# Patient Record
Sex: MALE | Race: WHITE | NOT HISPANIC OR LATINO | Employment: OTHER | URBAN - NONMETROPOLITAN AREA
[De-identification: names, ages, dates, MRNs, and addresses within clinical notes are randomized per-mention and may not be internally consistent; named-entity substitution may affect disease eponyms.]

---

## 2023-12-14 DIAGNOSIS — I10 PRIMARY HYPERTENSION: Primary | ICD-10-CM

## 2023-12-14 RX ORDER — ASPIRIN 325 MG
325 TABLET ORAL DAILY
COMMUNITY

## 2023-12-14 RX ORDER — RANOLAZINE 500 MG/1
500 TABLET, EXTENDED RELEASE ORAL 2 TIMES DAILY
COMMUNITY
Start: 2022-11-14

## 2023-12-14 RX ORDER — PENICILLIN V POTASSIUM 500 MG/1
500 TABLET, FILM COATED ORAL 4 TIMES DAILY
COMMUNITY

## 2023-12-14 RX ORDER — AMLODIPINE BESYLATE 10 MG/1
10 TABLET ORAL DAILY
COMMUNITY

## 2023-12-14 RX ORDER — HYDROCHLOROTHIAZIDE 25 MG/1
25 TABLET ORAL DAILY
COMMUNITY
End: 2024-04-03 | Stop reason: SDUPTHER

## 2023-12-14 RX ORDER — TRAZODONE HYDROCHLORIDE 100 MG/1
100 TABLET ORAL NIGHTLY
COMMUNITY
Start: 2020-01-27

## 2023-12-14 RX ORDER — DOXYCYCLINE 100 MG/1
100 TABLET ORAL 2 TIMES DAILY
COMMUNITY
Start: 2020-01-27

## 2023-12-14 RX ORDER — CLOTRIMAZOLE 1 %
1 CREAM (GRAM) TOPICAL 2 TIMES DAILY
COMMUNITY
Start: 2023-07-02

## 2023-12-14 RX ORDER — AMOXICILLIN AND CLAVULANATE POTASSIUM 875; 125 MG/1; MG/1
1 TABLET, FILM COATED ORAL 2 TIMES DAILY
COMMUNITY
Start: 2023-07-02 | End: 2023-07-09

## 2023-12-14 RX ORDER — ASPIRIN 81 MG/1
81 TABLET ORAL DAILY
COMMUNITY

## 2023-12-14 RX ORDER — METOPROLOL SUCCINATE 25 MG/1
25 TABLET, EXTENDED RELEASE ORAL DAILY
COMMUNITY
Start: 2020-01-27

## 2023-12-14 RX ORDER — METOPROLOL SUCCINATE 50 MG/1
50 TABLET, EXTENDED RELEASE ORAL DAILY
COMMUNITY
End: 2024-02-02 | Stop reason: SDUPTHER

## 2023-12-14 RX ORDER — PREDNISONE 20 MG/1
20 TABLET ORAL DAILY
COMMUNITY
Start: 2020-01-27

## 2023-12-14 RX ORDER — CLOPIDOGREL BISULFATE 75 MG/1
75 TABLET ORAL DAILY
COMMUNITY
End: 2023-12-14 | Stop reason: SDUPTHER

## 2023-12-14 RX ORDER — ATORVASTATIN CALCIUM 20 MG/1
20 TABLET, FILM COATED ORAL DAILY
COMMUNITY

## 2023-12-19 RX ORDER — CLOPIDOGREL BISULFATE 75 MG/1
75 TABLET ORAL DAILY
Qty: 90 TABLET | Refills: 3 | Status: SHIPPED | OUTPATIENT
Start: 2023-12-19 | End: 2024-01-29 | Stop reason: SDUPTHER

## 2024-01-29 DIAGNOSIS — I10 PRIMARY HYPERTENSION: ICD-10-CM

## 2024-01-29 NOTE — TELEPHONE ENCOUNTER
Pt called in to request a refill       Requested Prescriptions     Pending Prescriptions Disp Refills    clopidogrel (Plavix) 75 mg tablet 90 tablet 3     Sig: Take 1 tablet (75 mg) by mouth once daily.

## 2024-01-30 RX ORDER — CLOPIDOGREL BISULFATE 75 MG/1
75 TABLET ORAL DAILY
Qty: 90 TABLET | Refills: 3 | Status: SHIPPED | OUTPATIENT
Start: 2024-01-30 | End: 2024-04-29 | Stop reason: SDUPTHER

## 2024-02-02 DIAGNOSIS — I10 PRIMARY HYPERTENSION: Primary | ICD-10-CM

## 2024-02-02 RX ORDER — METOPROLOL SUCCINATE 50 MG/1
50 TABLET, EXTENDED RELEASE ORAL DAILY
Qty: 90 TABLET | Refills: 3 | Status: SHIPPED | OUTPATIENT
Start: 2024-02-02 | End: 2025-01-27

## 2024-02-02 NOTE — TELEPHONE ENCOUNTER
Pt is requesting a refill     Requested Prescriptions     Pending Prescriptions Disp Refills    metoprolol succinate XL (Toprol-XL) 50 mg 24 hr tablet 90 tablet 3     Sig: Take 1 tablet (50 mg) by mouth once daily.

## 2024-04-03 DIAGNOSIS — I10 PRIMARY HYPERTENSION: Primary | ICD-10-CM

## 2024-04-03 RX ORDER — HYDROCHLOROTHIAZIDE 25 MG/1
25 TABLET ORAL DAILY
Qty: 90 TABLET | Refills: 3 | Status: SHIPPED | OUTPATIENT
Start: 2024-04-03 | End: 2025-03-29

## 2024-04-03 NOTE — TELEPHONE ENCOUNTER
Pt is requesting a refill, due to running low       Requested Prescriptions     Pending Prescriptions Disp Refills    hydroCHLOROthiazide (HYDRODiuril) 25 mg tablet 90 tablet 3     Sig: Take 1 tablet (25 mg) by mouth once daily.

## 2024-04-29 DIAGNOSIS — I25.10 CORONARY ARTERY DISEASE INVOLVING NATIVE HEART, UNSPECIFIED VESSEL OR LESION TYPE, UNSPECIFIED WHETHER ANGINA PRESENT: ICD-10-CM

## 2024-04-29 DIAGNOSIS — I10 PRIMARY HYPERTENSION: ICD-10-CM

## 2024-04-29 NOTE — TELEPHONE ENCOUNTER
Christophe Ambriz  has called in to request a refill on his:    Clopidogrel 75mg     Medication sent to pharmacy and Christophe Ambriz  will be notified of when available for / has been sent out for delivery         Requested Prescriptions     Pending Prescriptions Disp Refills    clopidogrel (Plavix) 75 mg tablet 90 tablet 3     Sig: Take 1 tablet (75 mg) by mouth once daily.

## 2024-04-30 RX ORDER — CLOPIDOGREL BISULFATE 75 MG/1
75 TABLET ORAL DAILY
Qty: 90 TABLET | Refills: 3 | Status: SHIPPED | OUTPATIENT
Start: 2024-04-30 | End: 2025-04-25

## 2024-05-11 ENCOUNTER — HOSPITAL ENCOUNTER (EMERGENCY)
Facility: HOSPITAL | Age: 74
Discharge: HOME | End: 2024-05-11
Attending: EMERGENCY MEDICINE
Payer: MEDICARE

## 2024-05-11 VITALS
HEIGHT: 73 IN | WEIGHT: 210 LBS | BODY MASS INDEX: 27.83 KG/M2 | TEMPERATURE: 98.4 F | RESPIRATION RATE: 16 BRPM | DIASTOLIC BLOOD PRESSURE: 77 MMHG | HEART RATE: 72 BPM | OXYGEN SATURATION: 96 % | SYSTOLIC BLOOD PRESSURE: 138 MMHG

## 2024-05-11 DIAGNOSIS — K08.89 PAIN, DENTAL: Primary | ICD-10-CM

## 2024-05-11 PROCEDURE — 2500000001 HC RX 250 WO HCPCS SELF ADMINISTERED DRUGS (ALT 637 FOR MEDICARE OP): Mod: SE | Performed by: EMERGENCY MEDICINE

## 2024-05-11 PROCEDURE — 99283 EMERGENCY DEPT VISIT LOW MDM: CPT

## 2024-05-11 RX ORDER — AMOXICILLIN 500 MG/1
500 CAPSULE ORAL 3 TIMES DAILY
Qty: 30 CAPSULE | Refills: 0 | Status: SHIPPED | OUTPATIENT
Start: 2024-05-11 | End: 2024-05-21

## 2024-05-11 RX ORDER — AMOXICILLIN 500 MG/1
500 CAPSULE ORAL EVERY 8 HOURS SCHEDULED
Status: DISCONTINUED | OUTPATIENT
Start: 2024-05-11 | End: 2024-05-12 | Stop reason: HOSPADM

## 2024-05-11 RX ADMIN — AMOXICILLIN 500 MG: 500 CAPSULE ORAL at 22:30

## 2024-05-11 ASSESSMENT — PAIN DESCRIPTION - LOCATION: LOCATION: MOUTH

## 2024-05-11 ASSESSMENT — PAIN SCALES - GENERAL: PAINLEVEL_OUTOF10: 7

## 2024-05-11 ASSESSMENT — PAIN DESCRIPTION - PAIN TYPE: TYPE: ACUTE PAIN

## 2024-05-11 ASSESSMENT — PAIN - FUNCTIONAL ASSESSMENT: PAIN_FUNCTIONAL_ASSESSMENT: 0-10

## 2024-05-11 ASSESSMENT — PAIN DESCRIPTION - DESCRIPTORS: DESCRIPTORS: ACHING

## 2024-05-11 ASSESSMENT — PAIN DESCRIPTION - FREQUENCY: FREQUENCY: CONSTANT/CONTINUOUS

## 2024-05-12 NOTE — DISCHARGE INSTRUCTIONS
Follow-up with your dentist of choice.  There is a dental office in the [facility].  Recommend following up with your dentist of choice to soon as possible

## 2024-05-12 NOTE — ED PROVIDER NOTES
HPI   Chief Complaint   Patient presents with    Dental Pain     Dental pain       74-year-old male presents to the emergency department complaining of dental pain.  States that is been present this time for the past 3 days.  History of poor dentition.  States that it happens on and off and he needs antibiotics and Vicodin.  Denies any fevers or chills.  No other complaints.  Has been taking Tylenol and ibuprofen for the pain                          No data recorded                   Patient History   No past medical history on file.  No past surgical history on file.  No family history on file.  Social History     Tobacco Use    Smoking status: Not on file    Smokeless tobacco: Not on file   Substance Use Topics    Alcohol use: Not on file    Drug use: Not on file       Physical Exam   ED Triage Vitals [05/11/24 2119]   Temperature Heart Rate Respirations BP   36.9 °C (98.4 °F) 77 14 134/79      Pulse Ox Temp Source Heart Rate Source Patient Position   95 % Tympanic -- Sitting      BP Location FiO2 (%)     Left arm --       Physical Exam  HENT:      Head: Normocephalic and atraumatic.      Mouth/Throat:      Mouth: Mucous membranes are moist.      Comments: Patient with poor dentition.  Eroded teeth in multiple sites with gingival erythema without evidence of abscess formation.  Eyes:      Extraocular Movements: Extraocular movements intact.      Pupils: Pupils are equal, round, and reactive to light.   Cardiovascular:      Rate and Rhythm: Normal rate and regular rhythm.   Pulmonary:      Effort: Pulmonary effort is normal.      Breath sounds: Normal breath sounds.   Abdominal:      Palpations: Abdomen is soft.      Tenderness: There is no abdominal tenderness.   Neurological:      Mental Status: He is alert.         ED Course & MDM   Diagnoses as of 05/11/24 2222   Pain, dental       Medical Decision Making  Patient with poor dentition who presents with dental pain.  Patient will be started on amoxicillin for  infection.  He is to continue taking Tylenol and ibuprofen.  He needs to see a dentist in follow-up.  There is no abscess.  No concerns for Zackery's angina.  No posterior pharyngeal erythema.        Procedure  Procedures     Franki Saldivar MD  05/11/24 2221       Franki Saldivar MD  05/11/24 2222

## 2024-07-04 ENCOUNTER — HOSPITAL ENCOUNTER (EMERGENCY)
Facility: HOSPITAL | Age: 74
Discharge: OTHER INSTITUTION | End: 2024-07-06
Attending: EMERGENCY MEDICINE
Payer: MEDICARE

## 2024-07-04 DIAGNOSIS — N28.89 RENAL MASS: ICD-10-CM

## 2024-07-04 DIAGNOSIS — C79.9 METASTATIC MALIGNANT NEOPLASM, UNSPECIFIED SITE (MULTI): Primary | ICD-10-CM

## 2024-07-04 DIAGNOSIS — M89.9 LYTIC BONE LESIONS ON XRAY: ICD-10-CM

## 2024-07-04 PROCEDURE — 96375 TX/PRO/DX INJ NEW DRUG ADDON: CPT

## 2024-07-04 PROCEDURE — 99285 EMERGENCY DEPT VISIT HI MDM: CPT | Mod: 25

## 2024-07-04 PROCEDURE — 96374 THER/PROPH/DIAG INJ IV PUSH: CPT

## 2024-07-04 PROCEDURE — 96376 TX/PRO/DX INJ SAME DRUG ADON: CPT

## 2024-07-04 ASSESSMENT — COLUMBIA-SUICIDE SEVERITY RATING SCALE - C-SSRS
2. HAVE YOU ACTUALLY HAD ANY THOUGHTS OF KILLING YOURSELF?: NO
6. HAVE YOU EVER DONE ANYTHING, STARTED TO DO ANYTHING, OR PREPARED TO DO ANYTHING TO END YOUR LIFE?: NO
1. IN THE PAST MONTH, HAVE YOU WISHED YOU WERE DEAD OR WISHED YOU COULD GO TO SLEEP AND NOT WAKE UP?: NO

## 2024-07-04 ASSESSMENT — PAIN DESCRIPTION - PAIN TYPE: TYPE: ACUTE PAIN

## 2024-07-04 ASSESSMENT — PAIN - FUNCTIONAL ASSESSMENT: PAIN_FUNCTIONAL_ASSESSMENT: 0-10

## 2024-07-04 ASSESSMENT — PAIN SCALES - GENERAL: PAINLEVEL_OUTOF10: 7

## 2024-07-04 ASSESSMENT — PAIN DESCRIPTION - LOCATION: LOCATION: BACK

## 2024-07-05 ENCOUNTER — APPOINTMENT (OUTPATIENT)
Dept: RADIOLOGY | Facility: HOSPITAL | Age: 74
End: 2024-07-05
Payer: MEDICARE

## 2024-07-05 LAB
ALBUMIN SERPL BCP-MCNC: 3.8 G/DL (ref 3.4–5)
ALP SERPL-CCNC: 103 U/L (ref 33–136)
ALT SERPL W P-5'-P-CCNC: 15 U/L (ref 10–52)
ANION GAP SERPL CALC-SCNC: 16 MMOL/L (ref 10–20)
APPEARANCE UR: CLEAR
AST SERPL W P-5'-P-CCNC: 18 U/L (ref 9–39)
BILIRUB SERPL-MCNC: 0.5 MG/DL (ref 0–1.2)
BILIRUB UR STRIP.AUTO-MCNC: NEGATIVE MG/DL
BUN SERPL-MCNC: 11 MG/DL (ref 6–23)
CALCIUM SERPL-MCNC: 9.7 MG/DL (ref 8.6–10.3)
CHLORIDE SERPL-SCNC: 106 MMOL/L (ref 98–107)
CO2 SERPL-SCNC: 22 MMOL/L (ref 21–32)
COLOR UR: ABNORMAL
CREAT SERPL-MCNC: 0.75 MG/DL (ref 0.5–1.3)
CRP SERPL-MCNC: 10.89 MG/DL
EGFRCR SERPLBLD CKD-EPI 2021: >90 ML/MIN/1.73M*2
ERYTHROCYTE [DISTWIDTH] IN BLOOD BY AUTOMATED COUNT: 13.5 % (ref 11.5–14.5)
GLUCOSE SERPL-MCNC: 105 MG/DL (ref 74–99)
GLUCOSE UR STRIP.AUTO-MCNC: NORMAL MG/DL
HCT VFR BLD AUTO: 38.7 % (ref 41–52)
HGB BLD-MCNC: 12.5 G/DL (ref 13.5–17.5)
KETONES UR STRIP.AUTO-MCNC: ABNORMAL MG/DL
LEUKOCYTE ESTERASE UR QL STRIP.AUTO: NEGATIVE
MCH RBC QN AUTO: 27.2 PG (ref 26–34)
MCHC RBC AUTO-ENTMCNC: 32.3 G/DL (ref 32–36)
MCV RBC AUTO: 84 FL (ref 80–100)
MIXED CELL CASTS #/AREA UR COMP ASSIST: ABNORMAL /LPF
MUCOUS THREADS #/AREA URNS AUTO: ABNORMAL /LPF
NITRITE UR QL STRIP.AUTO: NEGATIVE
NRBC BLD-RTO: 0 /100 WBCS (ref 0–0)
PH UR STRIP.AUTO: 5.5 [PH]
PLATELET # BLD AUTO: 494 X10*3/UL (ref 150–450)
POTASSIUM SERPL-SCNC: 3.7 MMOL/L (ref 3.5–5.3)
PROT SERPL-MCNC: 7.8 G/DL (ref 6.4–8.2)
PROT UR STRIP.AUTO-MCNC: ABNORMAL MG/DL
PSA SERPL-MCNC: 3.69 NG/ML
RBC # BLD AUTO: 4.59 X10*6/UL (ref 4.5–5.9)
RBC # UR STRIP.AUTO: ABNORMAL /UL
RBC #/AREA URNS AUTO: ABNORMAL /HPF
SODIUM SERPL-SCNC: 140 MMOL/L (ref 136–145)
SP GR UR STRIP.AUTO: 1.03
UROBILINOGEN UR STRIP.AUTO-MCNC: NORMAL MG/DL
WBC # BLD AUTO: 12.7 X10*3/UL (ref 4.4–11.3)
WBC #/AREA URNS AUTO: ABNORMAL /HPF

## 2024-07-05 PROCEDURE — 80053 COMPREHEN METABOLIC PANEL: CPT | Performed by: EMERGENCY MEDICINE

## 2024-07-05 PROCEDURE — 36415 COLL VENOUS BLD VENIPUNCTURE: CPT | Performed by: EMERGENCY MEDICINE

## 2024-07-05 PROCEDURE — 2500000004 HC RX 250 GENERAL PHARMACY W/ HCPCS (ALT 636 FOR OP/ED): Mod: SE | Performed by: EMERGENCY MEDICINE

## 2024-07-05 PROCEDURE — 74177 CT ABD & PELVIS W/CONTRAST: CPT

## 2024-07-05 PROCEDURE — 72128 CT CHEST SPINE W/O DYE: CPT | Performed by: STUDENT IN AN ORGANIZED HEALTH CARE EDUCATION/TRAINING PROGRAM

## 2024-07-05 PROCEDURE — 72131 CT LUMBAR SPINE W/O DYE: CPT

## 2024-07-05 PROCEDURE — 2500000001 HC RX 250 WO HCPCS SELF ADMINISTERED DRUGS (ALT 637 FOR MEDICARE OP): Mod: SE | Performed by: EMERGENCY MEDICINE

## 2024-07-05 PROCEDURE — 2550000001 HC RX 255 CONTRASTS: Mod: SE

## 2024-07-05 PROCEDURE — 72128 CT CHEST SPINE W/O DYE: CPT

## 2024-07-05 PROCEDURE — 71260 CT THORAX DX C+: CPT | Performed by: RADIOLOGY

## 2024-07-05 PROCEDURE — 81001 URINALYSIS AUTO W/SCOPE: CPT | Performed by: EMERGENCY MEDICINE

## 2024-07-05 PROCEDURE — 84153 ASSAY OF PSA TOTAL: CPT | Mod: GENLAB | Performed by: EMERGENCY MEDICINE

## 2024-07-05 PROCEDURE — 96376 TX/PRO/DX INJ SAME DRUG ADON: CPT

## 2024-07-05 PROCEDURE — 74177 CT ABD & PELVIS W/CONTRAST: CPT | Performed by: RADIOLOGY

## 2024-07-05 PROCEDURE — 2550000001 HC RX 255 CONTRASTS: Mod: SE | Performed by: EMERGENCY MEDICINE

## 2024-07-05 PROCEDURE — 86140 C-REACTIVE PROTEIN: CPT | Performed by: EMERGENCY MEDICINE

## 2024-07-05 PROCEDURE — 85027 COMPLETE CBC AUTOMATED: CPT | Performed by: EMERGENCY MEDICINE

## 2024-07-05 PROCEDURE — 72158 MRI LUMBAR SPINE W/O & W/DYE: CPT | Performed by: RADIOLOGY

## 2024-07-05 PROCEDURE — 72131 CT LUMBAR SPINE W/O DYE: CPT | Performed by: STUDENT IN AN ORGANIZED HEALTH CARE EDUCATION/TRAINING PROGRAM

## 2024-07-05 PROCEDURE — 72158 MRI LUMBAR SPINE W/O & W/DYE: CPT

## 2024-07-05 PROCEDURE — A9575 INJ GADOTERATE MEGLUMI 0.1ML: HCPCS | Mod: SE

## 2024-07-05 RX ORDER — GADOTERATE MEGLUMINE 376.9 MG/ML
19 INJECTION INTRAVENOUS
Status: COMPLETED | OUTPATIENT
Start: 2024-07-05 | End: 2024-07-05

## 2024-07-05 RX ORDER — ONDANSETRON HYDROCHLORIDE 2 MG/ML
4 INJECTION, SOLUTION INTRAVENOUS ONCE
Status: COMPLETED | OUTPATIENT
Start: 2024-07-05 | End: 2024-07-05

## 2024-07-05 RX ORDER — HYDROMORPHONE HYDROCHLORIDE 1 MG/ML
1 INJECTION, SOLUTION INTRAMUSCULAR; INTRAVENOUS; SUBCUTANEOUS ONCE
Status: COMPLETED | OUTPATIENT
Start: 2024-07-05 | End: 2024-07-05

## 2024-07-05 RX ORDER — OXYCODONE HYDROCHLORIDE 5 MG/1
10 TABLET ORAL ONCE
Status: COMPLETED | OUTPATIENT
Start: 2024-07-05 | End: 2024-07-05

## 2024-07-05 RX ORDER — OXYCODONE HYDROCHLORIDE 5 MG/1
10 TABLET ORAL EVERY 6 HOURS PRN
Status: DISCONTINUED | OUTPATIENT
Start: 2024-07-05 | End: 2024-07-05

## 2024-07-05 RX ADMIN — HYDROMORPHONE HYDROCHLORIDE 0.5 MG: 1 INJECTION, SOLUTION INTRAMUSCULAR; INTRAVENOUS; SUBCUTANEOUS at 14:21

## 2024-07-05 RX ADMIN — HYDROMORPHONE HYDROCHLORIDE 1 MG: 1 INJECTION, SOLUTION INTRAMUSCULAR; INTRAVENOUS; SUBCUTANEOUS at 04:21

## 2024-07-05 RX ADMIN — IOHEXOL 75 ML: 350 INJECTION, SOLUTION INTRAVENOUS at 05:44

## 2024-07-05 RX ADMIN — GADOTERATE MEGLUMINE 19 ML: 376.9 INJECTION INTRAVENOUS at 10:11

## 2024-07-05 RX ADMIN — ONDANSETRON 4 MG: 2 INJECTION INTRAMUSCULAR; INTRAVENOUS at 04:21

## 2024-07-05 RX ADMIN — HYDROMORPHONE HYDROCHLORIDE 0.5 MG: 1 INJECTION, SOLUTION INTRAMUSCULAR; INTRAVENOUS; SUBCUTANEOUS at 20:25

## 2024-07-05 RX ADMIN — OXYCODONE HYDROCHLORIDE 10 MG: 5 TABLET ORAL at 00:34

## 2024-07-05 ASSESSMENT — PAIN SCALES - GENERAL
PAINLEVEL_OUTOF10: 7
PAINLEVEL_OUTOF10: 3
PAINLEVEL_OUTOF10: 8
PAINLEVEL_OUTOF10: 8

## 2024-07-05 ASSESSMENT — PAIN DESCRIPTION - LOCATION: LOCATION: BACK

## 2024-07-05 NOTE — ED PROVIDER NOTES
HPI   Chief Complaint   Patient presents with    Back Pain     Lower back pain for 36 days. Patient state he hasn't seen a dr       The patient has severe low back pain that has precluded his ambulation for the last 37 days.  He says that he is unaware of any injury strain or lifting accident that could have caused the pain.  He said it was more gradual but suddenly he could barely even move much less try to stand up.  He moves all extremities well and has no incontinence.  Sensorimotor function is intact in lower extremities.  He is fairly tender in the midline of the lumbar level.                        No data recorded                     Patient History   History reviewed. No pertinent past medical history.  History reviewed. No pertinent surgical history.  No family history on file.  Social History     Tobacco Use    Smoking status: Unknown    Smokeless tobacco: Not on file   Substance Use Topics    Alcohol use: Not on file    Drug use: Not on file       Physical Exam   ED Triage Vitals [07/04/24 2124]   Temperature Heart Rate Respirations BP   36.3 °C (97.3 °F) 72 16 148/84      Pulse Ox Temp Source Heart Rate Source Patient Position   98 % Temporal Monitor Sitting      BP Location FiO2 (%)     Right arm --       Physical Exam  Vitals and nursing note reviewed.   HENT:      Head: Normocephalic and atraumatic.      Right Ear: Tympanic membrane and ear canal normal.      Left Ear: Tympanic membrane and ear canal normal.      Nose: Nose normal.      Mouth/Throat:      Mouth: Mucous membranes are moist.   Cardiovascular:      Rate and Rhythm: Normal rate.   Pulmonary:      Effort: Pulmonary effort is normal.      Breath sounds: Normal breath sounds.   Abdominal:      General: Abdomen is flat.      Palpations: Abdomen is soft.   Musculoskeletal:         General: Tenderness present. Normal range of motion.      Cervical back: Normal range of motion.      Comments: There is midline tenderness in the lumbar spine    Skin:     General: Skin is warm and dry.   Neurological:      General: No focal deficit present.      Mental Status: He is alert.         ED Course & MDM   Diagnoses as of 07/06/24 2204   Metastatic malignant neoplasm, unspecified site (Multi)   Renal mass   Lytic bone lesions on xray       Medical Decision Making  Was not clear to me why the patient had been given narcotics without any defined cause or source of his low back pain.  He says the pain is severe and goes down the legs.  He had no incontinence.  Before sending him home with new medication I did scan his low back and thoracic spine and they have found a lytic lesion in the vertebral body of L3 which I think is probably metastatic.  There is also about a 20% compression fracture.  Since the patient cannot get out and go to the doctor and do much of anything since he is so much pain, I have opted to go ahead and scan when I can today to check for other related tumor and if possible, could likely obtain an MRI of the spine with contrast as recommended this morning.  After that he will need to be transferred in order to receive care for this lytic lesion.        Procedure  Procedures     Jose Soler MD  07/05/24 0348       Jose Soler MD  07/05/24 0432       Jose Soler MD  07/06/24 2204

## 2024-07-05 NOTE — PROGRESS NOTES
Emergency Medicine Transition of Care Note.    I received Christophe Ambriz in signout from Dr. Soler.  Please see the previous ED provider note for all HPI, PE and MDM up to the time of signout at 0700. This is in addition to the primary record.    In brief Christophe Ambriz is an 74 y.o. male presenting for back pain  Chief Complaint   Patient presents with    Back Pain     Lower back pain for 36 days. Patient state he hasn't seen a dr     At the time of signout we were awaiting: MRI results and transfer    Diagnoses as of 07/05/24 1053   Metastatic malignant neoplasm, unspecified site (Multi)   Renal mass   Lytic bone lesions on xray       Medical Decision Making  Patient is a 74-year-old male who initially presented to the ED for about 1 month of back pain, to the point where he has been unable to ambulate or get out of bed.    Workup in the ED was initiated by the previous physician, concerning for metastatic malignancy.  At time of signout, we are awaiting MRI results as well as transfer to a facility with oncology available, as patient is now unable to care for himself or get out of bed secondary to the pain.    Patient was informed of their lab and imaging results, and all questions and concerns were answered. Transfer planning for further management was discussed at this time, to which the patient was agreeable.  I discussed the case with Dr. Matias, oncologist at [facility], who accepts patient for transfer.      Final diagnoses:   [C79.9] Metastatic malignant neoplasm, unspecified site (Multi)   [N28.89] Renal mass   [M89.9] Lytic bone lesions on xray     Procedure  Procedures    Lexis Santizo MD

## 2024-07-06 ENCOUNTER — APPOINTMENT (OUTPATIENT)
Dept: RADIOLOGY | Facility: HOSPITAL | Age: 74
DRG: 456 | End: 2024-07-06
Payer: MEDICARE

## 2024-07-06 ENCOUNTER — OFFICE VISIT (OUTPATIENT)
Dept: HEMATOLOGY/ONCOLOGY | Facility: HOSPITAL | Age: 74
DRG: 456 | End: 2024-07-06
Payer: MEDICARE

## 2024-07-06 ENCOUNTER — HOSPITAL ENCOUNTER (INPATIENT)
Facility: HOSPITAL | Age: 74
DRG: 456 | End: 2024-07-06
Attending: INTERNAL MEDICINE | Admitting: INTERNAL MEDICINE
Payer: MEDICARE

## 2024-07-06 VITALS
DIASTOLIC BLOOD PRESSURE: 81 MMHG | TEMPERATURE: 97.3 F | SYSTOLIC BLOOD PRESSURE: 143 MMHG | RESPIRATION RATE: 18 BRPM | HEIGHT: 73 IN | HEART RATE: 71 BPM | WEIGHT: 210 LBS | OXYGEN SATURATION: 94 % | BODY MASS INDEX: 27.83 KG/M2

## 2024-07-06 DIAGNOSIS — G47.00 INSOMNIA, UNSPECIFIED TYPE: ICD-10-CM

## 2024-07-06 DIAGNOSIS — C64.9 RENAL CELL CARCINOMA, UNSPECIFIED LATERALITY (MULTI): ICD-10-CM

## 2024-07-06 DIAGNOSIS — Z95.5 STENTED CORONARY ARTERY: ICD-10-CM

## 2024-07-06 DIAGNOSIS — K59.03 DRUG-INDUCED CONSTIPATION: ICD-10-CM

## 2024-07-06 DIAGNOSIS — I10 PRIMARY HYPERTENSION: ICD-10-CM

## 2024-07-06 DIAGNOSIS — R52 PAIN: ICD-10-CM

## 2024-07-06 DIAGNOSIS — E78.5 HYPERLIPIDEMIA, UNSPECIFIED HYPERLIPIDEMIA TYPE: ICD-10-CM

## 2024-07-06 DIAGNOSIS — M84.48XA PATHOLOGIC LUMBAR VERTEBRAL FRACTURE, INITIAL ENCOUNTER: Primary | ICD-10-CM

## 2024-07-06 LAB
ABO GROUP (TYPE) IN BLOOD: NORMAL
ALBUMIN SERPL BCP-MCNC: 3.5 G/DL (ref 3.4–5)
ALP SERPL-CCNC: 110 U/L (ref 33–136)
ALT SERPL W P-5'-P-CCNC: 14 U/L (ref 10–52)
ANION GAP SERPL CALC-SCNC: 18 MMOL/L (ref 10–20)
ANTIBODY SCREEN: NORMAL
APPEARANCE UR: CLEAR
APPEARANCE UR: CLEAR
APTT PPP: 35 SECONDS (ref 27–38)
AST SERPL W P-5'-P-CCNC: 15 U/L (ref 9–39)
BASOPHILS # BLD AUTO: 0.05 X10*3/UL (ref 0–0.1)
BASOPHILS NFR BLD AUTO: 0.4 %
BILIRUB DIRECT SERPL-MCNC: 0.1 MG/DL (ref 0–0.3)
BILIRUB SERPL-MCNC: 0.4 MG/DL (ref 0–1.2)
BILIRUB UR STRIP.AUTO-MCNC: NEGATIVE MG/DL
BILIRUB UR STRIP.AUTO-MCNC: NEGATIVE MG/DL
BUN SERPL-MCNC: 10 MG/DL (ref 6–23)
CALCIUM SERPL-MCNC: 9 MG/DL (ref 8.6–10.6)
CHLORIDE SERPL-SCNC: 102 MMOL/L (ref 98–107)
CO2 SERPL-SCNC: 23 MMOL/L (ref 21–32)
COLOR UR: ABNORMAL
COLOR UR: ABNORMAL
CREAT SERPL-MCNC: 0.64 MG/DL (ref 0.5–1.3)
EGFRCR SERPLBLD CKD-EPI 2021: >90 ML/MIN/1.73M*2
EOSINOPHIL # BLD AUTO: 0.06 X10*3/UL (ref 0–0.4)
EOSINOPHIL NFR BLD AUTO: 0.4 %
ERYTHROCYTE [DISTWIDTH] IN BLOOD BY AUTOMATED COUNT: 13.6 % (ref 11.5–14.5)
GLUCOSE SERPL-MCNC: 153 MG/DL (ref 74–99)
GLUCOSE UR STRIP.AUTO-MCNC: NORMAL MG/DL
GLUCOSE UR STRIP.AUTO-MCNC: NORMAL MG/DL
HCT VFR BLD AUTO: 36.2 % (ref 41–52)
HGB BLD-MCNC: 11.5 G/DL (ref 13.5–17.5)
HOLD SPECIMEN: NORMAL
IMM GRANULOCYTES # BLD AUTO: 0.08 X10*3/UL (ref 0–0.5)
IMM GRANULOCYTES NFR BLD AUTO: 0.6 % (ref 0–0.9)
INR PPP: 1.2 (ref 0.9–1.1)
KETONES UR STRIP.AUTO-MCNC: ABNORMAL MG/DL
KETONES UR STRIP.AUTO-MCNC: NEGATIVE MG/DL
LDH SERPL L TO P-CCNC: 167 U/L (ref 84–246)
LEUKOCYTE ESTERASE UR QL STRIP.AUTO: NEGATIVE
LEUKOCYTE ESTERASE UR QL STRIP.AUTO: NEGATIVE
LYMPHOCYTES # BLD AUTO: 2.31 X10*3/UL (ref 0.8–3)
LYMPHOCYTES NFR BLD AUTO: 16.8 %
MAGNESIUM SERPL-MCNC: 1.95 MG/DL (ref 1.6–2.4)
MCH RBC QN AUTO: 27.1 PG (ref 26–34)
MCHC RBC AUTO-ENTMCNC: 31.8 G/DL (ref 32–36)
MCV RBC AUTO: 85 FL (ref 80–100)
MONOCYTES # BLD AUTO: 0.95 X10*3/UL (ref 0.05–0.8)
MONOCYTES NFR BLD AUTO: 6.9 %
MUCOUS THREADS #/AREA URNS AUTO: ABNORMAL /LPF
MUCOUS THREADS #/AREA URNS AUTO: NORMAL /LPF
NEUTROPHILS # BLD AUTO: 10.27 X10*3/UL (ref 1.6–5.5)
NEUTROPHILS NFR BLD AUTO: 74.9 %
NITRITE UR QL STRIP.AUTO: NEGATIVE
NITRITE UR QL STRIP.AUTO: NEGATIVE
NRBC BLD-RTO: 0 /100 WBCS (ref 0–0)
PH UR STRIP.AUTO: 6 [PH]
PH UR STRIP.AUTO: 6 [PH]
PHOSPHATE SERPL-MCNC: 2.4 MG/DL (ref 2.5–4.9)
PLATELET # BLD AUTO: 483 X10*3/UL (ref 150–450)
POTASSIUM SERPL-SCNC: 3.9 MMOL/L (ref 3.5–5.3)
PROT SERPL-MCNC: 6.8 G/DL (ref 6.4–8.2)
PROT UR STRIP.AUTO-MCNC: ABNORMAL MG/DL
PROT UR STRIP.AUTO-MCNC: ABNORMAL MG/DL
PROTHROMBIN TIME: 13.9 SECONDS (ref 9.8–12.8)
RBC # BLD AUTO: 4.24 X10*6/UL (ref 4.5–5.9)
RBC # UR STRIP.AUTO: ABNORMAL /UL
RBC # UR STRIP.AUTO: ABNORMAL /UL
RBC #/AREA URNS AUTO: ABNORMAL /HPF
RBC #/AREA URNS AUTO: NORMAL /HPF
RH FACTOR (ANTIGEN D): NORMAL
SODIUM SERPL-SCNC: 139 MMOL/L (ref 136–145)
SP GR UR STRIP.AUTO: 1.01
SP GR UR STRIP.AUTO: 1.01
UROBILINOGEN UR STRIP.AUTO-MCNC: NORMAL MG/DL
UROBILINOGEN UR STRIP.AUTO-MCNC: NORMAL MG/DL
WBC # BLD AUTO: 13.7 X10*3/UL (ref 4.4–11.3)
WBC #/AREA URNS AUTO: ABNORMAL /HPF
WBC #/AREA URNS AUTO: NORMAL /HPF

## 2024-07-06 PROCEDURE — 82248 BILIRUBIN DIRECT: CPT

## 2024-07-06 PROCEDURE — 71045 X-RAY EXAM CHEST 1 VIEW: CPT

## 2024-07-06 PROCEDURE — 2500000001 HC RX 250 WO HCPCS SELF ADMINISTERED DRUGS (ALT 637 FOR MEDICARE OP): Performed by: STUDENT IN AN ORGANIZED HEALTH CARE EDUCATION/TRAINING PROGRAM

## 2024-07-06 PROCEDURE — 93005 ELECTROCARDIOGRAM TRACING: CPT

## 2024-07-06 PROCEDURE — 99222 1ST HOSP IP/OBS MODERATE 55: CPT | Performed by: STUDENT IN AN ORGANIZED HEALTH CARE EDUCATION/TRAINING PROGRAM

## 2024-07-06 PROCEDURE — 72100 X-RAY EXAM L-S SPINE 2/3 VWS: CPT

## 2024-07-06 PROCEDURE — 71045 X-RAY EXAM CHEST 1 VIEW: CPT | Performed by: RADIOLOGY

## 2024-07-06 PROCEDURE — 84100 ASSAY OF PHOSPHORUS: CPT

## 2024-07-06 PROCEDURE — 99223 1ST HOSP IP/OBS HIGH 75: CPT

## 2024-07-06 PROCEDURE — 86901 BLOOD TYPING SEROLOGIC RH(D): CPT

## 2024-07-06 PROCEDURE — 36415 COLL VENOUS BLD VENIPUNCTURE: CPT

## 2024-07-06 PROCEDURE — 83615 LACTATE (LD) (LDH) ENZYME: CPT | Performed by: STUDENT IN AN ORGANIZED HEALTH CARE EDUCATION/TRAINING PROGRAM

## 2024-07-06 PROCEDURE — 2500000004 HC RX 250 GENERAL PHARMACY W/ HCPCS (ALT 636 FOR OP/ED)

## 2024-07-06 PROCEDURE — 1170000001 HC PRIVATE ONCOLOGY ROOM DAILY

## 2024-07-06 PROCEDURE — 72100 X-RAY EXAM L-S SPINE 2/3 VWS: CPT | Performed by: RADIOLOGY

## 2024-07-06 PROCEDURE — 73552 X-RAY EXAM OF FEMUR 2/>: CPT | Mod: 50

## 2024-07-06 PROCEDURE — 2500000004 HC RX 250 GENERAL PHARMACY W/ HCPCS (ALT 636 FOR OP/ED): Mod: SE | Performed by: EMERGENCY MEDICINE

## 2024-07-06 PROCEDURE — 81001 URINALYSIS AUTO W/SCOPE: CPT

## 2024-07-06 PROCEDURE — 73552 X-RAY EXAM OF FEMUR 2/>: CPT | Mod: BILATERAL PROCEDURE | Performed by: RADIOLOGY

## 2024-07-06 PROCEDURE — 85730 THROMBOPLASTIN TIME PARTIAL: CPT

## 2024-07-06 PROCEDURE — 83735 ASSAY OF MAGNESIUM: CPT

## 2024-07-06 PROCEDURE — 85025 COMPLETE CBC W/AUTO DIFF WBC: CPT

## 2024-07-06 PROCEDURE — 81001 URINALYSIS AUTO W/SCOPE: CPT | Performed by: STUDENT IN AN ORGANIZED HEALTH CARE EDUCATION/TRAINING PROGRAM

## 2024-07-06 PROCEDURE — 86923 COMPATIBILITY TEST ELECTRIC: CPT

## 2024-07-06 PROCEDURE — 2500000001 HC RX 250 WO HCPCS SELF ADMINISTERED DRUGS (ALT 637 FOR MEDICARE OP)

## 2024-07-06 RX ORDER — ATORVASTATIN CALCIUM 20 MG/1
20 TABLET, FILM COATED ORAL NIGHTLY
Status: DISCONTINUED | OUTPATIENT
Start: 2024-07-06 | End: 2024-07-12 | Stop reason: HOSPADM

## 2024-07-06 RX ORDER — OXYCODONE HYDROCHLORIDE 5 MG/1
5 TABLET ORAL EVERY 6 HOURS PRN
Status: DISCONTINUED | OUTPATIENT
Start: 2024-07-06 | End: 2024-07-06

## 2024-07-06 RX ORDER — METOPROLOL SUCCINATE 50 MG/1
50 TABLET, EXTENDED RELEASE ORAL DAILY
Status: DISCONTINUED | OUTPATIENT
Start: 2024-07-06 | End: 2024-07-12 | Stop reason: HOSPADM

## 2024-07-06 RX ORDER — HYDROMORPHONE HYDROCHLORIDE 1 MG/ML
0.5 INJECTION, SOLUTION INTRAMUSCULAR; INTRAVENOUS; SUBCUTANEOUS EVERY 6 HOURS PRN
Status: DISCONTINUED | OUTPATIENT
Start: 2024-07-06 | End: 2024-07-12 | Stop reason: HOSPADM

## 2024-07-06 RX ORDER — TRAZODONE HYDROCHLORIDE 50 MG/1
50 TABLET ORAL NIGHTLY
Status: DISCONTINUED | OUTPATIENT
Start: 2024-07-06 | End: 2024-07-12 | Stop reason: HOSPADM

## 2024-07-06 RX ORDER — OXYCODONE HYDROCHLORIDE 5 MG/1
7.5 TABLET ORAL
Status: DISCONTINUED | OUTPATIENT
Start: 2024-07-06 | End: 2024-07-07

## 2024-07-06 RX ORDER — ACETAMINOPHEN 500 MG
1000 TABLET ORAL EVERY 6 HOURS PRN
COMMUNITY

## 2024-07-06 RX ORDER — ENOXAPARIN SODIUM 100 MG/ML
40 INJECTION SUBCUTANEOUS DAILY
Status: DISCONTINUED | OUTPATIENT
Start: 2024-07-06 | End: 2024-07-12 | Stop reason: HOSPADM

## 2024-07-06 RX ORDER — POLYETHYLENE GLYCOL 3350 17 G/17G
17 POWDER, FOR SOLUTION ORAL DAILY
Status: DISCONTINUED | OUTPATIENT
Start: 2024-07-06 | End: 2024-07-12 | Stop reason: HOSPADM

## 2024-07-06 RX ORDER — HYDROCHLOROTHIAZIDE 25 MG/1
25 TABLET ORAL DAILY
Status: DISCONTINUED | OUTPATIENT
Start: 2024-07-06 | End: 2024-07-12 | Stop reason: HOSPADM

## 2024-07-06 RX ORDER — SENNOSIDES 8.6 MG/1
2 TABLET ORAL 2 TIMES DAILY
Status: DISCONTINUED | OUTPATIENT
Start: 2024-07-06 | End: 2024-07-12 | Stop reason: HOSPADM

## 2024-07-06 RX ORDER — CLOPIDOGREL BISULFATE 75 MG/1
75 TABLET ORAL DAILY
Status: DISCONTINUED | OUTPATIENT
Start: 2024-07-06 | End: 2024-07-12 | Stop reason: HOSPADM

## 2024-07-06 RX ORDER — IBUPROFEN 600 MG/1
600 TABLET ORAL EVERY 8 HOURS
Status: DISCONTINUED | OUTPATIENT
Start: 2024-07-06 | End: 2024-07-06

## 2024-07-06 RX ORDER — LIDOCAINE 560 MG/1
1 PATCH PERCUTANEOUS; TOPICAL; TRANSDERMAL DAILY
Status: DISCONTINUED | OUTPATIENT
Start: 2024-07-06 | End: 2024-07-12 | Stop reason: HOSPADM

## 2024-07-06 RX ORDER — ACETAMINOPHEN 325 MG/1
975 TABLET ORAL EVERY 8 HOURS
Status: DISCONTINUED | OUTPATIENT
Start: 2024-07-06 | End: 2024-07-12 | Stop reason: HOSPADM

## 2024-07-06 RX ORDER — OXYCODONE HYDROCHLORIDE 10 MG/1
10 TABLET ORAL
Status: CANCELLED | OUTPATIENT
Start: 2024-07-06

## 2024-07-06 RX ORDER — ACETAMINOPHEN 325 MG/1
975 TABLET ORAL EVERY 8 HOURS PRN
Status: DISCONTINUED | OUTPATIENT
Start: 2024-07-06 | End: 2024-07-06

## 2024-07-06 RX ORDER — GABAPENTIN 100 MG/1
100 CAPSULE ORAL NIGHTLY
Status: DISCONTINUED | OUTPATIENT
Start: 2024-07-06 | End: 2024-07-12 | Stop reason: HOSPADM

## 2024-07-06 RX ORDER — AMLODIPINE BESYLATE 10 MG/1
10 TABLET ORAL DAILY
Status: DISCONTINUED | OUTPATIENT
Start: 2024-07-06 | End: 2024-07-12 | Stop reason: HOSPADM

## 2024-07-06 RX ADMIN — SENNOSIDES 17.2 MG: 8.6 TABLET, FILM COATED ORAL at 20:28

## 2024-07-06 RX ADMIN — HYDROMORPHONE HYDROCHLORIDE 0.5 MG: 1 INJECTION, SOLUTION INTRAMUSCULAR; INTRAVENOUS; SUBCUTANEOUS at 06:28

## 2024-07-06 RX ADMIN — OXYCODONE HYDROCHLORIDE 5 MG: 5 TABLET ORAL at 04:29

## 2024-07-06 RX ADMIN — HYDROMORPHONE HYDROCHLORIDE 0.5 MG: 1 INJECTION, SOLUTION INTRAMUSCULAR; INTRAVENOUS; SUBCUTANEOUS at 01:49

## 2024-07-06 RX ADMIN — METOPROLOL SUCCINATE 50 MG: 50 TABLET, EXTENDED RELEASE ORAL at 08:57

## 2024-07-06 RX ADMIN — AMLODIPINE BESYLATE 10 MG: 10 TABLET ORAL at 08:57

## 2024-07-06 RX ADMIN — PSYLLIUM HUSK 1 PACKET: 3.4 POWDER ORAL at 10:11

## 2024-07-06 RX ADMIN — PSYLLIUM HUSK 1 PACKET: 3.4 POWDER ORAL at 20:28

## 2024-07-06 RX ADMIN — ATORVASTATIN CALCIUM 20 MG: 20 TABLET, FILM COATED ORAL at 20:28

## 2024-07-06 RX ADMIN — HYDROCHLOROTHIAZIDE 25 MG: 25 TABLET ORAL at 08:58

## 2024-07-06 RX ADMIN — TRAZODONE HYDROCHLORIDE 50 MG: 50 TABLET ORAL at 20:28

## 2024-07-06 RX ADMIN — ENOXAPARIN SODIUM 40 MG: 100 INJECTION SUBCUTANEOUS at 04:30

## 2024-07-06 RX ADMIN — OXYCODONE HYDROCHLORIDE 7.5 MG: 5 TABLET ORAL at 20:28

## 2024-07-06 RX ADMIN — ACETAMINOPHEN 975 MG: 325 TABLET ORAL at 17:19

## 2024-07-06 RX ADMIN — ACETAMINOPHEN 975 MG: 325 TABLET ORAL at 10:11

## 2024-07-06 RX ADMIN — PSYLLIUM HUSK 1 PACKET: 3.4 POWDER ORAL at 14:51

## 2024-07-06 RX ADMIN — GABAPENTIN 100 MG: 100 CAPSULE ORAL at 20:28

## 2024-07-06 RX ADMIN — OXYCODONE HYDROCHLORIDE 5 MG: 5 TABLET ORAL at 14:51

## 2024-07-06 SDOH — SOCIAL STABILITY: SOCIAL INSECURITY: HAVE YOU HAD THOUGHTS OF HARMING ANYONE ELSE?: NO

## 2024-07-06 SDOH — SOCIAL STABILITY: SOCIAL INSECURITY: HAVE YOU HAD ANY THOUGHTS OF HARMING ANYONE ELSE?: NO

## 2024-07-06 SDOH — ECONOMIC STABILITY: HOUSING INSECURITY: IN THE LAST 12 MONTHS, HOW MANY PLACES HAVE YOU LIVED?: 1

## 2024-07-06 SDOH — ECONOMIC STABILITY: TRANSPORTATION INSECURITY
IN THE PAST 12 MONTHS, HAS LACK OF TRANSPORTATION KEPT YOU FROM MEETINGS, WORK, OR FROM GETTING THINGS NEEDED FOR DAILY LIVING?: NO

## 2024-07-06 SDOH — ECONOMIC STABILITY: INCOME INSECURITY: IN THE LAST 12 MONTHS, WAS THERE A TIME WHEN YOU WERE NOT ABLE TO PAY THE MORTGAGE OR RENT ON TIME?: NO

## 2024-07-06 SDOH — SOCIAL STABILITY: SOCIAL INSECURITY: ARE THERE ANY APPARENT SIGNS OF INJURIES/BEHAVIORS THAT COULD BE RELATED TO ABUSE/NEGLECT?: NO

## 2024-07-06 SDOH — SOCIAL STABILITY: SOCIAL INSECURITY: DO YOU FEEL UNSAFE GOING BACK TO THE PLACE WHERE YOU ARE LIVING?: NO

## 2024-07-06 SDOH — ECONOMIC STABILITY: HOUSING INSECURITY
IN THE LAST 12 MONTHS, WAS THERE A TIME WHEN YOU DID NOT HAVE A STEADY PLACE TO SLEEP OR SLEPT IN A SHELTER (INCLUDING NOW)?: NO

## 2024-07-06 SDOH — SOCIAL STABILITY: SOCIAL INSECURITY: WERE YOU ABLE TO COMPLETE ALL THE BEHAVIORAL HEALTH SCREENINGS?: YES

## 2024-07-06 SDOH — ECONOMIC STABILITY: TRANSPORTATION INSECURITY
IN THE PAST 12 MONTHS, HAS THE LACK OF TRANSPORTATION KEPT YOU FROM MEDICAL APPOINTMENTS OR FROM GETTING MEDICATIONS?: NO

## 2024-07-06 SDOH — SOCIAL STABILITY: SOCIAL INSECURITY: DO YOU FEEL ANYONE HAS EXPLOITED OR TAKEN ADVANTAGE OF YOU FINANCIALLY OR OF YOUR PERSONAL PROPERTY?: NO

## 2024-07-06 SDOH — ECONOMIC STABILITY: INCOME INSECURITY: HOW HARD IS IT FOR YOU TO PAY FOR THE VERY BASICS LIKE FOOD, HOUSING, MEDICAL CARE, AND HEATING?: NOT HARD AT ALL

## 2024-07-06 SDOH — SOCIAL STABILITY: SOCIAL INSECURITY: DOES ANYONE TRY TO KEEP YOU FROM HAVING/CONTACTING OTHER FRIENDS OR DOING THINGS OUTSIDE YOUR HOME?: NO

## 2024-07-06 SDOH — SOCIAL STABILITY: SOCIAL INSECURITY: HAS ANYONE EVER THREATENED TO HURT YOUR FAMILY OR YOUR PETS?: NO

## 2024-07-06 SDOH — SOCIAL STABILITY: SOCIAL INSECURITY: ABUSE: ADULT

## 2024-07-06 SDOH — SOCIAL STABILITY: SOCIAL INSECURITY: ARE YOU OR HAVE YOU BEEN THREATENED OR ABUSED PHYSICALLY, EMOTIONALLY, OR SEXUALLY BY ANYONE?: NO

## 2024-07-06 ASSESSMENT — COGNITIVE AND FUNCTIONAL STATUS - GENERAL
TURNING FROM BACK TO SIDE WHILE IN FLAT BAD: A LOT
CLIMB 3 TO 5 STEPS WITH RAILING: TOTAL
MOVING TO AND FROM BED TO CHAIR: A LOT
MOVING TO AND FROM BED TO CHAIR: A LOT
MOVING FROM LYING ON BACK TO SITTING ON SIDE OF FLAT BED WITH BEDRAILS: A LOT
MOBILITY SCORE: 11
DRESSING REGULAR UPPER BODY CLOTHING: A LITTLE
HELP NEEDED FOR BATHING: A LITTLE
PATIENT BASELINE BEDBOUND: NO
STANDING UP FROM CHAIR USING ARMS: A LOT
WALKING IN HOSPITAL ROOM: A LOT
DRESSING REGULAR LOWER BODY CLOTHING: A LOT
TOILETING: A LITTLE
DRESSING REGULAR UPPER BODY CLOTHING: A LITTLE
DAILY ACTIVITIY SCORE: 19
DRESSING REGULAR LOWER BODY CLOTHING: A LOT
HELP NEEDED FOR BATHING: A LITTLE
TOILETING: A LITTLE
MOVING FROM LYING ON BACK TO SITTING ON SIDE OF FLAT BED WITH BEDRAILS: A LOT
CLIMB 3 TO 5 STEPS WITH RAILING: TOTAL
STANDING UP FROM CHAIR USING ARMS: A LOT
TURNING FROM BACK TO SIDE WHILE IN FLAT BAD: A LOT
DAILY ACTIVITIY SCORE: 19
WALKING IN HOSPITAL ROOM: A LOT
MOBILITY SCORE: 11

## 2024-07-06 ASSESSMENT — PAIN SCALES - GENERAL
PAINLEVEL_OUTOF10: 6
PAINLEVEL_OUTOF10: 6
PAINLEVEL_OUTOF10: 7
PAINLEVEL_OUTOF10: 7
PAINLEVEL_OUTOF10: 10 - WORST POSSIBLE PAIN
PAINLEVEL_OUTOF10: 9
PAINLEVEL_OUTOF10: 8
PAINLEVEL_OUTOF10: 6
PAINLEVEL_OUTOF10: 10 - WORST POSSIBLE PAIN
PAINLEVEL_OUTOF10: 7

## 2024-07-06 ASSESSMENT — ACTIVITIES OF DAILY LIVING (ADL)
GROOMING: INDEPENDENT
ADEQUATE_TO_COMPLETE_ADL: YES
HEARING - LEFT EAR: FUNCTIONAL
TOILETING: NEEDS ASSISTANCE
JUDGMENT_ADEQUATE_SAFELY_COMPLETE_DAILY_ACTIVITIES: YES
DRESSING YOURSELF: INDEPENDENT
FEEDING YOURSELF: INDEPENDENT
BATHING: NEEDS ASSISTANCE
HEARING - RIGHT EAR: FUNCTIONAL
WALKS IN HOME: NEEDS ASSISTANCE
PATIENT'S MEMORY ADEQUATE TO SAFELY COMPLETE DAILY ACTIVITIES?: YES

## 2024-07-06 ASSESSMENT — LIFESTYLE VARIABLES
AUDIT-C TOTAL SCORE: 2
SKIP TO QUESTIONS 9-10: 0
AUDIT-C TOTAL SCORE: 2
HOW OFTEN DO YOU HAVE 6 OR MORE DRINKS ON ONE OCCASION: LESS THAN MONTHLY
HOW OFTEN DO YOU HAVE A DRINK CONTAINING ALCOHOL: MONTHLY OR LESS
HOW MANY STANDARD DRINKS CONTAINING ALCOHOL DO YOU HAVE ON A TYPICAL DAY: 1 OR 2

## 2024-07-06 ASSESSMENT — PAIN DESCRIPTION - DESCRIPTORS: DESCRIPTORS: ACHING

## 2024-07-06 ASSESSMENT — PAIN - FUNCTIONAL ASSESSMENT
PAIN_FUNCTIONAL_ASSESSMENT: 0-10

## 2024-07-06 ASSESSMENT — COLUMBIA-SUICIDE SEVERITY RATING SCALE - C-SSRS
1. IN THE PAST MONTH, HAVE YOU WISHED YOU WERE DEAD OR WISHED YOU COULD GO TO SLEEP AND NOT WAKE UP?: NO
6. HAVE YOU EVER DONE ANYTHING, STARTED TO DO ANYTHING, OR PREPARED TO DO ANYTHING TO END YOUR LIFE?: NO
2. HAVE YOU ACTUALLY HAD ANY THOUGHTS OF KILLING YOURSELF?: NO

## 2024-07-06 ASSESSMENT — PAIN DESCRIPTION - LOCATION
LOCATION: HIP
LOCATION: HIP

## 2024-07-06 ASSESSMENT — PATIENT HEALTH QUESTIONNAIRE - PHQ9
1. LITTLE INTEREST OR PLEASURE IN DOING THINGS: NOT AT ALL
SUM OF ALL RESPONSES TO PHQ9 QUESTIONS 1 & 2: 0
2. FEELING DOWN, DEPRESSED OR HOPELESS: NOT AT ALL

## 2024-07-06 ASSESSMENT — PAIN DESCRIPTION - ORIENTATION: ORIENTATION: RIGHT;LEFT

## 2024-07-06 NOTE — SIGNIFICANT EVENT
"Updates to Assessment & Plan Following Rounds    Subjective/Interval History:  Patient seen and evaluated at bedside in AM. HPI as reported in H&P confirmed with the patient. Pt states he slept \"for an hour or two\" after arriving as transfer from [place] and getting to [facility] \"around 2 am\". Patient unclear on what was identified in his scans, and states that he thinks he was transferred to \"figure out something more than sciatica\". When asked about whether he was told there was a mass found on his left kidney, he states that he was told something about that.     Updates on 7/6  Pain management regimen:   Change tylenol 975 mg q8h PRN to scheduled 975 mg PO q8h  Consult to Interventional Radiology placed 7/6, appreciate recs  Preference for biopsy of L3 mass prior to renal mass (given soft tissue present at L3)  Add psyllium husk 3.4g packet TID     Consult neurosurgery place 7/6, appreciate recs  If NSG wants to do something surgical, can get biopsy tissue at that time      Assessment & Plan  Christophe Ambriz is a 74-year-old male with past medical history significant for significant tobacco use, coronary artery disease, hypertension, hyperlipidemia who is admitted for left renal mass and pathologic L3 fracture concerning for metastatic disease.  Currently stable, on minimal medications.  MRI spine completed, no concern for cord compression.     #Left renal mass  #L3 lumbar fracture concern for spinal mets  :: Patient with 1 month of severe back pain with point tenderness, recent weight loss, pain awakening at night concerning for malignant process with metastasis  :: CT and MRI imaging as above showing left renal mass concerning for RCC with enhancing metastatic lesion infiltrating L3 vertebral body and adjacent epidural invasion involving the ventral aspect of L3  Plan:  No concern at this time for cord compression, will not start dexamethasone but could consider for symptom relief  Tylenol 975 every 8 hours, " oxycodone 5 mg every 6 hours as needed, Dilaudid 0.5 mg every 6 hours for breakthrough  Consult neurosurgery place 7/6, appreciate recs  If NSG wants to do something surgical, can get biopsy tissue at that time  Consult to Interventional Radiology placed 7/6, appreciate recs  Preference for biopsy of L3 mass prior to renal mass (given soft tissue present at L3)     #CAD s/p PCI  #HTN  #HLD  hold home plavix in lieu of possible biopsy   continue home atorvastatin  continue home amlodipine 10mg daily  continue home hydrochlorothiazide 25mg daily   continue home metoprolol succinate 50mg daily   confirm home medications w/ pharmacy med rec in AM     #Leukocytosis  :: No current infectious symptoms, likely reactive in setting of likely malignancy and fracture     F: PRN  E: PRN  N: regular  A: PIV  Abx: none  O2: RA  GI ppx: n/a  DVT ppx: lovenox  Bowel ppx: senokot 17.2 mg BID, psyllium husk 3.4g TID, Miralax 17g PO daily    NOK: Iram Aguirre (sig other) [phone]  Code status: Full code (confirmed on admission)    Felix Edwards M4  CWRU YANETH

## 2024-07-06 NOTE — PROGRESS NOTES
Pharmacy Medication History Review    Christophe Ambriz is a 74 y.o. male admitted for Pathologic lumbar vertebral fracture, initial encounter. Pharmacy reviewed the patient's [id number] medications and allergies for accuracy.    The list below reflects the updated PTA list.   Comments regarding how patient may be taking medications differently can be found in the Admit Orders Activity  Prior to Admission Medications   Prescriptions Last Dose Informant Patient Reported?   acetaminophen (Tylenol) 500 mg tablet  Self Yes   Sig: Take 2 tablets (1,000 mg) by mouth every 6 hours if needed for mild pain (1 - 3).   amLODIPine (Norvasc) 10 mg tablet  Self Yes   Sig: Take 1 tablet (10 mg) by mouth once daily.   aspirin 81 mg EC tablet Not Taking Self Yes   Sig: Take 1 tablet (81 mg) by mouth once daily.   atorvastatin (Lipitor) 20 mg tablet  Self Yes   Sig: Take 1 tablet (20 mg) by mouth once daily.   clopidogrel (Plavix) 75 mg tablet  Self No   Sig: Take 1 tablet (75 mg) by mouth once daily.   hydroCHLOROthiazide (HYDRODiuril) 25 mg tablet  Self No   Sig: Take 1 tablet (25 mg) by mouth once daily.   metoprolol succinate XL (Toprol-XL) 50 mg 24 hr tablet  Self No   Sig: Take 1 tablet (50 mg) by mouth once daily.      Facility-Administered Medications: None        The list below reflects the updated allergy list. Please review each documented allergy for additional clarification and justification.  Allergies  Reviewed by London Chenug [facility] on 7/6/2024   No Known Allergies         Patient accepts M2B at discharge.   Local pharmacy: [facility] in [place], [place]     Sources:   Pt interview - familiar with medications, moderate historian.   Attempted to call friend (Iram) [phone] - pt reports she knows more about his medications than he does. LVM asking for return call.   Dispense hx  OARRS - no hx     Additional Comments:  Acetaminophen - pt reports taking acetaminophen 500 mg tablets, used a whole bottle of ~250  "tablets in 2 weeks (average 1974-0641 mg per day). Hepatic function panel from today is WNL. I discussed extensively the risk of taking more than eight 500 mg tablets per day. Risks including acetaminophen overdose, liver damage, ICU admission, and death.   Hydrochlorothiazide - reports running out of this medication. Pt needs to call the pharmacy to refill hydrochlorothiazide. He has 3 refills remaining.       London Cheung, Mary Anne  Transitions of Care Pharmacist  07/06/24     Secure Chat preferred   If no response call [phone] or Vocera \"Med Rec\"   "

## 2024-07-06 NOTE — SIGNIFICANT EVENT
Interventional Radiology Clinical Event Note:    IR contacted RE patient Christophe Ambriz for consideration of left renal mass biopsy.     Recent CT CAP on 7/5/24 showed a heterogenous solid mass arising from the left upper renal pole concerning for malignancy. Additionally, patient found to have osseous lesions including the right 9th rib and L3 vertebral body, presumably metastatic.     IR advised that US guided biopsy would be more appropriate in an outpatient setting given the patient's stability otherwise. Discussed with team and agreed.     Please contact if any significant changes in the patient's status requiring more urgent intervention.    Ofelia Kelley MD PGY-3  Interventional Radiology  Pager [phone] or Epic Secure Chat

## 2024-07-06 NOTE — CONSULTS
Inpatient consult to Neurosurgery  Consult performed by: Linda Dunaway MD  Consult ordered by: Nate Matias MD        Reason For Consult  L3 lesion, pathological fracture    History Of Present Illness  Christophe Ambriz is a 74 y.o. male with h/o HTN, HLD, CAD s/p PCI (2017) (on ASA/PLX), p/w LBP of 37d duration, CT T/L spine L3 lytic lesion, CT CAP 6x8 cm L kidney mass, 2.1 cm R thyroid nodule, L 9th rib lytic lesion, MRL LS lytic mets to L3 with L3-4 neuro foraminal stenosis (severe on the L), L3-4 disc involvement with tumor    Patient stated that he has been experiencing severe low back pain for 37 days and BLE weakness for about 30 days.  Denies bowel/bladder incontinence, change in sensation, headache, nausea, vomiting, chills, or any other symptoms. The pain begins in the back and radiates into the thigh and leg. The pain is 10/10 and he is barely able to walk.     Patient is on Plavix and ASA (last dose, about a week ago).     Imaging is personally reviewed and CT T/L spine L3 lytic lesion, CT CAP 6x8 cm L kidney mass, 2.1 cm R thyroid nodule, L 9th rib lytic lesion, MRL LS osteolytic mets to L3 with L3-4 neuro foraminal stenosis (severe on the L), L3-4 disc involvement with tumor    Past Medical History  He has no past medical history on file.    Surgical History  He has no past surgical history on file.     Social History  He has no history on file for tobacco use, alcohol use, and drug use.    Family History  No family history on file.     Allergies  Patient has no known allergies.    Review of Systems   Review of systems was reviewed and otherwise negative other than what was listed in the HPI.    Physical Exam  GENERAL APPEARANCE:  No distress, alert, interactive and cooperative.   RESP: breathing comfortably  CARDIOVASCULAR: Regular rate and rhythm. Radial pulses +2 and equal. No swelling, varicosities, edema, or tenderness to palpation.   NEURO:     NAD, A&Ox3     Cranial Nerves II-XII: PERRL, EOMI,  "Face symmetric, Facial SILT, Palate/Tongue midline and symmetric, shoulder shrugs symmetric, hearing intact to finger rubs bilaterally       MOTOR:        Muscle bulk and tone were normal in both upper and lower extremities.          RUE D5 / B5 / T5 / HG 5/ IO 5       LUE D5 / B5 / T5 / HG 5/ IO 5       Negative quinn         RLE HF5 / KE 5/ DF 5/ PF 5       LLE HF4 / KE 5/ DF 5/ PF 5       B/l clonus      SENSORY:  In both upper and lower extremities, sensation was intact to light touch   L3 lumbar radiculopathy and pain in the L3/L4 distribution worse in the left leg than right     PSYCH: appropriate  SKIN: no obvious lesions     Last Recorded Vitals  Blood pressure 153/86, pulse 74, temperature 36.4 °C (97.5 °F), temperature source Temporal, resp. rate 16, height 1.854 m (6' 1\"), weight 91 kg (200 lb 9.9 oz), SpO2 94%.    Relevant Results  Results for orders placed or performed during the hospital encounter of 07/06/24 (from the past 24 hour(s))   Urinalysis with Reflex Culture and Microscopic   Result Value Ref Range    Color, Urine Light-Yellow Light-Yellow, Yellow, Dark-Yellow    Appearance, Urine Clear Clear    Specific Gravity, Urine 1.014 1.005 - 1.035    pH, Urine 6.0 5.0, 5.5, 6.0, 6.5, 7.0, 7.5, 8.0    Protein, Urine 10 (TRACE) NEGATIVE, 10 (TRACE), 20 (TRACE) mg/dL    Glucose, Urine Normal Normal mg/dL    Blood, Urine 0.03 (TRACE) (A) NEGATIVE    Ketones, Urine NEGATIVE NEGATIVE mg/dL    Bilirubin, Urine NEGATIVE NEGATIVE    Urobilinogen, Urine Normal Normal mg/dL    Nitrite, Urine NEGATIVE NEGATIVE    Leukocyte Esterase, Urine NEGATIVE NEGATIVE   Extra Urine Gray Tube   Result Value Ref Range    Extra Tube Hold for add-ons.    Urinalysis Microscopic   Result Value Ref Range    WBC, Urine 1-5 1-5, NONE /HPF    RBC, Urine 6-10 (A) NONE, 1-2, 3-5 /HPF    Mucus, Urine FEW Reference range not established. /LPF          Assessment/Plan     Christophe Ambriz is a 74 y.o. male with h/o HTN, HLD, CAD s/p PCI " (2017) (on ASA/PLX), p/w LBP of 37d duration, CT T/L spine L3 lytic lesion, CT CAP 8 cm L kidney mass, 2.1 cm R thyroid nodule, L 9th rib lytic lesion, MRL LS lytic mets to L3 with L3-4 neuro foraminal stenosis (severe on the L), L3-4 disc involvement with tumor.    Patient has left hip flexion weakness and bilateral lower extremity radiculopathy.  Otherwise, spine exam is unremarkable.  Imaging is concerning for metastatic lesion. No cord compression or severe canal stenosis on imaging. Patients symptoms  are likely from the neuroforaminal stenosis at L3-4.     Recs  Please obtain upright lumbar spine x-ray  Recommend hard Thoracic-Lumbar-Sacral Orthosis (TLSO)   Document RCRI/prognosis  CBC/RFP/coag/T&S/UA/EKG/CXR    Patient is discussed with chief resident, who agrees with above assessment and plan. Note is not final until signed by attending physician.     Note authored by resident on neurosurgery team, with all questions or to contact team please page at [phone]    Linda Dunaway MD  Neurosurgery, PGY-2      Pathological fracture with >50% loss of height on standing xrays.  Severe intractable pain in the L3 distribution of the left leg.  He is unable to walk.  He does not have a known diagnosis of cancer but appears to have metastatic disease.  The presumptive diagnosis is renal cell carcinoma.  I discussed with the oncology team and if this is the case he may have a reasonable prognosis.  Given the instability and the lumbar radiculopathy being so debilitating I would recommend surgical stabilization decompression and fusion.  The intent would be to get a diagnosis at the time of surgery.    I offered the option of surgery that would consist of a L1-5 open decompression stabilization and resection of L3 tumor.    I have explained the surgical procedure in detail with expected duration and extent of recovery along risks of surgery that include, but is not limited to bleeding, infection, blood vessel injury or  damage, loss of sensation, loss of bladder, bowel or sexual function, nerve injury/damage resulting in weakness/paralysis, malunion, nonunion, CSF leak, brachial plexus injury, peripheral vision blindness, failure of implants/fusion, failure to relieve symptoms, recurrent disease, adjacent segment disease, need to reoperate for any reason and general anesthesia reaction such as stroke, coma, heart attack, delirium, confusion, death as well as worsening of preexisted medical conditions.    I clearly emphasized that while the goal of surgery is to decompress the spinal cord so as to ARREST the progression of neurological deficits - preexisting deficits may or may not improve after surgery. We discussed that many patients do clinically improve in functional and neurological outcomes following decompression of the spinal elements in patients with lumbar degenerative disease or radiculopathy the extent of which is variable and depends on the severity of pain, numbness, tingling, or weakness. With improvement seen of those symptoms in that order. We did discuss the goal of surgery to alleviate pain first and foremost and hope for recovery of all neurologic function with time.    All questions were answered and the patient left satisfied with the surgical plan moving forward.     I saw and evaluated the patient.  I personally obtained the key and critical portions of the history and physical exam or was physically present for key and critical portions performed by the Resident/Fellow. I reviewed the documentation and discussed the patient with the Resident/Fellow.  I agree with the Resident/Fellow’s medical decision making as documented in the note.     I have reviewed all prior documentation and reviewed the electronic medical record since admission. I have personally have reviewed all advanced imaging not just the reports and used my interpretation as documented as the relevant findings. I have reviewed the risks and  benefits of all treatment recommendations listed in this note with the patient and family. I spent a total of 60 minutes in service to this patient's care during this date of service.      Zac Garcia MD, [facility]  Spine , [facility]  Yohan Lima and Norma Lima Chair in Spinal Neurosurgery  Neurosurgery , [facility]  Complex Spine Surgery Fellowship Director   of Neurological Surgery  [employer]  Office: [phone]  Fax: [phone]

## 2024-07-06 NOTE — CONSULTS
SUPPORTIVE AND PALLIATIVE ONCOLOGY CONSULT    Inpatient consult to [facility] Adult Supportive Oncology  Consult performed by: Galina Durant, APRN-CNP  Consult ordered by: Nate Matias MD        SERVICE DATE: 7/6/2024      PALLIATIVE MEDICINE OUTPATIENT PROVIDER:  None  CURRENT ATTENDING PROVIDER: Nate Matias MD     Medical Oncologist: No care team member to display   Radiation Oncologist: No care team member to display  Primary Physician: Dejuan Dhaliwal  [phone]    REASON FOR CONSULT/CHIEF CONSULT COMPLAINT: pain management    Subjective   HISTORY OF PRESENT ILLNESS: Christophe Ambriz is a 74 y.o. male diagnosed with left renal mass and pathologic fracture of L3; c/f metastatic renal cell carcinoma. PMH significant for CAD s/p stenting (pt unsure of what year), HTN, HLD, tobacco use disorder. Admitted 7/6/2024 for further evaluation and management of pathologic spine fracture from bony metastatic disease from [facility] where pt originally presented on 7/4/24 with c/o low back pain that frequently awakens him at night. Course complicated by sub-optimal pain control. Supportive and Palliative Oncology is consulted for pain management.       CT of lumbar spine completed on 7/5/24. Results as follows:  1.  Large lytic lesion is present within the L3 vertebral body,  likely representing metastatic, myelogenous or lymphoproliferative  disease, with a suspected underlying pathologic fracture with less  than 10 to 20% height loss and no significant retropulsion  posteriorly into the spinal canal. Dedicated contrast enhanced MRI of  the lumbar spine is recommended to better characterize mass and  assess for any epidural soft tissue components.  2. No additional lytic lesions are identified in the thoracic or  lumbar spine.  3. Degenerative changes are present in the lumbar spine, although the  exam is not optimized to assess the spinal canal. At least  mild-to-moderate stenosis is present at L3-L4 and L4-L5  "due to disc  osteophyte complexes.    CT of thoracic spine completed on 7/5/24. Results as follows:   1.  Large lytic lesion is present within the L3 vertebral body,  likely representing metastatic, myelogenous or lymphoproliferative  disease, with a suspected underlying pathologic fracture with less  than 10 to 20% height loss and no significant retropulsion  posteriorly into the spinal canal. Dedicated contrast enhanced MRI of  the lumbar spine is recommended to better characterize mass and  assess for any epidural soft tissue components.  2. No additional lytic lesions are identified in the thoracic or  lumbar spine.  3. Degenerative changes are present in the lumbar spine, although the  exam is not optimized to assess the spinal canal. At least  mild-to-moderate stenosis is present at L3-L4 and L4-L5 due to disc  osteophyte complexes.    MRI of lumbar spine performed 7/5/24. Results as follows:  1) Findings concerning for left renal cell carcinoma with enhancing  metastatic lesion infiltrating the L3 vertebral body with minimal  height loss/pathologic fracture component and adjacent epidural  invasion involving the ventral aspect of L3. There is lateral recess  narrowing with no significant spinal canal stenosis. There is a  subcentimeter focus of enhancement involving the inferior endplate of  L2 along the rightward aspect that could represent additional  neoplastic involvement or degenerative change. There is otherwise no  additional osseous metastatic disease identified within the lumbar  spine.  2) Lumbar degenerative change as described in detail above. There is  severe neural foraminal narrowing at the L5-S1 level,  left-greater-than-right.    IR consulted and recommend biopsy of L3 mass prior to renal mass (given soft tissue present at L3  NSGY consulted and evaluated pt today (7/6/24); recs as follows:         \"Please obtain upright lumbar spine x-ray         Document RCRI/prognosis         " "CBC/RFP/coag/T&S/UA/EKG/CXR         Further recs pending above imaging\"    Pain Assessment:  Onset: 1-2 Months  Location: Bilateral lower back pain  Duration: Constant  Characteristics:   Ratin   Descriptors: aching, throbbing, shooting, and stabbing   Aggravating: movement, walking, and bending    Relieving: Analgesics and Modifying activity   Intolerances:Christophe Ambriz has No Known Allergies.   Personal Pain Goal: 3    Interference with Function: Very Much   Coping Strategies: relaxation   Emotional Response: None   Barriers to Pain Management: None    Opioid Use  Past 24 h opioid use:   Hydromorphone 0.5 mg IV x 4 doses = 2 mg = 25 OME  Oxycodone IR 5 mg PO x 1 doses = 5 mg = 6.2 OME  Total 24h OME use:  31.2 OME    Note: OME calculations based on equianalgesic table below. Please note this table is based on best available evidence but conversions are still approximate. These are NOT opioid DOSES for individual patient use; this is equivalency information.  Drug Parenteral Enteral   Morphine 10 25   Oxycodone N/A 20   Hydromorphone 2 5   Fentanyl 0.15 N/A   Tramadol N/A 120   Citation: Rafaela MARX. Demystifying opioid conversion calculations: A guide for effective dosing, Second edition. MD Reyna: American Society of Health-System Pharmacists, 2018.    OARRS/PDMP reviewed; Unintentional Overdose Risk Pojla=450; no aberrant behavior noted.    Symptom Assessment:  Pain:very much   Headache: none  Dizziness:none  Lack of energy: none  Difficulty sleeping: somewhat  Worrying: none  Anxiety: none  Depression: none  Pain in mouth/swallowing: none  Dry mouth: none  Taste changes: none  Shortness of breath: none  Lack of appetite: a little (associates with pain)  Nausea: none  Vomiting: none  Constipation: somewhat (states he has chronic constipation for \"years\")  Diarrhea: none  Sore muscles: none  Numbness or tingling in hands/feet/other: none  Weight loss: a little  Other: somewhat (bilateral lower " extremity weakness)  Wt Readings from Last 10 Encounters:   07/06/24 91 kg (200 lb 9.9 oz)   07/04/24 95.3 kg (210 lb)   05/11/24 95.3 kg (210 lb)   11/14/22 102 kg (225 lb)   05/16/22 112 kg (248 lb)   12/14/21 109 kg (240 lb)   06/15/21 110 kg (242 lb)   12/01/20 110 kg (243 lb)   10/14/20 110 kg (243 lb)   01/27/20 102 kg (224 lb)           Information obtained from: chart review, interview of patient, discussion with RN, and discussion with primary team  ______________________________________________________________________     Oncology History    No history exists.       No past medical history on file.  No past surgical history on file.  No family history on file.     SOCIAL HISTORY:  Marital Status single; in relationship with significant other Iram Armstrong-states ok to discuss anything regarding his care with her; no children   Social History:  Rare ETOH use  Smoked 3 packs/day for at least 50 years for 150-pack-year history  Denies illicit drug use    Oriental orthodox and Importance of Oriental orthodox:  None    REVIEW OF SYSTEMS:  Review of systems negative unless noted in HPI.       Objective       Lab Results   Component Value Date    WBC 13.7 (H) 07/06/2024    HGB 11.5 (L) 07/06/2024    HCT 36.2 (L) 07/06/2024    MCV 85 07/06/2024     (H) 07/06/2024      Lab Results   Component Value Date    GLUCOSE 153 (H) 07/06/2024    CALCIUM 9.0 07/06/2024     07/06/2024    K 3.9 07/06/2024    CO2 23 07/06/2024     07/06/2024    BUN 10 07/06/2024    CREATININE 0.64 07/06/2024     Lab Results   Component Value Date    ALT 14 07/06/2024    AST 15 07/06/2024    ALKPHOS 110 07/06/2024    BILITOT 0.4 07/06/2024     Estimated Creatinine Clearance: 112.7 mL/min (by C-G formula based on SCr of 0.64 mg/dL).     No results found for this or any previous visit (from the past 4464 hour(s)).  Wt Readings from Last 5 Encounters:   07/06/24 91 kg (200 lb 9.9 oz)   07/04/24 95.3 kg (210 lb)   05/11/24 95.3 kg (210 lb)    11/14/22 102 kg (225 lb)   05/16/22 112 kg (248 lb)       Current Outpatient Medications   Medication Instructions    acetaminophen (TYLENOL) 1,000 mg, oral, Every 6 hours PRN    amLODIPine (NORVASC) 10 mg, oral, Daily    aspirin 81 mg, oral, Daily    atorvastatin (LIPITOR) 20 mg, oral, Daily    clopidogrel (PLAVIX) 75 mg, oral, Daily    hydroCHLOROthiazide (HYDRODIURIL) 25 mg, oral, Daily    metoprolol succinate XL (TOPROL-XL) 50 mg, oral, Daily     Scheduled medications   acetaminophen, 975 mg, oral, q8h  amLODIPine, 10 mg, oral, Daily  atorvastatin, 20 mg, oral, Nightly  [Held by provider] clopidogrel, 75 mg, oral, Daily  enoxaparin, 40 mg, subcutaneous, Daily  hydroCHLOROthiazide, 25 mg, oral, Daily  lidocaine, 1 patch, transdermal, Daily  metoprolol succinate XL, 50 mg, oral, Daily  polyethylene glycol, 17 g, oral, Daily  psyllium, 1 packet, oral, TID  sennosides, 2 tablet, oral, BID      Continuous medications     PRN medications  HYDROmorphone, 0.5 mg, q6h PRN  oxyCODONE, 5 mg, q6h PRN         Allergies: No Known Allergies             PHYSICAL EXAMINATION:  Vital Signs:   Vital signs reviewed  Vitals:    07/06/24 1313   BP: 155/80   Pulse: 73   Resp: 18   Temp: 36.3 °C (97.3 °F)   SpO2: 94%     Pain Score: 7     Physical Exam  Vitals reviewed.   Constitutional:       General: He is not in acute distress.     Appearance: He is ill-appearing.   HENT:      Head: Normocephalic and atraumatic.   Cardiovascular:      Pulses: Normal pulses.   Pulmonary:      Effort: Pulmonary effort is normal. No respiratory distress.      Comments: On room air; no increased work of breathing or use of accessory muscles  Abdominal:      General: Abdomen is flat. There is no distension.      Palpations: Abdomen is soft.   Musculoskeletal:         General: Tenderness present.      Right lower leg: No edema.      Left lower leg: No edema.      Comments: Bilateral lower extremity weakness; full sensation intact  Tenderness upon  palpation noted to lumbar spine   Skin:     General: Skin is warm and dry.      Capillary Refill: Capillary refill takes less than 2 seconds.      Coloration: Skin is pale.   Neurological:      General: No focal deficit present.      Mental Status: He is alert and oriented to person, place, and time.   Psychiatric:         Mood and Affect: Mood normal.         Thought Content: Thought content normal.         ASSESSMENT/PLAN:  Christophe Ambriz is a 74 y.o. male diagnosed with left renal mass and pathologic fracture of L3; c/f metastatic renal cell carcinoma. PMH significant for CAD s/p stenting (pt unsure of what year), HTN, HLD, tobacco use disorder. Admitted 7/6/2024 for further evaluation and management of pathologic spine fracture from bony metastatic disease from [facility] where pt originally presented on 7/4/24 with c/o low back pain that frequently awakens him at night. Course complicated by sub-optimal pain control. Supportive and Palliative Oncology is consulted for pain management    Pain:  Lower back pain related to likely metastatic disease  Pain is: pain related to large lytic lesion to L3; likely metastatic disease (suspect RCC upon imaging)  Type: somatic and neuropathic  Pain control: sub-optimally controlled  Home regimen:  Acetaminophen  Intolerances/previously tried: none  Personalized pain goal: 3  Total OME usage for the past 24 hours:  31.2 OME  Recommend discontinuing Oxycodone 5 mg po q 6 hrs prnb-pt states minimally reduces pain and does not last more than 3-4 hours  Recommend 7.5 mg Oxycodone po q 3 hrs prn for MODERATE pain (opioid naive, elderly)  Recommend 10 mg Oxycodone po q 3 hrs prn for SEVERE pain (opioid naive, elderly)  Continue Hydromorphone 0.5 mg IVP but change frequency to q 3 hrs prn for BREAKTHROUGH PAIN   Recommend Gabapentin 100 mg po q hs (for neuropathic pain coverage)  Continue to monitor pain scores and administer PRN medications as appropriate  Continue/initiate  "nonpharmacologic pain management strategies including ice/heat therapy, distraction techniques, deep breathing/relaxation techniques, calming music, and repositioning  Continue to monitor for signs of opioid efficacy (pain scores, improved functionality) and toxicity (pinpoint pupils, excess sedation/drowsiness/confusion, respiratory depression, etc.)    Nausea:  At risk for nausea without vomiting related to opioids and constipation   Home regimen:  none  Well-controlled  Consider adding Ondansetron 4 mg PO/IVP q 6 hrs prn     Constipation  At risk for constipation related to opioids, decreased po intake and decreased mobility secondary to pain; currently constipated  States he has chronic constipation for \"years\"  Usual bowel pattern:  \"once a week\"-discussed importance of adequate BM's and need for bowel regimen as he is now on opioids  Home regimen: none  LBM 7/2/24  Monitor BM frequency, adjust regimen as needed  Goal to have BM without straining q48-72h  Miralax 17 g po daily  Senna 2 tabs po bid     Sleeping Difficulty:  Impaired sleep related to pain and hospital environment  Home regimen:   states 2-3 years ago was taking 300 mg Trazodone  po q hs for chronic insomnia  Will likely improve with adequate pain management  Recommend Trazodone 50 mg po q hs    Decreased appetite:  Appetite loss related to disease process and pain  Nutrition consult  Pt notes that he has had difficulty preparing meals due to pain and bilateral lower extremity weakness  Weight loss pt unsure of how much; chart review shows 10 lb weight loss since May  Home regimen:  none  Will likely improve as pain is better controlled  Can consider appetite stimulant after pain is well controlled and if appetite remains decreased    Medical Decision Making/Goals of Care/Advance Care Planning:  Patient's current clinical condition, including diagnosis, prognosis, and management plan, and goals of care were discussed.   Life limiting disease:  " "unknown at this time; imaging c/f renal cell carcinoma with metastatic disease (spine-l3)  Family: Supportive significant other Iram Armstrong  Performance status: Moderate limitations due to pain and disease process  Joys/meaning/strength: [place]  Understanding of health: Demonstrates good understanding of disease process, understands of probably diagnosis of renal cell carcinoma with mets to spine; fully understands need for biopsy to confirm diagnosis and develop plan after results are obtained  Information:Wants full disclosure  Goals: symptom control and cancer directed therapy  Worries and fears now and future: ongoing symptoms, inability to receive cancer treatment, and continued and progressive weakness    Minimum acceptable outcome/QOL:  \"to get my pain better controlled and to figure out what is going on and get moving on addressing it\"  Code status discussion:  FULL    Advance Directives  Existence of Advance Directives:No - not interested  Decision maker: Surrogate decision maker is friend Iram Armstrong [phone]-states ok to discuss any of his care with her  Code Status: Full code    Introduction to Supportive and Palliative Oncology:  Spoke with patient at bedside  Introduced the role and philosophy of Supportive and Palliative oncology in the evaluation and management of symptoms during cancer treatment  Palliative care was introduced as a service for patients with serious illness to help with symptoms, assist with goals of care conversations, navigate complex decision making, improve quality of life for patients, and provide support both patients and families.  Patient seemed to appreciate the extra layer of support.     Supportive Interventions: Interventions: Music Therapy: declined, Art Therapy: declined, SPO Spiritual Care: declined    Disposition:  Please  start the process of having prior authorization with meds to beds deliver medications to patient prior to discharge via [facility] " pharmacy. Prescriptions will need to be sent 48-72 hours prior to discharge so that a prior authorization can be completed.     Discharge date: unknown pending acute issues and pain control  Will assess if patient needs an appointment with Outpatient Supportive Oncology as appropriate      Signature and billing:  Thank you for allowing us to participate in the care of this patient. Recommendations will be communicated back to the consulting service by way of shared electronic medical record or face-to-face.    Medical complexity was high level due to due to complexity of problems, extensive data review, and high risk of management/treatment.    I spent 75 minutes in the care of this patient which included chart review, interviewing patient/family, discussion with primary team, coordination of care, and documentation.      DATA   Diagnostic tests and information reviewed for today's visit:  Conversation with primary team, Most recent labs and imaging results, Most recent EKG, Medications       Some elements copied from H&P note on 7/6/24, the elements have been updated and all reflect current decision making from today, 7/6/2024.    Plan of Care discussed with: Provider, RN, Patient    Thank you for asking Supportive and Palliative Oncology to assist with care of this patient.  We will continue to follow.  Please contact us for additional questions or concerns.      SIGNATURE: VINNY Riddle-CNP  PAGER/CONTACT:  Contact information:  Supportive and Palliative Oncology  Monday-Friday 8 AM-5 PM  Epic Secure chat or pager [phone].  After hours and weekends:  pager [phone]

## 2024-07-06 NOTE — H&P
"History Of Present Illness  Christophe Ambriz is a 74 y.o. male presenting with past medical history of coronary artery disease status post stenting (patient unsure the year), hypertension, hyperlipidemia, tobacco use disorder who was transferred from outside hospital emergency department due to concern for pathologic spine fracture from bony metastatic disease.  Patient presented to the emergency department 7/4 with complaints of low back pain that frequently awakens him from sleep and will reach 10 out of 10 since the second last day of May.  Patient says he originally thought this was sciatica and that it would go away on his own as he has had chronic back pain since he was 14 years old.  The pain occasionally shoots down his left leg and is frequently debilitating to him.  He denies any tobi weakness, bladder loss, inability to have bowel movements.  Does have point tenderness over his lower back.  He denies any blood thinner use, fevers or chills, IV drug use.  He has been taking \"a lot\" of Tylenol at home but this did not help his pain.  He said if it was not better by 4 July he was going to go to the emergency department so that is why he went in on the day that he did.    He denies hematuria, hematochezia, chest pain, shortness of breath, abdominal pain, fever/chills, headaches, change in vision, diplopia.  Reports shooting pain down his left leg as above.  Currently says that his pain is 6 out of 10, but frequently reaches \"13 or 14\" /10.  Pain frequently awakens him at night and was sudden onset at the end of May.  Denies any history of trauma or falls to the area.  Does endorse recent weight loss of at least 10 to 15 pounds, says he lost 3 to 4 inches off his waistline and his pants no longer fit.    Patient says that he does not go to the doctor that much, but that he does take several medications at home but he cannot remember all the names.  He says he knows that he takes a statin as well as Plavix.   "   Past Medical History  CAD s/p stenting unsure year  Hypertension  Hyperlipidemia    Surgical History  No surgeries aside from PCI for CAD     Social History  Rare ethanol use  Smoked 3 packs/day for at least 50 years for 150-pack-year history  Denies illicit drug use    Family History  Denies family history of cancer  Mother and father both passed away from ischemic heart disease     Allergies  Patient has no known allergies.    Review of Systems  12 point ROS negative otherwise stated in HPI    Physical Exam  Constitutional:       General: He is not in acute distress.     Appearance: He is not ill-appearing or toxic-appearing.      Comments: Chronically ill appearing   HENT:      Head: Normocephalic and atraumatic.      Mouth/Throat:      Mouth: Mucous membranes are moist.      Pharynx: Oropharynx is clear.      Comments: edentulous  Eyes:      General: No scleral icterus.     Extraocular Movements: Extraocular movements intact.      Conjunctiva/sclera: Conjunctivae normal.      Pupils: Pupils are equal, round, and reactive to light.   Cardiovascular:      Rate and Rhythm: Normal rate and regular rhythm.      Pulses: Normal pulses.      Heart sounds: Normal heart sounds. No murmur heard.     No friction rub. No gallop.   Pulmonary:      Effort: Pulmonary effort is normal. No respiratory distress.      Breath sounds: No wheezing or rhonchi.      Comments: Diminished breath sounds throughout  Abdominal:      General: Abdomen is flat. Bowel sounds are normal. There is no distension.      Palpations: Abdomen is soft.      Tenderness: There is abdominal tenderness (LLQ). There is no guarding.   Musculoskeletal:         General: Tenderness (midline, lumbar spine) present. Normal range of motion.      Cervical back: Normal range of motion and neck supple.      Right lower leg: No edema.      Left lower leg: No edema.   Skin:     General: Skin is warm and dry.      Capillary Refill: Capillary refill takes less than 2  "seconds.      Comments: Multiple tattoos over bilateral forearms   Neurological:      General: No focal deficit present.      Mental Status: He is alert and oriented to person, place, and time.      Comments: 5/5 bilateral upper extremity strength  5/5 RLE strength  LLE: 5/5 foot plantar and dorsiflexion, 5/5 knee extension/flexion; hip extension and flexion limited exam 2/2 pain    Normal sensation to light touch in all extremities    CN 2-12 intact   Psychiatric:         Mood and Affect: Mood normal.          Last Recorded Vitals  Blood pressure 154/52, pulse 70, temperature 36.4 °C (97.5 °F), temperature source Temporal, resp. rate 18, height 1.854 m (6' 1\"), weight 91 kg (200 lb 9.9 oz), SpO2 93%.    Relevant Results  Scheduled medications  amLODIPine, 10 mg, oral, Daily  atorvastatin, 20 mg, oral, Nightly  [Held by provider] clopidogrel, 75 mg, oral, Daily  enoxaparin, 40 mg, subcutaneous, Daily  hydroCHLOROthiazide, 25 mg, oral, Daily  lidocaine, 1 patch, transdermal, Daily  metoprolol succinate XL, 50 mg, oral, Daily  polyethylene glycol, 17 g, oral, Daily  sennosides, 2 tablet, oral, BID      Continuous medications     PRN medications  PRN medications: acetaminophen, HYDROmorphone, oxyCODONE  Results for orders placed or performed during the hospital encounter of 07/04/24 (from the past 24 hour(s))   Urinalysis with Reflex Microscopic   Result Value Ref Range    Color, Urine Light-Yellow Light-Yellow, Yellow, Dark-Yellow    Appearance, Urine Clear Clear    Specific Gravity, Urine 1.030 1.005 - 1.035    pH, Urine 5.5 5.0, 5.5, 6.0, 6.5, 7.0, 7.5, 8.0    Protein, Urine 10 (TRACE) NEGATIVE, 10 (TRACE), 20 (TRACE) mg/dL    Glucose, Urine Normal Normal mg/dL    Blood, Urine 0.1 (1+) (A) NEGATIVE    Ketones, Urine 40 (2+) (A) NEGATIVE mg/dL    Bilirubin, Urine NEGATIVE NEGATIVE    Urobilinogen, Urine Normal Normal mg/dL    Nitrite, Urine NEGATIVE NEGATIVE    Leukocyte Esterase, Urine NEGATIVE NEGATIVE "   Microscopic Only, Urine   Result Value Ref Range    WBC, Urine 1-5 1-5, NONE /HPF    RBC, Urine 6-10 (A) NONE, 1-2, 3-5 /HPF    Mucus, Urine FEW Reference range not established. /LPF    Mixed Cell Casts, Urine OCCASIONAL (A) NONE /LPF     CT chest abdomen pelvis w IV contrast    Result Date: 7/5/2024  Interpreted By:  Finkelstein, Evan, STUDY: CT CHEST ABDOMEN PELVIS W IV CONTRAST;  7/5/2024 5:47 am   INDICATION: Signs/Symptoms:metastatic disease with lesion to L3.   COMPARISON: None.   ACCESSION NUMBER(S): [id number]   ORDERING CLINICIAN: JAILENE FENG   TECHNIQUE: Axial CT images of the chest, abdomen and pelvis with coronal and sagittal reconstructed images obtained after intravenous administration of 75 mL Omnipaque 350.   FINDINGS: CHEST:   CHEST WALL AND LOWER NECK: Enlarged right thyroid lobe with a hypodense nodule measuring up to 2.1cm. No acute osseous abnormality. VESSELS: Aorta is normal caliber. Atherosclerotic changes in the aorta and coronary arteries. HEART: Normal size. No pericardial effusion. MEDIASTINUM AND CELE: No pathologically enlarged thoracic lymph nodes. LUNG, PLEURA, LARGE AIRWAYS: Mild bibasilar atelectasis.   ABDOMEN/PELVIS:   LIVER: Normal attenuation and contour. Subcentimeter hypodensity in the left hepatic lobe is too small to characterize. BILE DUCTS: Normal caliber. GALLBLADDER: No calcified gallstones. No wall thickening. PANCREAS: Unremarkable. SPLEEN: Unremarkable. ADRENALS: Unremarkable. KIDNEYS, URETERS and BLADDER: Symmetric renal enhancement. Subcentimeter hypodensities in the right kidney are too small to characterize. No right-sided hydroureteronephrosis. The bladder is unremarkable. Heterogeneous 5.8 x 8.1 cm mass arising from the superior aspect of the left kidney. There is mild adjacent stranding. 5.5 cm hypodensity extending from the inferior pole of the left kidney measures simple fluid attenuation most compatible with a simple cyst. REPRODUCTIVE ORGANS: The  prostate is enlarged and measures 4.5 x 5.7 cm.   ABDOMINAL WALL: Fat containing inguinal hernias bilaterally. PERITONEUM: No ascites or free air, no fluid collection.   BOWEL: Normal caliber. Scattered areas of wall thickening versus underdistention throughout the colon. Normal appendix.   VESSELS: Moderate aortoiliac calcifications. RETROPERITONEUM: Within normal limits.   BONES: Destructive changes of the right 9th rib with an associated soft tissue mass measuring 2.6 x 4 cm. Destructive changes of the L3 vertebral body as described on prior CT lumbar spine. For findings in the thoracic and lumbar spine, please refer to separately dictated earlier CT reports.       Mild bibasilar atelectasis.   Enlarged right thyroid lobe with a hypodense nodule measuring up to 2.1 cm. Recommend nonemergent targeted ultrasound to further characterize.   Heterogeneous 5.8 x 8.1 cm mass arising from the superior aspect of the left kidney concerning for malignancy.   Scattered areas of wall thickening versus underdistention throughout the colon. Correlate for symptoms of colitis.   Destructive changes of the right 9th rib with an associated soft tissue mass measuring 2.6 x 4 cm. There is also a destructive lytic lesion within the L3 vertebral body as described on prior CT lumbar spine. Findings are concerning for metastatic disease.   MACRO: Critical Finding:  See findings. Notification was initiated on 7/5/2024 at 6:49 am by  Evan Finkelstein.  (**-YCF-**) Instructions:   Signed by: Evan Finkelstein 7/5/2024 6:50 AM Dictation workstation:   [id number]    MR lumbar spine w and wo IV contrast    Result Date: 7/5/2024  Interpreted By:  Laureano Galindo, STUDY: MR LUMBAR SPINE W AND WO IV CONTRAST;  7/5/2024 10:28 am   INDICATION: Signs/Symptoms:lesion in L3 that appears lytic.   COMPARISON: CT lumbar spine dated 07/25/2024.   ACCESSION NUMBER(S): [id number]   ORDERING CLINICIAN: JAILENE FENG   TECHNIQUE: Multiplanar multisequence  MR imaging of the lumbar spine performed prior to and following administration of 19 mL Dotarem intravenous contrast. Sagittal T1, T2, STIR, axial T1 and T2 weighted images of the lumbar spine were acquired. Postcontrast sagittal and axial T1 imaging obtained.   FINDINGS: Segmentation: Normal.   Conus: The lower thoracic cord appears unremarkable. The conus terminates at the level of the inferior endplate of L1. Cauda equina are unremarkable. There is no definite abnormal enhancement of the cauda equina.   Epidural fluid: None. There is epidural extension of tumor noted at the L3 vertebral body with abnormal enhancing masslike components seen predominantly involving the ventral epidural region.   Alignment: Vertebral alignment is grossly maintained.   Marrow signal/Vertebral bodies: There is an expansile enhancing mass infiltrating the L3 vertebral body with invasion/extension to the ventral epidural regions. There is minimal associated height loss/pathologic fracture component. There is relatively diffuse involvement of the vertebral body and extension into the base of the pedicles. There is an ill-defined 6 mm focus of enhancement involving the inferior endplate of L2 (series 9, image 8). No additional abnormal enhancing osseous metastatic lesions are otherwise evident within the lumbar spine. Remaining lumbar vertebral body heights are maintained.   Intervertebral discs: Mild multilevel degenerative disc height loss and disc desiccation.     Degenerative change:   T12-L1: Minimal/mild facet arthropathy. No spinal canal stenosis or neural foraminal narrowing.   L1-2: Minimal ligamentum flavum hypertrophy and disc bulge. No spinal canal stenosis or neural foraminal narrowing.   L2-3: Mild ligamentum flavum hypertrophy. Mild-to-moderate disc bulge with ventral epidural extension of L3 tumor components. There is lateral recess narrowing with no additional spinal canal stenosis at the disc level. Mild bilateral neural  foraminal narrowing.   L3-4: Enhancing epidural tumor components at the L3 vertebral body level resulting in ventral thecal sac narrowing with no significant spinal canal stenosis. There is mild ligamentum flavum hypertrophy, minimal facet arthropathy, and mild disc bulge at the disc level. There is resultant mild lateral recess narrowing. There is moderate to severe left and mild-to-moderate right neural foraminal narrowing from degenerative change and neoplastic involvement.   L4-5: Mild facet arthropathy and ligamentum flavum hypertrophy. Disc bulge and endplate spurring with superimposed left subarticular disc osteophyte protrusion component. There is left lateral recess stenosis/effacement with compression of the descending left L5 nerve root. No additional spinal canal stenosis. Mild right and moderate left neural foraminal narrowing.   L5-S1: Mild facet arthropathy. Disc bulge and hypertrophic endplate spurring with broad-based central and left subarticular disc extrusion component with caudal migration. There is encroachment on the descending left S1 nerve root without compression. There is no additional spinal canal stenosis. Severe left and moderate to severe right neural foraminal narrowing.   Soft tissues: There is a partially visualized large irregular enhancing mass associated with the left kidney, better characterized on same day CT chest, abdomen, and pelvis, concerning for a renal cell carcinoma. Fatty atrophy of the inferior posterior paraspinal musculature.       1. Findings concerning for left renal cell carcinoma with enhancing metastatic lesion infiltrating the L3 vertebral body with minimal height loss/pathologic fracture component and adjacent epidural invasion involving the ventral aspect of L3. There is lateral recess narrowing with no significant spinal canal stenosis. There is a subcentimeter focus of enhancement involving the inferior endplate of L2 along the rightward aspect that could  represent additional neoplastic involvement or degenerative change. There is otherwise no additional osseous metastatic disease identified within the lumbar spine. 2. Lumbar degenerative change as described in detail above. There is severe neural foraminal narrowing at the L5-S1 level, left-greater-than-right.   MACRO: None   Signed by: Laureano Galindo 7/5/2024 11:25 AM Dictation workstation:   [id number]    CT lumbar spine wo IV contrast    Result Date: 7/5/2024  Interpreted By:  Hanh Webster, STUDY: CT THORACIC SPINE WO IV CONTRAST; CT LUMBAR SPINE WO IV CONTRAST; 7/5/2024 1:53 am   INDICATION: Signs/Symptoms:pain; Signs/Symptoms:pain; sciatica.   COMPARISON: Radiographs of the lumbar spine dated 12/17/2019..   ACCESSION NUMBER(S): [id number]; [id number]   ORDERING CLINICIAN: JAILENE FENG   TECHNIQUE: Axial CT images of the thoracic and lumbar spine are obtained. Axial, coronal and sagittal reconstructions are submitted for review.   FINDINGS: THORACIC SPINE:   Thoracic vertebral alignment is maintained, without evidence of significant spondylolisthesis.   Thoracic vertebral body heights are preserved without evidence of compression fractures.   Posterior elements of the thoracic spine do not demonstrate any evidence of acute trauma.   Mild intervertebral disc height loss is present in the midthoracic spine at T7-T8 and T8-T9.   No high-grade stenosis is present. Small disc osteophyte complexes are present at the levels of T6-T7, T7-T8 and T8-T9 with some effacement of the anterior subarachnoid space, without significant spinal canal stenosis.   Paraspinal musculature does not demonstrate any acute abnormality.   Atelectatic changes are partially visualized in the lower lobes bilaterally, right-greater-than-left.   LUMBAR SPINE:   There are 5 lumbar type non rib-bearing vertebral bodies, with lowest well-formed intervertebral disc space labeled L5-S1.   Lumbar vertebral alignment is maintained,  without evidence of significant spondylolisthesis.   There is a large lytic lesion present within the L3 vertebral body with some cortical discontinuity present along the superior and inferior margins likely representing an underlying pathologic fracture with less than 20% height loss.   No additional lytic lesions are identified in the lumbar spine. No additional compression fractures are identified.   Multilevel intervertebral disc height loss is present moderate at L5-S1 and mild at other levels.   Facet joints are preserved without evidence of widening or subluxation although multilevel degenerate facet osteoarthropathy is present, worst at L5-S1.   Evaluation of the lumbar spinal canal is somewhat limited. Mild-to-moderate spinal canal narrowing is suspected at the levels of L3-L4 and L4-L5 bilaterally due to disc osteophyte complex and ligamentum flavum thickening.   Multilevel neural foraminal stenosis is present, with likely moderate narrowing present at the level of L3-L4 on the left with mild narrowing present at L4-L5 and L5-S1 bilaterally.   Paraspinal musculature does not demonstrate any acute abnormality.       1.  Large lytic lesion is present within the L3 vertebral body, likely representing metastatic, myelogenous or lymphoproliferative disease, with a suspected underlying pathologic fracture with less than 10 to 20% height loss and no significant retropulsion posteriorly into the spinal canal. Dedicated contrast enhanced MRI of the lumbar spine is recommended to better characterize mass and assess for any epidural soft tissue components. 2. No additional lytic lesions are identified in the thoracic or lumbar spine. 3. Degenerative changes are present in the lumbar spine, although the exam is not optimized to assess the spinal canal. At least mild-to-moderate stenosis is present at L3-L4 and L4-L5 due to disc osteophyte complexes.   MACRO: None   Signed by: Hanh Webster 7/5/2024 2:22 AM  Dictation workstation:   [id number]    CT thoracic spine wo IV contrast    Result Date: 7/5/2024  Interpreted By:  Hanh Webster, STUDY: CT THORACIC SPINE WO IV CONTRAST; CT LUMBAR SPINE WO IV CONTRAST; 7/5/2024 1:53 am   INDICATION: Signs/Symptoms:pain; Signs/Symptoms:pain; sciatica.   COMPARISON: Radiographs of the lumbar spine dated 12/17/2019..   ACCESSION NUMBER(S): [id number]; [id number]   ORDERING CLINICIAN: JAILENE FENG   TECHNIQUE: Axial CT images of the thoracic and lumbar spine are obtained. Axial, coronal and sagittal reconstructions are submitted for review.   FINDINGS: THORACIC SPINE:   Thoracic vertebral alignment is maintained, without evidence of significant spondylolisthesis.   Thoracic vertebral body heights are preserved without evidence of compression fractures.   Posterior elements of the thoracic spine do not demonstrate any evidence of acute trauma.   Mild intervertebral disc height loss is present in the midthoracic spine at T7-T8 and T8-T9.   No high-grade stenosis is present. Small disc osteophyte complexes are present at the levels of T6-T7, T7-T8 and T8-T9 with some effacement of the anterior subarachnoid space, without significant spinal canal stenosis.   Paraspinal musculature does not demonstrate any acute abnormality.   Atelectatic changes are partially visualized in the lower lobes bilaterally, right-greater-than-left.   LUMBAR SPINE:   There are 5 lumbar type non rib-bearing vertebral bodies, with lowest well-formed intervertebral disc space labeled L5-S1.   Lumbar vertebral alignment is maintained, without evidence of significant spondylolisthesis.   There is a large lytic lesion present within the L3 vertebral body with some cortical discontinuity present along the superior and inferior margins likely representing an underlying pathologic fracture with less than 20% height loss.   No additional lytic lesions are identified in the lumbar spine. No additional  compression fractures are identified.   Multilevel intervertebral disc height loss is present moderate at L5-S1 and mild at other levels.   Facet joints are preserved without evidence of widening or subluxation although multilevel degenerate facet osteoarthropathy is present, worst at L5-S1.   Evaluation of the lumbar spinal canal is somewhat limited. Mild-to-moderate spinal canal narrowing is suspected at the levels of L3-L4 and L4-L5 bilaterally due to disc osteophyte complex and ligamentum flavum thickening.   Multilevel neural foraminal stenosis is present, with likely moderate narrowing present at the level of L3-L4 on the left with mild narrowing present at L4-L5 and L5-S1 bilaterally.   Paraspinal musculature does not demonstrate any acute abnormality.       1.  Large lytic lesion is present within the L3 vertebral body, likely representing metastatic, myelogenous or lymphoproliferative disease, with a suspected underlying pathologic fracture with less than 10 to 20% height loss and no significant retropulsion posteriorly into the spinal canal. Dedicated contrast enhanced MRI of the lumbar spine is recommended to better characterize mass and assess for any epidural soft tissue components. 2. No additional lytic lesions are identified in the thoracic or lumbar spine. 3. Degenerative changes are present in the lumbar spine, although the exam is not optimized to assess the spinal canal. At least mild-to-moderate stenosis is present at L3-L4 and L4-L5 due to disc osteophyte complexes.   MACRO: None   Signed by: Hanh Webster 7/5/2024 2:22 AM Dictation workstation:   [id number]      Assessment/Plan   Principal Problem:    Pathologic lumbar vertebral fracture, initial encounter  Christophe Ambriz is a 74-year-old male with past medical history significant for significant tobacco use, coronary artery disease, hypertension, hyperlipidemia who is admitted for left renal mass and pathologic L3 fracture concerning  for metastatic disease.  Currently stable, on minimal medications.  MRI spine completed, no concern for cord compression.    #Left renal mass  #L3 lumbar fracture concern for spinal mets  :: Patient with 1 month of severe back pain with point tenderness, recent weight loss, pain awakening at night concerning for malignant process with metastasis  :: CT and MRI imaging as above showing left renal mass concerning for RCC with enhancing metastatic lesion infiltrating L3 vertebral body and adjacent epidural invasion involving the ventral aspect of L3  Plan:  -Likely consult IR on 7/8 for CT-guided biopsy of left renal mass  -No concern at this time for cord compression, will not start dexamethasone but could consider for symptom relief  -Consider orthopedics/neurosurgery consult for possible surgical intervention of pathologic fracture  -Tylenol 975 every 8 hours as needed, oxycodone 5 mg every 6 hours as needed, Dilaudid 0.5 mg every 6 hours for breakthrough    #CAD s/p PCI  #HTN  #HLD  - hold home plavix in lieu of possible biopsy   - continue home atorvastatin  - continue home amlodipine 10mg daily  - continue home hydrochlorothiazide 25mg daily   - continue home metoprolol succinate 50mg daily   - confirm home medications w/ pharmacy med rec in AM    #Leukocytosis  :: No current infectious symptoms, likely reactive in setting of likely malignancy and fracture    F: PRN  E: PRN  N: regular  A: PIV  Abx: none  O2: RA  GI ppx: n/a  DVT ppx: lovenox    NOK: Iram Aguirre (sig other) [phone]  Code status: Full code (confirmed on admission)    Patient to be seen and staffed with attending physician Dr. Matias in the AM.        Romeo Mg MD

## 2024-07-07 VITALS
TEMPERATURE: 98.2 F | HEART RATE: 75 BPM | RESPIRATION RATE: 16 BRPM | HEIGHT: 73 IN | SYSTOLIC BLOOD PRESSURE: 121 MMHG | DIASTOLIC BLOOD PRESSURE: 75 MMHG | BODY MASS INDEX: 26.59 KG/M2 | OXYGEN SATURATION: 94 % | WEIGHT: 200.62 LBS

## 2024-07-07 LAB — HOLD SPECIMEN: NORMAL

## 2024-07-07 PROCEDURE — 2500000001 HC RX 250 WO HCPCS SELF ADMINISTERED DRUGS (ALT 637 FOR MEDICARE OP): Performed by: STUDENT IN AN ORGANIZED HEALTH CARE EDUCATION/TRAINING PROGRAM

## 2024-07-07 PROCEDURE — 2500000004 HC RX 250 GENERAL PHARMACY W/ HCPCS (ALT 636 FOR OP/ED)

## 2024-07-07 PROCEDURE — 1170000001 HC PRIVATE ONCOLOGY ROOM DAILY

## 2024-07-07 PROCEDURE — 2500000001 HC RX 250 WO HCPCS SELF ADMINISTERED DRUGS (ALT 637 FOR MEDICARE OP)

## 2024-07-07 PROCEDURE — 99232 SBSQ HOSP IP/OBS MODERATE 35: CPT

## 2024-07-07 RX ORDER — OXYCODONE HYDROCHLORIDE 5 MG/1
7.5 TABLET ORAL
Status: DISCONTINUED | OUTPATIENT
Start: 2024-07-07 | End: 2024-07-12 | Stop reason: HOSPADM

## 2024-07-07 RX ORDER — OXYCODONE HYDROCHLORIDE 5 MG/1
10 TABLET ORAL EVERY 6 HOURS PRN
Status: DISCONTINUED | OUTPATIENT
Start: 2024-07-07 | End: 2024-07-12 | Stop reason: HOSPADM

## 2024-07-07 RX ADMIN — GABAPENTIN 100 MG: 100 CAPSULE ORAL at 20:17

## 2024-07-07 RX ADMIN — OXYCODONE HYDROCHLORIDE 7.5 MG: 5 TABLET ORAL at 10:06

## 2024-07-07 RX ADMIN — TRAZODONE HYDROCHLORIDE 50 MG: 50 TABLET ORAL at 20:17

## 2024-07-07 RX ADMIN — PSYLLIUM HUSK 1 PACKET: 3.4 POWDER ORAL at 21:00

## 2024-07-07 RX ADMIN — HYDROMORPHONE HYDROCHLORIDE 0.5 MG: 1 INJECTION, SOLUTION INTRAMUSCULAR; INTRAVENOUS; SUBCUTANEOUS at 23:37

## 2024-07-07 RX ADMIN — PSYLLIUM HUSK 1 PACKET: 3.4 POWDER ORAL at 15:46

## 2024-07-07 RX ADMIN — SENNOSIDES 17.2 MG: 8.6 TABLET, FILM COATED ORAL at 20:16

## 2024-07-07 RX ADMIN — ACETAMINOPHEN 975 MG: 325 TABLET ORAL at 01:30

## 2024-07-07 RX ADMIN — METOPROLOL SUCCINATE 50 MG: 50 TABLET, EXTENDED RELEASE ORAL at 10:08

## 2024-07-07 RX ADMIN — HYDROCHLOROTHIAZIDE 25 MG: 25 TABLET ORAL at 10:05

## 2024-07-07 RX ADMIN — HYDROMORPHONE HYDROCHLORIDE 0.5 MG: 1 INJECTION, SOLUTION INTRAMUSCULAR; INTRAVENOUS; SUBCUTANEOUS at 11:51

## 2024-07-07 RX ADMIN — ACETAMINOPHEN 975 MG: 325 TABLET ORAL at 10:06

## 2024-07-07 RX ADMIN — AMLODIPINE BESYLATE 10 MG: 10 TABLET ORAL at 10:05

## 2024-07-07 RX ADMIN — ENOXAPARIN SODIUM 40 MG: 100 INJECTION SUBCUTANEOUS at 10:08

## 2024-07-07 RX ADMIN — OXYCODONE HYDROCHLORIDE 7.5 MG: 5 TABLET ORAL at 15:45

## 2024-07-07 RX ADMIN — ATORVASTATIN CALCIUM 20 MG: 20 TABLET, FILM COATED ORAL at 20:17

## 2024-07-07 RX ADMIN — OXYCODONE HYDROCHLORIDE 7.5 MG: 5 TABLET ORAL at 05:25

## 2024-07-07 RX ADMIN — OXYCODONE HYDROCHLORIDE 7.5 MG: 5 TABLET ORAL at 20:16

## 2024-07-07 RX ADMIN — SENNOSIDES 17.2 MG: 8.6 TABLET, FILM COATED ORAL at 10:05

## 2024-07-07 ASSESSMENT — PAIN DESCRIPTION - LOCATION
LOCATION: HIP
LOCATION: HIP

## 2024-07-07 ASSESSMENT — PAIN SCALES - GENERAL
PAINLEVEL_OUTOF10: 8
PAINLEVEL_OUTOF10: 6
PAINLEVEL_OUTOF10: 6
PAINLEVEL_OUTOF10: 5 - MODERATE PAIN
PAINLEVEL_OUTOF10: 4
PAINLEVEL_OUTOF10: 8
PAINLEVEL_OUTOF10: 9

## 2024-07-07 ASSESSMENT — COGNITIVE AND FUNCTIONAL STATUS - GENERAL
MOVING FROM LYING ON BACK TO SITTING ON SIDE OF FLAT BED WITH BEDRAILS: A LOT
WALKING IN HOSPITAL ROOM: A LOT
MOBILITY SCORE: 11
TOILETING: A LITTLE
DAILY ACTIVITIY SCORE: 19
HELP NEEDED FOR BATHING: A LITTLE
TURNING FROM BACK TO SIDE WHILE IN FLAT BAD: A LOT
STANDING UP FROM CHAIR USING ARMS: A LOT
CLIMB 3 TO 5 STEPS WITH RAILING: TOTAL
DRESSING REGULAR UPPER BODY CLOTHING: A LITTLE
MOVING TO AND FROM BED TO CHAIR: A LOT
DRESSING REGULAR LOWER BODY CLOTHING: A LOT

## 2024-07-07 ASSESSMENT — PAIN DESCRIPTION - ORIENTATION
ORIENTATION: RIGHT;LEFT
ORIENTATION: RIGHT;LEFT

## 2024-07-07 ASSESSMENT — PAIN - FUNCTIONAL ASSESSMENT
PAIN_FUNCTIONAL_ASSESSMENT: 0-10

## 2024-07-07 NOTE — PROGRESS NOTES
Discharge planning note:      Christophe Ambriz is a 74 y.o. male on day 1 of admission presenting with Pathologic lumbar vertebral fracture, initial encounter.    Called and spoke with the patient confirmed all information on the demographics page. Lives with his life time  in an apartment one level. No Pet or DME prior to admission. Unable to confirm MD as Dejuan Dhaliwal says he usually just goes to the ED for treatment as needed. Preference [facility] if needed for home.              Ro Beard RN

## 2024-07-07 NOTE — PROGRESS NOTES
Christophe Ambriz is a 74 y.o. male on day 1 of admission presenting with Pathologic lumbar vertebral fracture, initial encounter.    Subjective   Christophe Ambriz is a 74-year-old male with past medical history significant for significant tobacco use, coronary artery disease, hypertension, hyperlipidemia who is admitted for left renal mass and pathologic L3 fracture concerning for metastatic disease.  Currently stable, on minimal medications.     This morning he is having low back pain that awakens him from sleep and will radiate to left leg. He denied any weakness, incontinence, changes in bowel habits. He states the pain is 5/10.     He denies headaches, change in vision, chest pain, shortness of breath, abdominal pain, nausea, vomiting, or diarrhea. He endorsed constipation however this is his normal bowel habit.        Objective     Physical Exam  Physical Exam  Constitutional:       General: He is not in acute distress.     Appearance: He is not ill-appearing or toxic-appearing.   HENT:      Head: Normocephalic and atraumatic.      Mouth/Throat:      Mouth: Mucous membranes are moist.      Pharynx: Oropharynx is clear.   Eyes:      General: No scleral icterus.     Extraocular Movements: Extraocular movements intact.      Conjunctiva/sclera: Conjunctivae normal.      Pupils: Pupils are equal, round, and reactive to light.   Cardiovascular:      Rate and Rhythm: Normal rate and regular rhythm.      Pulses: Normal pulses.      Heart sounds: Normal heart sounds. No murmur heard.     No friction rub. No gallop.   Pulmonary:      Effort: Pulmonary effort is normal. No respiratory distress.      Breath sounds: No wheezing or rhonchi.   Abdominal:      General: Abdomen is flat. Bowel sounds are normal. There is no distension.      Palpations: Abdomen is soft.      Tenderness: There is abdominal tenderness (LLQ). There is no guarding.   Musculoskeletal:         General: Tenderness (midline, lumbar spine) present. Normal  "range of motion.      Cervical back: Normal range of motion and neck supple.      Right lower leg: No edema.      Left lower leg: No edema.   Skin:     General: Skin is warm and dry.      Capillary Refill: Capillary refill takes less than 2 seconds.   Neurological:      General: No focal deficit present.      Mental Status: He is alert and oriented to person, place, and time.     Last Recorded Vitals  Blood pressure 113/63, pulse 72, temperature 36.6 °C (97.9 °F), temperature source Temporal, resp. rate 16, height 1.854 m (6' 1\"), weight 91 kg (200 lb 9.9 oz), SpO2 95%.  Intake/Output last 3 Shifts:  I/O last 3 completed shifts:  In: 480 (5.3 mL/kg) [P.O.:480]  Out: 875 (9.6 mL/kg) [Urine:875 (0.3 mL/kg/hr)]  Weight: 91 kg     Relevant Results              Results for orders placed or performed during the hospital encounter of 07/06/24 (from the past 24 hour(s))   Urinalysis with Reflex Culture and Microscopic   Result Value Ref Range    Color, Urine Light-Yellow Light-Yellow, Yellow, Dark-Yellow    Appearance, Urine Clear Clear    Specific Gravity, Urine 1.014 1.005 - 1.035    pH, Urine 6.0 5.0, 5.5, 6.0, 6.5, 7.0, 7.5, 8.0    Protein, Urine 10 (TRACE) NEGATIVE, 10 (TRACE), 20 (TRACE) mg/dL    Glucose, Urine Normal Normal mg/dL    Blood, Urine 0.03 (TRACE) (A) NEGATIVE    Ketones, Urine NEGATIVE NEGATIVE mg/dL    Bilirubin, Urine NEGATIVE NEGATIVE    Urobilinogen, Urine Normal Normal mg/dL    Nitrite, Urine NEGATIVE NEGATIVE    Leukocyte Esterase, Urine NEGATIVE NEGATIVE   Extra Urine Gray Tube   Result Value Ref Range    Extra Tube Hold for add-ons.    Urinalysis Microscopic   Result Value Ref Range    WBC, Urine 1-5 1-5, NONE /HPF    RBC, Urine 6-10 (A) NONE, 1-2, 3-5 /HPF    Mucus, Urine FEW Reference range not established. /LPF      Imaging  XR chest 1 view    Result Date: 7/7/2024  Interpreted By:  Laureano Moore, STUDY: XR CHEST 1 VIEW; 7/6/2024 4:51 pm   INDICATION: Signs/Symptoms:neurosurgery recs for pt " with high suspicion new cancer.   COMPARISON: 04/05/2020   ACCESSION NUMBER(S): [id number]   ORDERING CLINICIAN: MINE EMANUEL   FINDINGS:     CARDIOMEDIASTINAL SILHOUETTE: Interval superior mediastinal prominence with tracheal narrowing.   LUNGS: There is minimal basilar atelectasis. No pneumothorax or significant effusions.   ABDOMEN: No remarkable upper abdominal findings.   BONES: No acute osseous changes.       1.  Interval mediastinal widening and correlate with thyroid enlargement/mass. 2. Minimal left retrocardiac atelectasis.     Signed by: Laureano Moore 7/7/2024 1:25 AM Dictation workstation:   [id number]    XR lumbar spine 2-3 views    Result Date: 7/6/2024  Interpreted By:  Pierce Vera, STUDY: XR LUMBAR SPINE 2-3 VIEWS; ;  7/6/2024 2:35 pm   INDICATION: Signs/Symptoms:presumed bony mets from presumed RCC.   COMPARISON: Correlation with MRI from 07/05/2024   ACCESSION NUMBER(S): [id number]   ORDERING CLINICIAN: MINE EMANUEL   FINDINGS: Lumbar spine, three views   There is loss of height of the L3 vertebral body corresponding to known pathologic fracture. No other fracture seen. There is moderate multilevel spondylotic changes worse at L4-5 of S1. There is no spondylolisthesis       Pathologic fracture at L3 as seen on recent MRI. No new abnormality seen     MACRO: None   Signed by: Pierce Vera 7/6/2024 3:12 PM Dictation workstation:   [id number]    XR femur 2 VW bilateral    Result Date: 7/6/2024  Interpreted By:  Pierce Vera, STUDY: XR FEMUR 2 VW BILATERAL; ;  7/6/2024 12:13 pm   INDICATION: Signs/Symptoms:Concern for Mets to femur.   COMPARISON: None.   ACCESSION NUMBER(S): [id number]   ORDERING CLINICIAN: MINE EMANUEL   FINDINGS: Bilateral femurs, two views of each   No lytic or sclerotic lesion seen in the visualized osseous structures radiographically. There is no fracture or dislocation.       No lytic or sclerotic lesion seen radiographically. If there is specific concern  bone scan or MRI can be performed further evaluation     MACRO: None   Signed by: Pierce Vera 7/6/2024 3:11 PM Dictation workstation:   [id number]    MR lumbar spine w and wo IV contrast    Result Date: 7/5/2024  Interpreted By:  Laureano Galindo, STUDY: MR LUMBAR SPINE W AND WO IV CONTRAST;  7/5/2024 10:28 am   INDICATION: Signs/Symptoms:lesion in L3 that appears lytic.   COMPARISON: CT lumbar spine dated 07/25/2024.   ACCESSION NUMBER(S): [id number]   ORDERING CLINICIAN: JAILENE FENG   TECHNIQUE: Multiplanar multisequence MR imaging of the lumbar spine performed prior to and following administration of 19 mL Dotarem intravenous contrast. Sagittal T1, T2, STIR, axial T1 and T2 weighted images of the lumbar spine were acquired. Postcontrast sagittal and axial T1 imaging obtained.   FINDINGS: Segmentation: Normal.   Conus: The lower thoracic cord appears unremarkable. The conus terminates at the level of the inferior endplate of L1. Cauda equina are unremarkable. There is no definite abnormal enhancement of the cauda equina.   Epidural fluid: None. There is epidural extension of tumor noted at the L3 vertebral body with abnormal enhancing masslike components seen predominantly involving the ventral epidural region.   Alignment: Vertebral alignment is grossly maintained.   Marrow signal/Vertebral bodies: There is an expansile enhancing mass infiltrating the L3 vertebral body with invasion/extension to the ventral epidural regions. There is minimal associated height loss/pathologic fracture component. There is relatively diffuse involvement of the vertebral body and extension into the base of the pedicles. There is an ill-defined 6 mm focus of enhancement involving the inferior endplate of L2 (series 9, image 8). No additional abnormal enhancing osseous metastatic lesions are otherwise evident within the lumbar spine. Remaining lumbar vertebral body heights are maintained.   Intervertebral discs: Mild  multilevel degenerative disc height loss and disc desiccation.     Degenerative change:   T12-L1: Minimal/mild facet arthropathy. No spinal canal stenosis or neural foraminal narrowing.   L1-2: Minimal ligamentum flavum hypertrophy and disc bulge. No spinal canal stenosis or neural foraminal narrowing.   L2-3: Mild ligamentum flavum hypertrophy. Mild-to-moderate disc bulge with ventral epidural extension of L3 tumor components. There is lateral recess narrowing with no additional spinal canal stenosis at the disc level. Mild bilateral neural foraminal narrowing.   L3-4: Enhancing epidural tumor components at the L3 vertebral body level resulting in ventral thecal sac narrowing with no significant spinal canal stenosis. There is mild ligamentum flavum hypertrophy, minimal facet arthropathy, and mild disc bulge at the disc level. There is resultant mild lateral recess narrowing. There is moderate to severe left and mild-to-moderate right neural foraminal narrowing from degenerative change and neoplastic involvement.   L4-5: Mild facet arthropathy and ligamentum flavum hypertrophy. Disc bulge and endplate spurring with superimposed left subarticular disc osteophyte protrusion component. There is left lateral recess stenosis/effacement with compression of the descending left L5 nerve root. No additional spinal canal stenosis. Mild right and moderate left neural foraminal narrowing.   L5-S1: Mild facet arthropathy. Disc bulge and hypertrophic endplate spurring with broad-based central and left subarticular disc extrusion component with caudal migration. There is encroachment on the descending left S1 nerve root without compression. There is no additional spinal canal stenosis. Severe left and moderate to severe right neural foraminal narrowing.   Soft tissues: There is a partially visualized large irregular enhancing mass associated with the left kidney, better characterized on same day CT chest, abdomen, and pelvis,  concerning for a renal cell carcinoma. Fatty atrophy of the inferior posterior paraspinal musculature.       1. Findings concerning for left renal cell carcinoma with enhancing metastatic lesion infiltrating the L3 vertebral body with minimal height loss/pathologic fracture component and adjacent epidural invasion involving the ventral aspect of L3. There is lateral recess narrowing with no significant spinal canal stenosis. There is a subcentimeter focus of enhancement involving the inferior endplate of L2 along the rightward aspect that could represent additional neoplastic involvement or degenerative change. There is otherwise no additional osseous metastatic disease identified within the lumbar spine. 2. Lumbar degenerative change as described in detail above. There is severe neural foraminal narrowing at the L5-S1 level, left-greater-than-right.   MACRO: None   Signed by: Laureano Galindo 7/5/2024 11:25 AM Dictation workstation:   [id number]            Assessment/Plan   Principal Problem:    Pathologic lumbar vertebral fracture, initial encounter    Christophe Ambriz is a 74-year-old male with past medical history significant for significant tobacco use, coronary artery disease, hypertension, hyperlipidemia who is admitted for left renal mass and pathologic L3 fracture concerning for metastatic disease.  Currently stable, on minimal medications.  MRI spine completed, no concern for cord compression.     #Left renal mass  #L3 lumbar fracture concern for spinal mets  :: Patient with 1 month of severe back pain with point tenderness, recent weight loss, pain awakening at night concerning for malignant process with metastasis  :: CT and MRI imaging as above showing left renal mass concerning for RCC with enhancing metastatic lesion infiltrating L3 vertebral body and adjacent epidural invasion involving the ventral aspect of L3  Plan:  -IR consult- Recommend outpatient biopsy will be canceled as it will be addressed via  neurosurgery surgical intervention  -No concern at this time for cord compression, will not start dexamethasone but could consider for symptom relief  -Consider orthopedics/neurosurgery consult for possible surgical intervention of pathologic fracture  - Surgery with neurosurgery Tuesday    -Tylenol 975 every 8 hours as needed, oxycodone 5 mg every 6 hours as needed, Dilaudid 0.5 mg every 6 hours for breakthrough  - Supportive onc pain management     #CAD s/p PCI  #HTN  #HLD  - hold home plavix in lieu of possible biopsy   - continue home atorvastatin  - continue home amlodipine 10mg daily  - continue home hydrochlorothiazide 25mg daily   - continue home metoprolol succinate 50mg daily   - confirm home medications w/ pharmacy med rec in AM     #Leukocytosis  :: No current infectious symptoms, likely reactive in setting of likely malignancy and fracture     F: PRN  E: PRN  N: regular  A: PIV  Abx: none  O2: RA  GI ppx: n/a  DVT ppx: lovenox     NOK: Iram Timothy (sig other) [phone]  Code status: Full code (confirmed on admission)           Zena Roche MD

## 2024-07-08 ENCOUNTER — ANESTHESIA EVENT (OUTPATIENT)
Dept: OPERATING ROOM | Facility: HOSPITAL | Age: 74
End: 2024-07-08
Payer: MEDICARE

## 2024-07-08 LAB
ABO GROUP (TYPE) IN BLOOD: NORMAL
ANTIBODY SCREEN: NORMAL
APTT PPP: 35 SECONDS (ref 27–38)
ERYTHROCYTE [DISTWIDTH] IN BLOOD BY AUTOMATED COUNT: 13.5 % (ref 11.5–14.5)
HCT VFR BLD AUTO: 40.9 % (ref 41–52)
HGB BLD-MCNC: 12.4 G/DL (ref 13.5–17.5)
INR PPP: 1.3 (ref 0.9–1.1)
MCH RBC QN AUTO: 27.6 PG (ref 26–34)
MCHC RBC AUTO-ENTMCNC: 30.3 G/DL (ref 32–36)
MCV RBC AUTO: 91 FL (ref 80–100)
NRBC BLD-RTO: 0 /100 WBCS (ref 0–0)
PLATELET # BLD AUTO: 448 X10*3/UL (ref 150–450)
PROTHROMBIN TIME: 14.4 SECONDS (ref 9.8–12.8)
RBC # BLD AUTO: 4.5 X10*6/UL (ref 4.5–5.9)
RH FACTOR (ANTIGEN D): NORMAL
WBC # BLD AUTO: 11.3 X10*3/UL (ref 4.4–11.3)

## 2024-07-08 PROCEDURE — 36415 COLL VENOUS BLD VENIPUNCTURE: CPT

## 2024-07-08 PROCEDURE — 1170000001 HC PRIVATE ONCOLOGY ROOM DAILY

## 2024-07-08 PROCEDURE — 2500000004 HC RX 250 GENERAL PHARMACY W/ HCPCS (ALT 636 FOR OP/ED)

## 2024-07-08 PROCEDURE — 85610 PROTHROMBIN TIME: CPT

## 2024-07-08 PROCEDURE — 2500000001 HC RX 250 WO HCPCS SELF ADMINISTERED DRUGS (ALT 637 FOR MEDICARE OP): Performed by: STUDENT IN AN ORGANIZED HEALTH CARE EDUCATION/TRAINING PROGRAM

## 2024-07-08 PROCEDURE — 2500000001 HC RX 250 WO HCPCS SELF ADMINISTERED DRUGS (ALT 637 FOR MEDICARE OP)

## 2024-07-08 PROCEDURE — 85027 COMPLETE CBC AUTOMATED: CPT

## 2024-07-08 PROCEDURE — 86901 BLOOD TYPING SEROLOGIC RH(D): CPT

## 2024-07-08 RX ORDER — SODIUM CHLORIDE, SODIUM LACTATE, POTASSIUM CHLORIDE, CALCIUM CHLORIDE 600; 310; 30; 20 MG/100ML; MG/100ML; MG/100ML; MG/100ML
75 INJECTION, SOLUTION INTRAVENOUS CONTINUOUS
Status: DISCONTINUED | OUTPATIENT
Start: 2024-07-09 | End: 2024-07-12 | Stop reason: HOSPADM

## 2024-07-08 RX ORDER — AMOXICILLIN 250 MG
1 CAPSULE ORAL NIGHTLY
Status: DISCONTINUED | OUTPATIENT
Start: 2024-07-08 | End: 2024-07-11

## 2024-07-08 RX ADMIN — OXYCODONE HYDROCHLORIDE 7.5 MG: 5 TABLET ORAL at 14:22

## 2024-07-08 RX ADMIN — SENNOSIDES 17.2 MG: 8.6 TABLET, FILM COATED ORAL at 21:22

## 2024-07-08 RX ADMIN — PSYLLIUM HUSK 1 PACKET: 3.4 POWDER ORAL at 15:00

## 2024-07-08 RX ADMIN — AMLODIPINE BESYLATE 10 MG: 10 TABLET ORAL at 10:10

## 2024-07-08 RX ADMIN — PSYLLIUM HUSK 1 PACKET: 3.4 POWDER ORAL at 10:10

## 2024-07-08 RX ADMIN — PSYLLIUM HUSK 1 PACKET: 3.4 POWDER ORAL at 21:22

## 2024-07-08 RX ADMIN — SENNOSIDES AND DOCUSATE SODIUM 1 TABLET: 50; 8.6 TABLET ORAL at 21:22

## 2024-07-08 RX ADMIN — ENOXAPARIN SODIUM 40 MG: 100 INJECTION SUBCUTANEOUS at 10:10

## 2024-07-08 RX ADMIN — OXYCODONE HYDROCHLORIDE 10 MG: 10 TABLET ORAL at 20:28

## 2024-07-08 RX ADMIN — GABAPENTIN 100 MG: 100 CAPSULE ORAL at 21:22

## 2024-07-08 RX ADMIN — POLYETHYLENE GLYCOL 3350 17 G: 17 POWDER, FOR SOLUTION ORAL at 09:00

## 2024-07-08 RX ADMIN — HYDROMORPHONE HYDROCHLORIDE 0.5 MG: 1 INJECTION, SOLUTION INTRAMUSCULAR; INTRAVENOUS; SUBCUTANEOUS at 21:24

## 2024-07-08 RX ADMIN — ACETAMINOPHEN 975 MG: 325 TABLET ORAL at 01:03

## 2024-07-08 RX ADMIN — ACETAMINOPHEN 975 MG: 325 TABLET ORAL at 10:10

## 2024-07-08 RX ADMIN — METOPROLOL SUCCINATE 50 MG: 50 TABLET, EXTENDED RELEASE ORAL at 10:10

## 2024-07-08 RX ADMIN — ATORVASTATIN CALCIUM 20 MG: 20 TABLET, FILM COATED ORAL at 21:22

## 2024-07-08 RX ADMIN — SENNOSIDES 17.2 MG: 8.6 TABLET, FILM COATED ORAL at 10:10

## 2024-07-08 RX ADMIN — ACETAMINOPHEN 975 MG: 325 TABLET ORAL at 16:53

## 2024-07-08 RX ADMIN — HYDROCHLOROTHIAZIDE 25 MG: 25 TABLET ORAL at 10:10

## 2024-07-08 ASSESSMENT — PAIN SCALES - GENERAL
PAINLEVEL_OUTOF10: 9
PAINLEVEL_OUTOF10: 2
PAINLEVEL_OUTOF10: 5 - MODERATE PAIN
PAINLEVEL_OUTOF10: 9

## 2024-07-08 ASSESSMENT — PAIN - FUNCTIONAL ASSESSMENT: PAIN_FUNCTIONAL_ASSESSMENT: 0-10

## 2024-07-08 NOTE — PROGRESS NOTES
Physical Therapy    Therapy Communication Note    Patient Name: Christophe Ambriz  MRN: [id number]  Today's Date: 7/8/2024       Discipline: Physical Therapy      Missed Visit: Yes  Missed Visit Reason:  (pt with L3 fx and pending OR on 7/9 with neurosurgery; will follow-up post procedure when appropriate)      07/08/24 at 8:03 AM   Annika Askew, PT

## 2024-07-08 NOTE — SIGNIFICANT EVENT
RCRI    Christophe Ambriz is a 74-year-old male with past medical history significant for significant tobacco use, coronary artery disease, hypertension, hyperlipidemia who is admitted for left renal mass and pathologic L3 fracture concerning for metastatic disease of unknown primary.  Currently stable, on minimal medications.  MRI spine completed, no concern for cord compression. Could most likely be discharged tomorrow no intervention per neuro/ortho spine. Neurosurgery to operate Tuesday with biopsy tentatively for pathologic L3 fracture due to dynamic instability and severity of symptoms    Surgery: Laminectomy- Intermediate risk surgery    Ischemic heart disease- Yes    Congestive heart failure- No     History of cerebrovascular disease- No     Diabetes treated with insulin- No     Preoperative creatinine- 0.64      Patient has 1 point for risk (ischemic heart disease). Class risk of II which is remarkable for a 6% 30 risk of death, MI or cardiac arrest. The patient refers that was able to walk around the block where he lives without major difficulties (approximately 4 METS) before he had left. No further workup is needed for laminectomy.     Prognosis for advanced cell carcinoma can be greater than a year depending on the treatment (PMID [id number]) .       Mandeep Esqueda   PGY3 Medicine

## 2024-07-08 NOTE — PROGRESS NOTES
Occupational Therapy                 Therapy Communication Note    Patient Name: Christophe Ambriz  MRN: [id number]  Today's Date: 7/8/2024     Discipline: Occupational Therapy    Missed Visit Reason: Missed Visit Reason:  (Pt with lumbar fx and pending OR with neurosurgery tomorrow (7/9). OT to follow-up post-op as medically appropriate.)    Missed Time: Attempt    Comment:

## 2024-07-08 NOTE — ANESTHESIA PREPROCEDURE EVALUATION
Patient: Christophe Ambriz    Procedure Information       Date/Time: 07/09/24 1215    Procedure: L1-5 fusion, L2-4 decompression and tumor debulking    Location: [facility] OR 24 / [facility] OR    Surgeons: Zac Garcia MD        ALLERGIES:  No Known Allergies     MEDICAL HISTORY:  No past medical history on file.     Relevant Problems   Anesthesia (within normal limits)      Cardiac   (+) CAD (coronary artery disease)   (+) HTN (hypertension)   (+) Hyperlipidemia   (+) Stented coronary artery      Musculoskeletal   (+) Pathologic lumbar vertebral fracture, initial encounter        SURGICAL HISTORY:  Cataract surgery, tonsilectomy, stent     MEDICATIONS:  Current Outpatient Medications   Medication Instructions    acetaminophen (TYLENOL) 1,000 mg, oral, Every 6 hours PRN    amLODIPine (NORVASC) 10 mg, oral, Daily    aspirin 81 mg, oral, Daily    atorvastatin (LIPITOR) 20 mg, oral, Daily    clopidogrel (PLAVIX) 75 mg, oral, Daily    hydroCHLOROthiazide (HYDRODIURIL) 25 mg, oral, Daily    metoprolol succinate XL (TOPROL-XL) 50 mg, oral, Daily        VITALS:      7/8/2024     9:20 AM 7/8/2024     1:01 AM 7/7/2024     8:21 PM   Vitals   Systolic 130 113 121   Diastolic 77 64 75   Heart Rate 71  75   Temp 36.8 °C (98.2 °F) 37.2 °C (99 °F) 36.8 °C (98.2 °F)   Resp 18 16 16       LABS:   BMP   Lab Results   Component Value Date    GLUCOSE 153 (H) 07/06/2024    CALCIUM 9.0 07/06/2024     07/06/2024    K 3.9 07/06/2024    CO2 23 07/06/2024     07/06/2024    BUN 10 07/06/2024    CREATININE 0.64 07/06/2024   , LFT   Lab Results   Component Value Date    ALT 14 07/06/2024    AST 15 07/06/2024    ALKPHOS 110 07/06/2024    BILITOT 0.4 07/06/2024   , CBC  Lab Results   Component Value Date    WBC 13.7 (H) 07/06/2024    HGB 11.5 (L) 07/06/2024    HCT 36.2 (L) 07/06/2024    MCV 85 07/06/2024     (H) 07/06/2024          , Coags   Lab Results   Component Value Date/Time    PROTIME 13.9 (H) 07/06/2024 0426  "   INR 1.2 (H) 07/06/2024 0429    APTT 35 07/06/2024 0429      , A1C No results found for: \"HGBA1C\"    IMAGES:  EKG          Encounter Date: 07/06/24   ECG 12 lead   Result Value    Ventricular Rate 78    Atrial Rate 78    CO Interval 142    QRS Duration 94    QT Interval 400    QTC Calculation(Bazett) 456    P Axis 24    R Axis -27    T Axis 43    QRS Count 13    Q Onset 224    P Onset 153    P Offset 187    T Offset 424    QTC Fredericia 436    Narrative    Normal sinus rhythm  Normal ECG  When compared with ECG of 06-JUL-2024 06:13, (unconfirmed)  Sinus rhythm has replaced Junctional rhythm      , ECHO       No results found for this or any previous visit from the past 730 days.    , CARDIAC CATH      No results found for this or any previous visit from the past 730 days.   , CXR       XR chest 1 view 07/06/2024    Narrative  Interpreted By:  Laureano Moore,  STUDY:  XR CHEST 1 VIEW; 7/6/2024 4:51 pm    INDICATION:  Signs/Symptoms:neurosurgery recs for pt with high suspicion new  cancer.    COMPARISON:  04/05/2020    ACCESSION NUMBER(S):  [id number]    ORDERING CLINICIAN:  MINE EMANUEL    FINDINGS:      CARDIOMEDIASTINAL SILHOUETTE:  Interval superior mediastinal prominence with tracheal narrowing.    LUNGS:  There is minimal basilar atelectasis. No pneumothorax or significant  effusions.    ABDOMEN:  No remarkable upper abdominal findings.    BONES:  No acute osseous changes.    Impression  1.  Interval mediastinal widening and correlate with thyroid  enlargement/mass.  2. Minimal left retrocardiac atelectasis.      Signed by: Laureano Moore 7/7/2024 1:25 AM  Dictation workstation:   [id number]    , CT Head/Neck     No results found for this or any previous visit from the past 760 days.    , CT Chest        CT chest abdomen pelvis w IV contrast 07/05/2024    Narrative  Interpreted By:  Finkelstein, Evan,  STUDY:  CT CHEST ABDOMEN PELVIS W IV CONTRAST;  7/5/2024 5:47 " am    INDICATION:  Signs/Symptoms:metastatic disease with lesion to L3.    COMPARISON:  None.    ACCESSION NUMBER(S):  [id number]    ORDERING CLINICIAN:  JAILENE FENG    TECHNIQUE:  Axial CT images of the chest, abdomen and pelvis with coronal and  sagittal reconstructed images obtained after intravenous  administration of 75 mL Omnipaque 350.    FINDINGS:  CHEST:    CHEST WALL AND LOWER NECK: Enlarged right thyroid lobe with a  hypodense nodule measuring up to 2.1cm. No acute osseous abnormality.  VESSELS: Aorta is normal caliber. Atherosclerotic changes in the  aorta and coronary arteries. HEART: Normal size. No pericardial  effusion. MEDIASTINUM AND CELE: No pathologically enlarged thoracic  lymph nodes. LUNG, PLEURA, LARGE AIRWAYS: Mild bibasilar atelectasis.    ABDOMEN/PELVIS:    LIVER: Normal attenuation and contour. Subcentimeter hypodensity in  the left hepatic lobe is too small to characterize. BILE DUCTS:  Normal caliber. GALLBLADDER: No calcified gallstones. No wall  thickening. PANCREAS: Unremarkable.  SPLEEN: Unremarkable.  ADRENALS: Unremarkable.  KIDNEYS, URETERS and BLADDER: Symmetric renal enhancement.  Subcentimeter hypodensities in the right kidney are too small to  characterize. No right-sided hydroureteronephrosis. The bladder is  unremarkable. Heterogeneous 5.8 x 8.1 cm mass arising from the  superior aspect of the left kidney. There is mild adjacent stranding.  5.5 cm hypodensity extending from the inferior pole of the left  kidney measures simple fluid attenuation most compatible with a  simple cyst. REPRODUCTIVE ORGANS: The prostate is enlarged and  measures 4.5 x 5.7 cm.    ABDOMINAL WALL: Fat containing inguinal hernias bilaterally.  PERITONEUM: No ascites or free air, no fluid collection.    BOWEL: Normal caliber. Scattered areas of wall thickening versus  underdistention throughout the colon. Normal appendix.    VESSELS: Moderate aortoiliac calcifications.  RETROPERITONEUM: Within  normal limits.    BONES: Destructive changes of the right 9th rib with an associated  soft tissue mass measuring 2.6 x 4 cm. Destructive changes of the L3  vertebral body as described on prior CT lumbar spine. For findings in  the thoracic and lumbar spine, please refer to separately dictated  earlier CT reports.    Impression  Mild bibasilar atelectasis.    Enlarged right thyroid lobe with a hypodense nodule measuring up to  2.1 cm. Recommend nonemergent targeted ultrasound to further  characterize.    Heterogeneous 5.8 x 8.1 cm mass arising from the superior aspect of  the left kidney concerning for malignancy.    Scattered areas of wall thickening versus underdistention throughout  the colon. Correlate for symptoms of colitis.    Destructive changes of the right 9th rib with an associated soft  tissue mass measuring 2.6 x 4 cm. There is also a destructive lytic  lesion within the L3 vertebral body as described on prior CT lumbar  spine. Findings are concerning for metastatic disease.    MACRO:  Critical Finding:  See findings. Notification was initiated on  7/5/2024 at 6:49 am by  Evan Finkelstein.  (**-YCF-**) Instructions:    Signed by: Evan Finkelstein 7/5/2024 6:50 AM  Dictation workstation:   [id number]   , CT Abdomin     No results found for this or any previous visit from the past 730 days.    SOCIAL:  Social History     Tobacco Use   Smoking Status Unknown   Smokeless Tobacco Not on file      Social History     Substance and Sexual Activity   Alcohol Use None      Social History     Substance and Sexual Activity   Drug Use Not on file        NPO STATUS:  No data recorded    Clinical Areas Reviewed:    Allergies  Meds               Anesthesia Assessment:    Physical Exam    Airway  Mallampati: III  TM distance: >3 FB  Neck ROM: full     Cardiovascular    Dental   (+) upper dentures     Pulmonary    Abdominal            Anesthesia Plan    History of general anesthesia?: yes  History of complications of  general anesthesia?: no    ASA 3     general     Anesthetic plan and risks discussed with patient.  Use of blood products discussed with patient who.    Plan discussed with CAA.

## 2024-07-08 NOTE — PROGRESS NOTES
Christophe Ambriz is a 74 y.o. male on day 2 of admission presenting with Pathologic lumbar vertebral fracture, initial encounter.    Subjective   Christophe Ambriz is a 74-year-old male with past medical history significant for significant tobacco use, coronary artery disease, hypertension, hyperlipidemia who is admitted for left renal mass and pathologic L3 fracture concerning for metastatic disease.  Currently stable, on minimal medications.      No significant events overnight.  This morning patient was well-appearing. He denied any pain, weakness, incontinence.   He appears anxious about his procedure tomorrow. Normal bowel habit.     Objective     Physical Exam  Constitutional:       General: He is not in acute distress.     Appearance: He is not ill-appearing or toxic-appearing.   HENT:      Head: Normocephalic and atraumatic.      Mouth/Throat:      Mouth: Mucous membranes are moist.      Pharynx: Oropharynx is clear.   Eyes:      General: No scleral icterus.     Extraocular Movements: Extraocular movements intact.      Conjunctiva/sclera: Conjunctivae normal.      Pupils: Pupils are equal, round, and reactive to light.   Cardiovascular:      Rate and Rhythm: Normal rate and regular rhythm.      Pulses: Normal pulses.      Heart sounds: Normal heart sounds. No murmur heard.     No friction rub. No gallop.   Pulmonary:      Effort: Pulmonary effort is normal. No respiratory distress.      Breath sounds: No wheezing or rhonchi.   Abdominal:      General: Abdomen is flat. Bowel sounds are normal. There is no distension.      Palpations: Abdomen is soft. No tenderness to palpation present.      Musculoskeletal:         Right lower leg: No edema.      Left lower leg: No edema.   Skin:     General: Skin is warm and dry.      Capillary Refill: Capillary refill takes less than 2 seconds.   Neurological:      General: No focal deficit present.      Mental Status: He is alert and oriented to person, place, and time.  "    Last Recorded Vitals  Blood pressure 113/64, pulse 75, temperature 37.2 °C (99 °F), temperature source Temporal, resp. rate 16, height 1.854 m (6' 1\"), weight 91 kg (200 lb 9.9 oz), SpO2 94%.    Intake/Output last 3 Shifts:  I/O last 3 completed shifts:  In: - (0 mL/kg)   Out: 450 (4.9 mL/kg) [Urine:450 (0.1 mL/kg/hr)]  Weight: 91 kg     Relevant Results  Results for orders placed or performed during the hospital encounter of 07/06/24 (from the past 24 hour(s))   Urinalysis with Reflex Culture and Microscopic   Result Value Ref Range     Color, Urine Light-Yellow Light-Yellow, Yellow, Dark-Yellow     Appearance, Urine Clear Clear     Specific Gravity, Urine 1.014 1.005 - 1.035     pH, Urine 6.0 5.0, 5.5, 6.0, 6.5, 7.0, 7.5, 8.0     Protein, Urine 10 (TRACE) NEGATIVE, 10 (TRACE), 20 (TRACE) mg/dL     Glucose, Urine Normal Normal mg/dL     Blood, Urine 0.03 (TRACE) (A) NEGATIVE     Ketones, Urine NEGATIVE NEGATIVE mg/dL     Bilirubin, Urine NEGATIVE NEGATIVE     Urobilinogen, Urine Normal Normal mg/dL     Nitrite, Urine NEGATIVE NEGATIVE     Leukocyte Esterase, Urine NEGATIVE NEGATIVE   Extra Urine Gray Tube   Result Value Ref Range     Extra Tube Hold for add-ons.     Urinalysis Microscopic   Result Value Ref Range     WBC, Urine 1-5 1-5, NONE /HPF     RBC, Urine 6-10 (A) NONE, 1-2, 3-5 /HPF     Mucus, Urine FEW Reference range not established. /LPF      Imaging  XR chest 1 view     Result Date: 7/7/2024  Interpreted By:  Laureano Moore, STUDY: XR CHEST 1 VIEW; 7/6/2024 4:51 pm   INDICATION: Signs/Symptoms:neurosurgery recs for pt with high suspicion new cancer.   COMPARISON: 04/05/2020   ACCESSION NUMBER(S): [id number]   ORDERING CLINICIAN: MINE EMANUEL   FINDINGS:     CARDIOMEDIASTINAL SILHOUETTE: Interval superior mediastinal prominence with tracheal narrowing.   LUNGS: There is minimal basilar atelectasis. No pneumothorax or significant effusions.   ABDOMEN: No remarkable upper abdominal findings.   " BONES: No acute osseous changes.        1.  Interval mediastinal widening and correlate with thyroid enlargement/mass. 2. Minimal left retrocardiac atelectasis.     Signed by: Laureano Moore 7/7/2024 1:25 AM Dictation workstation:   [id number]    XR lumbar spine 2-3 views     Result Date: 7/6/2024  Interpreted By:  Pierce Vera, STUDY: XR LUMBAR SPINE 2-3 VIEWS; ;  7/6/2024 2:35 pm   INDICATION: Signs/Symptoms:presumed bony mets from presumed RCC.   COMPARISON: Correlation with MRI from 07/05/2024   ACCESSION NUMBER(S): [id number]   ORDERING CLINICIAN: MINE EMANUEL   FINDINGS: Lumbar spine, three views   There is loss of height of the L3 vertebral body corresponding to known pathologic fracture. No other fracture seen. There is moderate multilevel spondylotic changes worse at L4-5 of S1. There is no spondylolisthesis        Pathologic fracture at L3 as seen on recent MRI. No new abnormality seen     MACRO: None   Signed by: Pierce Vera 7/6/2024 3:12 PM Dictation workstation:   [id number]     XR femur 2 VW bilateral     Result Date: 7/6/2024  Interpreted By:  Pierce Vera, STUDY: XR FEMUR 2 VW BILATERAL; ;  7/6/2024 12:13 pm   INDICATION: Signs/Symptoms:Concern for Mets to femur.   COMPARISON: None.   ACCESSION NUMBER(S): [id number]   ORDERING CLINICIAN: MINE EMANUEL   FINDINGS: Bilateral femurs, two views of each   No lytic or sclerotic lesion seen in the visualized osseous structures radiographically. There is no fracture or dislocation.        No lytic or sclerotic lesion seen radiographically. If there is specific concern bone scan or MRI can be performed further evaluation     MACRO: None   Signed by: Pierce Vera 7/6/2024 3:11 PM Dictation workstation:   [id number]    MR lumbar spine w and wo IV contrast     Result Date: 7/5/2024  Interpreted By:  Laureano Galindo, STUDY: MR LUMBAR SPINE W AND WO IV CONTRAST;  7/5/2024 10:28 am   INDICATION: Signs/Symptoms:lesion in L3 that appears  lytic.   COMPARISON: CT lumbar spine dated 07/25/2024.   ACCESSION NUMBER(S): [id number]   ORDERING CLINICIAN: JAILENE FENG   TECHNIQUE: Multiplanar multisequence MR imaging of the lumbar spine performed prior to and following administration of 19 mL Dotarem intravenous contrast. Sagittal T1, T2, STIR, axial T1 and T2 weighted images of the lumbar spine were acquired. Postcontrast sagittal and axial T1 imaging obtained.   FINDINGS: Segmentation: Normal.   Conus: The lower thoracic cord appears unremarkable. The conus terminates at the level of the inferior endplate of L1. Cauda equina are unremarkable. There is no definite abnormal enhancement of the cauda equina.   Epidural fluid: None. There is epidural extension of tumor noted at the L3 vertebral body with abnormal enhancing masslike components seen predominantly involving the ventral epidural region.   Alignment: Vertebral alignment is grossly maintained.   Marrow signal/Vertebral bodies: There is an expansile enhancing mass infiltrating the L3 vertebral body with invasion/extension to the ventral epidural regions. There is minimal associated height loss/pathologic fracture component. There is relatively diffuse involvement of the vertebral body and extension into the base of the pedicles. There is an ill-defined 6 mm focus of enhancement involving the inferior endplate of L2 (series 9, image 8). No additional abnormal enhancing osseous metastatic lesions are otherwise evident within the lumbar spine. Remaining lumbar vertebral body heights are maintained.   Intervertebral discs: Mild multilevel degenerative disc height loss and disc desiccation.     Degenerative change:   T12-L1: Minimal/mild facet arthropathy. No spinal canal stenosis or neural foraminal narrowing.   L1-2: Minimal ligamentum flavum hypertrophy and disc bulge. No spinal canal stenosis or neural foraminal narrowing.   L2-3: Mild ligamentum flavum hypertrophy. Mild-to-moderate disc bulge with  ventral epidural extension of L3 tumor components. There is lateral recess narrowing with no additional spinal canal stenosis at the disc level. Mild bilateral neural foraminal narrowing.   L3-4: Enhancing epidural tumor components at the L3 vertebral body level resulting in ventral thecal sac narrowing with no significant spinal canal stenosis. There is mild ligamentum flavum hypertrophy, minimal facet arthropathy, and mild disc bulge at the disc level. There is resultant mild lateral recess narrowing. There is moderate to severe left and mild-to-moderate right neural foraminal narrowing from degenerative change and neoplastic involvement.   L4-5: Mild facet arthropathy and ligamentum flavum hypertrophy. Disc bulge and endplate spurring with superimposed left subarticular disc osteophyte protrusion component. There is left lateral recess stenosis/effacement with compression of the descending left L5 nerve root. No additional spinal canal stenosis. Mild right and moderate left neural foraminal narrowing.   L5-S1: Mild facet arthropathy. Disc bulge and hypertrophic endplate spurring with broad-based central and left subarticular disc extrusion component with caudal migration. There is encroachment on the descending left S1 nerve root without compression. There is no additional spinal canal stenosis. Severe left and moderate to severe right neural foraminal narrowing.   Soft tissues: There is a partially visualized large irregular enhancing mass associated with the left kidney, better characterized on same day CT chest, abdomen, and pelvis, concerning for a renal cell carcinoma. Fatty atrophy of the inferior posterior paraspinal musculature.        1. Findings concerning for left renal cell carcinoma with enhancing metastatic lesion infiltrating the L3 vertebral body with minimal height loss/pathologic fracture component and adjacent epidural invasion involving the ventral aspect of L3. There is lateral recess  narrowing with no significant spinal canal stenosis. There is a subcentimeter focus of enhancement involving the inferior endplate of L2 along the rightward aspect that could represent additional neoplastic involvement or degenerative change. There is otherwise no additional osseous metastatic disease identified within the lumbar spine. 2. Lumbar degenerative change as described in detail above. There is severe neural foraminal narrowing at the L5-S1 level, left-greater-than-right.   MACRO: None   Signed by: Laureano Galindo 7/5/2024 11:25 AM Dictation workstation:   [id number]      Assessment/Plan   Principal Problem:  Pathologic lumbar vertebral fracture, initial encounter     Christophe Ambriz is a 74-year-old male with past medical history significant for significant tobacco use, coronary artery disease, hypertension, hyperlipidemia who is admitted for left renal mass and pathologic L3 fracture concerning for metastatic disease.  Currently stable, on minimal medications.  MRI spine completed, no concern for cord compression.      #Left renal mass  #L3 lumbar fracture concern for spinal mets  :: Patient with 1 month of severe back pain with point tenderness, recent weight loss, pain awakening at night concerning for malignant process with metastasis  :: CT and MRI imaging as above showing left renal mass concerning for RCC with enhancing metastatic lesion infiltrating L3 vertebral body and adjacent epidural invasion involving the ventral aspect of L3    Plan:  - If Senna and Miralax do not work, Enema today 7/8 prior to tomorrow's procedure  - Surgery with neurosurgery Tuesday 7/9  (Per Neurosurgery note 7/6): Given the instability and the lumbar radiculopathy being so debilitating I would recommend surgical stabilization decompression and fusion. The intent would be to get a diagnosis at the time of surgery.      - Supportive onc pain management   -Tylenol 975 every 8 hours as needed, oxycodone 5 mg every 6 hours as  needed, Dilaudid 0.5 mg every 6 hours for breakthrough    #CAD s/p PCI  #HTN  #HLD  - hold home plavix in lieu of possible biopsy   - continue home atorvastatin  - continue home amlodipine 10mg daily  - continue home hydrochlorothiazide 25mg daily   - continue home metoprolol succinate 50mg daily   - confirm home medications w/ pharmacy med rec in AM     #Leukocytosis  :: No current infectious symptoms, likely reactive in setting of likely malignancy and fracture     F: PRN  E: PRN  N: regular  A: PIV  Abx: none  O2: RA  GI ppx: n/a  DVT ppx: lovenox     NOK: Iram Aguirre (sig other) [phone]  Code status: Full code (confirmed on admission)       Caden Brady M4 ([id number])    -------------------------------------------------------------------------------------------------------------  I have seen and evaluated the patient with Caden Brady , a medical student (MS4).  I reviewed the patient’s medical and family history, the student/resident's findings on physical examination, and the  patient’s diagnosis and treatment plan with the student/resident. I discussed the case with the  student/resident in details and agree with the findings and plan as documented in the  student's note.    Other than constipation, he is doing well and appears fit for surgery tomorrow (decompression and biopsy). Plan today was to optimize his bowel regimen to prevent more severe constipation post-op.         Jose Boogie MD, PhD  Hematology/Oncology  [facility]

## 2024-07-09 ENCOUNTER — ANESTHESIA (OUTPATIENT)
Dept: OPERATING ROOM | Facility: HOSPITAL | Age: 74
End: 2024-07-09
Payer: MEDICARE

## 2024-07-09 ENCOUNTER — APPOINTMENT (OUTPATIENT)
Dept: RADIOLOGY | Facility: HOSPITAL | Age: 74
DRG: 456 | End: 2024-07-09
Payer: MEDICARE

## 2024-07-09 PROBLEM — E78.5 HYPERLIPIDEMIA: Status: ACTIVE | Noted: 2024-07-09

## 2024-07-09 PROBLEM — I25.10 CAD (CORONARY ARTERY DISEASE): Status: ACTIVE | Noted: 2024-07-09

## 2024-07-09 PROBLEM — Z95.5 STENTED CORONARY ARTERY: Status: ACTIVE | Noted: 2024-07-09

## 2024-07-09 PROBLEM — I10 HTN (HYPERTENSION): Status: ACTIVE | Noted: 2024-07-09

## 2024-07-09 LAB
ALBUMIN SERPL BCP-MCNC: 3.6 G/DL (ref 3.4–5)
ALBUMIN SERPL BCP-MCNC: 3.6 G/DL (ref 3.4–5)
ANION GAP SERPL CALC-SCNC: 14 MMOL/L (ref 10–20)
ANION GAP SERPL CALC-SCNC: 17 MMOL/L (ref 10–20)
ATRIAL RATE: 78 BPM
BLOOD EXPIRATION DATE: NORMAL
BLOOD EXPIRATION DATE: NORMAL
BUN SERPL-MCNC: 12 MG/DL (ref 6–23)
BUN SERPL-MCNC: 13 MG/DL (ref 6–23)
CALCIUM SERPL-MCNC: 8.9 MG/DL (ref 8.6–10.6)
CALCIUM SERPL-MCNC: 9.5 MG/DL (ref 8.6–10.6)
CHLORIDE SERPL-SCNC: 96 MMOL/L (ref 98–107)
CHLORIDE SERPL-SCNC: 97 MMOL/L (ref 98–107)
CO2 SERPL-SCNC: 27 MMOL/L (ref 21–32)
CO2 SERPL-SCNC: 28 MMOL/L (ref 21–32)
CREAT SERPL-MCNC: 0.68 MG/DL (ref 0.5–1.3)
CREAT SERPL-MCNC: 0.82 MG/DL (ref 0.5–1.3)
DISPENSE STATUS: NORMAL
DISPENSE STATUS: NORMAL
EGFRCR SERPLBLD CKD-EPI 2021: >90 ML/MIN/1.73M*2
EGFRCR SERPLBLD CKD-EPI 2021: >90 ML/MIN/1.73M*2
ERYTHROCYTE [DISTWIDTH] IN BLOOD BY AUTOMATED COUNT: 13.6 % (ref 11.5–14.5)
GLUCOSE SERPL-MCNC: 160 MG/DL (ref 74–99)
GLUCOSE SERPL-MCNC: 225 MG/DL (ref 74–99)
HCT VFR BLD AUTO: 28.5 % (ref 41–52)
HGB BLD-MCNC: 9.7 G/DL (ref 13.5–17.5)
MCH RBC QN AUTO: 27.2 PG (ref 26–34)
MCHC RBC AUTO-ENTMCNC: 34 G/DL (ref 32–36)
MCV RBC AUTO: 80 FL (ref 80–100)
NRBC BLD-RTO: 0 /100 WBCS (ref 0–0)
P AXIS: 24 DEGREES
P OFFSET: 187 MS
P ONSET: 153 MS
PHOSPHATE SERPL-MCNC: 3.3 MG/DL (ref 2.5–4.9)
PHOSPHATE SERPL-MCNC: 3.9 MG/DL (ref 2.5–4.9)
PLATELET # BLD AUTO: 476 X10*3/UL (ref 150–450)
POTASSIUM SERPL-SCNC: 3.8 MMOL/L (ref 3.5–5.3)
POTASSIUM SERPL-SCNC: 3.9 MMOL/L (ref 3.5–5.3)
PR INTERVAL: 142 MS
PRODUCT BLOOD TYPE: 5100
PRODUCT BLOOD TYPE: 5100
PRODUCT CODE: NORMAL
PRODUCT CODE: NORMAL
Q ONSET: 224 MS
QRS COUNT: 13 BEATS
QRS DURATION: 94 MS
QT INTERVAL: 400 MS
QTC CALCULATION(BAZETT): 456 MS
QTC FREDERICIA: 436 MS
R AXIS: -27 DEGREES
RBC # BLD AUTO: 3.56 X10*6/UL (ref 4.5–5.9)
SODIUM SERPL-SCNC: 135 MMOL/L (ref 136–145)
SODIUM SERPL-SCNC: 136 MMOL/L (ref 136–145)
T AXIS: 43 DEGREES
T OFFSET: 424 MS
UNIT ABO: NORMAL
UNIT ABO: NORMAL
UNIT NUMBER: NORMAL
UNIT NUMBER: NORMAL
UNIT RH: NORMAL
UNIT RH: NORMAL
UNIT VOLUME: 350
UNIT VOLUME: 350
VENTRICULAR RATE: 78 BPM
WBC # BLD AUTO: 23.8 X10*3/UL (ref 4.4–11.3)
XM INTEP: NORMAL
XM INTEP: NORMAL

## 2024-07-09 PROCEDURE — 22612 ARTHRD PST TQ 1NTRSPC LUMBAR: CPT | Performed by: STUDENT IN AN ORGANIZED HEALTH CARE EDUCATION/TRAINING PROGRAM

## 2024-07-09 PROCEDURE — 2500000004 HC RX 250 GENERAL PHARMACY W/ HCPCS (ALT 636 FOR OP/ED): Performed by: ANESTHESIOLOGY

## 2024-07-09 PROCEDURE — 2500000004 HC RX 250 GENERAL PHARMACY W/ HCPCS (ALT 636 FOR OP/ED): Performed by: ANESTHESIOLOGIST ASSISTANT

## 2024-07-09 PROCEDURE — 2500000001 HC RX 250 WO HCPCS SELF ADMINISTERED DRUGS (ALT 637 FOR MEDICARE OP)

## 2024-07-09 PROCEDURE — 37799 UNLISTED PX VASCULAR SURGERY: CPT | Performed by: NEUROLOGICAL SURGERY

## 2024-07-09 PROCEDURE — 2500000005 HC RX 250 GENERAL PHARMACY W/O HCPCS: Performed by: ANESTHESIOLOGIST ASSISTANT

## 2024-07-09 PROCEDURE — 2500000004 HC RX 250 GENERAL PHARMACY W/ HCPCS (ALT 636 FOR OP/ED): Performed by: STUDENT IN AN ORGANIZED HEALTH CARE EDUCATION/TRAINING PROGRAM

## 2024-07-09 PROCEDURE — P9045 ALBUMIN (HUMAN), 5%, 250 ML: HCPCS | Mod: JZ

## 2024-07-09 PROCEDURE — 82565 ASSAY OF CREATININE: CPT

## 2024-07-09 PROCEDURE — 85027 COMPLETE CBC AUTOMATED: CPT | Performed by: NEUROLOGICAL SURGERY

## 2024-07-09 PROCEDURE — 00BY0ZZ EXCISION OF LUMBAR SPINAL CORD, OPEN APPROACH: ICD-10-PCS | Performed by: STUDENT IN AN ORGANIZED HEALTH CARE EDUCATION/TRAINING PROGRAM

## 2024-07-09 PROCEDURE — 3600000018 HC OR TIME - INITIAL BASE CHARGE - PROCEDURE LEVEL SIX: Performed by: STUDENT IN AN ORGANIZED HEALTH CARE EDUCATION/TRAINING PROGRAM

## 2024-07-09 PROCEDURE — 2780000003 HC OR 278 NO HCPCS: Performed by: STUDENT IN AN ORGANIZED HEALTH CARE EDUCATION/TRAINING PROGRAM

## 2024-07-09 PROCEDURE — 7100000001 HC RECOVERY ROOM TIME - INITIAL BASE CHARGE: Performed by: STUDENT IN AN ORGANIZED HEALTH CARE EDUCATION/TRAINING PROGRAM

## 2024-07-09 PROCEDURE — 2500000004 HC RX 250 GENERAL PHARMACY W/ HCPCS (ALT 636 FOR OP/ED)

## 2024-07-09 PROCEDURE — 1170000001 HC PRIVATE ONCOLOGY ROOM DAILY

## 2024-07-09 PROCEDURE — 61783 SCAN PROC SPINAL: CPT | Performed by: STUDENT IN AN ORGANIZED HEALTH CARE EDUCATION/TRAINING PROGRAM

## 2024-07-09 PROCEDURE — 3600000017 HC OR TIME - EACH INCREMENTAL 1 MINUTE - PROCEDURE LEVEL SIX: Performed by: STUDENT IN AN ORGANIZED HEALTH CARE EDUCATION/TRAINING PROGRAM

## 2024-07-09 PROCEDURE — 7100000002 HC RECOVERY ROOM TIME - EACH INCREMENTAL 1 MINUTE: Performed by: STUDENT IN AN ORGANIZED HEALTH CARE EDUCATION/TRAINING PROGRAM

## 2024-07-09 PROCEDURE — 01NB0ZZ RELEASE LUMBAR NERVE, OPEN APPROACH: ICD-10-PCS | Performed by: STUDENT IN AN ORGANIZED HEALTH CARE EDUCATION/TRAINING PROGRAM

## 2024-07-09 PROCEDURE — 3700000001 HC GENERAL ANESTHESIA TIME - INITIAL BASE CHARGE: Performed by: STUDENT IN AN ORGANIZED HEALTH CARE EDUCATION/TRAINING PROGRAM

## 2024-07-09 PROCEDURE — 36415 COLL VENOUS BLD VENIPUNCTURE: CPT

## 2024-07-09 PROCEDURE — 2720000007 HC OR 272 NO HCPCS: Performed by: STUDENT IN AN ORGANIZED HEALTH CARE EDUCATION/TRAINING PROGRAM

## 2024-07-09 PROCEDURE — 3700000002 HC GENERAL ANESTHESIA TIME - EACH INCREMENTAL 1 MINUTE: Performed by: STUDENT IN AN ORGANIZED HEALTH CARE EDUCATION/TRAINING PROGRAM

## 2024-07-09 PROCEDURE — C1713 ANCHOR/SCREW BN/BN,TIS/BN: HCPCS | Performed by: STUDENT IN AN ORGANIZED HEALTH CARE EDUCATION/TRAINING PROGRAM

## 2024-07-09 PROCEDURE — 80069 RENAL FUNCTION PANEL: CPT | Performed by: NEUROLOGICAL SURGERY

## 2024-07-09 PROCEDURE — 22614 ARTHRD PST TQ 1NTRSPC EA ADD: CPT | Performed by: STUDENT IN AN ORGANIZED HEALTH CARE EDUCATION/TRAINING PROGRAM

## 2024-07-09 PROCEDURE — 22842 INSERT SPINE FIXATION DEVICE: CPT | Performed by: STUDENT IN AN ORGANIZED HEALTH CARE EDUCATION/TRAINING PROGRAM

## 2024-07-09 PROCEDURE — 2500000005 HC RX 250 GENERAL PHARMACY W/O HCPCS: Performed by: STUDENT IN AN ORGANIZED HEALTH CARE EDUCATION/TRAINING PROGRAM

## 2024-07-09 PROCEDURE — 0SG1071 FUSION OF 2 OR MORE LUMBAR VERTEBRAL JOINTS WITH AUTOLOGOUS TISSUE SUBSTITUTE, POSTERIOR APPROACH, POSTERIOR COLUMN, OPEN APPROACH: ICD-10-PCS | Performed by: STUDENT IN AN ORGANIZED HEALTH CARE EDUCATION/TRAINING PROGRAM

## 2024-07-09 PROCEDURE — 20930 SP BONE ALGRFT MORSEL ADD-ON: CPT | Performed by: STUDENT IN AN ORGANIZED HEALTH CARE EDUCATION/TRAINING PROGRAM

## 2024-07-09 PROCEDURE — 88307 TISSUE EXAM BY PATHOLOGIST: CPT | Mod: TC,SUR | Performed by: STUDENT IN AN ORGANIZED HEALTH CARE EDUCATION/TRAINING PROGRAM

## 2024-07-09 PROCEDURE — 63277 BX/EXC XDRL SPINE LESN LMBR: CPT | Performed by: STUDENT IN AN ORGANIZED HEALTH CARE EDUCATION/TRAINING PROGRAM

## 2024-07-09 DEVICE — SCAFFOLD, STRIP, ATTRAX, 100 X 25 X 6MM, 30CC: Type: IMPLANTABLE DEVICE | Site: SPINE LUMBAR | Status: FUNCTIONAL

## 2024-07-09 DEVICE — IMPLANTABLE DEVICE: Type: IMPLANTABLE DEVICE | Site: SPINE LUMBAR | Status: FUNCTIONAL

## 2024-07-09 DEVICE — ROD, RELINE-0, 5.5 X 300MM, STRAIGHT: Type: IMPLANTABLE DEVICE | Site: SPINE LUMBAR | Status: FUNCTIONAL

## 2024-07-09 DEVICE — SCREW, RELINE LOCK, 5.5MM OPEN TULIP: Type: IMPLANTABLE DEVICE | Site: SPINE LUMBAR | Status: FUNCTIONAL

## 2024-07-09 RX ORDER — DEXMEDETOMIDINE HYDROCHLORIDE 4 UG/ML
INJECTION, SOLUTION INTRAVENOUS CONTINUOUS PRN
Status: DISCONTINUED | OUTPATIENT
Start: 2024-07-09 | End: 2024-07-09

## 2024-07-09 RX ORDER — ALBUTEROL SULFATE 0.83 MG/ML
2.5 SOLUTION RESPIRATORY (INHALATION) ONCE AS NEEDED
Status: DISCONTINUED | OUTPATIENT
Start: 2024-07-09 | End: 2024-07-09 | Stop reason: HOSPADM

## 2024-07-09 RX ORDER — CEFAZOLIN 1 G/1
INJECTION, POWDER, FOR SOLUTION INTRAVENOUS AS NEEDED
Status: DISCONTINUED | OUTPATIENT
Start: 2024-07-09 | End: 2024-07-09

## 2024-07-09 RX ORDER — PHENYLEPHRINE HYDROCHLORIDE 10 MG/ML
INJECTION INTRAVENOUS AS NEEDED
Status: DISCONTINUED | OUTPATIENT
Start: 2024-07-09 | End: 2024-07-09

## 2024-07-09 RX ORDER — DIPHENHYDRAMINE HYDROCHLORIDE 50 MG/ML
25 INJECTION INTRAMUSCULAR; INTRAVENOUS ONCE AS NEEDED
Status: DISCONTINUED | OUTPATIENT
Start: 2024-07-09 | End: 2024-07-09 | Stop reason: HOSPADM

## 2024-07-09 RX ORDER — PHENYLEPHRINE 10 MG/250 ML(40 MCG/ML)IN 0.9 % SOD.CHLORIDE INTRAVENOUS
CONTINUOUS PRN
Status: DISCONTINUED | OUTPATIENT
Start: 2024-07-09 | End: 2024-07-09

## 2024-07-09 RX ORDER — LIDOCAINE HYDROCHLORIDE 10 MG/ML
0.1 INJECTION INFILTRATION; PERINEURAL ONCE
Status: DISCONTINUED | OUTPATIENT
Start: 2024-07-09 | End: 2024-07-09 | Stop reason: HOSPADM

## 2024-07-09 RX ORDER — LIDOCAINE HYDROCHLORIDE 20 MG/ML
INJECTION, SOLUTION INFILTRATION; PERINEURAL AS NEEDED
Status: DISCONTINUED | OUTPATIENT
Start: 2024-07-09 | End: 2024-07-09

## 2024-07-09 RX ORDER — ROCURONIUM BROMIDE 10 MG/ML
INJECTION, SOLUTION INTRAVENOUS AS NEEDED
Status: DISCONTINUED | OUTPATIENT
Start: 2024-07-09 | End: 2024-07-09

## 2024-07-09 RX ORDER — LIDOCAINE HYDROCHLORIDE AND EPINEPHRINE 5; 5 MG/ML; UG/ML
INJECTION, SOLUTION INFILTRATION; PERINEURAL AS NEEDED
Status: DISCONTINUED | OUTPATIENT
Start: 2024-07-09 | End: 2024-07-09 | Stop reason: HOSPADM

## 2024-07-09 RX ORDER — SODIUM CHLORIDE, SODIUM LACTATE, POTASSIUM CHLORIDE, CALCIUM CHLORIDE 600; 310; 30; 20 MG/100ML; MG/100ML; MG/100ML; MG/100ML
INJECTION, SOLUTION INTRAVENOUS CONTINUOUS PRN
Status: DISCONTINUED | OUTPATIENT
Start: 2024-07-09 | End: 2024-07-09

## 2024-07-09 RX ORDER — MIDAZOLAM HYDROCHLORIDE 1 MG/ML
INJECTION INTRAMUSCULAR; INTRAVENOUS AS NEEDED
Status: DISCONTINUED | OUTPATIENT
Start: 2024-07-09 | End: 2024-07-09

## 2024-07-09 RX ORDER — PHENYLEPHRINE HCL IN 0.9% NACL 0.4MG/10ML
SYRINGE (ML) INTRAVENOUS AS NEEDED
Status: DISCONTINUED | OUTPATIENT
Start: 2024-07-09 | End: 2024-07-09

## 2024-07-09 RX ORDER — HYDROMORPHONE HYDROCHLORIDE 1 MG/ML
0.2 INJECTION, SOLUTION INTRAMUSCULAR; INTRAVENOUS; SUBCUTANEOUS EVERY 5 MIN PRN
Status: DISCONTINUED | OUTPATIENT
Start: 2024-07-09 | End: 2024-07-09 | Stop reason: HOSPADM

## 2024-07-09 RX ORDER — ONDANSETRON HYDROCHLORIDE 2 MG/ML
4 INJECTION, SOLUTION INTRAVENOUS ONCE AS NEEDED
Status: COMPLETED | OUTPATIENT
Start: 2024-07-09 | End: 2024-07-09

## 2024-07-09 RX ORDER — PROPOFOL 10 MG/ML
INJECTION, EMULSION INTRAVENOUS AS NEEDED
Status: DISCONTINUED | OUTPATIENT
Start: 2024-07-09 | End: 2024-07-09

## 2024-07-09 RX ORDER — FENTANYL CITRATE 50 UG/ML
INJECTION, SOLUTION INTRAMUSCULAR; INTRAVENOUS AS NEEDED
Status: DISCONTINUED | OUTPATIENT
Start: 2024-07-09 | End: 2024-07-09

## 2024-07-09 RX ORDER — OXYCODONE HYDROCHLORIDE 5 MG/1
5 TABLET ORAL EVERY 4 HOURS PRN
Status: DISCONTINUED | OUTPATIENT
Start: 2024-07-09 | End: 2024-07-09 | Stop reason: HOSPADM

## 2024-07-09 RX ORDER — HYDROMORPHONE HYDROCHLORIDE 1 MG/ML
0.5 INJECTION, SOLUTION INTRAMUSCULAR; INTRAVENOUS; SUBCUTANEOUS EVERY 5 MIN PRN
Status: DISCONTINUED | OUTPATIENT
Start: 2024-07-09 | End: 2024-07-09 | Stop reason: HOSPADM

## 2024-07-09 RX ORDER — LABETALOL HYDROCHLORIDE 5 MG/ML
5 INJECTION, SOLUTION INTRAVENOUS ONCE AS NEEDED
Status: DISCONTINUED | OUTPATIENT
Start: 2024-07-09 | End: 2024-07-09 | Stop reason: HOSPADM

## 2024-07-09 RX ORDER — VANCOMYCIN HYDROCHLORIDE 1 G/20ML
INJECTION, POWDER, LYOPHILIZED, FOR SOLUTION INTRAVENOUS AS NEEDED
Status: DISCONTINUED | OUTPATIENT
Start: 2024-07-09 | End: 2024-07-09

## 2024-07-09 RX ORDER — SODIUM CHLORIDE 0.9 G/100ML
IRRIGANT IRRIGATION AS NEEDED
Status: DISCONTINUED | OUTPATIENT
Start: 2024-07-09 | End: 2024-07-09 | Stop reason: HOSPADM

## 2024-07-09 RX ORDER — VANCOMYCIN HYDROCHLORIDE 1 G/20ML
INJECTION, POWDER, LYOPHILIZED, FOR SOLUTION INTRAVENOUS AS NEEDED
Status: DISCONTINUED | OUTPATIENT
Start: 2024-07-09 | End: 2024-07-09 | Stop reason: HOSPADM

## 2024-07-09 RX ORDER — ONDANSETRON HYDROCHLORIDE 2 MG/ML
INJECTION, SOLUTION INTRAVENOUS AS NEEDED
Status: DISCONTINUED | OUTPATIENT
Start: 2024-07-09 | End: 2024-07-09

## 2024-07-09 RX ORDER — HYDROMORPHONE HYDROCHLORIDE 1 MG/ML
INJECTION, SOLUTION INTRAMUSCULAR; INTRAVENOUS; SUBCUTANEOUS AS NEEDED
Status: DISCONTINUED | OUTPATIENT
Start: 2024-07-09 | End: 2024-07-09

## 2024-07-09 RX ORDER — SODIUM CHLORIDE, SODIUM LACTATE, POTASSIUM CHLORIDE, CALCIUM CHLORIDE 600; 310; 30; 20 MG/100ML; MG/100ML; MG/100ML; MG/100ML
100 INJECTION, SOLUTION INTRAVENOUS CONTINUOUS
Status: DISCONTINUED | OUTPATIENT
Start: 2024-07-09 | End: 2024-07-09 | Stop reason: HOSPADM

## 2024-07-09 RX ORDER — ALBUMIN HUMAN 50 G/1000ML
SOLUTION INTRAVENOUS AS NEEDED
Status: DISCONTINUED | OUTPATIENT
Start: 2024-07-09 | End: 2024-07-09

## 2024-07-09 RX ADMIN — SODIUM CHLORIDE, POTASSIUM CHLORIDE, SODIUM LACTATE AND CALCIUM CHLORIDE 75 ML/HR: 600; 310; 30; 20 INJECTION, SOLUTION INTRAVENOUS at 01:07

## 2024-07-09 RX ADMIN — OXYCODONE HYDROCHLORIDE 10 MG: 10 TABLET ORAL at 21:32

## 2024-07-09 RX ADMIN — SENNOSIDES 17.2 MG: 8.6 TABLET, FILM COATED ORAL at 21:32

## 2024-07-09 RX ADMIN — SENNOSIDES 17.2 MG: 8.6 TABLET, FILM COATED ORAL at 08:06

## 2024-07-09 RX ADMIN — GABAPENTIN 100 MG: 100 CAPSULE ORAL at 21:32

## 2024-07-09 RX ADMIN — HYDROCHLOROTHIAZIDE 25 MG: 25 TABLET ORAL at 08:06

## 2024-07-09 RX ADMIN — ACETAMINOPHEN 975 MG: 325 TABLET ORAL at 08:09

## 2024-07-09 RX ADMIN — METOPROLOL SUCCINATE 50 MG: 50 TABLET, EXTENDED RELEASE ORAL at 08:06

## 2024-07-09 RX ADMIN — ACETAMINOPHEN 975 MG: 325 TABLET ORAL at 01:19

## 2024-07-09 RX ADMIN — ONDANSETRON 4 MG: 2 INJECTION INTRAMUSCULAR; INTRAVENOUS at 18:57

## 2024-07-09 RX ADMIN — OXYCODONE HYDROCHLORIDE 7.5 MG: 5 TABLET ORAL at 01:20

## 2024-07-09 RX ADMIN — SENNOSIDES AND DOCUSATE SODIUM 1 TABLET: 50; 8.6 TABLET ORAL at 21:32

## 2024-07-09 RX ADMIN — AMLODIPINE BESYLATE 10 MG: 10 TABLET ORAL at 08:06

## 2024-07-09 RX ADMIN — HYDROMORPHONE HYDROCHLORIDE 0.5 MG: 1 INJECTION, SOLUTION INTRAMUSCULAR; INTRAVENOUS; SUBCUTANEOUS at 18:56

## 2024-07-09 RX ADMIN — ATORVASTATIN CALCIUM 20 MG: 20 TABLET, FILM COATED ORAL at 21:32

## 2024-07-09 RX ADMIN — OXYCODONE HYDROCHLORIDE 7.5 MG: 5 TABLET ORAL at 08:05

## 2024-07-09 RX ADMIN — TRAZODONE HYDROCHLORIDE 50 MG: 50 TABLET ORAL at 21:32

## 2024-07-09 ASSESSMENT — COGNITIVE AND FUNCTIONAL STATUS - GENERAL
STANDING UP FROM CHAIR USING ARMS: A LOT
TOILETING: A LITTLE
DAILY ACTIVITIY SCORE: 16
MOVING TO AND FROM BED TO CHAIR: A LOT
DRESSING REGULAR UPPER BODY CLOTHING: A LOT
STANDING UP FROM CHAIR USING ARMS: A LOT
TURNING FROM BACK TO SIDE WHILE IN FLAT BAD: A LOT
WALKING IN HOSPITAL ROOM: A LOT
MOBILITY SCORE: 12
DRESSING REGULAR LOWER BODY CLOTHING: A LOT
CLIMB 3 TO 5 STEPS WITH RAILING: A LOT
MOVING TO AND FROM BED TO CHAIR: A LOT
DRESSING REGULAR UPPER BODY CLOTHING: A LITTLE
CLIMB 3 TO 5 STEPS WITH RAILING: A LOT
MOVING TO AND FROM BED TO CHAIR: A LOT
MOVING FROM LYING ON BACK TO SITTING ON SIDE OF FLAT BED WITH BEDRAILS: A LOT
MOVING FROM LYING ON BACK TO SITTING ON SIDE OF FLAT BED WITH BEDRAILS: A LOT
TURNING FROM BACK TO SIDE WHILE IN FLAT BAD: A LOT
TOILETING: A LITTLE
WALKING IN HOSPITAL ROOM: A LOT
CLIMB 3 TO 5 STEPS WITH RAILING: TOTAL
DRESSING REGULAR LOWER BODY CLOTHING: A LOT
HELP NEEDED FOR BATHING: A LITTLE
DRESSING REGULAR LOWER BODY CLOTHING: A LOT
MOVING FROM LYING ON BACK TO SITTING ON SIDE OF FLAT BED WITH BEDRAILS: A LOT
DAILY ACTIVITIY SCORE: 19
TURNING FROM BACK TO SIDE WHILE IN FLAT BAD: A LOT
HELP NEEDED FOR BATHING: A LOT
MOBILITY SCORE: 10
DAILY ACTIVITIY SCORE: 19
DRESSING REGULAR UPPER BODY CLOTHING: A LITTLE
HELP NEEDED FOR BATHING: A LITTLE
MOBILITY SCORE: 12
WALKING IN HOSPITAL ROOM: TOTAL
STANDING UP FROM CHAIR USING ARMS: A LOT
TOILETING: A LOT

## 2024-07-09 ASSESSMENT — PAIN - FUNCTIONAL ASSESSMENT
PAIN_FUNCTIONAL_ASSESSMENT: 0-10
PAIN_FUNCTIONAL_ASSESSMENT: 0-10
PAIN_FUNCTIONAL_ASSESSMENT: UNABLE TO SELF-REPORT
PAIN_FUNCTIONAL_ASSESSMENT: 0-10
PAIN_FUNCTIONAL_ASSESSMENT: UNABLE TO SELF-REPORT
PAIN_FUNCTIONAL_ASSESSMENT: 0-10

## 2024-07-09 ASSESSMENT — PAIN SCALES - GENERAL
PAINLEVEL_OUTOF10: 4
PAINLEVEL_OUTOF10: 7
PAINLEVEL_OUTOF10: 9
PAINLEVEL_OUTOF10: 6
PAINLEVEL_OUTOF10: 0 - NO PAIN
PAINLEVEL_OUTOF10: 9
PAINLEVEL_OUTOF10: 9
PAINLEVEL_OUTOF10: 5 - MODERATE PAIN
PAINLEVEL_OUTOF10: 4
PAINLEVEL_OUTOF10: 4

## 2024-07-09 ASSESSMENT — PAIN DESCRIPTION - LOCATION: LOCATION: BACK

## 2024-07-09 NOTE — CONSULTS
"Nutrition Initial Assessment:   Nutrition Assessment    The patient is a 74 y.o. male who is hospital day #3.  Pt admitted for work up of L renal mass and pathologic L3 fracture concerning for metastatic disease. NSGY following - plan for L1-5 fusion w/ L2-4 decompressions, tumor debulking.     PMHx: tobacco use, coronary artery disease, hypertension, hyperlipidemia who is admitted for left renal mass and pathologic L3 fracture concerning for metastatic disease of unknown primary.      Reason for Assessment: Admission nursing screening  Malnutrition Screening Tool (MST)  Have you recently lost weight without trying?: Yes  If yes, how much weight have you lost?: Lost 2 - 13 pounds  Weight Loss Score: 1  Have you been eating poorly because of a decreased appetite?: Yes  Malnutrition Score: 2      Nutrition History:  Energy Intake: Poor < 50 %  Food and Nutrient History: Pt reports PTA (for about 1.5 months) his appetite has been decreased. Was eating maybe 1 meal a day. HIs baseline is 2-3 meals a day. Ex. bacong and eggs or hamburgers. Reports he is a meat eater. Believes appetite is slowly coming back. Yesterday had some 1 pizza and sy pasta w/ chicken = ~560 kcal and 25g protein. Does not drink ONS at home. Pt states \"if i'm told it is not good for me then I will eat double\" of that food item. Not interested in ONS to help supplement nutrition. Missing teeth - makes hard to eat certain food items but feels comfortable choosing what he can eat. No food allergies.  Vitamin/Herbal Supplement Use: None      Anthropometrics:  Start of admission anthropometrics:  Height: 185.4 cm (6' 1\")  Weight: 91 kg (200 lb 9.9 oz)  BMI (Calculated): 26.47  IBW/kg (Dietitian Calculated): 83.6 kg    Wt Hx   07/06/24 91 kg (200 lb 9.9 oz) - bed scale   05/11/24 95.3 kg (210 lb) - 5% wt loss x2 months    11/14/22 102 kg (225 lb)     Weight Change %:  Weight History / % Weight Change: Pt reports a UBW of 220lb and wt loss. Unsure how " much he has lost but states clothes fits looser. Wt loss time frame per pt is 1.5 months. Limited wt hx - 5% (10lb) wt loss in 2 months (non significant).  Significant Weight Loss: No    Nutrition Focused Physical Exam Findings:  Moderate to severe losses noted in nfpe   Subcutaneous Fat Loss:   Buccal Fat Pads: Severe (hollow, sunken and narrow face)  Triceps: Mild-Moderate (less than ample fat tissue)  Muscle Wasting:  Temporalis: Mild-Moderate (slight depression)  Pectoralis (Clavicular Region): Mild-Moderate (some protrusion of clavicle)  Deltoid/Trapezius: Severe (squared shoulders, acromion process prominent)  Quadriceps: Severe (depressions on inner and outer thigh)  Gastrocnemius: Severe (minimal muscle definition)  Edema:  Edema: none  Physical Findings:  Hair: Negative  Mouth: Positive (missing teeth)  Nails: Negative  Skin: Negative    Objective Data:    Last BM Date: 07/04/24    Nutrition Significant Labs:    Results from last 7 days   Lab Units 07/09/24  0653 07/08/24  2249 07/06/24  0429 07/05/24  0420 07/05/24  0420   HEMOGLOBIN g/dL  --  12.4* 11.5*  --  12.5*   MCV fL  --  91 85  --  84   GLUCOSE mg/dL 160*  --  153*  --  105*   POTASSIUM mmol/L 3.8  --  3.9  --  3.7   SODIUM mmol/L 136  --  139  --  140   PHOSPHORUS mg/dL 3.3  --  2.4*   < >  --    MAGNESIUM mg/dL  --   --  1.95  --   --    CREATININE mg/dL 0.82  --  0.64  --  0.75   BUN mg/dL 13  --  10  --  11   ALT U/L  --   --  14  --  15   AST U/L  --   --  15  --  18   ALK PHOS U/L  --   --  110  --  103    < > = values in this interval not displayed.       Nutrition Specific Medications:  atorvastatin, 20 mg, oral, Nightly  gabapentin, 100 mg, oral, Nightly  polyethylene glycol, 17 g, oral, Daily  psyllium, 1 packet, oral, TID  sennosides, 2 tablet, oral, BID  sennosides-docusate sodium, 1 tablet, oral, Nightly      I/O:   I/O last 2 completed shifts:  In: - (0 mL/kg)   Out: 100 (1.1 mL/kg) [Urine:100 (0 mL/kg/hr)]  Weight: 91 kg     Dietary  Orders (From admission, onward)       Start     Ordered    07/09/24 0001  NPO Diet; Effective midnight  Diet effective midnight         07/08/24 1332                     Estimated Needs:   Total Energy Estimated Needs (kCal): 2350 kCal  Method for Estimating Needs: 28 kcal/kg iBW    Total Protein Estimated Needs (g): 110 g  Method for Estimating Needs: 1.3 g/kg IBW    Method for Estimating Needs: 1 ml/kcal or per team          Nutrition Diagnosis   Malnutrition Diagnosis  Patient has Malnutrition Diagnosis: Yes  Diagnosis Status: New  Malnutrition Diagnosis: Severe malnutrition related to chronic disease or condition  As Evidenced by: moderate to severe muscle and adipose tissue losses; PO intake likely meeting <75% of nutr needs for >/= 1 month            Nutrition Interventions/Recommendations   Individualized Nutrition Prescription Provided for : 2350 kcal and 110g protein    Briefly discussed choosing higher kcal/pro food items from menu and ONS use to help with wt loss and decreased PO intake. Pt not interested in any nutrition interventions + denied having any questions or concerns at the moment. Will continue to monitor PO intake and weight during admission.               Nutrition Monitoring and Evaluation   Monitoring and Evaluation Plan: Energy intake  Energy Intake: Estimated energy intake  Criteria: >50% of nutr needs    Monitoring and Evaluation Plan: Weight  Weight: Weight change  Criteria: no wt change                   Time Spent (min): 45 minutes

## 2024-07-09 NOTE — PROGRESS NOTES
"Christophe Ambriz is a 74 y.o. male on day 3 of admission presenting with Pathologic lumbar vertebral fracture, initial encounter.    Subjective   NAEO       Objective     Physical Exam         AOX3       RUE D5 / B5 / T5 / HG 5/ IO 5       LUE D5 / B5 / T5 / HG 5/ IO 5       Negative quinn          RLE HF5 / KE 5/ DF 5/ PF 5       LLE HF4 / KE 5/ DF 5/ PF 5       B/l clonus  Last Recorded Vitals  Blood pressure 122/74, pulse 84, temperature 36.8 °C (98.2 °F), resp. rate 16, height 1.854 m (6' 1\"), weight 91 kg (200 lb 9.9 oz), SpO2 95%.  Intake/Output last 3 Shifts:  I/O last 3 completed shifts:  In: - (0 mL/kg)   Out: 450 (4.9 mL/kg) [Urine:450 (0.1 mL/kg/hr)]  Weight: 91 kg     Relevant Results                Results for orders placed or performed during the hospital encounter of 07/06/24 (from the past 24 hour(s))   Type and screen   Result Value Ref Range    ABO TYPE O     Rh TYPE POS     ANTIBODY SCREEN NEG    Coagulation Screen   Result Value Ref Range    Protime 14.4 (H) 9.8 - 12.8 seconds    INR 1.3 (H) 0.9 - 1.1    aPTT 35 27 - 38 seconds   CBC   Result Value Ref Range    WBC 11.3 4.4 - 11.3 x10*3/uL    nRBC 0.0 0.0 - 0.0 /100 WBCs    RBC 4.50 4.50 - 5.90 x10*6/uL    Hemoglobin 12.4 (L) 13.5 - 17.5 g/dL    Hematocrit 40.9 (L) 41.0 - 52.0 %    MCV 91 80 - 100 fL    MCH 27.6 26.0 - 34.0 pg    MCHC 30.3 (L) 32.0 - 36.0 g/dL    RDW 13.5 11.5 - 14.5 %    Platelets 448 150 - 450 x10*3/uL                  Assessment/Plan   Principal Problem:    Pathologic lumbar vertebral fracture, initial encounter    Christophe Ambriz is a 74 y.o. male with h/o HTN, HLD, CAD s/p PCI (2017) (on ASA/PLX), p/w LBP of 37d duration, CT T/L spine L3 lytic lesion, CT CAP 8 cm L kidney mass, 2.1 cm R thyroid nodule, L 9th rib lytic lesion, MRL LS lytic mets to L3 with L3-4 neuro foraminal stenosis (severe on the L), L3-4 disc involvement with tumor.     Pathological fracture with >50% loss of height on standing xrays.  Severe intractable " pain in the L3 distribution of the left leg.     Recs  Ratnoff primary   OR 7/9 for L1-5 fusion w/ L2-4 decompression, tumor debulking     H&P from 07/06 is reviewed and exam is updated.           Linda Dunaway MD

## 2024-07-09 NOTE — OP NOTE
L1-5 fusion, L2-4 decompression and tumor debulking Operative Note     Date: 2024 - 2024  OR Location: [facility] OR    Name: Christophe Ambriz, : 1950, Age: 74 y.o., MRN: [id number], Sex: male    Diagnosis  Pre-op Diagnosis     * Pathologic lumbar vertebral fracture, initial encounter [M84.48XA] Post-op Diagnosis     * Pathologic lumbar vertebral fracture, initial encounter [M84.48XA]     Procedures  L1-5 fusion, L2-4 decompression and tumor debulking   ALLOGRAFT FOR SPINE SURGERY ONLY MORSELIZED      Surgeons      * Zac Garcia - Primary    Resident/Fellow/Other Assistant:  Surgeons and Role:     * Stephen Morales MD - Resident - Assisting    Procedure Summary  Anesthesia: General  ASA: III  Anesthesia Staff: Anesthesiologist: Dg Olsen MD; Iglesia Ramires MD; Patrick Rendon MD  C-AA: AKIN Newell; KAIN Samayoa  Anesthesia Resident: Omar Malcolm MD  BARRERA: Shannan Marques  Estimated Blood Loss: 500mL  Intra-op Medications:   Administrations occurring from 1215 to 1610 on 24:   Medication Name Total Dose   lidocaine-epinephrine (Xylocaine W/EPI) 0.5 %-1:200,000 injection 10 mL   thrombin (recombinant) (Recothrom) topical solution 10,000 Units   sodium chloride 0.9 % irrigation solution 1,000 mL   acetaminophen (Tylenol) tablet 975 mg Cannot be calculated   amLODIPine (Norvasc) tablet 10 mg Cannot be calculated   atorvastatin (Lipitor) tablet 20 mg Cannot be calculated   gabapentin (Neurontin) capsule 100 mg Cannot be calculated   hydroCHLOROthiazide (HYDRODiuril) tablet 25 mg Cannot be calculated   HYDROmorphone (Dilaudid) injection 0.5 mg Cannot be calculated   lactated Ringer's infusion Cannot be calculated   lidocaine 4 % patch 1 patch Cannot be calculated   metoprolol succinate XL (Toprol-XL) 24 hr tablet 50 mg Cannot be calculated   oxyCODONE (Roxicodone) immediate release tablet 10 mg Cannot be calculated   oxyCODONE (Roxicodone) immediate release tablet 7.5 mg  Cannot be calculated   polyethylene glycol (Glycolax, Miralax) packet 17 g Cannot be calculated   psyllium (Metamucil) 3.4 gram packet 1 packet Cannot be calculated   sennosides (Senokot) tablet 17.2 mg Cannot be calculated   sennosides-docusate sodium (Alivia-Colace) 8.6-50 mg per tablet 1 tablet Cannot be calculated   traZODone (Desyrel) tablet 50 mg Cannot be calculated              Anesthesia Record               Intraprocedure I/O Totals          Intake    Dexmedetomidine 0.00 mL    The total shown is the total volume documented since Anesthesia Start was filed.    Propofol Drip 0.00 mL    The total shown is the total volume documented since Anesthesia Start was filed.    Phenylephrine Drip 0.00 mL    The total shown is the total volume documented since Anesthesia Start was filed.    Total Intake 0 mL       Output    Urine 145 mL    Total Output 145 mL       Net    Net Volume -145 mL          Specimen:   ID Type Source Tests Collected by Time   1 : L3 MASS Tissue SPINE SURGICAL PATHOLOGY EXAM Zac Garcia MD 7/9/2024 4632        Staff:   Circulator: Gamal  Scrub Person: Galina  Scrub Person: Carolina Escamilla Circulator: Victor Manuel Escamilla Scrub: Howard Escamilla Circulator: Gisele  Relief Scrub: Aida         Drains and/or Catheters:   Closed/Suction Drain 1 Inferior Back Other (Comment) 10 Fr. (Active)       Urethral Catheter Non-latex;Double-lumen 16 Fr. (Active)       Implants:  Implants       Type Name Action Serial No.      Implant SCAFFOLD, STRIP, ATTRAX, 100 X 25 X 6MM, 30CC - [id number] Implanted                   Informed Consent:  The risks, benefits, complications, and alternatives were discussed with the patient. I have explained the surgical procedure in detail with expected duration and extent of recovery along risks of surgery that include, but is not limited to bleeding, infection, blood vessel injury or damage, loss of sensation, loss of bladder, bowel or sexual function, nerve injury/damage  resulting in weakness/paralysis, malunion, nonunion, CSF leak, brachial plexus injury, peripheral vision blindness, failure of implants/fusion, failure to relieve symptoms, recurrent disease, adjacent segment disease, need to reoperate for any reason and general anesthesia reaction such as stroke, coma, heart attack, delirium, confusion, death as well as worsening of preexisted medical conditions.     I clearly emphasized that while the goal of surgery is to decompress the spinal cord so as to ARREST the progression of neurological deficits - preexisting deficits may or may not improve after surgery. We discussed that many patients do clinically improve in functional and neurological outcomes following decompression of the spinal elements in patients but the extent of which is variable and depends on the severity of pain, numbness, tingling, or weakness. With improvement seen of those symptoms in that order. We did discuss the goal of surgery to alleviate pain first and foremost and hope for recovery of all neurologic function with time.     All questions were answered and the patient was amenable to proceed.     INDICATIONS FOR THE PROCEDURE: Christohpe Ambriz is an 74 y.o. male who is having surgery for Pathologic lumbar vertebral fracture, initial encounter [M84.48XA].     DESCRIPTION OF THE OPERATION:   The patient was brought to the operating room theater. A verbal huddle was performed confirming the patient by name, date of birth, medical record number, site of surgery. After all team members were in agreement, they underwent anesthesia induction without complication. Two large bore IVs were placed as well as endotracheal tube. Perioperative antibiotic administration was confirmed. The patient was flipped prone onto a pam table with a kenzie frame and their back was prepped and draped in a sterile fashion. Using lateral fluoroscopy we confirmed our levels of interest with a spinal needle and an incision was  marked out in the midline.     The skin was then infiltrated with local anesthetic and we then began the procedure by opening the skin with a 10 blade and using combination of Bovie electrocautery and blunt dissection we exposed the spinous process, pars, lamina, and joints. We then used lateral fluoroscopy again to confirm our levels of interest L1-5 after total exposure and a self-retaining retractor was placed into the incision.     Next we turned our attention the instrumentation portion of the procedure. A clamp was attached to the spinous process at the rostral edge of the incision and the O arm was brought into the field. An intraoperative CT scan was performed and merged the patient in three dimensional space. We then use navigated drill and taps to cannulate the pedicles from L1 to L5 with a 5.5mm tap. We then placed 7.5mm screws bilaterally from L1, L2, L4, L5 without complication.    The pathological fracture at L3 was then assessed. We cannulated the pedicles bilaterally with a large tap and then used pituitary rongeurs to perform a partial resection and biopsy. We completed the laminectomy and freed up the nerves removing all the extradural pathology in the canal. The pathology was sent for permanent and frozen specimen. We then filled the tracts with floseal for hemostasis.     Next we turned our attention to the decompression. To begin a leksell rongeur and a high speed drill was used to remove the spinous process and lamina exposing the underlying ligamentum flavum. We also performed a medial facetectomies with a high speed drill. After removal of the bone we used kerrison rongeurs, curettes, and blunt dissection to free the traversing nerve root in the lateral recess and performed a foraminotomies as well across the L3-4 disc space and the Left traversing and exiting nerve roots.    After removal of all of the soft tissue and bone we confirmed adequate decompression lateral recess and foraminal  decompression with a jojo and then achieved hemostasis with a bipolar and floseal. A 10 fr round drain was tunneled in a subfascial fashion.     We then completed our segmental instrumentation with rods and locking caps were placed through our screw tops and final tightened to  specifications. We then performed posterolateral arthrodesis decorticating the remaining joints and transverse processes from L1 to L5. We then packed autologous bone graft into the gutters for fusion.    Next we copiously irrigated the incision and began our closure, the muscle and fascia were approximated with 0 vicryl and 2-0 vicryl sutures and the skin was approximated with dermabond. The patient returned to anesthesias care and was extubated and returned to the PACU in stable condition.     Disposition: PACU - hemodynamically stable.    Condition: stable    Attending Attestation: I was present and scrubbed for the key portions of the procedure.      Zac Garcia MD, [facility]  Spine , [facility]  Yohan Lima and Norma Lima Chair in Spinal Neurosurgery  Neurosurgery , [facility]  Complex Spine Surgery Fellowship Director   of Neurological Surgery  [employer]  Office: [phone]  Fax: [phone]

## 2024-07-09 NOTE — PROGRESS NOTES
"Christophe Ambriz is a 74 y.o. male on day 3 of admission presenting with Pathologic lumbar vertebral fracture, initial encounter.    Subjective   Christophe Ambriz is a 74-year-old male with past medical history significant for significant tobacco use, coronary artery disease, hypertension, hyperlipidemia who is admitted for left renal mass and pathologic L3 fracture concerning for metastatic disease.  Currently stable, on minimal medications.      No significant events overnight. Mr. Ambriz did not have a BM overnight (despite enema). Last week reports poor diet but recently ate 2 meals yesterday. Reports shooting pain \"like the inside of a saw is grating my leg\" that begins in left anterior thigh and radiates to his right anterior thigh. Reports shooting pains anytime he tries to raise his left leg. Describes pain as constant and \"is always there\". Pain is 6/10.     Objective     Physical Exam  Physical Exam  Constitutional:       General: He is not in acute distress.     Appearance: He is not ill-appearing or toxic-appearing.   HENT:      Head: Normocephalic and atraumatic.      Mouth/Throat:      Mouth: Mucous membranes are moist.      Pharynx: Oropharynx is clear.   Eyes:      General: No scleral icterus.     Extraocular Movements: Extraocular movements intact.      Conjunctiva/sclera: Conjunctivae normal.      Pupils: Pupils are equal, round, and reactive to light.   Cardiovascular:      Rate and Rhythm: Normal rate and regular rhythm.      Pulses: Normal pulses.      Heart sounds: Normal heart sounds. No murmur heard.     No friction rub. No gallop.   Pulmonary:      Effort: Pulmonary effort is normal. No respiratory distress.      Breath sounds: No wheezing or rhonchi.   Abdominal:      General: Abdomen is flat. Bowel sounds are normal. There is no distension.      Palpations: Abdomen is soft. No tenderness to palpation present.      Musculoskeletal:         Right lower leg: No edema. 2/5 right lower leg strength.  " "     Left lower leg: No edema. 4/5 left lower leg strength.  Skin:     General: Skin is warm and dry.      Capillary Refill: Capillary refill takes less than 2 seconds.   Neurological:      General: No focal deficit present.      Mental Status: He is alert and oriented to person, place, and time.     Last Recorded Vitals  Blood pressure 132/76, pulse 76, temperature 36.2 °C (97.2 °F), resp. rate 18, height 1.854 m (6' 1\"), weight 91 kg (200 lb 9.9 oz), SpO2 93%.    Intake/Output last 3 Shifts:  I/O last 3 completed shifts:  In: - (0 mL/kg)   Out: 550 (6 mL/kg) [Urine:550 (0.2 mL/kg/hr)]  Weight: 91 kg     Relevant Results  Results for orders placed or performed during the hospital encounter of 07/06/24 (from the past 24 hour(s))   Type and screen   Result Value Ref Range    ABO TYPE O     Rh TYPE POS     ANTIBODY SCREEN NEG    Coagulation Screen   Result Value Ref Range    Protime 14.4 (H) 9.8 - 12.8 seconds    INR 1.3 (H) 0.9 - 1.1    aPTT 35 27 - 38 seconds   CBC   Result Value Ref Range    WBC 11.3 4.4 - 11.3 x10*3/uL    nRBC 0.0 0.0 - 0.0 /100 WBCs    RBC 4.50 4.50 - 5.90 x10*6/uL    Hemoglobin 12.4 (L) 13.5 - 17.5 g/dL    Hematocrit 40.9 (L) 41.0 - 52.0 %    MCV 91 80 - 100 fL    MCH 27.6 26.0 - 34.0 pg    MCHC 30.3 (L) 32.0 - 36.0 g/dL    RDW 13.5 11.5 - 14.5 %    Platelets 448 150 - 450 x10*3/uL      Imaging/Procedures:  EKG 12-lead  Narrative: Normal sinus rhythm Normal ECG When compared with ECG of 06-JUL-2024 06:13, (unconfirmed) Sinus rhythm has replaced Junctional rhythm Confirmed by Froylan Rodriguez ([id number]) on 7/9/2024 8:07:10 AM     Assessment/Plan   Principal Problem:    Pathologic lumbar vertebral fracture, initial encounter    Christophe Ambriz is a 74-year-old male with past medical history significant for significant tobacco use, coronary artery disease, hypertension, hyperlipidemia who is admitted for left renal mass and pathologic L3 fracture concerning for metastatic disease.  Currently stable, on " minimal medications.  MRI spine completed, no concern for cord compression.      #Left renal mass  #L3 lumbar fracture concern for spinal mets  :: Patient with 1 month of severe back pain with point tenderness, recent weight loss, pain awakening at night concerning for malignant process with metastasis  :: CT and MRI imaging as above showing left renal mass concerning for RCC with enhancing metastatic lesion infiltrating L3 vertebral body and adjacent epidural invasion involving the ventral aspect of L3     Plan:  - Will plan for enema for BM after surgery today 7/9  - Surgery with neurosurgery Tuesday 7/9 (around noon per Neurosurgery)  (Per Neurosurgery note 7/6): Given the instability and the lumbar radiculopathy being so debilitating I would recommend surgical stabilization decompression and fusion. The intent would be to get a diagnosis at the time of surgery.   - Will follow neurosurgery reccs post procedure     - Supportive onc pain management   -Tylenol 975 every 8 hours as needed, oxycodone 5 mg every 6 hours as needed, Dilaudid 0.5 mg every 6 hours for breakthrough     #CAD s/p PCI  #HTN  #HLD  - hold home plavix in lieu of possible biopsy   - continue home atorvastatin  - continue home amlodipine 10mg daily  - continue home hydrochlorothiazide 25mg daily   - continue home metoprolol succinate 50mg daily   - confirm home medications w/ pharmacy med rec in AM     #Leukocytosis  :: No current infectious symptoms, likely reactive in setting of likely malignancy and fracture     F: PRN  E: PRN  N: regular  A: PIV  Abx: none  O2: RA  GI ppx: n/a  DVT ppx: lovenox     NOK: Iram Timothy (sig other) [phone]  Code status: Full code (confirmed on admission)        Caden Brady M4 (CWRUSOM)  _____________  I have seen and evaluated the patient with Caden Brady, a medical student (MS4).  I reviewed the patient’s medical and family history, the student/resident's findings on physical examination, and  the  patient’s diagnosis and treatment plan with the student/resident. I discussed the case with the  student/resident in details and agree with the findings and plan as documented in the  student's note.    His neurosurgery is planned today at around noon. H still has 6/10 pain, which hopefully will be alleviated after surgical decompression. He failed to have BM after enema. Continue to optimize his bowel regimen, especially post-op. He is medically stable before surgery and in good spirits.         Jose Boogie MD, PhD  Hematology/Oncology  [facility]

## 2024-07-09 NOTE — ANESTHESIA PROCEDURE NOTES
Airway  Date/Time: 7/9/2024 2:21 PM  Urgency: elective    Airway not difficult    Staffing  Performed: BARRERA   Authorized by: Dg Olsen MD    Performed by: AKIN Samayoa  Patient location during procedure: OR    Indications and Patient Condition  Indications for airway management: anesthesia  Spontaneous Ventilation: absent  Sedation level: deep  Preoxygenated: yes  Patient position: sniffing  MILS maintained throughout  Mask difficulty assessment: 1 - vent by mask  Planned trial extubation    Final Airway Details  Final airway type: endotracheal airway      Successful airway: ETT  Cuffed: yes   Successful intubation technique: direct laryngoscopy  Facilitating devices/methods: intubating stylet  Endotracheal tube insertion site: oral  Blade: Kyara  Blade size: #4  ETT size (mm): 7.5  Cormack-Lehane Classification: grade I - full view of glottis  Placement verified by: chest auscultation and capnometry   Measured from: lips  ETT to lips (cm): 22  Number of attempts at approach: 1

## 2024-07-09 NOTE — ANESTHESIA PROCEDURE NOTES
Arterial Line:    Date/Time: 7/9/2024 1:50 PM    Staffing  Performed: BARRERA   Authorized by: Dg Olsen MD    Performed by: AKIN Samayoa    An arterial line was placed. Procedure performed using surface landmarks.in the OR for the following indication(s): continuous blood pressure monitoring and blood sampling needed.    A 20 gauge (size), 1 and 3/4 inch (length), Angiocath (type) catheter was placed into the Right radial artery, secured by tape,   Seldinger technique used.  Events:  patient tolerated procedure well with no complications.

## 2024-07-09 NOTE — ANESTHESIA PROCEDURE NOTES
Peripheral IV  Date/Time: 7/9/2024 2:24 PM  Inserted by: Patrick Rendon MD    Placement  Needle size: 16 G  Laterality: right  Location: wrist  Local anesthetic: none  Site prep: alcohol  Technique: anatomical landmarks  Attempts: 1

## 2024-07-09 NOTE — SIGNIFICANT EVENT
Patient is s/p L1-5 fusion with L3/4 decompression. Further neurosurgery recs as follows:    - plan to return back to [facility], okay for floor status from neurosurgery perspective  - would check a postoperative CBC and RFP, with an AM CBC  - patient has glue mesh dressing which will stay in place for 2-3 weeks and fall off on its own  - patient has two drains on hemovacs which should be maintained on suction with q shift output recorded  - intraoperative pathology consistent with carcinoma, path sample also sent for permanent pathology  - would maintain SBP < 160 in the perioperative period (first 48 hours)  - okay for DVT prophylaxis starting 7/10 AM  - will continue to follow for above

## 2024-07-09 NOTE — ANESTHESIA POSTPROCEDURE EVALUATION
Patient: Christophe Ambriz    Procedure Summary       Date: 07/09/24 Room / Location: [facility] OR 24 / [facility]    Anesthesia Start: 1406 Anesthesia Stop: 1834    Procedure: L1-5 fusion, L2-4 decompression and tumor debulking (Back) Diagnosis:       Pathologic lumbar vertebral fracture, initial encounter      (Pathologic lumbar vertebral fracture, initial encounter [M84.48XA])    Surgeons: Zac Garcia MD Responsible Provider: Iglesia Ramires MD    Anesthesia Type: general ASA Status: 3            Anesthesia Type: general    Vitals Value Taken Time   /76 07/09/24 1834   Temp 36.8 07/09/24 1834   Pulse 65 07/09/24 1830   Resp 22 07/09/24 1830   SpO2 97 % 07/09/24 1830   Vitals shown include unfiled device data.    Anesthesia Post Evaluation    Patient location during evaluation: PACU  Patient participation: complete - patient participated  Level of consciousness: awake and alert  Pain management: adequate  Airway patency: patent  Cardiovascular status: acceptable  Respiratory status: acceptable  Hydration status: acceptable  Postoperative Nausea and Vomiting: none        No notable events documented.

## 2024-07-10 LAB
ALBUMIN SERPL BCP-MCNC: 3.4 G/DL (ref 3.4–5)
ANION GAP SERPL CALC-SCNC: 14 MMOL/L (ref 10–20)
BUN SERPL-MCNC: 9 MG/DL (ref 6–23)
CALCIUM SERPL-MCNC: 9.4 MG/DL (ref 8.6–10.6)
CHLORIDE SERPL-SCNC: 97 MMOL/L (ref 98–107)
CO2 SERPL-SCNC: 29 MMOL/L (ref 21–32)
CREAT SERPL-MCNC: 0.64 MG/DL (ref 0.5–1.3)
EGFRCR SERPLBLD CKD-EPI 2021: >90 ML/MIN/1.73M*2
ERYTHROCYTE [DISTWIDTH] IN BLOOD BY AUTOMATED COUNT: 13.6 % (ref 11.5–14.5)
GLUCOSE SERPL-MCNC: 138 MG/DL (ref 74–99)
HCT VFR BLD AUTO: 29.2 % (ref 41–52)
HGB BLD-MCNC: 9.2 G/DL (ref 13.5–17.5)
MCH RBC QN AUTO: 26.6 PG (ref 26–34)
MCHC RBC AUTO-ENTMCNC: 31.5 G/DL (ref 32–36)
MCV RBC AUTO: 84 FL (ref 80–100)
NRBC BLD-RTO: 0 /100 WBCS (ref 0–0)
PHOSPHATE SERPL-MCNC: 2.6 MG/DL (ref 2.5–4.9)
PLATELET # BLD AUTO: 495 X10*3/UL (ref 150–450)
POTASSIUM SERPL-SCNC: 3.7 MMOL/L (ref 3.5–5.3)
RBC # BLD AUTO: 3.46 X10*6/UL (ref 4.5–5.9)
SODIUM SERPL-SCNC: 136 MMOL/L (ref 136–145)
WBC # BLD AUTO: 18.6 X10*3/UL (ref 4.4–11.3)

## 2024-07-10 PROCEDURE — 2500000001 HC RX 250 WO HCPCS SELF ADMINISTERED DRUGS (ALT 637 FOR MEDICARE OP)

## 2024-07-10 PROCEDURE — 97161 PT EVAL LOW COMPLEX 20 MIN: CPT | Mod: GP

## 2024-07-10 PROCEDURE — 2500000004 HC RX 250 GENERAL PHARMACY W/ HCPCS (ALT 636 FOR OP/ED)

## 2024-07-10 PROCEDURE — 36415 COLL VENOUS BLD VENIPUNCTURE: CPT

## 2024-07-10 PROCEDURE — 97166 OT EVAL MOD COMPLEX 45 MIN: CPT | Mod: GO | Performed by: OCCUPATIONAL THERAPIST

## 2024-07-10 PROCEDURE — 1170000001 HC PRIVATE ONCOLOGY ROOM DAILY

## 2024-07-10 PROCEDURE — 99233 SBSQ HOSP IP/OBS HIGH 50: CPT

## 2024-07-10 PROCEDURE — 2500000001 HC RX 250 WO HCPCS SELF ADMINISTERED DRUGS (ALT 637 FOR MEDICARE OP): Performed by: STUDENT IN AN ORGANIZED HEALTH CARE EDUCATION/TRAINING PROGRAM

## 2024-07-10 PROCEDURE — 85027 COMPLETE CBC AUTOMATED: CPT

## 2024-07-10 PROCEDURE — 80069 RENAL FUNCTION PANEL: CPT

## 2024-07-10 RX ORDER — LACTULOSE 10 G/15ML
20 SOLUTION ORAL DAILY
Status: DISCONTINUED | OUTPATIENT
Start: 2024-07-10 | End: 2024-07-12 | Stop reason: HOSPADM

## 2024-07-10 RX ADMIN — GABAPENTIN 100 MG: 100 CAPSULE ORAL at 20:43

## 2024-07-10 RX ADMIN — ACETAMINOPHEN 975 MG: 325 TABLET ORAL at 17:21

## 2024-07-10 RX ADMIN — METOPROLOL SUCCINATE 50 MG: 50 TABLET, EXTENDED RELEASE ORAL at 09:10

## 2024-07-10 RX ADMIN — TRAZODONE HYDROCHLORIDE 50 MG: 50 TABLET ORAL at 20:43

## 2024-07-10 RX ADMIN — ATORVASTATIN CALCIUM 20 MG: 20 TABLET, FILM COATED ORAL at 20:43

## 2024-07-10 RX ADMIN — AMLODIPINE BESYLATE 10 MG: 10 TABLET ORAL at 09:10

## 2024-07-10 RX ADMIN — HYDROCHLOROTHIAZIDE 25 MG: 25 TABLET ORAL at 09:00

## 2024-07-10 RX ADMIN — ACETAMINOPHEN 975 MG: 325 TABLET ORAL at 09:10

## 2024-07-10 RX ADMIN — LACTULOSE 20 G: 20 SOLUTION ORAL at 17:21

## 2024-07-10 RX ADMIN — ENOXAPARIN SODIUM 40 MG: 100 INJECTION SUBCUTANEOUS at 09:10

## 2024-07-10 RX ADMIN — SODIUM CHLORIDE, POTASSIUM CHLORIDE, SODIUM LACTATE AND CALCIUM CHLORIDE 75 ML/HR: 600; 310; 30; 20 INJECTION, SOLUTION INTRAVENOUS at 17:21

## 2024-07-10 RX ADMIN — HYDROMORPHONE HYDROCHLORIDE 0.5 MG: 1 INJECTION, SOLUTION INTRAMUSCULAR; INTRAVENOUS; SUBCUTANEOUS at 09:09

## 2024-07-10 RX ADMIN — SENNOSIDES 17.2 MG: 8.6 TABLET, FILM COATED ORAL at 20:43

## 2024-07-10 RX ADMIN — OXYCODONE HYDROCHLORIDE 10 MG: 10 TABLET ORAL at 03:34

## 2024-07-10 RX ADMIN — SENNOSIDES 17.2 MG: 8.6 TABLET, FILM COATED ORAL at 09:10

## 2024-07-10 RX ADMIN — OXYCODONE HYDROCHLORIDE 10 MG: 10 TABLET ORAL at 17:21

## 2024-07-10 RX ADMIN — SENNOSIDES AND DOCUSATE SODIUM 1 TABLET: 50; 8.6 TABLET ORAL at 20:44

## 2024-07-10 ASSESSMENT — COGNITIVE AND FUNCTIONAL STATUS - GENERAL
DRESSING REGULAR UPPER BODY CLOTHING: A LOT
DRESSING REGULAR UPPER BODY CLOTHING: A LOT
TURNING FROM BACK TO SIDE WHILE IN FLAT BAD: A LOT
TURNING FROM BACK TO SIDE WHILE IN FLAT BAD: A LOT
MOBILITY SCORE: 8
HELP NEEDED FOR BATHING: A LOT
WALKING IN HOSPITAL ROOM: TOTAL
TOILETING: TOTAL
CLIMB 3 TO 5 STEPS WITH RAILING: TOTAL
STANDING UP FROM CHAIR USING ARMS: TOTAL
DRESSING REGULAR LOWER BODY CLOTHING: TOTAL
TOILETING: TOTAL
DAILY ACTIVITIY SCORE: 13
MOBILITY SCORE: 8
PERSONAL GROOMING: A LITTLE
DAILY ACTIVITIY SCORE: 13
MOVING TO AND FROM BED TO CHAIR: TOTAL
CLIMB 3 TO 5 STEPS WITH RAILING: TOTAL
PERSONAL GROOMING: A LITTLE
MOVING TO AND FROM BED TO CHAIR: TOTAL
WALKING IN HOSPITAL ROOM: TOTAL
STANDING UP FROM CHAIR USING ARMS: TOTAL
DRESSING REGULAR LOWER BODY CLOTHING: TOTAL
MOVING FROM LYING ON BACK TO SITTING ON SIDE OF FLAT BED WITH BEDRAILS: A LOT
HELP NEEDED FOR BATHING: A LOT
MOVING FROM LYING ON BACK TO SITTING ON SIDE OF FLAT BED WITH BEDRAILS: A LOT

## 2024-07-10 ASSESSMENT — ACTIVITIES OF DAILY LIVING (ADL)
ADL_ASSISTANCE: INDEPENDENT
ADL_ASSISTANCE: INDEPENDENT
BATHING_ASSISTANCE: MAXIMAL

## 2024-07-10 ASSESSMENT — PAIN SCALES - GENERAL
PAINLEVEL_OUTOF10: 8
PAINLEVEL_OUTOF10: 9
PAINLEVEL_OUTOF10: 7
PAINLEVEL_OUTOF10: 8

## 2024-07-10 ASSESSMENT — PAIN - FUNCTIONAL ASSESSMENT
PAIN_FUNCTIONAL_ASSESSMENT: 0-10

## 2024-07-10 ASSESSMENT — PAIN DESCRIPTION - LOCATION
LOCATION: BACK
LOCATION: BACK

## 2024-07-10 ASSESSMENT — PAIN SCALES - PAIN ASSESSMENT IN ADVANCED DEMENTIA (PAINAD): TOTALSCORE: MEDICATION (SEE MAR)

## 2024-07-10 NOTE — PROGRESS NOTES
Occupational Therapy    Evaluation    Patient Name: Christophe Ambriz  MRN: [id number]  Today's Date: 7/10/2024  Time Calculation  Start Time: 0824  Stop Time: 0841  Time Calculation (min): 17 min        Assessment:  OT Assessment: Pt presents to OT with increased falls risk, decreased safety and independence with functional transfers and mobility and impaired ADL performance.  Pt will continue to benefit from skilled OT services to address deficits and facilitate safe return to PLOF and home environment.   Prognosis: Good  Barriers to Discharge:  (Tolerance to mobility and transfers)  Evaluation/Treatment Tolerance: Patient limited by pain  Medical Staff Made Aware: Yes  End of Session Communication: Bedside nurse  End of Session Patient Position: Bed, 3 rail up, Alarm on  OT Assessment Results: Decreased ADL status, Decreased endurance, Decreased safe judgment during ADL, Decreased functional mobility, Decreased IADLs, Decreased trunk control for functional activities, Decreased upper extremity strength  Prognosis: Good  Barriers to Discharge:  (Tolerance to mobility and transfers)  Evaluation/Treatment Tolerance: Patient limited by pain  Medical Staff Made Aware: Yes  Plan:  Treatment Interventions: ADL retraining, Functional transfer training, UE strengthening/ROM, Endurance training, Patient/family training, Equipment evaluation/education, Compensatory technique education  OT Frequency: 4 times per week  OT Discharge Recommendations: Moderate intensity level of continued care  OT Recommended Transfer Status: Assist of 2, Maximum assist  OT - OK to Discharge: Yes  Treatment Interventions: ADL retraining, Functional transfer training, UE strengthening/ROM, Endurance training, Patient/family training, Equipment evaluation/education, Compensatory technique education    Subjective   Current Problem:  1. Pathologic lumbar vertebral fracture, initial encounter  Referral to Neurosurgery    XR lumbar spine 2-3 views    Case  "Request Operating Room: L1-5 fusion, L2-4 decompression and tumor debulking    Case Request Operating Room: L1-5 fusion, L2-4 decompression and tumor debulking    Surgical Pathology Exam    Surgical Pathology Exam        General:  General  Reason for Referral: admitted for left renal mass and pathologic L3 fracture concerning for metastatic disease; s/p L1-5 fusion with L3/4 decompression 7/9  Past Medical History Relevant to Rehab: HTN, HLD, CAD s/p PCI (2017) (on ASA/PLX), p/w LBP of 37d duration, CT T/L spine L3 lytic lesion, CT CAP 8 cm L kidney mass, 2.1 cm R thyroid nodule, L 9th rib lytic lesion, MRL LS lytic mets to L3 with L3-4 neuro foraminal stenosis (severe on the L), L3-4 disc involvement with tumor.  Co-Treatment: PT  Co-Treatment Reason: to maximize pt safety and therapeutic potential while focusing on OT specific goals in pt requiring 2 skilled A to attempt functional transfers and mobility  Prior to Session Communication: Bedside nurse  Patient Position Received: Bed, 3 rail up, Alarm on  General Comment: pt willing to participate with self reported \"perverse\" sense of humor, requires redirection to task at times  Precautions:  Medical Precautions: Fall precautions, Spinal precautions, Oxygen therapy device and L/min (3L)     Pain:  Pain Assessment  Pain Assessment: 0-10  0-10 (Numeric) Pain Score: 8  Pain Type: Surgical pain  Pain Location: Back  Pain Interventions: Ambulation/increased activity, Repositioned  Response to Interventions: OT to tolerance    Objective   Cognition:  Overall Cognitive Status: Within Functional Limits  Orientation Level: Oriented X4  Insight: Mild           Home Living:  Type of Home: Apartment  Lives With: Spouse  Home Adaptive Equipment: Cane  Home Layout: One level  Home Access: No concerns  Prior Function:  Level of [place]: Independent with ADLs and functional transfers, Independent with homemaking with ambulation  Receives Help From: Family  ADL Assistance: " Independent  Homemaking Assistance: Independent  Ambulatory Assistance: Independent (recent cane)  Vocational: Retired  Leisure: motorcycles  Prior Function Comments: -falls  IADL History:  Current License: Yes  Mode of Transportation: Car  ADL:  Eating Assistance: Independent  Eating Deficit: Beverage management  Grooming Assistance: Minimal (anticipate)  Grooming Deficit: Setup  Bathing Assistance: Maximal (anticipate)  UE Dressing Assistance: Moderate  UE Dressing Deficit: Thread RUE, Thread LUE, Pull around back  LE Dressing Assistance: Total  LE Dressing Deficit: Don/doff R sock, Don/doff L sock  Toileting Assistance with Device: Total (nelson)  Toileting Deficit:  (would require 2 A at this time)  Activity Tolerance:  Endurance: Decreased tolerance for upright activites  Bed Mobility/Transfers: Bed Mobility  Bed Mobility: Yes  Bed Mobility 1  Bed Mobility 1: Supine to sitting, Sitting to supine  Level of Assistance 1: Maximum assistance, +2  Bed Mobility Comments 1: HOB elevated, cues for sequencing and technique; via logroll  Bed Mobility 2  Bed Mobility  2: Scooting  Level of Assistance 2: Dependent, +2  Bed Mobility Comments 2: draw assist    Transfers  Transfer: Yes  Transfer 1  Technique 1: Sit to stand, Stand to sit  Transfer Device 1:  (BUE arm in arm)  Transfer Level of Assistance 1: Maximum assistance, +2  Trials/Comments 1: elevated EOB, B knee blocked, pt minimally clearing bed with max A x2 ~10%      Functional Mobility:  Functional Mobility  Functional Mobility Performed: No      Vision:Vision - Basic Assessment  Current Vision: No visual deficits  Sensation:  Light Touch: No apparent deficits  Strength:  Strength Comments: gross deconditioning noted with AROM, questionable effort, formal MMT deferred 2/2 spine precautions  Perception:  Inattention/Neglect: Appears intact  Initiation: Cues to initiate tasks  Motor Planning:  (cues for sequencing)  Perseveration: Not  present  Coordination:  Movements are Fluid and Coordinated: Yes   Hand Function:  Gross Grasp: Functional  Coordination: Functional  Extremities: RUE   RUE :  (AROM distally WFL; shoulder ~90 degrees) and LUE   LUE:  (AROM distally WFL; shoulder ~90 degrees)    Outcome Measures:[facility] Daily Activity  Putting on and taking off regular lower body clothing: Total  Bathing (including washing, rinsing, drying): A lot  Putting on and taking off regular upper body clothing: A lot  Toileting, which includes using toilet, bedpan or urinal: Total  Taking care of personal grooming such as brushing teeth: A little  Eating Meals: None  Daily Activity - Total Score: 13         and Brief Confusion Assessment Method (bCAM)  CAM Result: CAM -    Education Documentation  Body Mechanics, taught by Yahaira Knowles OT at 7/10/2024  2:17 PM.  Learner: Significant Other, Patient  Readiness: Acceptance  Method: Explanation  Response: Needs Reinforcement    Precautions, taught by Yahaira Knowles OT at 7/10/2024  2:17 PM.  Learner: Significant Other, Patient  Readiness: Acceptance  Method: Explanation  Response: Needs Reinforcement    ADL Training, taught by Yahaira Knowles OT at 7/10/2024  2:17 PM.  Learner: Significant Other, Patient  Readiness: Acceptance  Method: Explanation  Response: Needs Reinforcement    Education Comments  No comments found.        Goals:  Encounter Problems       Encounter Problems (Active)       ADLs       Patient will demonstrate lower body dressing with modified independent level of assistancedonning and doffing all LE clothes  with PRN adaptive equipment        Start:  07/10/24    Expected End:  07/31/24            Patient will perform toileting tasks, including hygiene and clothing management with modified independent level of assistance        Start:  07/10/24    Expected End:  07/31/24               COGNITION/SAFETY       Patient will recall and adhere to spine precautions within all ADLs and  functional mobility in order to promote healing and safety with functional tasks        Start:  07/10/24    Expected End:  07/31/24               MOBILITY       pt will safely demonstrate functional mobility, to/from bathroom and at other household distances, navigating around environmental barriers with LRD and mod I        Start:  07/10/24    Expected End:  07/31/24               TRANSFERS       Patient will perform bed mobility modified independent level of assistance in order to improve safety and independence with mobility       Start:  07/10/24    Expected End:  07/31/24            Patient will demonstrate functional transfers with least restrictive device with modified independent level of assistance.       Start:  07/10/24    Expected End:  07/31/24                        07/10/24 at 2:28 PM   Yahaira Knowles OT   Rehab Office: [phone]

## 2024-07-10 NOTE — PROGRESS NOTES
"Christophe Ambriz is a 74 y.o. male on day 4 of admission presenting with Pathologic lumbar vertebral fracture, initial encounter.    Subjective   NAEO, doing well post op        Objective     Physical Exam         AOX3       RUE D5 / B5 / T5 / HG 5/ IO 5       LUE D5 / B5 / T5 / HG 5/ IO 5       Negative quinn          RLE HF5 / KE 5/ DF 5/ PF 5       LLE HF4 / KE 5/ DF 5/ PF 5       B/l clonus    Last Recorded Vitals  Blood pressure 110/68, pulse 80, temperature 35.9 °C (96.6 °F), temperature source Temporal, resp. rate 16, height 1.854 m (6' 1\"), weight 91 kg (200 lb 9.9 oz), SpO2 94%.  Intake/Output last 3 Shifts:  I/O last 3 completed shifts:  In: 1750 (19.2 mL/kg) [I.V.:1750 (19.2 mL/kg)]  Out: 1170 (12.9 mL/kg) [Urine:570 (0.2 mL/kg/hr); Blood:600]  Weight: 91 kg     Relevant Results                Results for orders placed or performed during the hospital encounter of 07/06/24 (from the past 24 hour(s))   Renal function panel   Result Value Ref Range    Glucose 160 (H) 74 - 99 mg/dL    Sodium 136 136 - 145 mmol/L    Potassium 3.8 3.5 - 5.3 mmol/L    Chloride 96 (L) 98 - 107 mmol/L    Bicarbonate 27 21 - 32 mmol/L    Anion Gap 17 10 - 20 mmol/L    Urea Nitrogen 13 6 - 23 mg/dL    Creatinine 0.82 0.50 - 1.30 mg/dL    eGFR >90 >60 mL/min/1.73m*2    Calcium 9.5 8.6 - 10.6 mg/dL    Phosphorus 3.3 2.5 - 4.9 mg/dL    Albumin 3.6 3.4 - 5.0 g/dL   CBC   Result Value Ref Range    WBC 23.8 (H) 4.4 - 11.3 x10*3/uL    nRBC 0.0 0.0 - 0.0 /100 WBCs    RBC 3.56 (L) 4.50 - 5.90 x10*6/uL    Hemoglobin 9.7 (L) 13.5 - 17.5 g/dL    Hematocrit 28.5 (L) 41.0 - 52.0 %    MCV 80 80 - 100 fL    MCH 27.2 26.0 - 34.0 pg    MCHC 34.0 32.0 - 36.0 g/dL    RDW 13.6 11.5 - 14.5 %    Platelets 476 (H) 150 - 450 x10*3/uL   Renal Function Panel   Result Value Ref Range    Glucose 225 (H) 74 - 99 mg/dL    Sodium 135 (L) 136 - 145 mmol/L    Potassium 3.9 3.5 - 5.3 mmol/L    Chloride 97 (L) 98 - 107 mmol/L    Bicarbonate 28 21 - 32 mmol/L    " Anion Gap 14 10 - 20 mmol/L    Urea Nitrogen 12 6 - 23 mg/dL    Creatinine 0.68 0.50 - 1.30 mg/dL    eGFR >90 >60 mL/min/1.73m*2    Calcium 8.9 8.6 - 10.6 mg/dL    Phosphorus 3.9 2.5 - 4.9 mg/dL    Albumin 3.6 3.4 - 5.0 g/dL                 Assessment/Plan   Principal Problem:    Pathologic lumbar vertebral fracture, initial encounter  Active Problems:    CAD (coronary artery disease)    Stented coronary artery    HTN (hypertension)    Hyperlipidemia    Christophe Ambriz is a 74 y.o. male with h/o HTN, HLD, CAD s/p PCI (2017) (on ASA/PLX), p/w LBP of 37d duration, CT T/L spine L3 lytic lesion, CT CAP 8 cm L kidney mass, 2.1 cm R thyroid nodule, L 9th rib lytic lesion, MRL LS lytic mets to L3 with L3-4 neuro foraminal stenosis (severe on the L), L3-4 disc involvement with tumor.     Pathological fracture with >50% loss of height on standing xrays.  Severe intractable pain in the L3 distribution of the left leg.     7/19 s/p L1-5 fusion with L3/4 decompression (prelim: carcinoma)  Recs  Ratnoff primary,   drains x2,   ASA/PLX restart plan,   emailed 6wk fuv   PTOT           Linda Dunaway MD

## 2024-07-10 NOTE — PROGRESS NOTES
"Christophe Ambriz is a 74 y.o. male on day 4 of admission presenting with Pathologic lumbar vertebral fracture, initial encounter.    Subjective   Christophe Ambriz is a 74-year-old male with past medical history significant for significant tobacco use, coronary artery disease, hypertension, hyperlipidemia who is admitted for left renal mass and pathologic L3 fracture concerning for metastatic disease.  Currently stable, on minimal medications.      No significant events overnight. Endorses much improved 4/10 pain in left leg. Continues to report mild radiation of pain to right leg upon left straight leg raise test. Reports pain is much improved. Pt is very hopeful and considers it a \"miracle\". Reports 6/10 pain in incisional site in back.        Objective     Physical Exam  Constitutional:       General: He is not in acute distress.     Appearance: He is not ill-appearing or toxic-appearing.   HENT:      Head: Normocephalic and atraumatic.      Mouth/Throat:      Mouth: Mucous membranes are moist.      Pharynx: Oropharynx is clear.   Eyes:      General: No scleral icterus.     Extraocular Movements: Extraocular movements intact.      Conjunctiva/sclera: Conjunctivae normal.      Pupils: Pupils are equal, round, and reactive to light.   Cardiovascular:      Rate and Rhythm: Normal rate and regular rhythm.      Pulses: Normal pulses.      Heart sounds: Normal heart sounds. No murmur heard.     No friction rub. No gallop.   Pulmonary:      Effort: Pulmonary effort is normal. No respiratory distress.      Breath sounds: No wheezing or rhonchi.   Abdominal:      General: Abdomen is flat. Bowel sounds are normal. There is no distension.      Palpations: Abdomen is soft. No tenderness to palpation present.      Musculoskeletal:         Right lower leg: No edema. 4/5 right lower leg strength.       Left lower leg: No edema. 4/5 left lower leg strength.  Skin:     General: Skin is warm and dry.      Capillary Refill: Capillary " "refill takes less than 2 seconds.   Neurological:      General: No focal deficit present.      Mental Status: He is alert and oriented to person, place, and time.     Last Recorded Vitals  Blood pressure 110/70, pulse 84, temperature 36.6 °C (97.9 °F), temperature source Temporal, resp. rate 16, height 1.854 m (6' 1\"), weight 91 kg (200 lb 9.9 oz), SpO2 92%.    Intake/Output last 3 Shifts:  I/O last 3 completed shifts:  In: 1750 (19.2 mL/kg) [I.V.:1750 (19.2 mL/kg)]  Out: 1170 (12.9 mL/kg) [Urine:570 (0.2 mL/kg/hr); Blood:600]  Weight: 91 kg     Relevant Results  Results for orders placed or performed during the hospital encounter of 07/06/24 (from the past 24 hour(s))   CBC   Result Value Ref Range    WBC 23.8 (H) 4.4 - 11.3 x10*3/uL    nRBC 0.0 0.0 - 0.0 /100 WBCs    RBC 3.56 (L) 4.50 - 5.90 x10*6/uL    Hemoglobin 9.7 (L) 13.5 - 17.5 g/dL    Hematocrit 28.5 (L) 41.0 - 52.0 %    MCV 80 80 - 100 fL    MCH 27.2 26.0 - 34.0 pg    MCHC 34.0 32.0 - 36.0 g/dL    RDW 13.6 11.5 - 14.5 %    Platelets 476 (H) 150 - 450 x10*3/uL   Renal Function Panel   Result Value Ref Range    Glucose 225 (H) 74 - 99 mg/dL    Sodium 135 (L) 136 - 145 mmol/L    Potassium 3.9 3.5 - 5.3 mmol/L    Chloride 97 (L) 98 - 107 mmol/L    Bicarbonate 28 21 - 32 mmol/L    Anion Gap 14 10 - 20 mmol/L    Urea Nitrogen 12 6 - 23 mg/dL    Creatinine 0.68 0.50 - 1.30 mg/dL    eGFR >90 >60 mL/min/1.73m*2    Calcium 8.9 8.6 - 10.6 mg/dL    Phosphorus 3.9 2.5 - 4.9 mg/dL    Albumin 3.6 3.4 - 5.0 g/dL   CBC   Result Value Ref Range    WBC 18.6 (H) 4.4 - 11.3 x10*3/uL    nRBC 0.0 0.0 - 0.0 /100 WBCs    RBC 3.46 (L) 4.50 - 5.90 x10*6/uL    Hemoglobin 9.2 (L) 13.5 - 17.5 g/dL    Hematocrit 29.2 (L) 41.0 - 52.0 %    MCV 84 80 - 100 fL    MCH 26.6 26.0 - 34.0 pg    MCHC 31.5 (L) 32.0 - 36.0 g/dL    RDW 13.6 11.5 - 14.5 %    Platelets 495 (H) 150 - 450 x10*3/uL   Renal function panel   Result Value Ref Range    Glucose 138 (H) 74 - 99 mg/dL    Sodium 136 136 - 145 " mmol/L    Potassium 3.7 3.5 - 5.3 mmol/L    Chloride 97 (L) 98 - 107 mmol/L    Bicarbonate 29 21 - 32 mmol/L    Anion Gap 14 10 - 20 mmol/L    Urea Nitrogen 9 6 - 23 mg/dL    Creatinine 0.64 0.50 - 1.30 mg/dL    eGFR >90 >60 mL/min/1.73m*2    Calcium 9.4 8.6 - 10.6 mg/dL    Phosphorus 2.6 2.5 - 4.9 mg/dL    Albumin 3.4 3.4 - 5.0 g/dL         Assessment/Plan   Christophe Ambriz is a 74-year-old male with past medical history significant for significant tobacco use, coronary artery disease, hypertension, hyperlipidemia who is admitted for left renal mass and pathologic L3 fracture concerning for metastatic disease.  Currently stable, on minimal medications.  MRI spine completed, no concern for cord compression.      #Left renal mass  #L3 lumbar fracture concern for spinal mets  :: Patient with 1 month of severe back pain with point tenderness, recent weight loss, pain awakening at night concerning for malignant process with metastasis  :: CT and MRI imaging as above showing left renal mass concerning for RCC with enhancing metastatic lesion infiltrating L3 vertebral body and adjacent epidural invasion involving the ventral aspect of L3     Plan 7/10:  - Will plan for enema today 7/10 for BM after surgery  - Completed surgery 7/9  - F/U NS reccs (postop CBC, RFP, AM CBC)  - Maintain SBP <160 in perioperative period  - DVT prophylaxis starting 7/10 AM (ok with Levonox)  - F/U pathology results  - PT/OT eval pending     - Supportive onc pain management   -Tylenol 975 every 8 hours as needed, oxycodone 5 mg every 6 hours as needed, Dilaudid 0.5 mg every 6 hours for breakthrough     #CAD s/p PCI  #HTN  #HLD  - hold home plavix in lieu of possible biopsy   - continue home atorvastatin  - continue home amlodipine 10mg daily  - continue home hydrochlorothiazide 25mg daily   - continue home metoprolol succinate 50mg daily   - confirm home medications w/ pharmacy med rec in AM     #Leukocytosis  :: No current infectious symptoms,  "likely reactive in setting of likely malignancy and fracture     F: PRN  E: PRN  N: regular  A: PIV  Abx: none  O2: RA  GI ppx: n/a  DVT ppx: lovenox     NOK: Iram Aguirre (sig other) [phone]  Code status: Full code (confirmed on admission)        Caden Brady M4 ([id number])    I have seen and evaluated the patient with Caden Brady, a medical student (MS4).    I reviewed the patient’s medical and family history, the student/resident's findings on physical examination, and the patient’s diagnosis and treatment plan with the student/resident. I discussed the case with the student/resident in details and agree with the findings and plan as documented in the student's note.    Taken to OR on 7/9 for L1-5 fusion w/ L2-4 decompression, tumor debulking  and biopsy. Today is post-op day 1 and he says his pain is better (4/10). He is now able to move both legs better and even to lift right leg. He was able to work with PT a little bit today. Pathology is still pending. Still no bowel movement. He is refusing senna. Will try lactulose.  PT saw him saying his need is \"moderate intensity\". The current disposition is SNF vs. Home (final decision still pending).         Jose Boogie MD, PhD  Hematology/Oncology  [facility]         "

## 2024-07-10 NOTE — PROGRESS NOTES
07/10/24 1210   Discharge Planning   Living Arrangements Spouse/significant other   Support Systems Spouse/significant other;Family members;Friends/neighbors   Type of Residence Private residence   Who is requesting discharge planning? Provider   Home or Post Acute Services Post acute facilities (Rehab/SNF/etc)   Type of Post Acute Facility Services Skilled nursing   Expected Discharge Disposition SNF   Does the patient need discharge transport arranged? Yes   RoundTrip coordination needed? Yes   Has discharge transport been arranged? No     7/10/24 @ 1210  Met with patient and his wife at bedside.  Spoke with them about discharge planning. PT rec SNF.  They were given a SNF list to review.  They will think about what they want to do. They feel just having PT/OT come into the home would be best. They are agreeable to using [facility].  Will follow up with their decision and follow the patient during his hospital stay for his dispo needs.  Evelyn Grant RN TCC

## 2024-07-10 NOTE — PROGRESS NOTES
SUPPORTIVE AND PALLIATIVE ONCOLOGY INPATIENT FOLLOW-UP      SERVICE DATE: 07/10/24     SUBJECTIVE:  Interval Events:  S/p L1-5 fusion w/ L3-L4 decompression tumor debulking (in hopes of arresting progression of neuro deficits from tumor)  Preliminary path consistent with carcinoma; final path pending  Pt reports pain as well controlled  Reports sleep as improved    Pain Assessment:  Location: Bilateral lower back  Duration: Intermittent  Characteristics:   Ratin   Descriptors: aching, throbbing, shooting, and stabbing   Aggravating: movement and bending    Relieving: Analgesics Oxycodone and Modifying activity   Interference with Function: Somewhat    Opioid Use  Past 24 h prn opioid use: Hydromorphone 0.5 mg IV x 1 doses = 0.5 mg = 6.2 OME  Oxycodone IR 7.5 mg PO x 1 doses = 7.5 mg = 10 OME  Oxycodone IR 10 mg PO x 2 doses=20 mg=25 OME  Total 24h OME use:  41.2 OME    Note: OME calculations based on equianalgesic table below. Please note this table is based on best available evidence but conversions are still approximate. These are NOT opioid DOSES for individual patient use; this is equivalency information.  Drug Parenteral Enteral   Morphine 10 25   Oxycodone N/A 20   Hydromorphone 2 5   Fentanyl 0.15 N/A   Tramadol N/A 120   Citation: Rafaela MARX. Demystifying opioid conversion calculations: A guide for effective dosing, Second edition. MD Reyna: [employer]System Pharmacists, 2018.    Symptom Assessment:  Nausea none  Vomiting none  Lack of appetite a little  Constipation somewhat  Difficulty Sleeping none    Information obtained from: chart review, interview of patient, and discussion with primary team  ______________________________________________________________________        OBJECTIVE:    Lab Results   Component Value Date    WBC 23.8 (H) 2024    HGB 9.7 (L) 2024    HCT 28.5 (L) 2024    MCV 80 2024     (H) 2024      Lab Results   Component  Value Date    GLUCOSE 225 (H) 07/09/2024    CALCIUM 8.9 07/09/2024     (L) 07/09/2024    K 3.9 07/09/2024    CO2 28 07/09/2024    CL 97 (L) 07/09/2024    BUN 12 07/09/2024    CREATININE 0.68 07/09/2024     Lab Results   Component Value Date    ALT 14 07/06/2024    AST 15 07/06/2024    ALKPHOS 110 07/06/2024    BILITOT 0.4 07/06/2024     Estimated Creatinine Clearance: 106.1 mL/min (by C-G formula based on SCr of 0.68 mg/dL).     Scheduled medications  acetaminophen, 975 mg, oral, q8h  amLODIPine, 10 mg, oral, Daily  atorvastatin, 20 mg, oral, Nightly  [Held by provider] clopidogrel, 75 mg, oral, Daily  enoxaparin, 40 mg, subcutaneous, Daily  gabapentin, 100 mg, oral, Nightly  hydroCHLOROthiazide, 25 mg, oral, Daily  lidocaine, 1 patch, transdermal, Daily  metoprolol succinate XL, 50 mg, oral, Daily  polyethylene glycol, 17 g, oral, Daily  psyllium, 1 packet, oral, TID  sennosides, 2 tablet, oral, BID  sennosides-docusate sodium, 1 tablet, oral, Nightly  traZODone, 50 mg, oral, Nightly      Continuous medications  lactated Ringer's, 75 mL/hr, Last Rate: Stopped (07/09/24 1340)      PRN medications  HYDROmorphone, 0.5 mg, q6h PRN  oxyCODONE, 10 mg, q6h PRN  oxyCODONE, 7.5 mg, q3h PRN      }     PHYSICAL EXAMINATION:    Vital Signs:   Vital signs reviewed  Visit Vitals  /70 (BP Location: Left arm, Patient Position: Lying)   Pulse 84   Temp 36.6 °C (97.9 °F) (Temporal)   Resp 16        0-10 (Numeric) Pain Score: 9       Physical Exam  Vitals reviewed.   Constitutional:       Appearance: He is ill-appearing.      Comments: A&O x 3; NAD; thin, pale   HENT:      Head: Normocephalic and atraumatic.      Mouth/Throat:      Mouth: Mucous membranes are moist.   Cardiovascular:      Pulses: Normal pulses.   Pulmonary:      Effort: Pulmonary effort is normal.      Comments: On 1 L of O2 per NC; no increased work of breathing or use of accessory muscles  Abdominal:      General: Abdomen is flat. There is no  distension.      Palpations: Abdomen is soft.      Tenderness: There is no abdominal tenderness.   Musculoskeletal:      Right lower leg: No edema.      Left lower leg: No edema.   Skin:     General: Skin is warm and dry.      Capillary Refill: Capillary refill takes less than 2 seconds.      Coloration: Skin is pale.   Neurological:      General: No focal deficit present.      Mental Status: He is alert and oriented to person, place, and time.   Psychiatric:         Mood and Affect: Mood normal.        ASSESSMENT/PLAN:  Christophe Ambriz is a 74 y.o. male diagnosed with left renal mass and pathologic fracture of L3; c/f metastatic renal cell carcinoma. PMH significant for CAD s/p stenting (pt unsure of what year), HTN, HLD, tobacco use disorder. Admitted 7/6/2024 for further evaluation and management of pathologic spine fracture from bony metastatic disease from [facility] where pt originally presented on 7/4/24 with c/o low back pain that frequently awakens him at night. Course complicated by sub-optimal pain control. Supportive and Palliative Oncology is consulted for pain management     Pain:  Lower back pain related to likely metastatic disease  Pain is: pain related to large lytic lesion to L3; likely metastatic disease (suspect RCC upon imaging)  Type: somatic and neuropathic  Pain control: well-controlled  Home regimen:  Acetaminophen  Intolerances/previously tried: none  Personalized pain goal: 3  Total OME usage for the past 24 hours:  41.2 OME  Continue 7.5 mg Oxycodone po q 3 hrs prn for MODERATE pain (opioid naive, elderly)  Continue 10 mg Oxycodone po q 3 hrs prn for SEVERE pain (opioid naive, elderly)  Continue Hydromorphone 0.5 mg IVP but change frequency to q 3 hrs prn for BREAKTHROUGH PAIN              Continue Gabapentin 100 mg po q hs (for neuropathic pain coverage)  Continue to monitor pain scores and administer PRN medications as appropriate  Continue/initiate nonpharmacologic pain  "management strategies including ice/heat therapy, distraction techniques, deep breathing/relaxation techniques, calming music, and repositioning  Continue to monitor for signs of opioid efficacy (pain scores, improved functionality) and toxicity (pinpoint pupils, excess sedation/drowsiness/confusion, respiratory depression, etc.)     Nausea:  At risk for nausea without vomiting related to opioids and constipation   Home regimen:  none  Well-controlled  Consider adding Ondansetron 4 mg PO/IVP q 6 hrs prn      Constipation  At risk for constipation related to opioids, decreased po intake and decreased mobility secondary to pain; currently constipated  States he has chronic constipation for \"years\"  Usual bowel pattern:  \"once a week\"-discussed importance of adequate BM's and need for bowel regimen as he is now on opioids  Home regimen: none  LBM 7/4/24  Monitor BM frequency, adjust regimen as needed  Goal to have BM without straining q48-72h  Miralax 17 g po daily (pt refused today; long discussion on importance of having regular bowel movements especially while on opioids)  Senna 2 tabs po bid   Recommend Lactulose 20 g daily  Discontinue Psyllium 3.4 g packet tid as it is a bulk-forming laxative which will increase stool bulk & distend the colon, which can worsen abdominal pain and bowel obstruction when opioids prevent peristalsis; use osmotic laxatives like those listed above only     Sleeping Difficulty:  Impaired sleep related to pain and hospital environment  Home regimen:   states 2-3 years ago was taking 300 mg Trazodone  po q hs for chronic insomnia  Improved and well controlled  Will likely improve with adequate pain management  Gabapentin as listed above will also help improve sleep  Continue Trazodone 50 mg po q hs     Decreased appetite:  Appetite loss related to disease process and pain  Nutrition consult  Pt notes that he has had difficulty preparing meals due to pain and bilateral lower extremity " "weakness  Weight loss pt unsure of how much; chart review shows 10 lb weight loss since May  Home regimen:  none  Will likely improve as pain is better controlled  Can consider appetite stimulant after pain is well controlled and if appetite remains decreased     Medical Decision Making/Goals of Care/Advance Care Planning:  Patient's current clinical condition, including diagnosis, prognosis, and management plan, and goals of care were discussed.   Life limiting disease:  unknown at this time; imaging c/f renal cell carcinoma with metastatic disease (spine-l3)  Family: Supportive significant other Iram Armstrong  Performance status: Moderate limitations due to pain and disease process  Joys/meaning/strength: [place]  Understanding of health: Demonstrates good understanding of disease process, understands of probably diagnosis of renal cell carcinoma with mets to spine; fully understands need for biopsy to confirm diagnosis and develop plan after results are obtained  Information:Wants full disclosure  Goals: symptom control and cancer directed therapy  Worries and fears now and future: ongoing symptoms, inability to receive cancer treatment, and continued and progressive weakness    Minimum acceptable outcome/QOL:  \"to get my pain better controlled and to figure out what is going on and get moving on addressing it\"  Code status discussion:  FULL     Advance Directives  Existence of Advance Directives:No - not interested  Decision maker: Surrogate decision maker is friend Iram Armstrong [phone] ok to discuss any of his care with her  Code Status: Full code     Introduction to Supportive and Palliative Oncology:  Spoke with patient at bedside  Introduced the role and philosophy of Supportive and Palliative oncology in the evaluation and management of symptoms during cancer treatment  Palliative care was introduced as a service for patients with serious illness to help with symptoms, assist with " goals of care conversations, navigate complex decision making, improve quality of life for patients, and provide support both patients and families.  Patient seemed to appreciate the extra layer of support.      Supportive Interventions: Interventions: Music Therapy: declined, Art Therapy: declined, SPO Spiritual Care: declined     Disposition:  Please  start the process of having prior authorization with meds to beds deliver medications to patient prior to discharge via [facility] pharmacy. Prescriptions will need to be sent 48-72 hours prior to discharge so that a prior authorization can be completed.      Discharge date: unknown pending acute issues and pain control  Will assess if patient needs an appointment with Outpatient Supportive Oncology as appropriate       Supportive and Palliative Oncology encounter:  Spoke with patient at bedside  Emotional support provided  Coordination of care     Signature and billing:  Thank you for allowing us to participate in the care of this patient. Recommendations will be communicated back to the consulting service by way of shared electronic medical record or face-to-face.    Medical complexity was high level due to due to complexity of problems, extensive data review, and high risk of management/treatment.    I spent 50 minutes in the care of this patient which included chart review, interviewing patient/family, discussion with primary team, coordination of care, and documentation.    Data:   Diagnostic tests and information reviewed for today's visit:  Conversation with primary team, Most recent labs and imaging results, Medications       Some elements copied from my consult note on 7/6/24, the elements have been updated and all reflect current decision making from today, 07/10/24       Plan of Care discussed with: Provider, RN, Patient    Thank you for asking Supportive and Palliative Oncology to assist with care of this patient.  We will continue to follow  Please contact  us for additional questions or concerns.      SIGNATURE: VINNY Riddle-CNP   PAGER/CONTACT:  Contact information:  Supportive and Palliative Oncology  Monday-Friday 8 AM-5 PM  Epic Secure chat or pager [phone].  After hours and weekends:  pager [phone]

## 2024-07-10 NOTE — PROGRESS NOTES
Physical Therapy    Physical Therapy Evaluation    Patient Name: Christophe Ambriz  MRN: [id number]  Today's Date: 7/10/2024   Time Calculation  Start Time: 0824  Stop Time: 0841  Time Calculation (min): 17 min    Assessment/Plan   PT Assessment  PT Assessment Results: Decreased strength, Decreased endurance, Impaired balance, Decreased mobility, Pain  Rehab Prognosis: Good  End of Session Communication: Bedside nurse  End of Session Patient Position: Bed, 3 rail up, Alarm on  IP OR SWING BED PT PLAN  Inpatient or Swing Bed: Inpatient  PT Plan  Treatment/Interventions: Bed mobility, Transfer training, Gait training, Balance training, Strengthening, Therapeutic exercise  PT Plan: Ongoing PT  PT Frequency: 4 times per week  PT Discharge Recommendations: Moderate intensity level of continued care  PT Recommended Transfer Status: Assist x2  PT - OK to Discharge: Yes      Subjective   General Visit Information:  Reason for Referral: admitted for left renal mass and pathologic L3 fracture concerning for metastatic disease; s/p L1-5 fusion with L3/4 decompression 7/9  Past Medical History Relevant to Rehab: HTN, HLD, CAD s/p PCI (2017) (on ASA/PLX), p/w LBP of 37d duration, CT T/L spine L3 lytic lesion, CT CAP 8 cm L kidney mass, 2.1 cm R thyroid nodule, L 9th rib lytic lesion, MRL LS lytic mets to L3 with L3-4 neuro foraminal stenosis (severe on the L), L3-4 disc involvement with tumor.  Co-Treatment: OT  Co-Treatment Reason: to maximize mobility and safety  Prior to Session Communication: Bedside nurse  Patient Position Received: Bed, 3 rail up, Alarm on   Home Living:  Home Living  Type of Home: Apartment  Lives With: Spouse  Home Adaptive Equipment: Cane  Home Layout: One level  Home Access: No concerns  Prior Level of Function:  Prior Function Per Pt/Caregiver Report  ADL Assistance: Independent  Ambulatory Assistance: Independent (has been using a cane for ~1-2 months)  Precautions:  Precautions  Medical Precautions:  Fall precautions, Spinal precautions       Objective     Pain:  Pain Assessment  Pain Assessment: 0-10  0-10 (Numeric) Pain Score: 8  Pain Type: Surgical pain  Pain Location: Back  Cognition:  Cognition  Overall Cognitive Status: Within Functional Limits      Extremity/Trunk Assessments:  Strength:           RLE   RLE : Exceptions to WFL  Strength RLE  R Hip Flexion: 2+/5  R Knee Extension: 2+/5  R Ankle Dorsiflexion:  (at least 3/5)  R Ankle Plantar Flexion:  (at least 3/5)  LLE   LLE : Exceptions to WFL  Strength LLE  L Hip Flexion: 2+/5  L Knee Extension: 2+/5  L Ankle Dorsiflexion:  (at least 3/5)  L Ankle Plantar Flexion:  (at least 3/5)    General Assessments:            Sensation  Light Touch: No apparent deficits     Static Sitting Balance  Static Sitting-Level of Assistance: Contact guard  Dynamic Sitting Balance  Dynamic Sitting-Comments: min assist       Functional Assessments:  Bed Mobility  Bed Mobility: Yes  Bed Mobility 1  Bed Mobility 1: Supine to sitting, Sitting to supine  Level of Assistance 1: Maximum assistance, +2  Bed Mobility Comments 1: HOB elevated, verbal cues for logroll  Bed Mobility 2  Bed Mobility  2: Scooting  Level of Assistance 2: Dependent, +2  Transfers  Transfer: Yes  Transfer 1  Technique 1: Sit to stand, Stand to sit  Transfer Device 1:  (bilateral arm in arm assist)  Transfer Level of Assistance 1: Maximum assistance, +2  Trials/Comments 1: bed height elevated, bilateral knees blocked; pt only able to partially clear bed (~10%)             Outcome Measures:  [facility] Basic Mobility  Turning from your back to your side while in a flat bed without using bedrails: A lot  Moving from lying on your back to sitting on the side of a flat bed without using bedrails: A lot  Moving to and from bed to chair (including a wheelchair): Total  Standing up from a chair using your arms (e.g. wheelchair or bedside chair): Total  To walk in hospital room: Total  Climbing 3-5 steps with railing:  Total  Basic Mobility - Total Score: 8                               Encounter Problems       Encounter Problems (Active)       Balance       SBA static/dynamic sitting with UE support       Start:  07/10/24    Expected End:  07/31/24               Balance       mod assist static stance with walker       Start:  07/10/24    Expected End:  07/31/24               Mobility       STG - Patient will ambulate >10 feet with mod assist       Start:  07/10/24    Expected End:  07/31/24               PT Transfers       STG - Transfer from bed to chair with walker with mod assist       Start:  07/10/24    Expected End:  07/31/24            STG - Patient will perform bed mobility with mod assist       Start:  07/10/24    Expected End:  07/31/24            STG - Patient will transfer sit to and from stand with walker with mod assist       Start:  07/10/24    Expected End:  07/31/24                   Education Documentation  Precautions, taught by Annika Askew PT at 7/10/2024 11:08 AM.  Learner: Patient  Readiness: Acceptance  Method: Explanation  Response: Needs Reinforcement    Mobility Training, taught by Annika Askew PT at 7/10/2024 11:08 AM.  Learner: Patient  Readiness: Acceptance  Method: Explanation  Response: Needs Reinforcement    Education Comments  No comments found.            07/10/24 at 11:08 AM   Annika Askew PT

## 2024-07-11 LAB
ALBUMIN SERPL BCP-MCNC: 3.2 G/DL (ref 3.4–5)
ALP SERPL-CCNC: 112 U/L (ref 33–136)
ALT SERPL W P-5'-P-CCNC: 24 U/L (ref 10–52)
ANION GAP SERPL CALC-SCNC: 13 MMOL/L (ref 10–20)
AST SERPL W P-5'-P-CCNC: 26 U/L (ref 9–39)
BASOPHILS # BLD AUTO: 0.02 X10*3/UL (ref 0–0.1)
BASOPHILS NFR BLD AUTO: 0.1 %
BILIRUB SERPL-MCNC: 0.5 MG/DL (ref 0–1.2)
BUN SERPL-MCNC: 11 MG/DL (ref 6–23)
CALCIUM SERPL-MCNC: 9 MG/DL (ref 8.6–10.6)
CHLORIDE SERPL-SCNC: 95 MMOL/L (ref 98–107)
CO2 SERPL-SCNC: 32 MMOL/L (ref 21–32)
CREAT SERPL-MCNC: 0.65 MG/DL (ref 0.5–1.3)
EGFRCR SERPLBLD CKD-EPI 2021: >90 ML/MIN/1.73M*2
EOSINOPHIL # BLD AUTO: 0.03 X10*3/UL (ref 0–0.4)
EOSINOPHIL NFR BLD AUTO: 0.2 %
ERYTHROCYTE [DISTWIDTH] IN BLOOD BY AUTOMATED COUNT: 13.6 % (ref 11.5–14.5)
GLUCOSE SERPL-MCNC: 163 MG/DL (ref 74–99)
HCT VFR BLD AUTO: 25.6 % (ref 41–52)
HGB BLD-MCNC: 8.1 G/DL (ref 13.5–17.5)
IMM GRANULOCYTES # BLD AUTO: 0.13 X10*3/UL (ref 0–0.5)
IMM GRANULOCYTES NFR BLD AUTO: 0.8 % (ref 0–0.9)
LABORATORY COMMENT REPORT: NORMAL
LYMPHOCYTES # BLD AUTO: 2.79 X10*3/UL (ref 0.8–3)
LYMPHOCYTES NFR BLD AUTO: 16.4 %
Lab: NORMAL
MAGNESIUM SERPL-MCNC: 1.83 MG/DL (ref 1.6–2.4)
MCH RBC QN AUTO: 27.6 PG (ref 26–34)
MCHC RBC AUTO-ENTMCNC: 31.6 G/DL (ref 32–36)
MCV RBC AUTO: 87 FL (ref 80–100)
MONOCYTES # BLD AUTO: 1.41 X10*3/UL (ref 0.05–0.8)
MONOCYTES NFR BLD AUTO: 8.3 %
NEUTROPHILS # BLD AUTO: 12.67 X10*3/UL (ref 1.6–5.5)
NEUTROPHILS NFR BLD AUTO: 74.2 %
NRBC BLD-RTO: 0 /100 WBCS (ref 0–0)
PATH REPORT.FINAL DX SPEC: NORMAL
PATH REPORT.GROSS SPEC: NORMAL
PATH REPORT.RELEVANT HX SPEC: NORMAL
PATH REPORT.TOTAL CANCER: NORMAL
PLATELET # BLD AUTO: 468 X10*3/UL (ref 150–450)
POTASSIUM SERPL-SCNC: 3.4 MMOL/L (ref 3.5–5.3)
PROT SERPL-MCNC: 6.7 G/DL (ref 6.4–8.2)
RBC # BLD AUTO: 2.94 X10*6/UL (ref 4.5–5.9)
SODIUM SERPL-SCNC: 137 MMOL/L (ref 136–145)
WBC # BLD AUTO: 17.1 X10*3/UL (ref 4.4–11.3)

## 2024-07-11 PROCEDURE — 2500000001 HC RX 250 WO HCPCS SELF ADMINISTERED DRUGS (ALT 637 FOR MEDICARE OP): Performed by: STUDENT IN AN ORGANIZED HEALTH CARE EDUCATION/TRAINING PROGRAM

## 2024-07-11 PROCEDURE — 97110 THERAPEUTIC EXERCISES: CPT | Mod: GP

## 2024-07-11 PROCEDURE — 2500000001 HC RX 250 WO HCPCS SELF ADMINISTERED DRUGS (ALT 637 FOR MEDICARE OP)

## 2024-07-11 PROCEDURE — 83735 ASSAY OF MAGNESIUM: CPT

## 2024-07-11 PROCEDURE — 2500000004 HC RX 250 GENERAL PHARMACY W/ HCPCS (ALT 636 FOR OP/ED)

## 2024-07-11 PROCEDURE — 80053 COMPREHEN METABOLIC PANEL: CPT

## 2024-07-11 PROCEDURE — 85025 COMPLETE CBC W/AUTO DIFF WBC: CPT

## 2024-07-11 PROCEDURE — 36415 COLL VENOUS BLD VENIPUNCTURE: CPT

## 2024-07-11 PROCEDURE — 1170000001 HC PRIVATE ONCOLOGY ROOM DAILY

## 2024-07-11 RX ORDER — POTASSIUM CHLORIDE 14.9 MG/ML
20 INJECTION INTRAVENOUS
Status: COMPLETED | OUTPATIENT
Start: 2024-07-11 | End: 2024-07-11

## 2024-07-11 RX ADMIN — OXYCODONE HYDROCHLORIDE 10 MG: 10 TABLET ORAL at 01:36

## 2024-07-11 RX ADMIN — ENOXAPARIN SODIUM 40 MG: 100 INJECTION SUBCUTANEOUS at 08:26

## 2024-07-11 RX ADMIN — HYDROCHLOROTHIAZIDE 25 MG: 25 TABLET ORAL at 08:26

## 2024-07-11 RX ADMIN — OXYCODONE HYDROCHLORIDE 10 MG: 10 TABLET ORAL at 18:06

## 2024-07-11 RX ADMIN — SODIUM CHLORIDE, POTASSIUM CHLORIDE, SODIUM LACTATE AND CALCIUM CHLORIDE 75 ML/HR: 600; 310; 30; 20 INJECTION, SOLUTION INTRAVENOUS at 08:26

## 2024-07-11 RX ADMIN — SENNOSIDES 17.2 MG: 8.6 TABLET, FILM COATED ORAL at 21:36

## 2024-07-11 RX ADMIN — LACTULOSE 20 G: 20 SOLUTION ORAL at 08:26

## 2024-07-11 RX ADMIN — METOPROLOL SUCCINATE 50 MG: 50 TABLET, EXTENDED RELEASE ORAL at 08:26

## 2024-07-11 RX ADMIN — ATORVASTATIN CALCIUM 20 MG: 20 TABLET, FILM COATED ORAL at 21:36

## 2024-07-11 RX ADMIN — POTASSIUM CHLORIDE 20 MEQ: 14.9 INJECTION, SOLUTION INTRAVENOUS at 15:41

## 2024-07-11 RX ADMIN — GABAPENTIN 100 MG: 100 CAPSULE ORAL at 21:36

## 2024-07-11 RX ADMIN — SENNOSIDES 17.2 MG: 8.6 TABLET, FILM COATED ORAL at 08:26

## 2024-07-11 RX ADMIN — ACETAMINOPHEN 975 MG: 325 TABLET ORAL at 18:07

## 2024-07-11 RX ADMIN — ACETAMINOPHEN 975 MG: 325 TABLET ORAL at 01:36

## 2024-07-11 RX ADMIN — POTASSIUM CHLORIDE 20 MEQ: 14.9 INJECTION, SOLUTION INTRAVENOUS at 13:27

## 2024-07-11 RX ADMIN — AMLODIPINE BESYLATE 10 MG: 10 TABLET ORAL at 08:26

## 2024-07-11 RX ADMIN — TRAZODONE HYDROCHLORIDE 50 MG: 50 TABLET ORAL at 21:36

## 2024-07-11 RX ADMIN — ACETAMINOPHEN 975 MG: 325 TABLET ORAL at 08:36

## 2024-07-11 ASSESSMENT — COGNITIVE AND FUNCTIONAL STATUS - GENERAL
TURNING FROM BACK TO SIDE WHILE IN FLAT BAD: A LOT
CLIMB 3 TO 5 STEPS WITH RAILING: TOTAL
WALKING IN HOSPITAL ROOM: TOTAL
HELP NEEDED FOR BATHING: A LOT
DRESSING REGULAR UPPER BODY CLOTHING: A LOT
MOBILITY SCORE: 11
CLIMB 3 TO 5 STEPS WITH RAILING: TOTAL
MOVING FROM LYING ON BACK TO SITTING ON SIDE OF FLAT BED WITH BEDRAILS: A LOT
MOVING FROM LYING ON BACK TO SITTING ON SIDE OF FLAT BED WITH BEDRAILS: A LOT
STANDING UP FROM CHAIR USING ARMS: TOTAL
TURNING FROM BACK TO SIDE WHILE IN FLAT BAD: A LOT
TOILETING: A LOT
STANDING UP FROM CHAIR USING ARMS: A LOT
MOBILITY SCORE: 8
MOVING TO AND FROM BED TO CHAIR: A LOT
WALKING IN HOSPITAL ROOM: A LOT
MOVING TO AND FROM BED TO CHAIR: TOTAL
DRESSING REGULAR LOWER BODY CLOTHING: A LOT
DAILY ACTIVITIY SCORE: 16

## 2024-07-11 ASSESSMENT — PAIN - FUNCTIONAL ASSESSMENT
PAIN_FUNCTIONAL_ASSESSMENT: 0-10

## 2024-07-11 ASSESSMENT — PAIN SCALES - GENERAL
PAINLEVEL_OUTOF10: 6
PAINLEVEL_OUTOF10: 6
PAINLEVEL_OUTOF10: 7
PAINLEVEL_OUTOF10: 9
PAINLEVEL_OUTOF10: 7

## 2024-07-11 ASSESSMENT — PAIN DESCRIPTION - LOCATION: LOCATION: BACK

## 2024-07-11 NOTE — PROGRESS NOTES
"Christophe Ambriz is a 74 y.o. male on day 5 of admission. PMHx sig for significant tobacco use, coronary artery disease, hypertension, hyperlipidemia who is admitted for left renal mass and pathologic L3 fracture concerning for metastatic disease. S/p L1-5 fusion w/ L2-4 decompression, tumor debulking on 7/9. Currently stable.     Subjective     Interval Events: No interval events.      Subjective: No significant events overnight. Patient endorses pain of 5/10. States that his strength has improved. When asked if he needs anything, patient says \"Can you get me any dancing girls?\"       Objective     Physical exam  Constitutional: Normal appearance, NAD.    HEENT: Normocephalic and atraumatic.  Cardiovascular: Normal rate and regular rhythm.  Pulmonary: Normal work of breathing, bilateral chest rise.    Extremities: No edema.  Psychiatric: Normal behavior and mood.   Drains: 2 hemovac style drains in place on L & R draining serosanguinous fluid.     Neurological Exam:  Orientation: Appropriately conversant throughout conversation    STRENGTH: R L  Deltoid  5 5  Biceps  5 5  Triceps  5 5    5 5    Hip flexion 4 4  DorsiFlex 5          5  PlantarFlex 5 5    REFLEXES:  R  L  Biceps   1  1  Triceps   1  1  Brachioradialis  1  1  Patellar   1  1  Achilles 1  1  Plantar  Down Down    SENSORY: Intact to light touch in bl UE and LE    Last Recorded Vitals  Blood pressure 119/73, pulse 77, temperature 36.9 °C (98.4 °F), temperature source Temporal, resp. rate 16, height 1.854 m (6' 1\"), weight 91 kg (200 lb 9.9 oz), SpO2 91%.    Intake/Output last 3 Shifts:  I/O last 3 completed shifts:  In: 2088.8 (23 mL/kg) [I.V.:2088.8 (23 mL/kg)]  Out: 3835 (42.1 mL/kg) [Urine:2795 (0.9 mL/kg/hr); Drains:440; Blood:600]  Weight: 91 kg     Relevant Results  Results for orders placed or performed during the hospital encounter of 07/06/24 (from the past 24 hour(s))   CBC   Result Value Ref Range    WBC 18.6 (H) 4.4 - 11.3 x10*3/uL    nRBC " 0.0 0.0 - 0.0 /100 WBCs    RBC 3.46 (L) 4.50 - 5.90 x10*6/uL    Hemoglobin 9.2 (L) 13.5 - 17.5 g/dL    Hematocrit 29.2 (L) 41.0 - 52.0 %    MCV 84 80 - 100 fL    MCH 26.6 26.0 - 34.0 pg    MCHC 31.5 (L) 32.0 - 36.0 g/dL    RDW 13.6 11.5 - 14.5 %    Platelets 495 (H) 150 - 450 x10*3/uL   Renal function panel   Result Value Ref Range    Glucose 138 (H) 74 - 99 mg/dL    Sodium 136 136 - 145 mmol/L    Potassium 3.7 3.5 - 5.3 mmol/L    Chloride 97 (L) 98 - 107 mmol/L    Bicarbonate 29 21 - 32 mmol/L    Anion Gap 14 10 - 20 mmol/L    Urea Nitrogen 9 6 - 23 mg/dL    Creatinine 0.64 0.50 - 1.30 mg/dL    eGFR >90 >60 mL/min/1.73m*2    Calcium 9.4 8.6 - 10.6 mg/dL    Phosphorus 2.6 2.5 - 4.9 mg/dL    Albumin 3.4 3.4 - 5.0 g/dL     Surgical Pathology Exam:     FINAL DIAGNOSIS   L3 SPINAL MASS, REMOVAL:  - CLEAR-CELL CARCINOMA INVOLVING FIBROCARTILAGINOUS TISSUE, MORPHOLOGICALLY CONSISTENT WITH METASTATIC RENAL CELL CARCINOMA.           Assessment/Plan   Christophe Ambriz is a 74-year-old male with past medical history significant for significant tobacco use, coronary artery disease, hypertension, hyperlipidemia who is admitted for left renal mass and pathologic L3 fracture concerning for metastatic disease.  MRI spine completed, no concern for cord compression.  Patient underwent surgery on 7/9 for L1-5 fusion w/ L2-4 decompression, tumor debulking. Surgery resulted in slight improvement in pain and LLE weakness. Surgical pathology results returned on 7/11 show metastatic clear-cell renal cell carcinoma. Discharge pending approval from neurosurgery, patient has denied SNF has recommended by PT/OT so will need to look into home care.      Plan/Updates 7/11:  PT/OT recommend SNF, patient refused. SW will look into homecare  Pt still no BM, refusing Miralax. Encourage patient to take colace, senakot, miralax regimen.   Discharge pending neurosurgery OK  Surgical pathology results: Clear-cell carcinoma, metastatic renal cell carcinoma  "  Potassium 3.4, repleted. Repeat CMP 7/12  Added Mg to existing sample.       #Left renal mass  #L3 lumbar fracture concern for spinal mets  :: Patient with 1 month of severe back pain with point tenderness, recent weight loss, pain awakening at night concerning for malignant process with metastasis  :: CT and MRI imaging as above showing left renal mass concerning for RCC with enhancing metastatic lesion infiltrating L3 vertebral body and adjacent epidural invasion involving the ventral aspect of L3  ::s/p L1-5 fusion w/ L2-4 decompression, tumor debulking on 7/9  F/U pathology results  Surgical pathology results: Clear-cell carcinoma, metastatic renal cell carcnoma   Per NSGY: Maintain SBP <160 in perioperative period  Supportive onc pain management from 7/10  Tylenol 975 every 8 hours as needed, oxycodone 7.5 mg every 3 hours PRN moderate pain, Dilaudid 0.5 mg every 6 hours for breakthrough   C/w oxycodone IR 10 mg PO PRN severe pain   C/w gabapentin 100 mg PO at bedtime (for neuropathic pain coverage)   Follow NSGY recs:   Restart ASA POD7 (7/16, 2024)  Restart PLX POD14 (7/23, 2024)    #constipation  :: patient claims to have baseline constipation, states he has BM every 1-2 weeks  :: patient states he has not had a BM in 3 weeks  C/w Alivia-Colace  C/w Senokot  C/w polyethylene glycol (Miralax) 17g packet [patient refusing, given education]  C/w Lactulose 20 g daily   Discontinued psyllium 3.4g packet per supportive onc recs  \"Discontinue Psyllium 3.4 g packet tid as it is a bulk-forming laxative which will increase stool bulk & distend the colon, which can worsen abdominal pain and bowel obstruction when opioids prevent peristalsis; use osmotic laxatives like those listed above only\"     #CAD s/p PCI  #HTN  #HLD  hold home plavix in lieu of possible biopsy   continue home atorvastatin  continue home amlodipine 10mg daily  continue home hydrochlorothiazide 25mg daily   continue home metoprolol succinate 50mg " daily   Per NSGY: Maintain SBP <160 in perioperative period    #insomnia  C/w trazadone 50 mg at bedtime       #Leukocytosis  :: No current infectious symptoms, likely reactive in setting of likely malignancy and fracture  ::7/11 update -- likely reactive following surgery     #DISPO  PT/OT recommend SNF, patient refused. SW will look into homecare  Discharge pending neurosurgery OK     F: PRN  E: PRN  N: regular  A: PIV  Abx: none  O2: RA  GI ppx: n/a  DVT ppx: lovenox starting 7/10 AM      NOK: Iram Aguirre (sig other) [phone]  Code status: Full code (confirmed on admission)     Felix Edwards M4  CWRU YANETH      I have seen and evaluated the patient with Felix Polo, a medical student (MS4).   I reviewed the patient’s medical and family history, the student/resident's findings on physical examination, and the patient’s diagnosis and treatment plan with the student/resident. I discussed the case with the student/resident in details and agree with the findings and plan as documented in the student's note.    He states his pain is fine (8/10). Pathology came back as RCC. Informed him that once he is discharged, he can see a  cancer specialist for further cancer treatment. The surgical drain is still in. Once this drain is out by surgery team, he is medical stable for discharge.  PT recommends discharge to SNF but patient is refusing this and wants to go home. We will talk to a  and find out an appropriate setting at home if feasible.     Constipation continues to be an issue. He is refusing Miralax. Advised him to take all we are giving him, which he agreed this morning.         Jose Boogie MD, PhD  Hematology/Oncology  [facility]

## 2024-07-11 NOTE — HOSPITAL COURSE
Christophe Ambriz is a 74-year-old male with past medical history significant for significant tobacco use, coronary artery disease, hypertension, hyperlipidemia who was admitted as a transfer from [place] for a left renal mass and pathologic L3 fracture concerning for metastatic disease, newly identified on CT when the patient presented for 1 month of back pain. On admission patient had left lower extremity weakness and significant back and hip pain.  Patient underwent surgery on 7/9 for L1-5 fusion w/ L2-4 decompression, tumor debulking. Surgery resulted in slight improvement in pain and LLE weakness. Surgical pathology results returned on 7/11 show metastatic clear-cell renal cell carcinoma. PT/OT recommended acute rehab, but patient rejected and insisted on going home. Patient was discharged home with plans for outpatient follow up with  oncology, supportive onc, neurosurgery, and primary care.       Follow Up Outpatient:   [ ] 2.1 cm R thyroid nodule found incidentally, follow up with PCP  [ ] 6 wk FU visit with NSGY  [ ] Restart ASA POD7 (7/16, 2024)  [ ] Restart PLX POD14 (7/23, 2024)  [ ] FU with supportive onc  [ ] FU new PCP visit

## 2024-07-11 NOTE — PROGRESS NOTES
"Christophe Ambriz is a 74 y.o. male on day 5 of admission presenting with Pathologic lumbar vertebral fracture, initial encounter.    Subjective   NAEO, doing well post op, radiculopathy is better       Objective     Physical Exam         AOX3       RUE D5 / B5 / T5 / HG 5/ IO 5       LUE D5 / B5 / T5 / HG 5/ IO 5       Negative quinn          RLE HF4+ / KE 5/ DF 5/ PF 5       LLE HF4 / KE 5/ DF 5/ PF 5       B/l clonus    Last Recorded Vitals  Blood pressure 114/68, pulse 85, temperature 37.4 °C (99.3 °F), temperature source Temporal, resp. rate 16, height 1.854 m (6' 1\"), weight 91 kg (200 lb 9.9 oz), SpO2 91%.  Intake/Output last 3 Shifts:  I/O last 3 completed shifts:  In: 2088.8 (23 mL/kg) [I.V.:2088.8 (23 mL/kg)]  Out: 3835 (42.1 mL/kg) [Urine:2795 (0.9 mL/kg/hr); Drains:440; Blood:600]  Weight: 91 kg     Relevant Results                Results for orders placed or performed during the hospital encounter of 07/06/24 (from the past 24 hour(s))   CBC   Result Value Ref Range    WBC 18.6 (H) 4.4 - 11.3 x10*3/uL    nRBC 0.0 0.0 - 0.0 /100 WBCs    RBC 3.46 (L) 4.50 - 5.90 x10*6/uL    Hemoglobin 9.2 (L) 13.5 - 17.5 g/dL    Hematocrit 29.2 (L) 41.0 - 52.0 %    MCV 84 80 - 100 fL    MCH 26.6 26.0 - 34.0 pg    MCHC 31.5 (L) 32.0 - 36.0 g/dL    RDW 13.6 11.5 - 14.5 %    Platelets 495 (H) 150 - 450 x10*3/uL   Renal function panel   Result Value Ref Range    Glucose 138 (H) 74 - 99 mg/dL    Sodium 136 136 - 145 mmol/L    Potassium 3.7 3.5 - 5.3 mmol/L    Chloride 97 (L) 98 - 107 mmol/L    Bicarbonate 29 21 - 32 mmol/L    Anion Gap 14 10 - 20 mmol/L    Urea Nitrogen 9 6 - 23 mg/dL    Creatinine 0.64 0.50 - 1.30 mg/dL    eGFR >90 >60 mL/min/1.73m*2    Calcium 9.4 8.6 - 10.6 mg/dL    Phosphorus 2.6 2.5 - 4.9 mg/dL    Albumin 3.4 3.4 - 5.0 g/dL                 Assessment/Plan   Principal Problem:    Pathologic lumbar vertebral fracture, initial encounter  Active Problems:    CAD (coronary artery disease)    Stented coronary " artery    HTN (hypertension)    Hyperlipidemia    Christophe Ambriz is a 74 y.o. male with h/o HTN, HLD, CAD s/p PCI (2017) (on ASA/PLX), p/w LBP of 37d duration, CT T/L spine L3 lytic lesion, CT CAP 8 cm L kidney mass, 2.1 cm R thyroid nodule, L 9th rib lytic lesion, MRL LS lytic mets to L3 with L3-4 neuro foraminal stenosis (severe on the L), L3-4 disc involvement with tumor.     Pathological fracture with >50% loss of height on standing xrays.  Severe intractable pain in the L3 distribution of the left leg.     7/19 s/p L1-5 fusion with L3/4 decompression (prelim: carcinoma)    Recs  [place] primary,   Maintain drains x2   ASA POD7  PLX POD14   emailed 6wk fuv   PTOT-SNF         Linda Dunaway MD

## 2024-07-11 NOTE — PROGRESS NOTES
Physical Therapy    Physical Therapy Treatment    Patient Name: Christophe Ambriz  MRN: [id number]  Today's Date: 7/11/2024  Time in: 1157  Time out: 1214          Assessment/Plan   PT Assessment  End of Session Communication: Bedside nurse  Assessment Comment: Noted improvement in BLE AROM even though L side was a little weaker than R side. Continue to recommend mod-intensity PT.  End of Session Patient Position: Bed, 3 rail up, Alarm on     PT Plan  Treatment/Interventions: Bed mobility, Transfer training, Gait training, Balance training, Strengthening, Therapeutic exercise  PT Plan: Ongoing PT  PT Frequency: 4 times per week  PT Discharge Recommendations: Moderate intensity level of continued care  PT Recommended Transfer Status: Assist x2  PT - OK to Discharge: Yes      General Visit Information:   PT  Visit  PT Received On: 07/11/24  Prior to Session Communication: Bedside nurse  Patient Position Received: Bed, 3 rail up, Alarm on  General Comment: pt hesitated to participate PT treatment. Pt declined OOB despite encouragement. Agreed to do exercises in supine.     Subjective   Precautions:  Precautions  Medical Precautions: Fall precautions, Spinal precautions    Objective   Pain:  Pain Assessment  Pain Assessment: 0-10  0-10 (Numeric) Pain Score: 6  Pain Type: Surgical pain  Pain Location: Back      PT Treatments:  Therapeutic Exercise  Therapeutic Exercise Activity 1: AROM BLE x10. Ankle PF/DF. SAQ. Hip abduction. Heel sides. L side was weaker than R side.                            Outcome Measures:  [facility] Basic Mobility  Turning from your back to your side while in a flat bed without using bedrails: A lot  Moving from lying on your back to sitting on the side of a flat bed without using bedrails: A lot  Moving to and from bed to chair (including a wheelchair): Total  Standing up from a chair using your arms (e.g. wheelchair or bedside chair): Total  To walk in hospital room: Total  Climbing 3-5 steps with  railing: Total  Basic Mobility - Total Score: 8                            Education Documentation  Home Exercise Program, taught by CHANTELLE Wiley at 7/11/2024 12:39 PM.  Learner: Patient  Readiness: Acceptance  Method: Explanation  Response: Verbalizes Understanding    Education Comments  No comments found.               Encounter Problems       Encounter Problems (Active)       Balance       SBA static/dynamic sitting with UE support (Progressing)       Start:  07/10/24    Expected End:  07/31/24               Balance       mod assist static stance with walker (Progressing)       Start:  07/10/24    Expected End:  07/31/24               Mobility       STG - Patient will ambulate >10 feet with mod assist (Progressing)       Start:  07/10/24    Expected End:  07/31/24               PT Transfers       STG - Transfer from bed to chair with walker with mod assist (Progressing)       Start:  07/10/24    Expected End:  07/31/24            STG - Patient will perform bed mobility with mod assist (Progressing)       Start:  07/10/24    Expected End:  07/31/24            STG - Patient will transfer sit to and from stand with walker with mod assist (Progressing)       Start:  07/10/24    Expected End:  07/31/24 07/11/24 at 3:20 PM   CHANTELLE WILEY

## 2024-07-12 ENCOUNTER — APPOINTMENT (OUTPATIENT)
Dept: RADIOLOGY | Facility: HOSPITAL | Age: 74
DRG: 456 | End: 2024-07-12
Payer: MEDICARE

## 2024-07-12 ENCOUNTER — PHARMACY VISIT (OUTPATIENT)
Dept: PHARMACY | Facility: CLINIC | Age: 74
End: 2024-07-12
Payer: COMMERCIAL

## 2024-07-12 ENCOUNTER — DOCUMENTATION (OUTPATIENT)
Dept: HOME HEALTH SERVICES | Facility: HOME HEALTH | Age: 74
End: 2024-07-12
Payer: MEDICARE

## 2024-07-12 ENCOUNTER — HOME HEALTH ADMISSION (OUTPATIENT)
Dept: HOME HEALTH SERVICES | Facility: HOME HEALTH | Age: 74
End: 2024-07-12
Payer: MEDICARE

## 2024-07-12 VITALS
HEART RATE: 83 BPM | HEIGHT: 73 IN | RESPIRATION RATE: 18 BRPM | DIASTOLIC BLOOD PRESSURE: 78 MMHG | SYSTOLIC BLOOD PRESSURE: 132 MMHG | BODY MASS INDEX: 26.59 KG/M2 | WEIGHT: 200.62 LBS | TEMPERATURE: 98.4 F | OXYGEN SATURATION: 92 %

## 2024-07-12 LAB
ALBUMIN SERPL BCP-MCNC: 3 G/DL (ref 3.4–5)
ALP SERPL-CCNC: 107 U/L (ref 33–136)
ALT SERPL W P-5'-P-CCNC: 22 U/L (ref 10–52)
ANION GAP SERPL CALC-SCNC: 12 MMOL/L (ref 10–20)
AST SERPL W P-5'-P-CCNC: 23 U/L (ref 9–39)
BASOPHILS # BLD AUTO: 0.02 X10*3/UL (ref 0–0.1)
BASOPHILS NFR BLD AUTO: 0.1 %
BILIRUB SERPL-MCNC: 0.5 MG/DL (ref 0–1.2)
BUN SERPL-MCNC: 9 MG/DL (ref 6–23)
CALCIUM SERPL-MCNC: 9 MG/DL (ref 8.6–10.6)
CHLORIDE SERPL-SCNC: 96 MMOL/L (ref 98–107)
CO2 SERPL-SCNC: 33 MMOL/L (ref 21–32)
CREAT SERPL-MCNC: 0.6 MG/DL (ref 0.5–1.3)
EGFRCR SERPLBLD CKD-EPI 2021: >90 ML/MIN/1.73M*2
EOSINOPHIL # BLD AUTO: 0.02 X10*3/UL (ref 0–0.4)
EOSINOPHIL NFR BLD AUTO: 0.1 %
ERYTHROCYTE [DISTWIDTH] IN BLOOD BY AUTOMATED COUNT: 13.7 % (ref 11.5–14.5)
GLUCOSE SERPL-MCNC: 154 MG/DL (ref 74–99)
HCT VFR BLD AUTO: 22.6 % (ref 41–52)
HGB BLD-MCNC: 7.2 G/DL (ref 13.5–17.5)
IMM GRANULOCYTES # BLD AUTO: 0.1 X10*3/UL (ref 0–0.5)
IMM GRANULOCYTES NFR BLD AUTO: 0.6 % (ref 0–0.9)
LYMPHOCYTES # BLD AUTO: 3.02 X10*3/UL (ref 0.8–3)
LYMPHOCYTES NFR BLD AUTO: 18.1 %
MCH RBC QN AUTO: 27 PG (ref 26–34)
MCHC RBC AUTO-ENTMCNC: 31.9 G/DL (ref 32–36)
MCV RBC AUTO: 85 FL (ref 80–100)
MONOCYTES # BLD AUTO: 1.33 X10*3/UL (ref 0.05–0.8)
MONOCYTES NFR BLD AUTO: 8 %
NEUTROPHILS # BLD AUTO: 12.22 X10*3/UL (ref 1.6–5.5)
NEUTROPHILS NFR BLD AUTO: 73.1 %
NRBC BLD-RTO: 0 /100 WBCS (ref 0–0)
PLATELET # BLD AUTO: 480 X10*3/UL (ref 150–450)
POTASSIUM SERPL-SCNC: 3.5 MMOL/L (ref 3.5–5.3)
PROT SERPL-MCNC: 6.3 G/DL (ref 6.4–8.2)
RBC # BLD AUTO: 2.67 X10*6/UL (ref 4.5–5.9)
SODIUM SERPL-SCNC: 137 MMOL/L (ref 136–145)
WBC # BLD AUTO: 16.7 X10*3/UL (ref 4.4–11.3)

## 2024-07-12 PROCEDURE — RXMED WILLOW AMBULATORY MEDICATION CHARGE

## 2024-07-12 PROCEDURE — 2500000001 HC RX 250 WO HCPCS SELF ADMINISTERED DRUGS (ALT 637 FOR MEDICARE OP)

## 2024-07-12 PROCEDURE — 85025 COMPLETE CBC W/AUTO DIFF WBC: CPT

## 2024-07-12 PROCEDURE — 80053 COMPREHEN METABOLIC PANEL: CPT

## 2024-07-12 PROCEDURE — 2500000004 HC RX 250 GENERAL PHARMACY W/ HCPCS (ALT 636 FOR OP/ED)

## 2024-07-12 PROCEDURE — 76536 US EXAM OF HEAD AND NECK: CPT

## 2024-07-12 PROCEDURE — 76536 US EXAM OF HEAD AND NECK: CPT | Performed by: RADIOLOGY

## 2024-07-12 PROCEDURE — 97530 THERAPEUTIC ACTIVITIES: CPT | Mod: GP

## 2024-07-12 PROCEDURE — 2500000001 HC RX 250 WO HCPCS SELF ADMINISTERED DRUGS (ALT 637 FOR MEDICARE OP): Performed by: STUDENT IN AN ORGANIZED HEALTH CARE EDUCATION/TRAINING PROGRAM

## 2024-07-12 PROCEDURE — 36415 COLL VENOUS BLD VENIPUNCTURE: CPT

## 2024-07-12 RX ORDER — AMLODIPINE BESYLATE 10 MG/1
10 TABLET ORAL DAILY
Qty: 30 TABLET | Refills: 0 | Status: SHIPPED | OUTPATIENT
Start: 2024-07-12 | End: 2024-08-11

## 2024-07-12 RX ORDER — GABAPENTIN 100 MG/1
100 CAPSULE ORAL NIGHTLY
Qty: 30 CAPSULE | Refills: 0 | Status: SHIPPED | OUTPATIENT
Start: 2024-07-12 | End: 2024-08-11

## 2024-07-12 RX ORDER — POLYETHYLENE GLYCOL 3350 17 G/17G
17 POWDER, FOR SOLUTION ORAL DAILY
Qty: 30 PACKET | Refills: 0 | Status: SHIPPED | OUTPATIENT
Start: 2024-07-13 | End: 2024-08-12

## 2024-07-12 RX ORDER — LACTULOSE 10 G/15ML
20 SOLUTION ORAL DAILY
Qty: 946 ML | Refills: 0 | Status: SHIPPED | OUTPATIENT
Start: 2024-07-13 | End: 2024-08-12

## 2024-07-12 RX ORDER — ATORVASTATIN CALCIUM 20 MG/1
20 TABLET, FILM COATED ORAL NIGHTLY
Qty: 30 TABLET | Refills: 0 | Status: SHIPPED | OUTPATIENT
Start: 2024-07-12 | End: 2024-08-11

## 2024-07-12 RX ORDER — OXYCODONE HYDROCHLORIDE 10 MG/1
10 TABLET ORAL EVERY 6 HOURS PRN
Qty: 15 TABLET | Refills: 0 | Status: SHIPPED | OUTPATIENT
Start: 2024-07-12

## 2024-07-12 RX ORDER — TRAZODONE HYDROCHLORIDE 50 MG/1
50 TABLET ORAL NIGHTLY
Qty: 30 TABLET | Refills: 0 | Status: SHIPPED | OUTPATIENT
Start: 2024-07-12 | End: 2024-08-11

## 2024-07-12 RX ORDER — SENNOSIDES 8.6 MG/1
2 TABLET ORAL 2 TIMES DAILY
Qty: 120 TABLET | Refills: 0 | Status: SHIPPED | OUTPATIENT
Start: 2024-07-12 | End: 2024-08-11

## 2024-07-12 RX ADMIN — AMLODIPINE BESYLATE 10 MG: 10 TABLET ORAL at 08:08

## 2024-07-12 RX ADMIN — POLYETHYLENE GLYCOL 3350 17 G: 17 POWDER, FOR SOLUTION ORAL at 08:09

## 2024-07-12 RX ADMIN — ACETAMINOPHEN 975 MG: 325 TABLET ORAL at 10:25

## 2024-07-12 RX ADMIN — ACETAMINOPHEN 975 MG: 325 TABLET ORAL at 01:42

## 2024-07-12 RX ADMIN — HYDROCHLOROTHIAZIDE 25 MG: 25 TABLET ORAL at 08:08

## 2024-07-12 RX ADMIN — SODIUM CHLORIDE, POTASSIUM CHLORIDE, SODIUM LACTATE AND CALCIUM CHLORIDE 75 ML/HR: 600; 310; 30; 20 INJECTION, SOLUTION INTRAVENOUS at 11:50

## 2024-07-12 RX ADMIN — ENOXAPARIN SODIUM 40 MG: 100 INJECTION SUBCUTANEOUS at 08:09

## 2024-07-12 RX ADMIN — SENNOSIDES 17.2 MG: 8.6 TABLET, FILM COATED ORAL at 08:09

## 2024-07-12 RX ADMIN — OXYCODONE HYDROCHLORIDE 10 MG: 10 TABLET ORAL at 01:42

## 2024-07-12 RX ADMIN — METOPROLOL SUCCINATE 50 MG: 50 TABLET, EXTENDED RELEASE ORAL at 08:09

## 2024-07-12 RX ADMIN — LACTULOSE 20 G: 20 SOLUTION ORAL at 08:09

## 2024-07-12 ASSESSMENT — COGNITIVE AND FUNCTIONAL STATUS - GENERAL
MOVING TO AND FROM BED TO CHAIR: TOTAL
WALKING IN HOSPITAL ROOM: TOTAL
MOVING FROM LYING ON BACK TO SITTING ON SIDE OF FLAT BED WITH BEDRAILS: A LOT
TURNING FROM BACK TO SIDE WHILE IN FLAT BAD: A LOT
CLIMB 3 TO 5 STEPS WITH RAILING: TOTAL
MOBILITY SCORE: 8
STANDING UP FROM CHAIR USING ARMS: TOTAL

## 2024-07-12 ASSESSMENT — PAIN DESCRIPTION - ORIENTATION: ORIENTATION: LEFT

## 2024-07-12 ASSESSMENT — PAIN SCALES - GENERAL
PAINLEVEL_OUTOF10: 7
PAINLEVEL_OUTOF10: 4
PAINLEVEL_OUTOF10: 5 - MODERATE PAIN

## 2024-07-12 ASSESSMENT — PAIN - FUNCTIONAL ASSESSMENT: PAIN_FUNCTIONAL_ASSESSMENT: 0-10

## 2024-07-12 ASSESSMENT — PAIN DESCRIPTION - LOCATION: LOCATION: HIP

## 2024-07-12 NOTE — PROGRESS NOTES
07/10/24 1210   Discharge Planning   Living Arrangements Spouse/significant other   Support Systems Spouse/significant other;Family members;Friends/neighbors   Type of Residence Private residence   Who is requesting discharge planning? Provider   Home or Post Acute Services Post acute facilities (Rehab/SNF/etc)   Type of Post Acute Facility Services Skilled nursing   Expected Discharge Disposition SNF   Does the patient need discharge transport arranged? Yes   [employer] coordination needed? Yes   Has discharge transport been arranged? No     7/10/24 @ 1210  Met with patient and his wife at bedside.  Spoke with them about discharge planning. PT rec SNF.  They were given a SNF list to review.  They will think about what they want to do. They feel just having PT/OT come into the home would be best. They are agreeable to using [facility].  Will follow up with their decision and follow the patient during his hospital stay for his dispo needs.  Evelyn Grant RN TCC    7/12/24 @ 0905  Patient to discharge home with [facility]-PT services. Referral placed. Patient does not want to go to a skilled facility as PT recommended. ADOD 7/12 once drains are removed.     UPDATE 1110:   Patient requesting an stretcher ambulette ride home. Attempted to call his SO, left a message. Patient aware there may be a cost, and not sure how much insurance will cover. He states his SO is unable to get him in a car home.  Requested a ride via [employer]. ELIAZAR Alicea RN made aware.    UPDATE 1145:  The [facility] Vehicle you requested for Christophe OSORIO in unit/room [facility] [id number] on 07/12/2024 is scheduled to arrive at 2:00pm EDT! [facility] is handling this ride and you can contact them at [phone]. RN and patient made aware.    UPDATE 1333:  SO, Rasta, updated via phone on transport time. She is requesting that the neurosurgery appointment be made closer to their home if possible. Team made aware. Per [facility], start of care will be on Sunday.  Iram clarke.  Evelyn Grant RN TCC

## 2024-07-12 NOTE — PROGRESS NOTES
Physical Therapy    Physical Therapy Treatment    Patient Name: Christophe Ambriz  MRN: [id number]  Today's Date: 7/12/2024  Time in: 0903  Time: 0930          Assessment/Plan   PT Assessment  End of Session Communication: Bedside nurse  Assessment Comment: Noted improvement in time and tolerance with sitting and bed mobility. Continue to recommend mod-intensity PT.  End of Session Patient Position: Bed, 3 rail up, Alarm on     PT Plan  Treatment/Interventions: Bed mobility, Transfer training, Gait training, Balance training, Strengthening, Therapeutic exercise  PT Plan: Ongoing PT  PT Frequency: 4 times per week  PT Discharge Recommendations: Moderate intensity level of continued care  PT Recommended Transfer Status: Assist x2  PT - OK to Discharge: Yes      General Visit Information:   PT  Visit  PT Received On: 07/12/24  Prior to Session Communication: Bedside nurse  Patient Position Received: Bed, 3 rail up, Alarm on  General Comment: pt was agreeable to participate in PT treatment. notified nurse for a dislodged IV on his LUE.     Subjective   Precautions:  Precautions  Medical Precautions: Fall precautions, Spinal precautions      Objective   Pain:  Pain Assessment  Pain Assessment: 0-10  0-10 (Numeric) Pain Score: 5 - Moderate pain  Pain Type:  (dull ache)  Pain Location:  (back and hips)  Cognition:  Cognition  Overall Cognitive Status: Within Functional Limits    Postural Control:     Static Sitting Balance  Static Sitting-Balance Support: Bilateral upper extremity supported, Feet supported  Static Sitting-Level of Assistance: Minimum assistance  Static Sitting-Comment/Number of Minutes: 15 minutes  Dynamic Sitting Balance  Dynamic Sitting-Balance Support: Bilateral upper extremity supported, Feet supported  Dynamic Sitting-Comments: required min A. pt reported dizziness and nausea.         PT Treatments:  Therapeutic Exercise  Therapeutic Exercise Performed: Yes  Therapeutic Exercise Activity 1: AROM in seated  EOB BLE x10. Ankle PF/DF. Pt declined to continue exercises due to dizziness        Bed Mobility  Bed Mobility: Yes  Bed Mobility 1  Bed Mobility 1: Supine to sitting, Sitting to supine  Level of Assistance 1: Maximum assistance, +2  Bed Mobility Comments 1: HOB elevated. Use of bed rail. Required verbal cues for log roll. Pt presented guarding due to pain, but reported he was in less pain than previous visit  Bed Mobility 2  Bed Mobility  2: Scooting  Level of Assistance 2: Dependent, +2  Bed Mobility Comments 2: use of draw sheet                   Outcome Measures:  [facility] Basic Mobility  Turning from your back to your side while in a flat bed without using bedrails: A lot  Moving from lying on your back to sitting on the side of a flat bed without using bedrails: A lot  Moving to and from bed to chair (including a wheelchair): Total  Standing up from a chair using your arms (e.g. wheelchair or bedside chair): Total  To walk in hospital room: Total  Climbing 3-5 steps with railing: Total  Basic Mobility - Total Score: 8                            Education Documentation  Home Exercise Program, taught by CHANTELLE Wiley at 7/12/2024 10:10 AM.  Learner: Patient  Readiness: Acceptance  Method: Explanation  Response: Needs Reinforcement    Precautions, taught by CHANTELLE Wiley at 7/12/2024 10:10 AM.  Learner: Patient  Readiness: Acceptance  Method: Explanation  Response: Needs Reinforcement    Mobility Training, taught by CHANTELLE Wiley at 7/12/2024 10:10 AM.  Learner: Patient  Readiness: Acceptance  Method: Explanation  Response: Needs Reinforcement    Home Exercise Program, taught by CHANTELLE Wiley at 7/12/2024 10:04 AM.  Learner: Patient  Readiness: Acceptance  Method: Explanation  Response: Verbalizes Understanding    Mobility Training, taught by CHANTELLE Wiley at 7/12/2024 10:04 AM.  Learner: Patient  Readiness: Acceptance  Method: Explanation  Response: Verbalizes Understanding    Education  Comments  No comments found.               Encounter Problems       Encounter Problems (Active)       Balance       SBA static/dynamic sitting with UE support (Progressing)       Start:  07/10/24    Expected End:  07/31/24               Balance       mod assist static stance with walker (Progressing)       Start:  07/10/24    Expected End:  07/31/24               Mobility       STG - Patient will ambulate >10 feet with mod assist (Progressing)       Start:  07/10/24    Expected End:  07/31/24               PT Transfers       STG - Transfer from bed to chair with walker with mod assist (Progressing)       Start:  07/10/24    Expected End:  07/31/24            STG - Patient will perform bed mobility with mod assist (Progressing)       Start:  07/10/24    Expected End:  07/31/24            STG - Patient will transfer sit to and from stand with walker with mod assist (Progressing)       Start:  07/10/24    Expected End:  07/31/24 07/12/24 at 10:17 AM   VINCE CALVILLO S-PT

## 2024-07-12 NOTE — HH CARE COORDINATION
Home Care received a Referral for Physical Therapy. We have processed the referral for a Start of Care on 07/14.     If you have any questions or concerns, please feel free to contact us at [phone]. Follow the prompts, enter your five digit zip code, and you will be directed to your care team on [facility].

## 2024-07-12 NOTE — DISCHARGE SUMMARY
Discharge Diagnosis  Pathologic lumbar vertebral fracture, initial encounter    Issues Requiring Follow-Up  2.1 cm R thyroid nodule found incidentally, follow up with PCP  FU with supportive oncology 7/16/24 (Cathy CASILLAS)  FU with hematology/oncology 7/22/24 (Sahil Chavarria MD)  FU with primary care 7/29/24 (Noris Champagne PA-C)  FU with neurosurgery 8/12/24 (London Ayala PA-C)    Test Results Pending At Discharge  Pending Labs       No current pending labs.            Hospital Course  Christophe Ambriz is a 74-year-old male with past medical history significant for significant tobacco use, coronary artery disease, hypertension, hyperlipidemia who was admitted as a transfer from [place] for a left renal mass and pathologic L3 fracture concerning for metastatic disease, newly identified on CT when the patient presented for 1 month of back pain. On admission patient had left lower extremity weakness and significant back and hip pain.  Patient underwent surgery on 7/9 for L1-5 fusion w/ L2-4 decompression, tumor debulking. Surgery resulted in slight improvement in pain and LLE weakness. Surgical pathology results returned on 7/11 show metastatic clear-cell renal cell carcinoma. PT/OT recommended acute rehab, but patient rejected and insisted on going home. Patient was discharged home with plans for outpatient follow up with  oncology, supportive onc, neurosurgery, and primary care.       Follow Up Outpatient:   [ ] 2.1 cm R thyroid nodule found incidentally, follow up with PCP  [ ] 6 wk FU visit with NSGY  [ ] Restart ASA POD7 (7/16, 2024)  [ ] Restart PLX POD14 (7/23, 2024)  [ ] FU with supportive onc  [ ] FU new PCP visit    Pertinent Physical Exam At Time of Discharge  Physical Exam  Constitutional: Normal appearance, NAD.    HEENT: Normocephalic and atraumatic.  Cardiovascular: Normal rate and regular rhythm.  Pulmonary: Normal work of breathing, bilateral chest rise.    Extremities: No  edema.  Psychiatric: Normal behavior and mood.   Drains: Hemovac drains removed      Neurological Exam:  Orientation: Appropriately conversant throughout conversation     STRENGTH:RL  Deltoid             5          5  Biceps              5          5  Triceps             5          5                    5          5     Hip flexion       33  DorsiFlex          5          5  [id number]     REFLEXES:         R          L  Biceps               1          1  Triceps              1          1  Brachioradialis  1          1  Patellar              1          1  Achilles            1           1  Plantar             Down   Down     SENSORY: Intact to light touch in bl UE and LE    Home Medications     Medication List      START taking these medications     gabapentin 100 mg capsule; Commonly known as: Neurontin; Take 1 capsule   (100 mg) by mouth once daily at bedtime.   lactulose 20 gram/30 mL oral solution; Take 30 mL (20 g) by mouth once   daily.; Start taking on: July 13, 2024   oxyCODONE 10 mg immediate release tablet; Commonly known as: Roxicodone;   Take 1 tablet (10 mg) by mouth every 6 hours if needed for severe pain (7   - 10).   polyethylene glycol 17 gram packet; Commonly known as: Glycolax,   Miralax; Take 17 g by mouth once daily.; Start taking on: July 13, 2024   senna 8.6 mg tablet; Generic drug: sennosides; Take 2 tablets (17.2 mg)   by mouth 2 times a day.   traZODone 50 mg tablet; Commonly known as: Desyrel; Take 1 tablet (50   mg) by mouth once daily at bedtime.     CHANGE how you take these medications     atorvastatin 20 mg tablet; Commonly known as: Lipitor; Take 1 tablet (20   mg) by mouth once daily at bedtime.; What changed: when to take this     CONTINUE taking these medications     acetaminophen 500 mg tablet; Commonly known as: Tylenol   amLODIPine 10 mg tablet; Commonly known as: Norvasc; Take 1 tablet (10   mg) by mouth once daily.   hydroCHLOROthiazide 25 mg tablet; Commonly known as:  HYDRODiuril; Take 1   tablet (25 mg) by mouth once daily.   metoprolol succinate XL 50 mg 24 hr tablet; Commonly known as:   Toprol-XL; Take 1 tablet (50 mg) by mouth once daily.     STOP taking these medications     aspirin 81 mg EC tablet   clopidogrel 75 mg tablet; Commonly known as: Plavix       Outpatient Follow-Up  Future Appointments   Date Time Provider Department Center   7/16/2024 11:00 AM VINNY Fountain-CNP [id number] [place]   7/22/2024 10:00 AM Sahil Chavarria MD [id number] [place]   7/29/2024  2:50 PM Noris Luis PA-C [id number] [facility]   8/12/2024 10:30 AM London Ayala PA-C [id number] [place]       Caden Brady M4 (CWRUSOM)      Jose Boogie MD (staff)

## 2024-07-12 NOTE — PROGRESS NOTES
"Christophe Ambriz is a 74 y.o. male on day 6 of admission. PMHx sig for significant tobacco use, coronary artery disease, hypertension, hyperlipidemia who is admitted for left renal mass and pathologic L3 fracture concerning for metastatic disease. S/p L1-5 fusion w/ L2-4 decompression, tumor debulking on 7/9. Currently stable.     Subjective     Interval Events: No interval events.      Subjective: No significant events overnight. Patient endorses pain of 4/10. No BM yet.        Objective     Physical exam  Constitutional: Normal appearance, NAD.    HEENT: Normocephalic and atraumatic.  Cardiovascular: Normal rate and regular rhythm.  Pulmonary: Normal work of breathing, bilateral chest rise.    Extremities: No edema.  Psychiatric: Normal behavior and mood.   Drains: Hemovac drains removed     Neurological Exam:  Orientation: Appropriately conversant throughout conversation    STRENGTH: R L  Deltoid  5 5  Biceps  5 5  Triceps  5 5    5 5    Hip flexion 3 3  DorsiFlex 5          5  PlantarFlex 5 5    REFLEXES:  R  L  Biceps   1  1  Triceps   1  1  Brachioradialis  1  1  Patellar   1  1  Achilles 1  1  Plantar  Down Down    SENSORY: Intact to light touch in bl UE and LE    Last Recorded Vitals  Blood pressure 116/68, pulse 88, temperature 37.1 °C (98.8 °F), temperature source Temporal, resp. rate 16, height 1.854 m (6' 1\"), weight 91 kg (200 lb 9.9 oz), SpO2 91%.    Intake/Output last 3 Shifts:  I/O last 3 completed shifts:  In: 1100 (12.1 mL/kg) [I.V.:900 (9.9 mL/kg); IV Piggyback:200]  Out: 2985 (32.8 mL/kg) [Urine:2800 (0.9 mL/kg/hr); Drains:185]  Weight: 91 kg     Relevant Results  Results for orders placed or performed during the hospital encounter of 07/06/24 (from the past 24 hour(s))   CBC and Auto Differential   Result Value Ref Range    WBC 16.7 (H) 4.4 - 11.3 x10*3/uL    nRBC 0.0 0.0 - 0.0 /100 WBCs    RBC 2.67 (L) 4.50 - 5.90 x10*6/uL    Hemoglobin 7.2 (L) 13.5 - 17.5 g/dL    Hematocrit 22.6 (L) 41.0 - " 52.0 %    MCV 85 80 - 100 fL    MCH 27.0 26.0 - 34.0 pg    MCHC 31.9 (L) 32.0 - 36.0 g/dL    RDW 13.7 11.5 - 14.5 %    Platelets 480 (H) 150 - 450 x10*3/uL    Neutrophils % 73.1 40.0 - 80.0 %    Immature Granulocytes %, Automated 0.6 0.0 - 0.9 %    Lymphocytes % 18.1 13.0 - 44.0 %    Monocytes % 8.0 2.0 - 10.0 %    Eosinophils % 0.1 0.0 - 6.0 %    Basophils % 0.1 0.0 - 2.0 %    Neutrophils Absolute 12.22 (H) 1.60 - 5.50 x10*3/uL    Immature Granulocytes Absolute, Automated 0.10 0.00 - 0.50 x10*3/uL    Lymphocytes Absolute 3.02 (H) 0.80 - 3.00 x10*3/uL    Monocytes Absolute 1.33 (H) 0.05 - 0.80 x10*3/uL    Eosinophils Absolute 0.02 0.00 - 0.40 x10*3/uL    Basophils Absolute 0.02 0.00 - 0.10 x10*3/uL   Comprehensive metabolic panel   Result Value Ref Range    Glucose 154 (H) 74 - 99 mg/dL    Sodium 137 136 - 145 mmol/L    Potassium 3.5 3.5 - 5.3 mmol/L    Chloride 96 (L) 98 - 107 mmol/L    Bicarbonate 33 (H) 21 - 32 mmol/L    Anion Gap 12 10 - 20 mmol/L    Urea Nitrogen 9 6 - 23 mg/dL    Creatinine 0.60 0.50 - 1.30 mg/dL    eGFR >90 >60 mL/min/1.73m*2    Calcium 9.0 8.6 - 10.6 mg/dL    Albumin 3.0 (L) 3.4 - 5.0 g/dL    Alkaline Phosphatase 107 33 - 136 U/L    Total Protein 6.3 (L) 6.4 - 8.2 g/dL    AST 23 9 - 39 U/L    Bilirubin, Total 0.5 0.0 - 1.2 mg/dL    ALT 22 10 - 52 U/L     Surgical Pathology Exam:     FINAL DIAGNOSIS   L3 SPINAL MASS, REMOVAL:  - CLEAR-CELL CARCINOMA INVOLVING FIBROCARTILAGINOUS TISSUE, MORPHOLOGICALLY CONSISTENT WITH METASTATIC RENAL CELL CARCINOMA.           Assessment/Plan   Christophe Ambriz is a 74-year-old male with past medical history significant for significant tobacco use, coronary artery disease, hypertension, hyperlipidemia who is admitted for left renal mass and pathologic L3 fracture concerning for metastatic disease.  MRI spine completed, no concern for cord compression.  Patient underwent surgery on 7/9 for L1-5 fusion w/ L2-4 decompression, tumor debulking. Surgery resulted in  "slight improvement in pain and LLE weakness. Surgical pathology results returned on 7/11 show metastatic clear-cell renal cell carcinoma. 7/12 nsgy removed drains, OK to discharge patient. Discharge home planned for today 7/12.      Plan/Updates 7/12:  Patient discharged home today 7/12  Nsgy removed drains 7/12      #Left renal mass  #L3 lumbar fracture concern for spinal mets  :: Patient with 1 month of severe back pain with point tenderness, recent weight loss, pain awakening at night concerning for malignant process with metastasis  :: CT and MRI imaging as above showing left renal mass concerning for RCC with enhancing metastatic lesion infiltrating L3 vertebral body and adjacent epidural invasion involving the ventral aspect of L3  ::s/p L1-5 fusion w/ L2-4 decompression, tumor debulking on 7/9  F/U pathology results  Surgical pathology results: Clear-cell carcinoma, metastatic renal cell carcnoma   Per NSGY: Maintain SBP <160 in perioperative period  Supportive onc pain management from 7/10  Tylenol 975 every 8 hours as needed, oxycodone 7.5 mg every 3 hours PRN moderate pain, Dilaudid 0.5 mg every 6 hours for breakthrough   C/w oxycodone IR 10 mg PO PRN severe pain   C/w gabapentin 100 mg PO at bedtime (for neuropathic pain coverage)   Follow NSGY recs:   Restart ASA POD7 (7/16, 2024)  Restart PLX POD14 (7/23, 2024)    #constipation  :: patient claims to have baseline constipation, states he has BM every 1-2 weeks  :: patient states he has not had a BM in 3 weeks  C/w Alivia-Colace  C/w Senokot  C/w polyethylene glycol (Miralax) 17g packet  C/w Lactulose 20 g daily   Discontinued psyllium 3.4g packet per supportive onc recs  \"Discontinue Psyllium 3.4 g packet tid as it is a bulk-forming laxative which will increase stool bulk & distend the colon, which can worsen abdominal pain and bowel obstruction when opioids prevent peristalsis; use osmotic laxatives like those listed above only\"     #CAD s/p " PCI  #HTN  #HLD  hold home plavix in lieu of possible biopsy   continue home atorvastatin  continue home amlodipine 10mg daily  continue home hydrochlorothiazide 25mg daily   continue home metoprolol succinate 50mg daily   Per NSGY: Maintain SBP <160 in perioperative period    #insomnia  C/w trazadone 50 mg at bedtime     #Leukocytosis  :: No current infectious symptoms, likely reactive in setting of likely malignancy and fracture  ::7/11 update -- likely reactive following surgery     #DISPO  PT/OT recommend SNF, patient refused. SW has approval for homecare  Discharge today 7/12, OK from nsgy     F: PRN  E: PRN  N: regular  A: PIV  Abx: none  O2: RA  GI ppx: n/a  DVT ppx: lovenox starting 7/10 AM      NOK: Iram Aguirre (sig other) [phone]  Code status: Full code (confirmed on admission)     Felix Edwards M4  RU YANETH      I have seen and evaluated the patient with Felix Polo, a medical student (MS 4).    I reviewed the patient’s medical and family history, the student/resident's findings on physical examination, and the patient’s diagnosis and treatment plan with the student/resident. I discussed the case with the student/resident in details and agree with the findings and plan as documented in the student's note.    He was resting comfortably in bed this morning. He has not had bowel movement but states that his baseline is weekly bowel movement. He denies abdominal pain. He is passing gases and feacal smear was noted on his bed. Surgery took out his drain. He is stable to get discharged.         Jose Boogie MD, PhD  Hematology/Oncology  [facility]

## 2024-07-12 NOTE — SIGNIFICANT EVENT
Patient s/p L1-5 fusion w/ L3-4 decompression. Drains removed today. No acute neurosurgical intervention or additional neuroimaging needed at this time. Prophylactic anticoagulation allowed on post-op day 2. Patient needs follow up in 2 weeks for wound check, we will arrange. Patient has mesh with glue for closure (will fall off on its own). Ok for ASA post op day 7, and PLX post op day 14.  Ok to work with PTOT without restrictions. Thank you for allowing us to participate in the care of this patient. Will sign off at this time. Please page [phone] with any questions or concerns.    Lili Solorzano MD  PGY-3 Neurosurgery  10:02 AM

## 2024-07-12 NOTE — PROGRESS NOTES
07/10/24 1210   Discharge Planning   Living Arrangements Spouse/significant other   Support Systems Spouse/significant other;Family members;Friends/neighbors   Type of Residence Private residence   Who is requesting discharge planning? Provider   Home or Post Acute Services Post acute facilities (Rehab/SNF/etc)   Type of Post Acute Facility Services Skilled nursing   Expected Discharge Disposition SNF   Does the patient need discharge transport arranged? Yes   RoundTrip coordination needed? Yes   Has discharge transport been arranged? No     7/10/24 @ 1210  Met with patient and his wife at bedside.  Spoke with them about discharge planning. PT rec SNF.  They were given a SNF list to review.  They will think about what they want to do. They feel just having PT/OT come into the home would be best. They are agreeable to using [facility].  Will follow up with their decision and follow the patient during his hospital stay for his dispo needs.  Evelyn Grant RN [facility]    7/12/24 @ 0905  Patient to discharge home with [facility]-PT services. Referral placed. Patient does not want to go to a skilled facility as PT recommended. ADOD 7/12 once drains are removed.     UPDATE 1110:   Patient requesting an stretcher ambulette ride home. Attempted to call his SO, left a message. Patient aware there may be a cost, and not sure how much insurance will cover. He states his SO is unable to get him in a car home.  Requested a ride via RoundTrip. ELIAAZR Alicea RN made aware.    UPDATE 1145:  The [facility] Vehicle you requested for Christophe OSORIO in unit/room SCC [id number] on 07/12/2024 is scheduled to arrive at 2:00pm EDT! [facility] is handling this ride and you can contact them at [phone]. RN and patient made aware.  Evelyn Grant RN [facility]

## 2024-07-12 NOTE — DISCHARGE INSTRUCTIONS
Hi Mr. Ambriz,     You were admitted to the hospital at [facility] because scans of your spine at [facility] showed that you had a tumor on your left kidney and a fracture and tumor on your spine at the L3 vertebrae. This tumor was the cause of the back pain and the weakness that you had been having for the last month. On July 9th, the neurosurgeons did surgery on your spine, fusing some of your vertebrae (L1-5 fusion) and removing pressure on your spinal cord as well as removing some of the tumor. The tissue that was collected from your spine showed that you have renal cell carcinoma (kidney cancer) that has spread to your spine.     We are discharging you home with home care and home physical therapy who will come to see you.     Neurosurgery has specific instructions that we want you to follow. We have stopped your aspirin and your plavix (clopidogrel). You will need to start taking these again. On July 16th, start taking aspirin again. On July 23rd, start taking Plavix (clopidogrel) again.  Do not start taking either medicine before the day we have told you.     Regarding your follow up appointments, we have scheduled you to have follow up visits with a number of providers. On 7/16 you will see supportive oncology, who will be managing your pain regimen and helping with bowel movements. On 7/22 you will see Dr. Chavarria, who will be your new kidney cancer doctor. You also have follow up visits with a new primary care doctor and with neurosurgery.     Thank you for letting us be a part of your care team.      Oncology

## 2024-07-12 NOTE — PROGRESS NOTES
"Christophe Ambriz is a 74 y.o. male on day 6 of admission presenting with Pathologic lumbar vertebral fracture, initial encounter.    Subjective   NAEO, doing well post op, radiculopathy is better       Objective     Physical Exam         AOX3       RUE D5 / B5 / T5 / HG 5/ IO 5       LUE D5 / B5 / T5 / HG 5/ IO 5       Negative quinn          RLE HF4+ / KE 5/ DF 5/ PF 5       LLE HF4 / KE 5/ DF 5/ PF 5       B/l clonus    Last Recorded Vitals  Blood pressure 135/74, pulse 89, temperature 36.5 °C (97.7 °F), temperature source Temporal, resp. rate 16, height 1.854 m (6' 1\"), weight 91 kg (200 lb 9.9 oz), SpO2 92%.  Intake/Output last 3 Shifts:  I/O last 3 completed shifts:  In: 1438.8 (15.8 mL/kg) [I.V.:1238.8 (13.6 mL/kg); IV Piggyback:200]  Out: 2470 (27.1 mL/kg) [Urine:2175 (0.7 mL/kg/hr); Drains:295]  Weight: 91 kg     Relevant Results                Results for orders placed or performed during the hospital encounter of 07/06/24 (from the past 24 hour(s))   CBC and Auto Differential   Result Value Ref Range    WBC 17.1 (H) 4.4 - 11.3 x10*3/uL    nRBC 0.0 0.0 - 0.0 /100 WBCs    RBC 2.94 (L) 4.50 - 5.90 x10*6/uL    Hemoglobin 8.1 (L) 13.5 - 17.5 g/dL    Hematocrit 25.6 (L) 41.0 - 52.0 %    MCV 87 80 - 100 fL    MCH 27.6 26.0 - 34.0 pg    MCHC 31.6 (L) 32.0 - 36.0 g/dL    RDW 13.6 11.5 - 14.5 %    Platelets 468 (H) 150 - 450 x10*3/uL    Neutrophils % 74.2 40.0 - 80.0 %    Immature Granulocytes %, Automated 0.8 0.0 - 0.9 %    Lymphocytes % 16.4 13.0 - 44.0 %    Monocytes % 8.3 2.0 - 10.0 %    Eosinophils % 0.2 0.0 - 6.0 %    Basophils % 0.1 0.0 - 2.0 %    Neutrophils Absolute 12.67 (H) 1.60 - 5.50 x10*3/uL    Immature Granulocytes Absolute, Automated 0.13 0.00 - 0.50 x10*3/uL    Lymphocytes Absolute 2.79 0.80 - 3.00 x10*3/uL    Monocytes Absolute 1.41 (H) 0.05 - 0.80 x10*3/uL    Eosinophils Absolute 0.03 0.00 - 0.40 x10*3/uL    Basophils Absolute 0.02 0.00 - 0.10 x10*3/uL   Comprehensive metabolic panel   Result " Value Ref Range    Glucose 163 (H) 74 - 99 mg/dL    Sodium 137 136 - 145 mmol/L    Potassium 3.4 (L) 3.5 - 5.3 mmol/L    Chloride 95 (L) 98 - 107 mmol/L    Bicarbonate 32 21 - 32 mmol/L    Anion Gap 13 10 - 20 mmol/L    Urea Nitrogen 11 6 - 23 mg/dL    Creatinine 0.65 0.50 - 1.30 mg/dL    eGFR >90 >60 mL/min/1.73m*2    Calcium 9.0 8.6 - 10.6 mg/dL    Albumin 3.2 (L) 3.4 - 5.0 g/dL    Alkaline Phosphatase 112 33 - 136 U/L    Total Protein 6.7 6.4 - 8.2 g/dL    AST 26 9 - 39 U/L    Bilirubin, Total 0.5 0.0 - 1.2 mg/dL    ALT 24 10 - 52 U/L   Magnesium   Result Value Ref Range    Magnesium 1.83 1.60 - 2.40 mg/dL                 Assessment/Plan   Principal Problem:    Pathologic lumbar vertebral fracture, initial encounter  Active Problems:    CAD (coronary artery disease)    Stented coronary artery    HTN (hypertension)    Hyperlipidemia    Christophe Ambriz is a 74 y.o. male with h/o HTN, HLD, CAD s/p PCI (2017) (on ASA/PLX), p/w LBP of 37d duration, CT T/L spine L3 lytic lesion, CT CAP 8 cm L kidney mass, 2.1 cm R thyroid nodule, L 9th rib lytic lesion, MRL LS lytic mets to L3 with L3-4 neuro foraminal stenosis (severe on the L), L3-4 disc involvement with tumor.     Pathological fracture with >50% loss of height on standing xrays.  Severe intractable pain in the L3 distribution of the left leg.     7/19 s/p L1-5 fusion with L3/4 decompression (prelim: carcinoma)    Recs  [place] primary,   Will dc drain today  ASA POD7  PLX POD14   emailed 6wk fuv   PTOT-SNF         Linda Dunaway MD

## 2024-07-14 NOTE — DISCHARGE SUMMARY
Discharge Diagnosis  Pathologic lumbar vertebral fracture, initial encounter    Test Results Pending At Discharge  Pending Labs       No current pending labs.            Hospital Course  Christophe Ambriz is a 74-year-old male with past medical history significant for significant tobacco use, coronary artery disease, hypertension, hyperlipidemia who was admitted as a transfer from [place] for a left renal mass and pathologic L3 fracture concerning for metastatic disease, newly identified on CT when the patient presented for 1 month of back pain. On admission patient had left lower extremity weakness and significant back and hip pain.  Patient underwent surgery on 7/9 for L1-5 fusion w/ L2-4 decompression, tumor debulking. Surgery resulted in slight improvement in pain and LLE weakness. Surgical pathology results returned on 7/11 show metastatic clear-cell renal cell carcinoma. PT/OT recommended acute rehab, but patient rejected and insisted on going home. Patient was discharged home with plans for outpatient follow up with  oncology, supportive onc, neurosurgery, and primary care.       Follow Up Outpatient:   [ ] 2.1 cm R thyroid nodule found incidentally, follow up with PCP  [ ] 6 wk FU visit with NSGY  [ ] Restart ASA POD7 (7/16, 2024)  [ ] Restart PLX POD14 (7/23, 2024)  [ ] FU with supportive onc  [ ] FU new PCP visit    Pertinent Physical Exam At Time of Discharge  Physical Exam    Home Medications     Medication List      START taking these medications     gabapentin 100 mg capsule; Commonly known as: Neurontin; Take 1 capsule   (100 mg) by mouth once daily at bedtime.   lactulose 20 gram/30 mL oral solution; Take 30 mL (20 g) by mouth once   daily.   oxyCODONE 10 mg immediate release tablet; Commonly known as: Roxicodone;   Take 1 tablet (10 mg) by mouth every 6 hours if needed for severe pain (7   - 10).   polyethylene glycol 17 gram packet; Commonly known as: Glycolax,   Miralax; Take 17 g by mouth once  daily.   senna 8.6 mg tablet; Generic drug: sennosides; Take 2 tablets (17.2 mg)   by mouth 2 times a day.   traZODone 50 mg tablet; Commonly known as: Desyrel; Take 1 tablet (50   mg) by mouth once daily at bedtime.     CHANGE how you take these medications     atorvastatin 20 mg tablet; Commonly known as: Lipitor; Take 1 tablet (20   mg) by mouth once daily at bedtime.; What changed: when to take this     CONTINUE taking these medications     acetaminophen 500 mg tablet; Commonly known as: Tylenol   amLODIPine 10 mg tablet; Commonly known as: Norvasc; Take 1 tablet (10   mg) by mouth once daily.   hydroCHLOROthiazide 25 mg tablet; Commonly known as: HYDRODiuril; Take 1   tablet (25 mg) by mouth once daily.   metoprolol succinate XL 50 mg 24 hr tablet; Commonly known as:   Toprol-XL; Take 1 tablet (50 mg) by mouth once daily.     STOP taking these medications     aspirin 81 mg EC tablet   clopidogrel 75 mg tablet; Commonly known as: Plavix       Outpatient Follow-Up  Future Appointments   Date Time Provider Department [place]   7/16/2024 11:00 AM VINNY Fountain-CNP [id number] [facility]   7/22/2024 10:00 AM Sahil Chavarria MD [id number] [place]   7/29/2024  2:50 PM Noris Luis PA-C [id number] [facility]   8/12/2024 10:30 AM London Ayala PA-C [id number] [facility]

## 2024-07-16 ENCOUNTER — TELEPHONE (OUTPATIENT)
Dept: PALLIATIVE MEDICINE | Facility: CLINIC | Age: 74
End: 2024-07-16
Payer: MEDICARE

## 2024-07-16 NOTE — TELEPHONE ENCOUNTER
Patient was scheduled to be seen in Supportive Oncology/Palliative Care clinic today by Cathy Mcneill CNP. Referral placed for palliative care during inpatient visit and scheduled by  on Friday. Referral had not been reviewed by our team - patient meets oncology as NPV on 7/22/24 so will follow up on plan after that visit to determine rescheduling NPV. Reminder placed on calendar to follow up next week.

## 2024-07-17 ENCOUNTER — PATIENT OUTREACH (OUTPATIENT)
Dept: HEMATOLOGY/ONCOLOGY | Facility: HOSPITAL | Age: 74
End: 2024-07-17
Payer: MEDICARE

## 2024-07-17 NOTE — PROGRESS NOTES
07/17/2024 1502: Patient is referred by Dr. Boogie to meet with Dr. Chavarria for discussion of treatment options for his recently diagnosed metastatic RCC to L3. Patient presented to the ER with lower back pain for over a month's time, it has inhibited his ability to walk. Imaging revealed   Large lytic lesion is present within the L3 vertebral body,  likely representing metastatic, myelogenous or lymphoproliferative  disease, with a suspected underlying pathologic fracture with less  than 10 to 20% height loss and no significant retropulsion  posteriorly into the spinal canal. Dedicated contrast enhanced MRI of  the lumbar spine is recommended to better characterize mass and  assess for any epidural soft tissue components.  2. No additional lytic lesions are identified in the thoracic or  lumbar spine.  3. Degenerative changes are present in the lumbar spine, although the  exam is not optimized to assess the spinal canal. At least  mild-to-moderate stenosis is present at L3-L4 and L4-L5 due to disc  osteophyte complexes. Patient underwent surgical resection of L3 spinal mass on 07/09/2024, pathology showed clear cell carcinoma involving fibrocartilaginous tissue, morphologically consistent with metastatic renal cell carcinoma. Patient had no known history of  kidney cancer. However, CT scan did identify a heterogeneous 5.8 x 8.1 cm mass arising from the superior aspect of the left kidney concerning for malignancy. Patient was dc'd to home from in-pt stay 07/12/2024. He is scheduled to meet with Dr. Chavarria to discuss systemic therapy options. In speaking with patient he stated he is aware of where the appointment is and all of his information is in the system. Jarred Loyd RN.

## 2024-07-18 NOTE — DOCUMENTATION CLARIFICATION NOTE
"    PATIENT:               NIDIA MILLER  ACCT #:                  [id number]  MRN:                       [id number]  :                       1950  ADMIT DATE:       2024 3:12 AM  DISCH DATE:        2024 5:05 PM  RESPONDING PROVIDER #:        [id number]          PROVIDER RESPONSE TEXT:    Severe Protein Calorie Malnutrition    CDI QUERY TEXT:    Clarification        Instruction:    Based on your assessment of the patient and the clinical information, please provide the requested documentation by clicking on the appropriate radio button and enter any additional information if prompted.    Question: Please further clarify this patient nutritional status as    When answering this query, please exercise your independent professional judgment. The fact that a question is being asked, does not imply that any particular answer is desired or expected.    The patient's clinical indicators include:  Clinical Information: 73 yo M with PMHx of tobacco use, coronary artery disease, hypertension, hyperlipidemia who is admitted for left renal mass and pathologic L3 fracture, now s/p L1-5 fusion w/ L2-4 decompression, tumor debulking on . Pathology results confirm metastatic clear-cell renal cell carcinoma.    Clinical Indicators:  RDN consult  at 1039:  \"BMI: 26.47  Malnutrition Diagnosis: Severe malnutrition related to chronic disease or condition  As Evidenced by: moderate to severe muscle and adipose tissue losses; PO intake likely meeting less than 75 percent of nutr needs for greater than/equal to 1 month\"    Treatment:  Per RDN consult : \"Individualized Nutrition Prescription Provided for : 2350 kcal and 110g protein  Briefly discussed choosing higher kcal/pro food items from menu and ONS use to help with wt loss and decreased PO intake. Pt not interested in any nutrition interventions, denied having any questions or concerns at the moment. Will continue to monitor PO intake and weight during " "admission.\"    Risk Factors:  - metastatic clear-cell renal cell carcinoma  Options provided:  -- Severe Protein Calorie Malnutrition  -- Protein Calorie Malnutrition, Please specify severity  -- Other - I will add my own diagnosis  -- Refer to Clinical Documentation Reviewer    Query created by: Kalyn Ferrer on 7/17/2024 12:53 PM      Electronically signed by:  SCOTT MORFIN MD 7/17/2024 8:30 PM          "

## 2024-07-19 ENCOUNTER — HOME CARE VISIT (OUTPATIENT)
Dept: HOME HEALTH SERVICES | Facility: HOME HEALTH | Age: 74
End: 2024-07-19
Payer: MEDICARE

## 2024-07-19 PROCEDURE — G0151 HHCP-SERV OF PT,EA 15 MIN: HCPCS | Mod: HHH

## 2024-07-19 PROCEDURE — 169592 NO-PAY CLAIM PROCEDURE

## 2024-07-19 ASSESSMENT — ENCOUNTER SYMPTOMS
PAIN SEVERITY GOAL: 0/10
PAIN LOCATION: BACK
PERSON REPORTING PAIN: PATIENT
PAIN LOCATION - PAIN QUALITY: ACHING
LOWEST PAIN SEVERITY IN PAST 24 HOURS: 3/10
PAIN: 1
PAIN LOCATION - PAIN SEVERITY: 3/10
SUBJECTIVE PAIN PROGRESSION: GRADUALLY IMPROVING
HIGHEST PAIN SEVERITY IN PAST 24 HOURS: 4/10

## 2024-07-19 ASSESSMENT — ACTIVITIES OF DAILY LIVING (ADL)
TRANSPORTATION ASSESSED: 1
OASIS_M1830: 03
AMBULATION ASSISTANCE: MAXIMUM ASSIST
ENTERING_EXITING_HOME: MAXIMUM ASSIST
AMBULATION ASSISTANCE: 1
TRANSPORTATION: NEEDS ASSISTANCE

## 2024-07-19 NOTE — HOME HEALTH
"7 4 24 E.R. visit due to uncontrolled LBP with radicular symptoms to at least one of the patient's legs, Surgery 7 6 24 spinal fusion surgery by Dr. Boogie.   Patient reports a mass was located and currently there is a biopsy being performed on it.    Timed up and go test took patient 65 seconds to complete.  Patient used a standard walker.    \"BLT\" precautions reviewed, patient demonstrating understanding.    Also discussed was log rolling and all other basic back protection techniques.  Patient did demonstrate understanding of those concepts, moving from supine to sitting with good, protective technique.    Patient taught and given written exercises of  1.  Ankle pumps  2.  Quad sets  3.  Gluteal sets  4.  Heel slides  5.  Hip abduction  6.  Short arc quads with firm pillow underneath his knees        for 2 minutes each while I timed him with my phone.  Patient understands that he is welcome to perform them for 2 mniutes each OR 20 repetitions each.      Patient's signfiicant other Iram asked \"should he get up twice daily?\"   My response was, \"most certainly but he should then get up an additional 5 times each day after those two times.\"   Patient AND CG Iram demonstrated undertanding.  Patient has been in bed far too much, opting to stay there since it is most comofrtable for him."

## 2024-07-20 NOTE — PROGRESS NOTES
Oncology New Patient Visit  Patient ID: Christophe Ambriz is a 74 y.o. male who presents today to hospitals care.  Referring Physician: Jose Boogie MD  [id number]  [place] [address]  Primary Care Provider: Dejuan Dhaliwal MD  Z[facility]     Cancer History  Met RCC  Treatment  -Admission 7/24 -increasing back pain found to have lytic lesion in L3 left-sided kidney mass right thyroid nodule left ninth rib lytic lesion with pathological rib fracture, status post surgery L1 L5 fusion L2-L4 decompression and tumor debulking, supportive oncology follow-up appointment pathology consistent with clear-cell carcinoma involving L3       Other contributing history  Hypertension, coronary artery disease, status post PCI in 2017 on aspirin and Plavix  HPI  Referred by provider/s above.  Oncology history is summarized above.  The patient denies any significant ongoing issues or worsening nausea, vomiting, fevers, chills, easy bruising or bleeding chest pain melena shortness of breath diarrhea or worsening fatigue.    ROS:  10-pt ROS reviewed and negative except as mentioned above.    Medications: below was reviewed and is accurate  Current Outpatient Medications   Medication Instructions    acetaminophen (TYLENOL) 1,000 mg, oral, Every 6 hours PRN    amLODIPine (NORVASC) 10 mg, oral, Daily    atorvastatin (LIPITOR) 20 mg, oral, Nightly    gabapentin (NEURONTIN) 100 mg, oral, Nightly    hydroCHLOROthiazide (HYDRODIURIL) 25 mg, oral, Daily    lactulose 20 g, oral, Daily    metoprolol succinate XL (TOPROL-XL) 50 mg, oral, Daily    oxyCODONE (ROXICODONE) 10 mg, oral, Every 6 hours PRN    polyethylene glycol (GLYCOLAX, MIRALAX) 17 g, oral, Daily    senna 17.2 mg, oral, 2 times daily    traZODone (DESYREL) 50 mg, oral, Nightly        Past Medical History  No past medical history on file.  FamilyHistory  No family history on file.   Past Surgical History  No past surgical history on  file.  Social History    He  has no history on file for alcohol use.  He  has no history on file for drug use.    Physical exam:  There were no vitals filed for this visit.    GEN: NAD, well-appearing  HEENT: no clear lesions seen  NECK: no clear masses  LYMPH: no palpable adenopathy  SKIN: no concerning new lesions  LUNGS: CTA  CV: RRR no clear R/G  ABD: Soft, NTND. No rebound or guarding.  EXT:  trace edema bilaterally   NEURO: Grossly intact. No focal deficits.  PSYCH: Normal mood and affect    Radiology review  Reviewed in detail personally and for detail see results in EPIC  US thyroid 7/24 -3.4 cm nodule in the right mid thyroid gland recommend FNA other nodules 0.7 cm and 1.6 cm  MRI of the lumbar spine concerning for left renal cell carcinoma static lesion involving L3 with pathological fracture and epidural invasion  CT chest abdomen pelvis enlarged right thyroid 5.8 x 2.1 cm mass arising from the superior aspect of the left kidney destructive changes of the right rib with a soft tissue mass with a soft tissue mass       Genomics Data    Laboratory review  Reviewed in detail personally and for detail see results in [facility]  Lab Results   Component Value Date    WBC 16.7 (H) 07/12/2024    HGB 7.2 (L) 07/12/2024    HCT 22.6 (L) 07/12/2024    MCV 85 07/12/2024     (H) 07/12/2024     Lab Results   Component Value Date    GLUCOSE 154 (H) 07/12/2024    CALCIUM 9.0 07/12/2024     07/12/2024    K 3.5 07/12/2024    CO2 33 (H) 07/12/2024    CL 96 (L) 07/12/2024    BUN 9 07/12/2024    CREATININE 0.60 07/12/2024     Lab Results   Component Value Date    PSA 3.69 07/05/2024    PSA 2.9 09/23/2020     Lab Results   Component Value Date    ALT 22 07/12/2024    AST 23 07/12/2024    ALKPHOS 107 07/12/2024    BILITOT 0.5 07/12/2024           ASSESSMENT AND PLAN   We had a long discussion regarding the natural history, prognosis, and potential treatment options for his disease.  We discussed timing of therapy and  next line standard options as well as clinical trial options for his current disease state. Discussed also NCCN guidlines as per the patients diagnosis and treatment options.  The Total Visit includes the following :  face to face time during the visit today if in person, phone / virtual time with the patient,  review of records, including office notes, pathology, radiology, labs, and prior treatments and care coordination. This review directly influenced my assessment and recommendations for this visit.    mRCC -discussed in detail findings consistent with metastatic renal cell carcinoma discussed in detail options at this point with bony metastatic lesions discussed the rationale to start systemic treatment.  Discussed the role of cytoreductive nephrectomy in the setting of his age and other comorbidities.  Discussed in detail also clinical trials looking at genomic sequencing to identify signature versus the role of potential radiation and systemic treatment to the primary mass.  All options discussed in detail discussed in detail also the need for further workup of the thyroid nodule.  All options discussed in detail does have rib lesion and is recovering from from surgery.  Discussed rationale for treatment of study including I/O I/O immunotherapy based regimens versus the use of TKI and pembrolizumab or nivolumab and some potential use of cabozantinib in the setting of bone metastases.  All options discussed in detail with the patient discussed role of Xgeva.  Discussed also the role of radiation referral to discuss discussed also the role of clinical studies.  Also the role of Xgeva to help with symptomatic management of bony metastases  Will refer to radiation oncology continue follow-up with neurosurgery      Sahil Chavarria MD  Senior Attending Physician/  in Medicine [employer] [facility] [employer]  [facility]  Patient line  [phone]  Fax [phone]

## 2024-07-22 ENCOUNTER — TELEPHONE (OUTPATIENT)
Dept: HEMATOLOGY/ONCOLOGY | Facility: CLINIC | Age: 74
End: 2024-07-22
Payer: MEDICARE

## 2024-07-22 ENCOUNTER — APPOINTMENT (OUTPATIENT)
Dept: HEMATOLOGY/ONCOLOGY | Facility: CLINIC | Age: 74
End: 2024-07-22
Payer: MEDICARE

## 2024-07-22 ENCOUNTER — TELEPHONE (OUTPATIENT)
Dept: HEMATOLOGY/ONCOLOGY | Facility: CLINIC | Age: 74
End: 2024-07-22

## 2024-07-22 NOTE — TELEPHONE ENCOUNTER
Patient did not show up for appointment we will try to reschedule to next week did update family member who will try to bring him next week

## 2024-07-23 NOTE — TELEPHONE ENCOUNTER
Patient missed NPV with onc and is rescheduled for 7/29. Reminder moved to next week to see if referral to palliative care is needed after onc visit.

## 2024-07-24 ENCOUNTER — HOME CARE VISIT (OUTPATIENT)
Dept: HOME HEALTH SERVICES | Facility: HOME HEALTH | Age: 74
End: 2024-07-24
Payer: MEDICARE

## 2024-07-24 VITALS
DIASTOLIC BLOOD PRESSURE: 64 MMHG | SYSTOLIC BLOOD PRESSURE: 118 MMHG | TEMPERATURE: 99.9 F | OXYGEN SATURATION: 98 % | HEART RATE: 102 BPM

## 2024-07-24 PROCEDURE — G0157 HHC PT ASSISTANT EA 15: HCPCS | Mod: CQ,HHH

## 2024-07-24 ASSESSMENT — ENCOUNTER SYMPTOMS
PAIN LOCATION - PAIN SEVERITY: 3/10
PAIN LOCATION: LEFT LEG
HIGHEST PAIN SEVERITY IN PAST 24 HOURS: 3/10
PAIN LOCATION - PAIN QUALITY: THROBBING
PAIN LOCATION - PAIN SEVERITY: 3/10
PAIN LOCATION - PAIN SEVERITY: 1/10
PAIN LOCATION: RIGHT LEG
PAIN: 1
PAIN LOCATION - PAIN QUALITY: THROBBING
PAIN LOCATION - PAIN FREQUENCY: CONSTANT
LOWEST PAIN SEVERITY IN PAST 24 HOURS: 3/10
PAIN LOCATION - PAIN FREQUENCY: CONSTANT
PAIN LOCATION - PAIN FREQUENCY: CONSTANT
PAIN LOCATION: BACK
PAIN LOCATION - PAIN QUALITY: THROBBING

## 2024-07-26 ENCOUNTER — APPOINTMENT (OUTPATIENT)
Dept: RADIOLOGY | Facility: HOSPITAL | Age: 74
End: 2024-07-26
Payer: MEDICARE

## 2024-07-26 ENCOUNTER — HOSPITAL ENCOUNTER (EMERGENCY)
Facility: HOSPITAL | Age: 74
Discharge: HOME | End: 2024-07-27
Payer: MEDICARE

## 2024-07-26 VITALS
SYSTOLIC BLOOD PRESSURE: 142 MMHG | DIASTOLIC BLOOD PRESSURE: 70 MMHG | OXYGEN SATURATION: 98 % | WEIGHT: 177.9 LBS | RESPIRATION RATE: 15 BRPM | BODY MASS INDEX: 23.58 KG/M2 | HEART RATE: 70 BPM | HEIGHT: 73 IN | TEMPERATURE: 98.6 F

## 2024-07-26 DIAGNOSIS — S32.038A OTHER CLOSED FRACTURE OF THIRD LUMBAR VERTEBRA, INITIAL ENCOUNTER (MULTI): ICD-10-CM

## 2024-07-26 DIAGNOSIS — M84.48XA PATHOLOGICAL FRACTURE OF LUMBAR VERTEBRA, INITIAL ENCOUNTER: Primary | ICD-10-CM

## 2024-07-26 DIAGNOSIS — S32.009A CLOSED FRACTURE OF TRANSVERSE PROCESS OF LUMBAR VERTEBRA, INITIAL ENCOUNTER (MULTI): ICD-10-CM

## 2024-07-26 DIAGNOSIS — M79.2 NEUROPATHIC PAIN: ICD-10-CM

## 2024-07-26 LAB
ALBUMIN SERPL-MCNC: 3.2 G/DL (ref 3.5–5)
ALP BLD-CCNC: 128 U/L (ref 35–125)
ALT SERPL-CCNC: 12 U/L (ref 5–40)
ANION GAP SERPL CALC-SCNC: 13 MMOL/L
AST SERPL-CCNC: 10 U/L (ref 5–40)
BASOPHILS # BLD AUTO: 0.01 X10*3/UL (ref 0–0.1)
BASOPHILS NFR BLD AUTO: 0.1 %
BILIRUB SERPL-MCNC: 0.4 MG/DL (ref 0.1–1.2)
BUN SERPL-MCNC: 7 MG/DL (ref 8–25)
CALCIUM SERPL-MCNC: 9.1 MG/DL (ref 8.5–10.4)
CHLORIDE SERPL-SCNC: 100 MMOL/L (ref 97–107)
CO2 SERPL-SCNC: 23 MMOL/L (ref 24–31)
CREAT SERPL-MCNC: 0.7 MG/DL (ref 0.4–1.6)
EGFRCR SERPLBLD CKD-EPI 2021: >90 ML/MIN/1.73M*2
EOSINOPHIL # BLD AUTO: 0.03 X10*3/UL (ref 0–0.4)
EOSINOPHIL NFR BLD AUTO: 0.3 %
ERYTHROCYTE [DISTWIDTH] IN BLOOD BY AUTOMATED COUNT: 13.9 % (ref 11.5–14.5)
GLUCOSE SERPL-MCNC: 172 MG/DL (ref 65–99)
HCT VFR BLD AUTO: 28.5 % (ref 41–52)
HGB BLD-MCNC: 8.9 G/DL (ref 13.5–17.5)
IMM GRANULOCYTES # BLD AUTO: 0.26 X10*3/UL (ref 0–0.5)
IMM GRANULOCYTES NFR BLD AUTO: 2.3 % (ref 0–0.9)
LYMPHOCYTES # BLD AUTO: 2.15 X10*3/UL (ref 0.8–3)
LYMPHOCYTES NFR BLD AUTO: 19.2 %
MCH RBC QN AUTO: 26.3 PG (ref 26–34)
MCHC RBC AUTO-ENTMCNC: 31.2 G/DL (ref 32–36)
MCV RBC AUTO: 84 FL (ref 80–100)
MONOCYTES # BLD AUTO: 0.62 X10*3/UL (ref 0.05–0.8)
MONOCYTES NFR BLD AUTO: 5.5 %
NEUTROPHILS # BLD AUTO: 8.11 X10*3/UL (ref 1.6–5.5)
NEUTROPHILS NFR BLD AUTO: 72.6 %
NRBC BLD-RTO: 0 /100 WBCS (ref 0–0)
PLATELET # BLD AUTO: 618 X10*3/UL (ref 150–450)
POTASSIUM SERPL-SCNC: 3.8 MMOL/L (ref 3.4–5.1)
PROT SERPL-MCNC: 7 G/DL (ref 5.9–7.9)
RBC # BLD AUTO: 3.39 X10*6/UL (ref 4.5–5.9)
SODIUM SERPL-SCNC: 136 MMOL/L (ref 133–145)
WBC # BLD AUTO: 11.2 X10*3/UL (ref 4.4–11.3)

## 2024-07-26 PROCEDURE — 99285 EMERGENCY DEPT VISIT HI MDM: CPT | Mod: 25

## 2024-07-26 PROCEDURE — 72131 CT LUMBAR SPINE W/O DYE: CPT | Performed by: RADIOLOGY

## 2024-07-26 PROCEDURE — 80053 COMPREHEN METABOLIC PANEL: CPT

## 2024-07-26 PROCEDURE — 93971 EXTREMITY STUDY: CPT

## 2024-07-26 PROCEDURE — 85025 COMPLETE CBC W/AUTO DIFF WBC: CPT

## 2024-07-26 PROCEDURE — 2500000001 HC RX 250 WO HCPCS SELF ADMINISTERED DRUGS (ALT 637 FOR MEDICARE OP): Performed by: STUDENT IN AN ORGANIZED HEALTH CARE EDUCATION/TRAINING PROGRAM

## 2024-07-26 PROCEDURE — 72148 MRI LUMBAR SPINE W/O DYE: CPT | Performed by: SURGERY

## 2024-07-26 PROCEDURE — 36415 COLL VENOUS BLD VENIPUNCTURE: CPT

## 2024-07-26 PROCEDURE — A9575 INJ GADOTERATE MEGLUMI 0.1ML: HCPCS

## 2024-07-26 PROCEDURE — 2500000005 HC RX 250 GENERAL PHARMACY W/O HCPCS

## 2024-07-26 PROCEDURE — 72131 CT LUMBAR SPINE W/O DYE: CPT

## 2024-07-26 PROCEDURE — 93971 EXTREMITY STUDY: CPT | Performed by: RADIOLOGY

## 2024-07-26 PROCEDURE — 96375 TX/PRO/DX INJ NEW DRUG ADDON: CPT

## 2024-07-26 PROCEDURE — 72158 MRI LUMBAR SPINE W/O & W/DYE: CPT

## 2024-07-26 PROCEDURE — 2550000001 HC RX 255 CONTRASTS

## 2024-07-26 PROCEDURE — 2500000004 HC RX 250 GENERAL PHARMACY W/ HCPCS (ALT 636 FOR OP/ED)

## 2024-07-26 PROCEDURE — 96374 THER/PROPH/DIAG INJ IV PUSH: CPT

## 2024-07-26 PROCEDURE — 96361 HYDRATE IV INFUSION ADD-ON: CPT

## 2024-07-26 RX ORDER — GABAPENTIN 300 MG/1
300 CAPSULE ORAL ONCE
Status: COMPLETED | OUTPATIENT
Start: 2024-07-26 | End: 2024-07-26

## 2024-07-26 RX ORDER — LIDOCAINE 560 MG/1
1 PATCH PERCUTANEOUS; TOPICAL; TRANSDERMAL ONCE
Status: COMPLETED | OUTPATIENT
Start: 2024-07-26 | End: 2024-07-27

## 2024-07-26 RX ORDER — ONDANSETRON HYDROCHLORIDE 2 MG/ML
4 INJECTION, SOLUTION INTRAVENOUS ONCE
Status: COMPLETED | OUTPATIENT
Start: 2024-07-26 | End: 2024-07-26

## 2024-07-26 RX ORDER — MORPHINE SULFATE 4 MG/ML
4 INJECTION, SOLUTION INTRAMUSCULAR; INTRAVENOUS ONCE
Status: COMPLETED | OUTPATIENT
Start: 2024-07-26 | End: 2024-07-26

## 2024-07-26 RX ORDER — GADOTERATE MEGLUMINE 376.9 MG/ML
16 INJECTION INTRAVENOUS
Status: COMPLETED | OUTPATIENT
Start: 2024-07-26 | End: 2024-07-26

## 2024-07-26 RX ORDER — GABAPENTIN 300 MG/1
300 CAPSULE ORAL NIGHTLY
Qty: 10 CAPSULE | Refills: 0 | Status: SHIPPED | OUTPATIENT
Start: 2024-07-26 | End: 2024-08-05

## 2024-07-26 ASSESSMENT — PAIN SCALES - GENERAL
PAINLEVEL_OUTOF10: 5 - MODERATE PAIN

## 2024-07-26 ASSESSMENT — PAIN DESCRIPTION - DESCRIPTORS: DESCRIPTORS: BURNING

## 2024-07-26 ASSESSMENT — PAIN DESCRIPTION - ORIENTATION: ORIENTATION: LEFT

## 2024-07-26 ASSESSMENT — PAIN DESCRIPTION - PROGRESSION: CLINICAL_PROGRESSION: NOT CHANGED

## 2024-07-26 ASSESSMENT — PAIN DESCRIPTION - LOCATION: LOCATION: LEG

## 2024-07-26 ASSESSMENT — COLUMBIA-SUICIDE SEVERITY RATING SCALE - C-SSRS
1. IN THE PAST MONTH, HAVE YOU WISHED YOU WERE DEAD OR WISHED YOU COULD GO TO SLEEP AND NOT WAKE UP?: NO
2. HAVE YOU ACTUALLY HAD ANY THOUGHTS OF KILLING YOURSELF?: NO
6. HAVE YOU EVER DONE ANYTHING, STARTED TO DO ANYTHING, OR PREPARED TO DO ANYTHING TO END YOUR LIFE?: NO

## 2024-07-26 ASSESSMENT — PAIN - FUNCTIONAL ASSESSMENT: PAIN_FUNCTIONAL_ASSESSMENT: 0-10

## 2024-07-26 ASSESSMENT — PAIN DESCRIPTION - PAIN TYPE: TYPE: ACUTE PAIN

## 2024-07-26 NOTE — ED PROVIDER NOTES
"HPI   Chief Complaint   Patient presents with    Back Pain     Pt bib EMS from home for back pain, had recent back surgery and states his left knee/thigh area has a \"burning\" sensation and he would like to r/o DVT. Pt states he has not been ambulating much since surgery, only ambulates to bathroom and back, spends majority of the time in bed. Pt states he does participate in his physical therapy. Received 50 of fentanyl and 4 of zofran from EMS en route.        Patient is a 74-year-old male who presented to chief complaint of back pain and lower extremity pain.  Patient is he is mainly concerned about his lower extremity.  He states that he has had some numbness and tingling in it since his surgery.  He states that he was reading online, and got worked up about this being a potential DVT.  Patient states he has no prior history of DVT, he thinks he might be on a blood thinner such as Plavix.  He denies any redness or swelling in his extremity, denies any injuries to this extremity, patient denies any saddle anesthesia, denies any loss of bowel or bladder continence.  Patient denies any fevers or chills, chest pain or shortness of breath, dizziness or blurred vision.  Patient states the back pain has not changed, however just slightly more troublesome to deal with with the leg pain.              Patient History   No past medical history on file.  No past surgical history on file.  No family history on file.  Social History     Tobacco Use    Smoking status: Unknown    Smokeless tobacco: Not on file   Substance Use Topics    Alcohol use: Not on file    Drug use: Not on file       Physical Exam   ED Triage Vitals   Temp Pulse Resp BP   -- -- -- --      SpO2 Temp src Heart Rate Source Patient Position   -- -- -- --      BP Location FiO2 (%)     -- --       Physical Exam  Vitals and nursing note reviewed.   Constitutional:       General: He is not in acute distress.     Appearance: Normal appearance. He is normal weight. " He is not ill-appearing.   HENT:      Head: Normocephalic and atraumatic.      Mouth/Throat:      Mouth: Mucous membranes are moist.      Pharynx: Oropharynx is clear.   Eyes:      Extraocular Movements: Extraocular movements intact.      Conjunctiva/sclera: Conjunctivae normal.      Pupils: Pupils are equal, round, and reactive to light.   Cardiovascular:      Rate and Rhythm: Normal rate and regular rhythm.      Pulses: Normal pulses.      Heart sounds: Normal heart sounds.   Pulmonary:      Effort: Pulmonary effort is normal.      Breath sounds: Normal breath sounds.   Abdominal:      General: Abdomen is flat. Bowel sounds are normal.      Palpations: Abdomen is soft.   Musculoskeletal:         General: No swelling or tenderness. Normal range of motion.      Cervical back: Normal range of motion and neck supple.   Skin:     General: Skin is warm and dry.      Capillary Refill: Capillary refill takes less than 2 seconds.   Neurological:      General: No focal deficit present.      Mental Status: He is alert and oriented to person, place, and time. Mental status is at baseline.   Psychiatric:         Mood and Affect: Mood normal.           ED Course & MDM   ED Course as of 07/28/24 0954   Fri Jul 26, 2024   1412 EKG interpretation provided by myself, EKG shows a normal sinus rhythm, 75 bpm, QRS 94, negative for acute MI, TX interval 158 ms, normal axis, no ST abnormalities. [SALLIE]      ED Course User Index  [SALLIE] Efren Phan,          Diagnoses as of 07/28/24 0954   Other closed fracture of third lumbar vertebra, initial encounter (Multi)   Closed fracture of transverse process of lumbar vertebra, initial encounter (Multi)   Pathological fracture of lumbar vertebra, initial encounter   Neuropathic pain                       Jelena Coma Scale Score: 15                        Medical Decision Making  Patient seen and evaluated at bedside, patient is in no acute distress.  I will order a CT lumbar spine, ultrasound of  lower extremity, CBC, CMP, morphine, Zofran. Differential diagnosis includes present limited to radiculopathy, infection.      Patient's CBC shows no leukocytosis, patient's ultrasound does not show any DVT, patient's CT lumbar spine findings listed below.  I spoke with on-call neurosurgery at [facility], they state the patient needs to have an MRI, to better delineate what is going on, this will dictate whether he is admitted or discharged home.  I will order this.   Patient signed out to oncoming physician at shift change with MRI results pending.   Lower extremity venous duplex left   Final Result    No deep venous thrombosis identified in the left lower extremity.          Signed by: Cuba Goodson 7/26/2024 3:44 PM    Dictation workstation:   [id number]     CT lumbar spine wo IV contrast   Final Result    1.  Interval posterior fusion L1-L5.    2. Interval mild pathologic compression fracture related to diffuse    osteolytic lesion of the L3 vertebral body. Osseous retropulsion at    this level causes new severe canal stenosis.    3. L3 left facetectomy defect may partially decompressed the exiting    nerve root.    4. New nondisplaced fracture right transverse process at L3.          MACRO:    None          Signed by: Cuba Goodson 7/26/2024 3:41 PM    Dictation workstation:   [id number]     MR lumbar spine w and wo IV contrast    (Results Pending)  Results for orders placed or performed during the hospital encounter of 07/26/24  -CBC and Auto Differential:        Result                      Value             Ref Range           WBC                         11.2              4.4 - 11.3 x*       nRBC                        0.0               0.0 - 0.0 /1*       RBC                         3.39 (L)          4.50 - 5.90 *       Hemoglobin                  8.9 (L)           13.5 - 17.5 *       Hematocrit                  28.5 (L)          41.0 - 52.0 %       MCV                         84                 80 - 100 fL         MCH                         26.3              26.0 - 34.0 *       MCHC                        31.2 (L)          32.0 - 36.0 *       RDW                         13.9              11.5 - 14.5 %       Platelets                   618 (H)           150 - 450 x1*       Neutrophils %               72.6              40.0 - 80.0 %       Immature Granulocytes *     2.3 (H)           0.0 - 0.9 %         Lymphocytes %               19.2              13.0 - 44.0 %       Monocytes %                 5.5               2.0 - 10.0 %        Eosinophils %               0.3               0.0 - 6.0 %         Basophils %                 0.1               0.0 - 2.0 %         Neutrophils Absolute        8.11 (H)          1.60 - 5.50 *       Immature Granulocytes *     0.26              0.00 - 0.50 *       Lymphocytes Absolute        2.15              0.80 - 3.00 *       Monocytes Absolute          0.62              0.05 - 0.80 *       Eosinophils Absolute        0.03              0.00 - 0.40 *       Basophils Absolute          0.01              0.00 - 0.10 *  -Comprehensive metabolic panel:        Result                      Value             Ref Range           Glucose                     172 (H)           65 - 99 mg/dL       Sodium                      136               133 - 145 mm*       Potassium                   3.8               3.4 - 5.1 mm*       Chloride                    100               97 - 107 mmo*       Bicarbonate                 23 (L)            24 - 31 mmol*       Urea Nitrogen               7 (L)             8 - 25 mg/dL        Creatinine                  0.70              0.40 - 1.60 *       eGFR                        >90               >60 mL/min/1*       Calcium                     9.1               8.5 - 10.4 m*       Albumin                     3.2 (L)           3.5 - 5.0 g/*       Alkaline Phosphatase        128 (H)           35 - 125 U/L        Total Protein               7.0                5.9 - 7.9 g/*       AST                         10                5 - 40 U/L          Bilirubin, Total            0.4               0.1 - 1.2 mg*       ALT                         12                5 - 40 U/L          Anion Gap                   13                <=19 mmol/L            Procedure  Procedures    Sections of this report were created using voice-to-text technology and may contain errors in translation    Efren Phan DO  Emergency Medicine         Efren Phan DO  07/28/24 0905

## 2024-07-27 PROCEDURE — 2500000001 HC RX 250 WO HCPCS SELF ADMINISTERED DRUGS (ALT 637 FOR MEDICARE OP): Performed by: STUDENT IN AN ORGANIZED HEALTH CARE EDUCATION/TRAINING PROGRAM

## 2024-07-27 RX ORDER — OXYCODONE AND ACETAMINOPHEN 5; 325 MG/1; MG/1
1 TABLET ORAL ONCE
Status: COMPLETED | OUTPATIENT
Start: 2024-07-27 | End: 2024-07-27

## 2024-07-27 NOTE — DISCHARGE INSTRUCTIONS
You have been given a prescription for a slightly increased dose of medication to help with pain.  You should follow-up with neurosurgery for your already scheduled appointment.  Return to the emergency department with any worsening symptoms.    It is important to remember that your care does not end here and you must continue to monitor your condition closely. Please return to the emergency department for any worsening or concerning signs or symptoms as directed by our conversations and the discharge instructions. If you do not have a doctor please contact the referral number on your discharge instructions. Please contact any physician specialists provided in your discharge notes as it is very important to follow up with them regarding your condition. If you are unable to reach the physicians provided, please come back to the Emergency Department at any time.    Return to emergency room without delay for ANY new or worsening pains or for any other symptoms or concerns.  Return with worsening pains, nausea, vomiting, trouble breathing, palpitations, shortness of breath, inability to pass stool or urine, loss of control of stool or urine, any numbness or tingling (that is not normal for you), uncontrolled fevers, the passing of blood or other material in stool or urine, rashes, pains or for any other symptoms or concerns you may have.  You are always welcome to return to the ER at any time for any reason or for any other concerns you may have.

## 2024-07-27 NOTE — PROGRESS NOTES
Patient received in sign out from Dr Phan. Patient with ongoing back pain since his previous surgery on 7/9, now with some burning/paresthesia in his left leg. Had abnormal lumbar CT. Case was discussed with spine surgery who have recommended MRI for further delineation.    Patient reexamined.  Strength in left lower extremity slightly less than strength in right lower extremity but sensation is intact.  This is chronic per patient.  Sensation intact to light touch of scrotum/perineum.  Patient's case was discussed with [facility] neurosurgery, Dr. Stephen Morales, who has reviewed MRI images and does not feel that imaging is significantly different from previously.  As long as patient is not having any new neurological symptoms, he recommends that patient be placed on gabapentin 300 mg at night and follow-up with neurosurgery for his already scheduled appointment.  Patient given strict return precautions for any worsening symptoms.  Parts of this chart were completed with dictation software, please excuse any errors in transcription.

## 2024-07-29 ENCOUNTER — TELEPHONE (OUTPATIENT)
Dept: HEMATOLOGY/ONCOLOGY | Facility: CLINIC | Age: 74
End: 2024-07-29
Payer: MEDICARE

## 2024-07-29 ENCOUNTER — APPOINTMENT (OUTPATIENT)
Dept: PRIMARY CARE | Facility: CLINIC | Age: 74
End: 2024-07-29
Payer: MEDICARE

## 2024-07-29 ENCOUNTER — TELEPHONE (OUTPATIENT)
Dept: PALLIATIVE MEDICINE | Facility: HOSPITAL | Age: 74
End: 2024-07-29

## 2024-07-29 ENCOUNTER — TELEPHONE (OUTPATIENT)
Dept: HEMATOLOGY/ONCOLOGY | Facility: CLINIC | Age: 74
End: 2024-07-29

## 2024-07-29 NOTE — TELEPHONE ENCOUNTER
Patient has not shown up for the appointment did leave a message to discuss next labs to make sure we can arrange follow-up for his ongoing treatment for his cancer.

## 2024-07-29 NOTE — TELEPHONE ENCOUNTER
- Continue Asprin for stroke prevention  - Have your Lipid panel rechecked.  You may have this completed when you have other blood work completed by your primary care physician.  We will contact you with the results when it is completed.   -  Avoid Triptan medications for the management of your migraine headaches.   -  Avoid Estrogen containing birth control medications.    -continue Rosuvastatin, discussed most recent cholesterol values- LDL:   LDL (mg/dL)   Date Value   11/17/2022 192 (H)     Neck precautions:  Avoid neck massage or chiropractic manipulation  Avoid heavy weight lifting, pushing or pulling activities such as shoveling  Avoid neck extension during exercise (examples: yoga, pilates, bench pressing)   Avoid reclining with neck extension such as getting hair washed in shampoo bowl at beauty parlor  Avoid rollercoasters or uncontrollable neck movements    If you develop sudden onset severe neck pain, headaches, or stroke symptoms, call 911 with any of the above symptoms so they can be brought to the nearest stroke center for evaluation and treatment.      -check your blood pressure daily, goal 100-130/60-80  -discussed that you should eat a healthy diet (Mediterranean)  -avoid smoking and second hand smoke  -alcohol should be no more than 2 drinks/day for men and 1 drink/day for women  -recommend moderate intensity exercise 10 minutes 4x per week or vigorous exercise 20 minutes 2x per week   -if you develop worst headache of life or any new stroke symptoms (BEFAST symptoms), call 911 immediately   -Follow up with your primary care provider  -return to clinic in 12 months with the Nurse practitioner for follow up.     *I will communicate results to you through the [employer] Rosita.  If you do not have the [employer] rosita, we will call you with results.    *You can also send me messages through the [employer] Rosita, but it should NOT be used for any emergency situations, as it could take several days for me to  Patient called to see if they would like to reschedule an appointment with Cathy Mcneill with supportive oncology.  No answer.  Voicemail message left for patient to call office if interested in scheduling.   review and return your messages.    Stroke, TIA and Warning Signs    Stroke occurs when a blood vessel bringing blood and oxygen to the brain gets blocked (ischemic stroke) or ruptures (hemorrhagic stroke). When this happens, brain cells don't get the blood that they need and the part of the body that the brain controls will not function appropriately.      Stroke symptoms (BEFAST):   BALANCE: Sudden loss of balance or coordination  EYES: Sudden change in vision; loss of vision; blurry vision; or double vision  FACIAL DROOP: Any facial droop on one side of the face  ARMS: Arm or leg weakness, numbness, or tingling  SPEECH: Slurred speech, trouble speaking, trouble understanding speech  TERRIBLE HEADACHE: Sudden onset of a terrible headache    If you SUDDENLY develop any of these symptoms, call 9-1-1 or seek emergency medical attention immediately!  Even if the symptoms improve, this may be a transient blockage of the blood vessel, or TIA (transient ischemic attack) which may be a warning sign that a stroke (permanent brain damage) may occur.      Isn't stroke hopeless?    No! We can treat strokes with tPA (clot busting medication) and thrombectomy (a catheter that can remove the clot), but this can only be done if you come to the hospital quickly and there is brain tissue left to preserve.      Mediterranean diet  Key components of the Mediterranean diet  The Mediterranean diet emphasizes:  Eating primarily plant-based foods, such as fruits and vegetables, whole grains, legumes and nuts   Replacing butter with healthy fats, such as olive oil   Using herbs and spices instead of salt to flavor foods   Limiting red meat to no more than a few times a month   Eating fish and poultry at least twice a week   Drinking red wine in moderation (optional)  The diet also recognizes the importance of being physically active, and enjoying meals with family and friends.    Focus on fruits, vegetables, nuts and grains  The  Mediterranean diet traditionally includes fruits, vegetables and grains. For example, residents of [facility] average six or more servings a day of antioxidant-rich fruits and vegetables.  Grains in the Mediterranean region are typically whole grain and usually contain very few unhealthy trans fats, and bread is an important part of the diet. However, throughout the Mediterranean region, bread is eaten plain or dipped in olive oil -- not eaten with butter or margarine, which contains saturated or trans fats.   Nuts are another part of a healthy Mediterranean diet. Nuts are high in fat, but most of the fat is healthy. Because nuts are high in calories, they should not be eaten in large amounts -- generally no more than a handful a day. For the best nutrition, avoid candied or honey-roasted and heavily salted nuts.    Choose healthier fats  The focus of the Mediterranean diet isn't on limiting total fat consumption, but rather on choosing healthier types of fat. The Mediterranean diet discourages saturated fats and hydrogenated oils (trans fats), both of which contribute to heart disease.  The Mediterranean diet features olive oil as the primary source of fat. Olive oil is mainly monounsaturated fat -- a type of fat that can help reduce low-density lipoprotein (LDL) cholesterol levels when used in place of saturated or trans fats. \"Extra-virgin\" and \"virgin\" olive oils (the least processed forms) also contain the highest levels of protective plant compounds that provide antioxidant effects.  Canola oil and some nuts contain the beneficial linolenic acid (a type of omega-3 fatty acid) in addition to healthy unsaturated fat. Omega-3 fatty acids lower triglycerides, decrease blood clotting, and are associated with decreased incidence of sudden heart attacks, improve the health of your blood vessels, and help moderate blood pressure. Fatty fish -- such as mackerel, lake trout, herring, sardines, albacore tuna and salmon --  are rich sources of omega-3 fatty acids. Fish is eaten on a regular basis in the Mediterranean diet.    Putting it all togetherThe Mediterranean diet is a delicious and healthy way to eat. Many people who switch to this style of eating say they'll never eat any other way. Here are some specific steps to get you started:   Eat your veggies and fruits -- and switch to whole grains. Avariety of plant foods should make up the majority of your meals. They should be minimally processed -- fresh and whole are best. Include veggies and fruits in every meal and eat them for snacks as well. Switch to whole-grain bread and cereal, and begin to eat more whole-grain rice and pasta products. Keep baby carrots, apples and bananas on hand for quick, satisfying snacks. Fruit salads are a wonderful way to eat a variety of healthy fruit.   Go nuts. Nuts and seeds are good sources of fiber, protein and healthy fats. Keep almonds, cashews, pistachios and walnuts on hand for a quick snack. Choose natural peanut butter, rather than the kind with hydrogenated fat added. Try blended sesame seeds (tahini) as a dip or spread for bread.   Pass on the butter. Try olive or canola oil as a healthy replacement for butter or margarine. Lightly drizzle it over vegetables. After cooking pasta, add a touch of olive oil, some garlic and green onions for flavoring. Dip bread in flavored olive oil or lightly spread it on whole-grain bread for a tasty alternative to butter. Try tahini as a dip or spread for bread too.   Spice it up. Herbs and spices make food tasty and can  for salt and fat in recipes.   Go fish. Eat fish at least twice a week. Fresh or water-packed tuna, salmon, trout, mackerel and herring are healthy choices. [place], bake or broil fish for great taste and easy cleanup. Avoid breaded and fried fish.   Rein in the red meat. Limit red meat to no more than a few times a month. Substitute fish and poultry for red meat. When choosing  red meat, make sure it's lean and keep portions small (about the size of a deck of cards). Also avoid sausage, moura and other high-fat, processed meats.   Choose low-fat dairy. Limit higher fat dairy products, such as whole or 2 percent milk, cheese and ice cream. Switch to skim milk, fat-free yogurt and low-fat cheese.

## 2024-07-31 ENCOUNTER — HOME CARE VISIT (OUTPATIENT)
Dept: HOME HEALTH SERVICES | Facility: HOME HEALTH | Age: 74
End: 2024-07-31
Payer: MEDICARE

## 2024-07-31 VITALS — DIASTOLIC BLOOD PRESSURE: 90 MMHG | HEART RATE: 80 BPM | SYSTOLIC BLOOD PRESSURE: 138 MMHG | TEMPERATURE: 97.9 F

## 2024-07-31 PROCEDURE — G0151 HHCP-SERV OF PT,EA 15 MIN: HCPCS | Mod: HHH

## 2024-07-31 SDOH — HEALTH STABILITY: PHYSICAL HEALTH: EXERCISE TYPE: LE THER EX

## 2024-07-31 ASSESSMENT — ACTIVITIES OF DAILY LIVING (ADL)
AMBULATION ASSISTANCE ON FLAT SURFACES: 1
CURRENT_FUNCTION: STAND BY ASSIST
AMBULATION ASSISTANCE: STAND BY ASSIST
AMBULATION ASSISTANCE: 1
AMBULATION_DISTANCE/DURATION_TOLERATED: 20 FT
PHYSICAL TRANSFERS ASSESSED: 1

## 2024-07-31 ASSESSMENT — ENCOUNTER SYMPTOMS
PAIN LOCATION: BACK
PAIN LOCATION - PAIN SEVERITY: 7/10
PERSON REPORTING PAIN: PATIENT
PAIN: 1
PAIN LOCATION - PAIN SEVERITY: 0/10
PAIN LOCATION: LEFT LEG
MUSCLE WEAKNESS: 1
PAIN LOCATION - PAIN FREQUENCY: CONSTANT
PAIN LOCATION - PAIN QUALITY: BURNING

## 2024-08-07 ENCOUNTER — TELEPHONE (OUTPATIENT)
Dept: HEMATOLOGY/ONCOLOGY | Facility: HOSPITAL | Age: 74
End: 2024-08-07
Payer: MEDICARE

## 2024-08-07 NOTE — TELEPHONE ENCOUNTER
Pt called with questions about appointments.  Discussed recent missed appointments with Dr. Chavarria at [facility].  Pt thought the appointments were at another location and was concerned about being able to make a long car ride to get there.  He states he would be able to get to [facility] and agreeable to rescheduling.  Pt transferred to scheduling to set up an appt.

## 2024-08-09 ENCOUNTER — TELEPHONE (OUTPATIENT)
Dept: NEUROSURGERY | Facility: CLINIC | Age: 74
End: 2024-08-09
Payer: MEDICARE

## 2024-08-09 ENCOUNTER — HOME CARE VISIT (OUTPATIENT)
Dept: HOME HEALTH SERVICES | Facility: HOME HEALTH | Age: 74
End: 2024-08-09

## 2024-08-09 ENCOUNTER — TELEPHONE (OUTPATIENT)
Dept: HEMATOLOGY/ONCOLOGY | Facility: CLINIC | Age: 74
End: 2024-08-09
Payer: MEDICARE

## 2024-08-09 NOTE — TELEPHONE ENCOUNTER
Called and spoke with patient's significant other, Iram. Advised her that we are unable to prescribe medication to someone that has not been seen by our physicians. Patient does not have a primary care physician at this point in time.  Iram states that patient is in a lot of pain, unable to get out of bed and having difficulty ambulating. He had a fall yesterday when transferring from a wheel chair. This RN advised patient to go to the ER where they can do xrays and help with pain control. Iram stated that she is unable to get him in and out the car. This RN advised her to call an ambulance to take him to the ER.   Iram stated that she will talk with Christophe to see if he would be willing to go. This RN explained that the hospital also has care coordination to help when they go home.   Iram verbalized understanding.

## 2024-08-12 ENCOUNTER — TELEPHONE (OUTPATIENT)
Dept: HEMATOLOGY/ONCOLOGY | Facility: CLINIC | Age: 74
End: 2024-08-12

## 2024-08-12 ENCOUNTER — APPOINTMENT (OUTPATIENT)
Dept: NEUROSURGERY | Facility: CLINIC | Age: 74
End: 2024-08-12
Payer: MEDICARE

## 2024-08-12 NOTE — TELEPHONE ENCOUNTER
This nurse placed a call to patient to remind him  of appointment scheduled this afternoon.     Patient has missed two previous appointments.    There was no answer at this time. A message was left to call back if they gio assistance with scheduling transportation.       Iram placed a call last Friday concerned that she can not get him in the car and he does not have a PCP but has fallen recently as well. They were advised to seek further evaluation in the ER at that time due to concerns of  pain making it difficult for transfer into and out of the car.       8/12/24 4:15PM  Additional call placed to patient as he did not report to the appointment today. No response noted at this time.    8/13/24 9:48 AM  Message left @[phone] re patient missed appointment in attempt to reschedule or assist with rescheduling patients appointment. Awaiting return call.

## 2024-08-13 ENCOUNTER — TELEPHONE (OUTPATIENT)
Dept: HEMATOLOGY/ONCOLOGY | Facility: CLINIC | Age: 74
End: 2024-08-13

## 2024-08-13 NOTE — TELEPHONE ENCOUNTER
This nurse had a long conversation with Iram as Christophe wound not come to the phone.   Iram reports that he is not getting out of bed. He is barely eating and she is fearful of taking him out to appointments as he can not walk. He sustained a fall last Thursday just getting OOB to the wheelchair.     She was encouraged to call an ambulance service for transport to the ER as he requires further medical attention at this time. She admits that she also agrees but that he will not allow it and is stating that he will not go to the hospital.     She was informed that this was referred also to  to determine if there were any other helpful resources for them.     She was informed that I will attempt to see if a physician phone visit is an option however, with the information she is providing Christophe needs to go to the Emergency room to receive the support and care that her really needs at this time.         8/14/24 11:45 am  This nurse spoke to Iram, at this time as follow up to our conversation yesterday.     She also confirms a conversation with Kar and Dr Chavarria yesterday as well, which included the plan to return to the hospital.     Iram  reports that PT just left the house and she is going to have transport pick Kar up to go back to the hospital for further evaluation shortly as he has not changed since our last conversation.

## 2024-08-13 NOTE — TELEPHONE ENCOUNTER
The patient has been noncompliant with any follow-up and difficulty in getting appointment scheduled in regards to next step.  Discussed in detail concerns for metastatic renal cell cancer and does need further workup and treatment discussed in detail ongoing issues with pain ongoing issues with difficulty with mobility and barely able to get up out of bed and be able to take care of himself.  If he is unable to come in the office would be hard to offer many systemic treatment.  Discussed in detail the need for further workup and to set up services that can help him at home he was agreeable to coming to the emergency room will need likely admission, PT OT, social work potential placement and then can discuss next steps does also need follow-up with neurosurgery in regards to management of wound and then can discuss palliative radiation and discuss the role of systemic treatment.  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   -ED visit 7/26/24 - back / pain negative work up    Radiology review  Reviewed in detail personally and for detail see results in EPIC  US thyroid 7/24 -3.4 cm nodule in the right mid thyroid gland recommend FNA other nodules 0.7 cm and 1.6 cm  MRI of the lumbar spine concerning for left renal cell carcinoma static lesion involving L3 with pathological fracture and epidural invasion  CT chest abdomen pelvis enlarged right thyroid 5.8 x 2.1 cm mass arising from the superior aspect of the left kidney destructive changes of the right rib with a soft tissue mass with a soft tissue mass  CT L spine / DVT / MRI spine -  fusion of L1-L5, compression Fx L3 , DVT neg, L3 surgery , epidural extension, cauda equina compression,

## 2024-08-14 ENCOUNTER — HOME CARE VISIT (OUTPATIENT)
Dept: HOME HEALTH SERVICES | Facility: HOME HEALTH | Age: 74
End: 2024-08-14
Payer: MEDICARE

## 2024-08-14 ENCOUNTER — HOSPITAL ENCOUNTER (INPATIENT)
Facility: HOSPITAL | Age: 74
LOS: 2 days | Discharge: ANOTHER HOSPITAL | End: 2024-08-16
Attending: INTERNAL MEDICINE | Admitting: INTERNAL MEDICINE
Payer: MEDICARE

## 2024-08-14 ENCOUNTER — APPOINTMENT (OUTPATIENT)
Dept: RADIOLOGY | Facility: HOSPITAL | Age: 74
End: 2024-08-14
Payer: MEDICARE

## 2024-08-14 ENCOUNTER — TELEPHONE (OUTPATIENT)
Dept: HEMATOLOGY/ONCOLOGY | Facility: CLINIC | Age: 74
End: 2024-08-14
Payer: MEDICARE

## 2024-08-14 VITALS — HEART RATE: 86 BPM | TEMPERATURE: 98.6 F | DIASTOLIC BLOOD PRESSURE: 72 MMHG | SYSTOLIC BLOOD PRESSURE: 122 MMHG

## 2024-08-14 DIAGNOSIS — M84.48XS PATHOLOGICAL FRACTURE OF LUMBAR VERTEBRA, SEQUELA: ICD-10-CM

## 2024-08-14 DIAGNOSIS — C79.51 PAIN FROM BONE METASTASES (MULTI): Primary | ICD-10-CM

## 2024-08-14 DIAGNOSIS — G89.3 CANCER ASSOCIATED PAIN: ICD-10-CM

## 2024-08-14 DIAGNOSIS — G89.3 PAIN FROM BONE METASTASES (MULTI): Primary | ICD-10-CM

## 2024-08-14 LAB
ALBUMIN SERPL-MCNC: 3.4 G/DL (ref 3.5–5)
ALP BLD-CCNC: 125 U/L (ref 35–125)
ALT SERPL-CCNC: 5 U/L (ref 5–40)
ANION GAP SERPL CALC-SCNC: 13 MMOL/L
AST SERPL-CCNC: 14 U/L (ref 5–40)
BASOPHILS # BLD AUTO: 0.03 X10*3/UL (ref 0–0.1)
BASOPHILS NFR BLD AUTO: 0.2 %
BILIRUB SERPL-MCNC: 0.5 MG/DL (ref 0.1–1.2)
BUN SERPL-MCNC: 9 MG/DL (ref 8–25)
CALCIUM SERPL-MCNC: 10 MG/DL (ref 8.5–10.4)
CHLORIDE SERPL-SCNC: 99 MMOL/L (ref 97–107)
CO2 SERPL-SCNC: 23 MMOL/L (ref 24–31)
CREAT SERPL-MCNC: 0.7 MG/DL (ref 0.4–1.6)
EGFRCR SERPLBLD CKD-EPI 2021: >90 ML/MIN/1.73M*2
EOSINOPHIL # BLD AUTO: 0.02 X10*3/UL (ref 0–0.4)
EOSINOPHIL NFR BLD AUTO: 0.1 %
ERYTHROCYTE [DISTWIDTH] IN BLOOD BY AUTOMATED COUNT: 14.4 % (ref 11.5–14.5)
ERYTHROCYTE [SEDIMENTATION RATE] IN BLOOD BY WESTERGREN METHOD: >130 MM/H (ref 0–20)
GLUCOSE SERPL-MCNC: 162 MG/DL (ref 65–99)
HCT VFR BLD AUTO: 32.2 % (ref 41–52)
HGB BLD-MCNC: 9.9 G/DL (ref 13.5–17.5)
IMM GRANULOCYTES # BLD AUTO: 0.06 X10*3/UL (ref 0–0.5)
IMM GRANULOCYTES NFR BLD AUTO: 0.4 % (ref 0–0.9)
LYMPHOCYTES # BLD AUTO: 2.43 X10*3/UL (ref 0.8–3)
LYMPHOCYTES NFR BLD AUTO: 17.6 %
MCH RBC QN AUTO: 25.6 PG (ref 26–34)
MCHC RBC AUTO-ENTMCNC: 30.7 G/DL (ref 32–36)
MCV RBC AUTO: 83 FL (ref 80–100)
MONOCYTES # BLD AUTO: 0.86 X10*3/UL (ref 0.05–0.8)
MONOCYTES NFR BLD AUTO: 6.2 %
NEUTROPHILS # BLD AUTO: 10.43 X10*3/UL (ref 1.6–5.5)
NEUTROPHILS NFR BLD AUTO: 75.5 %
NRBC BLD-RTO: 0 /100 WBCS (ref 0–0)
PLATELET # BLD AUTO: 635 X10*3/UL (ref 150–450)
POTASSIUM SERPL-SCNC: 3.8 MMOL/L (ref 3.4–5.1)
PROT SERPL-MCNC: 8.1 G/DL (ref 5.9–7.9)
RBC # BLD AUTO: 3.86 X10*6/UL (ref 4.5–5.9)
SODIUM SERPL-SCNC: 135 MMOL/L (ref 133–145)
WBC # BLD AUTO: 13.8 X10*3/UL (ref 4.4–11.3)

## 2024-08-14 PROCEDURE — 85652 RBC SED RATE AUTOMATED: CPT

## 2024-08-14 PROCEDURE — 36415 COLL VENOUS BLD VENIPUNCTURE: CPT

## 2024-08-14 PROCEDURE — 2500000004 HC RX 250 GENERAL PHARMACY W/ HCPCS (ALT 636 FOR OP/ED)

## 2024-08-14 PROCEDURE — G0151 HHCP-SERV OF PT,EA 15 MIN: HCPCS | Mod: HHH

## 2024-08-14 PROCEDURE — 96374 THER/PROPH/DIAG INJ IV PUSH: CPT

## 2024-08-14 PROCEDURE — 85025 COMPLETE CBC W/AUTO DIFF WBC: CPT

## 2024-08-14 PROCEDURE — 2550000001 HC RX 255 CONTRASTS

## 2024-08-14 PROCEDURE — 80053 COMPREHEN METABOLIC PANEL: CPT

## 2024-08-14 PROCEDURE — 1200000002 HC GENERAL ROOM WITH TELEMETRY DAILY

## 2024-08-14 PROCEDURE — 99285 EMERGENCY DEPT VISIT HI MDM: CPT | Mod: 25

## 2024-08-14 PROCEDURE — 2500000004 HC RX 250 GENERAL PHARMACY W/ HCPCS (ALT 636 FOR OP/ED): Performed by: INTERNAL MEDICINE

## 2024-08-14 PROCEDURE — 2500000001 HC RX 250 WO HCPCS SELF ADMINISTERED DRUGS (ALT 637 FOR MEDICARE OP): Performed by: INTERNAL MEDICINE

## 2024-08-14 PROCEDURE — 96372 THER/PROPH/DIAG INJ SC/IM: CPT

## 2024-08-14 PROCEDURE — A9575 INJ GADOTERATE MEGLUMI 0.1ML: HCPCS

## 2024-08-14 PROCEDURE — 72158 MRI LUMBAR SPINE W/O & W/DYE: CPT

## 2024-08-14 RX ORDER — GUAIFENESIN/DEXTROMETHORPHAN 100-10MG/5
5 SYRUP ORAL EVERY 4 HOURS PRN
Status: DISCONTINUED | OUTPATIENT
Start: 2024-08-14 | End: 2024-08-16 | Stop reason: HOSPADM

## 2024-08-14 RX ORDER — METOPROLOL SUCCINATE 50 MG/1
50 TABLET, EXTENDED RELEASE ORAL DAILY
Status: DISCONTINUED | OUTPATIENT
Start: 2024-08-15 | End: 2024-08-16 | Stop reason: HOSPADM

## 2024-08-14 RX ORDER — ATORVASTATIN CALCIUM 20 MG/1
20 TABLET, FILM COATED ORAL NIGHTLY
Status: DISCONTINUED | OUTPATIENT
Start: 2024-08-14 | End: 2024-08-16 | Stop reason: HOSPADM

## 2024-08-14 RX ORDER — ACETAMINOPHEN 325 MG/1
650 TABLET ORAL EVERY 4 HOURS PRN
Status: DISCONTINUED | OUTPATIENT
Start: 2024-08-14 | End: 2024-08-16 | Stop reason: HOSPADM

## 2024-08-14 RX ORDER — ACETAMINOPHEN 160 MG/5ML
650 SOLUTION ORAL EVERY 4 HOURS PRN
Status: DISCONTINUED | OUTPATIENT
Start: 2024-08-14 | End: 2024-08-16 | Stop reason: HOSPADM

## 2024-08-14 RX ORDER — HEPARIN SODIUM 5000 [USP'U]/ML
5000 INJECTION, SOLUTION INTRAVENOUS; SUBCUTANEOUS EVERY 8 HOURS SCHEDULED
Status: DISCONTINUED | OUTPATIENT
Start: 2024-08-15 | End: 2024-08-16 | Stop reason: HOSPADM

## 2024-08-14 RX ORDER — GABAPENTIN 100 MG/1
100 CAPSULE ORAL NIGHTLY PRN
Status: DISCONTINUED | OUTPATIENT
Start: 2024-08-14 | End: 2024-08-15

## 2024-08-14 RX ORDER — TRAZODONE HYDROCHLORIDE 50 MG/1
50 TABLET ORAL NIGHTLY
Status: DISCONTINUED | OUTPATIENT
Start: 2024-08-14 | End: 2024-08-14

## 2024-08-14 RX ORDER — GADOTERATE MEGLUMINE 376.9 MG/ML
20 INJECTION INTRAVENOUS
Status: COMPLETED | OUTPATIENT
Start: 2024-08-14 | End: 2024-08-14

## 2024-08-14 RX ORDER — AMLODIPINE BESYLATE 10 MG/1
10 TABLET ORAL DAILY
Status: DISCONTINUED | OUTPATIENT
Start: 2024-08-15 | End: 2024-08-16 | Stop reason: HOSPADM

## 2024-08-14 RX ORDER — FAMOTIDINE 20 MG/1
20 TABLET, FILM COATED ORAL DAILY
Status: DISCONTINUED | OUTPATIENT
Start: 2024-08-15 | End: 2024-08-15

## 2024-08-14 RX ORDER — ACETAMINOPHEN 650 MG/1
650 SUPPOSITORY RECTAL EVERY 4 HOURS PRN
Status: DISCONTINUED | OUTPATIENT
Start: 2024-08-14 | End: 2024-08-16 | Stop reason: HOSPADM

## 2024-08-14 RX ORDER — CLOPIDOGREL BISULFATE 75 MG/1
75 TABLET ORAL DAILY
Status: DISCONTINUED | OUTPATIENT
Start: 2024-08-15 | End: 2024-08-16 | Stop reason: HOSPADM

## 2024-08-14 RX ORDER — ONDANSETRON 4 MG/1
4 TABLET, ORALLY DISINTEGRATING ORAL EVERY 8 HOURS PRN
Status: DISCONTINUED | OUTPATIENT
Start: 2024-08-14 | End: 2024-08-16 | Stop reason: HOSPADM

## 2024-08-14 RX ORDER — DOCUSATE SODIUM 100 MG/1
100 CAPSULE, LIQUID FILLED ORAL 2 TIMES DAILY
Status: DISCONTINUED | OUTPATIENT
Start: 2024-08-14 | End: 2024-08-16 | Stop reason: HOSPADM

## 2024-08-14 RX ORDER — HYDROMORPHONE HYDROCHLORIDE 1 MG/ML
1 INJECTION, SOLUTION INTRAMUSCULAR; INTRAVENOUS; SUBCUTANEOUS ONCE
Status: COMPLETED | OUTPATIENT
Start: 2024-08-14 | End: 2024-08-14

## 2024-08-14 RX ORDER — POLYETHYLENE GLYCOL 3350 17 G/17G
17 POWDER, FOR SOLUTION ORAL DAILY PRN
Status: DISCONTINUED | OUTPATIENT
Start: 2024-08-14 | End: 2024-08-16 | Stop reason: HOSPADM

## 2024-08-14 RX ORDER — HYDROCHLOROTHIAZIDE 25 MG/1
25 TABLET ORAL DAILY
Status: DISCONTINUED | OUTPATIENT
Start: 2024-08-15 | End: 2024-08-15

## 2024-08-14 RX ORDER — ONDANSETRON HYDROCHLORIDE 2 MG/ML
4 INJECTION, SOLUTION INTRAVENOUS EVERY 8 HOURS PRN
Status: DISCONTINUED | OUTPATIENT
Start: 2024-08-14 | End: 2024-08-16 | Stop reason: HOSPADM

## 2024-08-14 RX ORDER — TRAZODONE HYDROCHLORIDE 50 MG/1
50 TABLET ORAL NIGHTLY PRN
Status: DISCONTINUED | OUTPATIENT
Start: 2024-08-14 | End: 2024-08-16 | Stop reason: HOSPADM

## 2024-08-14 RX ORDER — OXYCODONE HYDROCHLORIDE 5 MG/1
10 TABLET ORAL EVERY 12 HOURS PRN
Status: DISCONTINUED | OUTPATIENT
Start: 2024-08-14 | End: 2024-08-16 | Stop reason: HOSPADM

## 2024-08-14 RX ORDER — GUAIFENESIN 600 MG/1
600 TABLET, EXTENDED RELEASE ORAL EVERY 12 HOURS PRN
Status: DISCONTINUED | OUTPATIENT
Start: 2024-08-14 | End: 2024-08-16 | Stop reason: HOSPADM

## 2024-08-14 RX ORDER — ASPIRIN 81 MG/1
81 TABLET ORAL DAILY
Status: DISCONTINUED | OUTPATIENT
Start: 2024-08-15 | End: 2024-08-16 | Stop reason: HOSPADM

## 2024-08-14 RX ORDER — LACTULOSE 10 G/15ML
20 SOLUTION ORAL DAILY
Status: DISCONTINUED | OUTPATIENT
Start: 2024-08-15 | End: 2024-08-16 | Stop reason: HOSPADM

## 2024-08-14 SDOH — SOCIAL STABILITY: SOCIAL INSECURITY: DO YOU FEEL ANYONE HAS EXPLOITED OR TAKEN ADVANTAGE OF YOU FINANCIALLY OR OF YOUR PERSONAL PROPERTY?: NO

## 2024-08-14 SDOH — SOCIAL STABILITY: SOCIAL INSECURITY: HAS ANYONE EVER THREATENED TO HURT YOUR FAMILY OR YOUR PETS?: NO

## 2024-08-14 SDOH — SOCIAL STABILITY: SOCIAL INSECURITY: ABUSE: ADULT

## 2024-08-14 SDOH — SOCIAL STABILITY: SOCIAL INSECURITY: ARE YOU OR HAVE YOU BEEN THREATENED OR ABUSED PHYSICALLY, EMOTIONALLY, OR SEXUALLY BY ANYONE?: NO

## 2024-08-14 SDOH — SOCIAL STABILITY: SOCIAL INSECURITY: HAVE YOU HAD ANY THOUGHTS OF HARMING ANYONE ELSE?: NO

## 2024-08-14 SDOH — SOCIAL STABILITY: SOCIAL INSECURITY: WERE YOU ABLE TO COMPLETE ALL THE BEHAVIORAL HEALTH SCREENINGS?: YES

## 2024-08-14 SDOH — SOCIAL STABILITY: SOCIAL INSECURITY: DOES ANYONE TRY TO KEEP YOU FROM HAVING/CONTACTING OTHER FRIENDS OR DOING THINGS OUTSIDE YOUR HOME?: NO

## 2024-08-14 SDOH — SOCIAL STABILITY: SOCIAL INSECURITY: ARE THERE ANY APPARENT SIGNS OF INJURIES/BEHAVIORS THAT COULD BE RELATED TO ABUSE/NEGLECT?: NO

## 2024-08-14 SDOH — HEALTH STABILITY: PHYSICAL HEALTH: EXERCISE TYPE: LE THER EX

## 2024-08-14 SDOH — SOCIAL STABILITY: SOCIAL INSECURITY: HAVE YOU HAD THOUGHTS OF HARMING ANYONE ELSE?: NO

## 2024-08-14 SDOH — SOCIAL STABILITY: SOCIAL INSECURITY: DO YOU FEEL UNSAFE GOING BACK TO THE PLACE WHERE YOU ARE LIVING?: NO

## 2024-08-14 ASSESSMENT — PAIN SCALES - GENERAL
PAINLEVEL_OUTOF10: 0 - NO PAIN
PAINLEVEL_OUTOF10: 7
PAINLEVEL_OUTOF10: 0 - NO PAIN
PAINLEVEL_OUTOF10: 6
PAINLEVEL_OUTOF10: 10 - WORST POSSIBLE PAIN
PAINLEVEL_OUTOF10: 10 - WORST POSSIBLE PAIN
PAINLEVEL_OUTOF10: 0 - NO PAIN
PAINLEVEL_OUTOF10: 9

## 2024-08-14 ASSESSMENT — ACTIVITIES OF DAILY LIVING (ADL)
HOME_HEALTH_OASIS: 01
HEARING - RIGHT EAR: FUNCTIONAL
TOILETING: NEEDS ASSISTANCE
WALKS IN HOME: NEEDS ASSISTANCE
PATIENT'S MEMORY ADEQUATE TO SAFELY COMPLETE DAILY ACTIVITIES?: YES
FEEDING YOURSELF: INDEPENDENT
GROOMING: INDEPENDENT
ASSISTIVE_DEVICE: WALKER
ADEQUATE_TO_COMPLETE_ADL: YES
DRESSING YOURSELF: INDEPENDENT
OASIS_M1830: 05
LACK_OF_TRANSPORTATION: NO
BATHING: NEEDS ASSISTANCE
JUDGMENT_ADEQUATE_SAFELY_COMPLETE_DAILY_ACTIVITIES: YES
HEARING - LEFT EAR: FUNCTIONAL

## 2024-08-14 ASSESSMENT — LIFESTYLE VARIABLES
HOW OFTEN DO YOU HAVE 6 OR MORE DRINKS ON ONE OCCASION: NEVER
AUDIT-C TOTAL SCORE: 1
HOW MANY STANDARD DRINKS CONTAINING ALCOHOL DO YOU HAVE ON A TYPICAL DAY: 1 OR 2
HOW OFTEN DO YOU HAVE A DRINK CONTAINING ALCOHOL: MONTHLY OR LESS
SKIP TO QUESTIONS 9-10: 1
AUDIT-C TOTAL SCORE: 1

## 2024-08-14 ASSESSMENT — COGNITIVE AND FUNCTIONAL STATUS - GENERAL
PATIENT BASELINE BEDBOUND: NO
MOVING FROM LYING ON BACK TO SITTING ON SIDE OF FLAT BED WITH BEDRAILS: A LITTLE
DAILY ACTIVITIY SCORE: 20
WALKING IN HOSPITAL ROOM: A LOT
DRESSING REGULAR LOWER BODY CLOTHING: A LITTLE
STANDING UP FROM CHAIR USING ARMS: A LOT
MOVING TO AND FROM BED TO CHAIR: A LOT
CLIMB 3 TO 5 STEPS WITH RAILING: A LOT
TOILETING: A LITTLE
DRESSING REGULAR UPPER BODY CLOTHING: A LITTLE
HELP NEEDED FOR BATHING: A LITTLE
MOBILITY SCORE: 14
TURNING FROM BACK TO SIDE WHILE IN FLAT BAD: A LITTLE

## 2024-08-14 ASSESSMENT — ENCOUNTER SYMPTOMS
PAIN LOCATION - PAIN SEVERITY: 8/10
PAIN LOCATION: GENERALIZED
PERSON REPORTING PAIN: PATIENT
MUSCLE WEAKNESS: 1
PAIN: 1

## 2024-08-14 ASSESSMENT — PAIN DESCRIPTION - ORIENTATION
ORIENTATION: LEFT
ORIENTATION: LOWER
ORIENTATION: LOWER

## 2024-08-14 ASSESSMENT — PATIENT HEALTH QUESTIONNAIRE - PHQ9
SUM OF ALL RESPONSES TO PHQ9 QUESTIONS 1 & 2: 2
1. LITTLE INTEREST OR PLEASURE IN DOING THINGS: SEVERAL DAYS
2. FEELING DOWN, DEPRESSED OR HOPELESS: SEVERAL DAYS

## 2024-08-14 ASSESSMENT — PAIN DESCRIPTION - LOCATION
LOCATION: HIP
LOCATION: BACK

## 2024-08-14 ASSESSMENT — PAIN - FUNCTIONAL ASSESSMENT
PAIN_FUNCTIONAL_ASSESSMENT: 0-10

## 2024-08-14 NOTE — ED PROVIDER NOTES
HPI   Chief Complaint   Patient presents with    Hip Pain     Bib ems from home with worsening left hip pain and pressure radiating down his leg. Patient had surgery recently, home health rn is concerned about pain control and immobility. Patient only complaint is pain        HPI  Patient is a 74-year-old male with history of metastatic renal cell carcinoma presenting for evaluation of low back pain radiating down his legs bilaterally.  States the pain is severe.  States that he was diagnosed with fractures in his back and had an L1-L5 fusion and a L2-L4 decompression and tumor debulking.  His oncologist sent him in because he was concerned due to limited mobility and not being able to get to his oncology appointments for care.  He also has no pain control at home he states.  He denies saddle anesthesia, fevers or chills.  Denies any loss of bowel or bladder function.      Patient History   No past medical history on file.  No past surgical history on file.  No family history on file.  Social History     Tobacco Use    Smoking status: Unknown    Smokeless tobacco: Not on file   Substance Use Topics    Alcohol use: Not on file    Drug use: Not on file       Physical Exam   ED Triage Vitals [08/14/24 1245]   Temperature Heart Rate Respirations BP   36.8 °C (98.2 °F) 97 16 112/68      Pulse Ox Temp Source Heart Rate Source Patient Position   100 % Temporal -- --      BP Location FiO2 (%)     -- --       Physical Exam  Vitals and nursing note reviewed.   Constitutional:       General: He is not in acute distress.     Appearance: He is well-developed.      Comments: Chronically ill-appearing thin male in resting in hospital bed appearing uncomfortable   HENT:      Head: Normocephalic and atraumatic.      Mouth/Throat:      Mouth: Mucous membranes are dry.   Eyes:      Conjunctiva/sclera: Conjunctivae normal.   Cardiovascular:      Rate and Rhythm: Normal rate and regular rhythm.      Heart sounds: No murmur  heard.  Pulmonary:      Effort: Pulmonary effort is normal. No respiratory distress.      Breath sounds: Normal breath sounds.   Abdominal:      Palpations: Abdomen is soft.      Tenderness: There is no abdominal tenderness.   Musculoskeletal:         General: No swelling.      Cervical back: Neck supple.   Skin:     General: Skin is warm and dry.      Capillary Refill: Capillary refill takes less than 2 seconds.   Neurological:      Mental Status: He is alert.      Comments: Intact rectal tone.  Able to dorsiflex and plantarflex bilateral lower extremities.  Difficult to assess deep tendon reflexes on exam.  Range of motion of hips and knees limited on exam.   Psychiatric:         Mood and Affect: Mood normal.           ED Course & MDM   Diagnoses as of 08/14/24 2228   Cancer associated pain   Pathological fracture of lumbar vertebra, sequela   Pain from bone metastases (Multi)                 No data recorded     Jelena Coma Scale Score: 15 (08/14/24 1245 : Mattie Fernandes RN)                           Medical Decision Making  Parts of this chart have been completed using voice recognition software. Please excuse any errors of transcription.  My thought process and reason for plan has been formulated from the time that I saw the patient until the time of disposition and is not specific to one specific moment during their visit and furthermore my MDM encompasses this entire chart and not only this text box.      HPI: Detailed above.    Exam: A medically appropriate exam performed, outlined above, given the known history and presentation.    History obtained from: Patient    Social Determinants of Health considered during this visit: Unable to get to oncology appointments due to pain    Medications given during visit:  Medications   HYDROmorphone (Dilaudid) injection 1 mg (1 mg intramuscular Given 8/14/24 1319)   gadoterate meglumine (Dotarem) 0.5 mmol/mL contrast injection 20 mL (16 mL intravenous Given 8/14/24  7290)   HYDROmorphone (Dilaudid) injection 1 mg (1 mg intravenous Given 8/14/24 8422)        Diagnostic/tests  Labs Reviewed   CBC WITH AUTO DIFFERENTIAL - Abnormal       Result Value    WBC 13.8 (*)     nRBC 0.0      RBC 3.86 (*)     Hemoglobin 9.9 (*)     Hematocrit 32.2 (*)     MCV 83      MCH 25.6 (*)     MCHC 30.7 (*)     RDW 14.4      Platelets 635 (*)     Neutrophils % 75.5      Immature Granulocytes %, Automated 0.4      Lymphocytes % 17.6      Monocytes % 6.2      Eosinophils % 0.1      Basophils % 0.2      Neutrophils Absolute 10.43 (*)     Immature Granulocytes Absolute, Automated 0.06      Lymphocytes Absolute 2.43      Monocytes Absolute 0.86 (*)     Eosinophils Absolute 0.02      Basophils Absolute 0.03     COMPREHENSIVE METABOLIC PANEL - Abnormal    Glucose 162 (*)     Sodium 135      Potassium 3.8      Chloride 99      Bicarbonate 23 (*)     Urea Nitrogen 9      Creatinine 0.70      eGFR >90      Calcium 10.0      Albumin 3.4 (*)     Alkaline Phosphatase 125      Total Protein 8.1 (*)     AST 14      Bilirubin, Total 0.5      ALT 5      Anion Gap 13     SEDIMENTATION RATE, AUTOMATED - Abnormal    Sedimentation Rate >130 (*)       MR lumbar spine w and wo IV contrast   Final Result   * L3 compression fracture with retropulsion of the posterior cortex   and severe stenosis of the central spinal canal with nerve root   compression unchanged from the previous exam *No new foci of marrow   replacement or compression fracture *There is no measurable change   compared to the previous exam.        MACRO:   Critical Finding:  See findings. Notification was initiated on   8/14/2024 at 3:50 pm by  Elroy Uribe.  (**-OCF-**)        Signed by: Elroy Uribe 8/14/2024 3:50 PM   Dictation workstation:   [id number]           Considerations/further MDM:  Patient is a 74-year-old male presenting for evaluation of back pain    Patient hemodynamically stable upon arrival.  He does have rectal tone on exam.  He  does have intact plantar flexion and dorsiflexion of his feet bilaterally.  Pulses are +2.  Range of motion and strength of bilateral lower extremities is difficult to assess based on patient's pain.    Laboratory workup with mild leukocytosis and with elevated sed rate.    Given history of metastatic cancer, MRI was ordered for evaluation for tumor compression or cauda equina syndrome.  MRI is with stenosis similar to prior exam.    Patient provided pain control while in the ER.  Symptoms did improve and patient had increased range of motion of his extremities.    I did speak with the oncologist at [facility] Dr. Howard.  We discussed the patient case and he is agreeable to accepting the patient for transfer to [facility] for further care, pain control and PT OT evaluation.    At the time of my signout still awaiting open bed at [facility].  Did speak with hospitalist Dr. Sanderson who accepted the patient for further management of pain control while awaiting open bed at [facility].  The patient remained hemodynamically stable during the ER visit.  Procedure  Procedures     Carmen Caldwell PA-C  08/14/24 0642       Carmen Caldwell PA-C  08/14/24 4543

## 2024-08-15 LAB
ANION GAP SERPL CALC-SCNC: 10 MMOL/L
BUN SERPL-MCNC: 10 MG/DL (ref 8–25)
CALCIUM SERPL-MCNC: 9.6 MG/DL (ref 8.5–10.4)
CHLORIDE SERPL-SCNC: 98 MMOL/L (ref 97–107)
CO2 SERPL-SCNC: 23 MMOL/L (ref 24–31)
CREAT SERPL-MCNC: 0.6 MG/DL (ref 0.4–1.6)
EGFRCR SERPLBLD CKD-EPI 2021: >90 ML/MIN/1.73M*2
ERYTHROCYTE [DISTWIDTH] IN BLOOD BY AUTOMATED COUNT: 14.3 % (ref 11.5–14.5)
GLUCOSE SERPL-MCNC: 147 MG/DL (ref 65–99)
HCT VFR BLD AUTO: 29.8 % (ref 41–52)
HGB BLD-MCNC: 9 G/DL (ref 13.5–17.5)
MCH RBC QN AUTO: 25.2 PG (ref 26–34)
MCHC RBC AUTO-ENTMCNC: 30.2 G/DL (ref 32–36)
MCV RBC AUTO: 84 FL (ref 80–100)
NRBC BLD-RTO: 0 /100 WBCS (ref 0–0)
PLATELET # BLD AUTO: 594 X10*3/UL (ref 150–450)
POTASSIUM SERPL-SCNC: 3.7 MMOL/L (ref 3.4–5.1)
RBC # BLD AUTO: 3.57 X10*6/UL (ref 4.5–5.9)
SODIUM SERPL-SCNC: 131 MMOL/L (ref 133–145)
WBC # BLD AUTO: 12.3 X10*3/UL (ref 4.4–11.3)

## 2024-08-15 PROCEDURE — 82374 ASSAY BLOOD CARBON DIOXIDE: CPT | Performed by: INTERNAL MEDICINE

## 2024-08-15 PROCEDURE — 99497 ADVNCD CARE PLAN 30 MIN: CPT | Performed by: NURSE PRACTITIONER

## 2024-08-15 PROCEDURE — 36415 COLL VENOUS BLD VENIPUNCTURE: CPT | Performed by: INTERNAL MEDICINE

## 2024-08-15 PROCEDURE — 1100000001 HC PRIVATE ROOM DAILY

## 2024-08-15 PROCEDURE — 2500000001 HC RX 250 WO HCPCS SELF ADMINISTERED DRUGS (ALT 637 FOR MEDICARE OP): Performed by: INTERNAL MEDICINE

## 2024-08-15 PROCEDURE — 99223 1ST HOSP IP/OBS HIGH 75: CPT | Performed by: NURSE PRACTITIONER

## 2024-08-15 PROCEDURE — 2500000001 HC RX 250 WO HCPCS SELF ADMINISTERED DRUGS (ALT 637 FOR MEDICARE OP): Performed by: NURSE PRACTITIONER

## 2024-08-15 PROCEDURE — 99232 SBSQ HOSP IP/OBS MODERATE 35: CPT | Performed by: NURSE PRACTITIONER

## 2024-08-15 PROCEDURE — 85027 COMPLETE CBC AUTOMATED: CPT | Performed by: INTERNAL MEDICINE

## 2024-08-15 PROCEDURE — 2500000004 HC RX 250 GENERAL PHARMACY W/ HCPCS (ALT 636 FOR OP/ED): Performed by: NURSE PRACTITIONER

## 2024-08-15 PROCEDURE — 2500000004 HC RX 250 GENERAL PHARMACY W/ HCPCS (ALT 636 FOR OP/ED): Performed by: INTERNAL MEDICINE

## 2024-08-15 RX ORDER — PANTOPRAZOLE SODIUM 40 MG/1
40 TABLET, DELAYED RELEASE ORAL
Status: DISCONTINUED | OUTPATIENT
Start: 2024-08-16 | End: 2024-08-16 | Stop reason: HOSPADM

## 2024-08-15 RX ORDER — DEXAMETHASONE 4 MG/1
4 TABLET ORAL EVERY 8 HOURS SCHEDULED
Status: DISCONTINUED | OUTPATIENT
Start: 2024-08-15 | End: 2024-08-16 | Stop reason: HOSPADM

## 2024-08-15 RX ORDER — GABAPENTIN 100 MG/1
100 CAPSULE ORAL 3 TIMES DAILY
Status: DISCONTINUED | OUTPATIENT
Start: 2024-08-15 | End: 2024-08-16 | Stop reason: HOSPADM

## 2024-08-15 RX ORDER — DEXAMETHASONE SODIUM PHOSPHATE 10 MG/ML
10 INJECTION INTRAMUSCULAR; INTRAVENOUS ONCE
Status: COMPLETED | OUTPATIENT
Start: 2024-08-15 | End: 2024-08-15

## 2024-08-15 RX ORDER — LIDOCAINE 560 MG/1
1 PATCH PERCUTANEOUS; TOPICAL; TRANSDERMAL EVERY 24 HOURS
Status: DISCONTINUED | OUTPATIENT
Start: 2024-08-15 | End: 2024-08-16 | Stop reason: HOSPADM

## 2024-08-15 RX ORDER — HYDROMORPHONE HYDROCHLORIDE 1 MG/ML
0.6 INJECTION, SOLUTION INTRAMUSCULAR; INTRAVENOUS; SUBCUTANEOUS
Status: COMPLETED | OUTPATIENT
Start: 2024-08-15 | End: 2024-08-15

## 2024-08-15 SDOH — ECONOMIC STABILITY: INCOME INSECURITY: IN THE LAST 12 MONTHS, WAS THERE A TIME WHEN YOU WERE NOT ABLE TO PAY THE MORTGAGE OR RENT ON TIME?: NO

## 2024-08-15 SDOH — ECONOMIC STABILITY: FOOD INSECURITY: WITHIN THE PAST 12 MONTHS, THE FOOD YOU BOUGHT JUST DIDN'T LAST AND YOU DIDN'T HAVE MONEY TO GET MORE.: NEVER TRUE

## 2024-08-15 SDOH — SOCIAL STABILITY: SOCIAL INSECURITY: WITHIN THE LAST YEAR, HAVE YOU BEEN AFRAID OF YOUR PARTNER OR EX-PARTNER?: NO

## 2024-08-15 SDOH — HEALTH STABILITY: MENTAL HEALTH
HOW OFTEN DO YOU NEED TO HAVE SOMEONE HELP YOU WHEN YOU READ INSTRUCTIONS, PAMPHLETS, OR OTHER WRITTEN MATERIAL FROM YOUR DOCTOR OR PHARMACY?: NEVER

## 2024-08-15 SDOH — SOCIAL STABILITY: SOCIAL INSECURITY
WITHIN THE LAST YEAR, HAVE TO BEEN RAPED OR FORCED TO HAVE ANY KIND OF SEXUAL ACTIVITY BY YOUR PARTNER OR EX-PARTNER?: NO

## 2024-08-15 SDOH — ECONOMIC STABILITY: HOUSING INSECURITY: AT ANY TIME IN THE PAST 12 MONTHS, WERE YOU HOMELESS OR LIVING IN A SHELTER (INCLUDING NOW)?: NO

## 2024-08-15 SDOH — ECONOMIC STABILITY: FOOD INSECURITY: WITHIN THE PAST 12 MONTHS, YOU WORRIED THAT YOUR FOOD WOULD RUN OUT BEFORE YOU GOT MONEY TO BUY MORE.: NEVER TRUE

## 2024-08-15 SDOH — SOCIAL STABILITY: SOCIAL INSECURITY
WITHIN THE LAST YEAR, HAVE YOU BEEN KICKED, HIT, SLAPPED, OR OTHERWISE PHYSICALLY HURT BY YOUR PARTNER OR EX-PARTNER?: NO

## 2024-08-15 SDOH — ECONOMIC STABILITY: INCOME INSECURITY: IN THE PAST 12 MONTHS, HAS THE ELECTRIC, GAS, OIL, OR WATER COMPANY THREATENED TO SHUT OFF SERVICE IN YOUR HOME?: NO

## 2024-08-15 SDOH — ECONOMIC STABILITY: HOUSING INSECURITY: IN THE PAST 12 MONTHS, HOW MANY TIMES HAVE YOU MOVED WHERE YOU WERE LIVING?: 0

## 2024-08-15 SDOH — SOCIAL STABILITY: SOCIAL INSECURITY: WITHIN THE LAST YEAR, HAVE YOU BEEN HUMILIATED OR EMOTIONALLY ABUSED IN OTHER WAYS BY YOUR PARTNER OR EX-PARTNER?: NO

## 2024-08-15 SDOH — ECONOMIC STABILITY: INCOME INSECURITY: HOW HARD IS IT FOR YOU TO PAY FOR THE VERY BASICS LIKE FOOD, HOUSING, MEDICAL CARE, AND HEATING?: NOT HARD AT ALL

## 2024-08-15 ASSESSMENT — COGNITIVE AND FUNCTIONAL STATUS - GENERAL
MOBILITY SCORE: 12
TURNING FROM BACK TO SIDE WHILE IN FLAT BAD: A LITTLE
WALKING IN HOSPITAL ROOM: TOTAL
PERSONAL GROOMING: A LITTLE
MOVING TO AND FROM BED TO CHAIR: A LOT
STANDING UP FROM CHAIR USING ARMS: TOTAL
DRESSING REGULAR LOWER BODY CLOTHING: A LOT
CLIMB 3 TO 5 STEPS WITH RAILING: TOTAL
TOILETING: A LOT
DAILY ACTIVITIY SCORE: 19

## 2024-08-15 ASSESSMENT — ENCOUNTER SYMPTOMS
WEAKNESS: 1
PALPITATIONS: 0
BACK PAIN: 1
DIARRHEA: 0
FEVER: 0
TREMORS: 0
NERVOUS/ANXIOUS: 0
CONSTIPATION: 0
WOUND: 0
CHILLS: 0
COUGH: 0
HEADACHES: 0
POLYPHAGIA: 0
ARTHRALGIAS: 0
DIFFICULTY URINATING: 0
FATIGUE: 0
COLOR CHANGE: 0
SHORTNESS OF BREATH: 0
DIZZINESS: 1
ABDOMINAL PAIN: 0
ACTIVITY CHANGE: 1
POLYDIPSIA: 0
ABDOMINAL DISTENTION: 0
NUMBNESS: 1
EYE PAIN: 0
SORE THROAT: 0
SLEEP DISTURBANCE: 1
SEIZURES: 0
MYALGIAS: 0
APPETITE CHANGE: 1
CONFUSION: 0
HEMATURIA: 0
SINUS PAIN: 0
NAUSEA: 0

## 2024-08-15 ASSESSMENT — PAIN DESCRIPTION - LOCATION
LOCATION: BACK
LOCATION: GENERALIZED
LOCATION: BACK

## 2024-08-15 ASSESSMENT — PAIN SCALES - GENERAL
PAINLEVEL_OUTOF10: 8
PAINLEVEL_OUTOF10: 8
PAINLEVEL_OUTOF10: 3
PAINLEVEL_OUTOF10: 6
PAINLEVEL_OUTOF10: 5 - MODERATE PAIN
PAINLEVEL_OUTOF10: 8
PAINLEVEL_OUTOF10: 6

## 2024-08-15 ASSESSMENT — PAIN - FUNCTIONAL ASSESSMENT
PAIN_FUNCTIONAL_ASSESSMENT: 0-10
PAIN_FUNCTIONAL_ASSESSMENT: UNABLE TO SELF-REPORT
PAIN_FUNCTIONAL_ASSESSMENT: 0-10
PAIN_FUNCTIONAL_ASSESSMENT: 0-10

## 2024-08-15 ASSESSMENT — ACTIVITIES OF DAILY LIVING (ADL): LACK_OF_TRANSPORTATION: NO

## 2024-08-15 NOTE — CARE PLAN
The patient's goals for the shift include Pain management, rest, monitor vitals, monitor labs    The clinical goals for the shift include Manage pain, monitor labs and VS, maintain safety, no falls, promote rest, transfer to [facility].    No barriers towards meeting these goals. Plan of care ongoing.       Problem: Discharge Planning  Goal: Discharge to home or other facility with appropriate resources  Outcome: Progressing     Problem: Chronic Conditions and Co-morbidities  Goal: Patient's chronic conditions and co-morbidity symptoms are monitored and maintained or improved  Outcome: Progressing     Problem: Fall/Injury  Goal: Not fall by end of shift  Outcome: Progressing  Goal: Be free from injury by end of the shift  Outcome: Progressing  Goal: Verbalize understanding of personal risk factors for fall in the hospital  Outcome: Progressing  Goal: Verbalize understanding of risk factor reduction measures to prevent injury from fall in the home  Outcome: Progressing  Goal: Use assistive devices by end of the shift  Outcome: Progressing  Goal: Pace activities to prevent fatigue by end of the shift  Outcome: Progressing     Problem: Pain  Goal: Takes deep breaths with improved pain control throughout the shift  Outcome: Progressing  Goal: Turns in bed with improved pain control throughout the shift  Outcome: Progressing  Goal: Walks with improved pain control throughout the shift  Outcome: Progressing  Goal: Performs ADL's with improved pain control throughout shift  Outcome: Progressing  Goal: Participates in PT with improved pain control throughout the shift  Outcome: Progressing  Goal: Free from opioid side effects throughout the shift  Outcome: Progressing  Goal: Free from acute confusion related to pain meds throughout the shift  Outcome: Progressing     Problem: Skin  Goal: Participates in plan/prevention/treatment measures  Outcome: Progressing  Goal: Prevent/minimize sheer/friction injuries  Outcome:  Progressing  Goal: Promote/optimize nutrition  Outcome: Progressing  Goal: Promote skin healing  Outcome: Progressing     Problem: Nutrition  Goal: Adequate PO fluid intake  Outcome: Progressing  Goal: Nutrition support is meeting 75% of nutrient needs  Outcome: Progressing  Goal: Lab values WNL  Outcome: Progressing  Goal: Maintain stable weight  Outcome: Progressing

## 2024-08-15 NOTE — CONSULTS
"Nutrition Assessement Note    Nutrition Assessment    Reason for Assessment: Admission nursing screening    Reason for Hospital Admission:  Christophe Ambriz is a 74 y.o. male who is admitted for metastatic renal cancer.     Past Medical History:   Diagnosis Date    HLD (hyperlipidemia)     HTN (hypertension)     Metastasis (Multi)     Renal cell adenocarcinoma (Multi)       Past Surgical History:   Procedure Laterality Date    LUMBAR FUSION       Nutrition History:  Food and Nutrient History: pt reports he eats \"2 tbsp per day\" for the past 1-1.5 months. poor appetite d/t pain. has boost at home but dislikes suppelment.  Energy Intake: Poor < 50 %    Anthropometrics:  Ht: 185.4 cm (6' 1\"), Wt: 73.7 kg (162 lb 7.7 oz), BMI: 21.44    Weight Change:  Daily Weight  08/14/24 : 73.7 kg (162 lb 7.7 oz)  07/26/24 : 80.7 kg (177 lb 14.4 oz)  07/06/24 : 91 kg (200 lb 9.9 oz)  07/04/24 : 95.3 kg (210 lb)  05/11/24 : 95.3 kg (210 lb)  11/14/22 : 102 kg (225 lb)  05/16/22 : 112 kg (248 lb)  12/14/21 : 109 kg (240 lb)  06/15/21 : 110 kg (242 lb)  12/01/20 : 110 kg (243 lb)     Significant Weight Loss: Yes  Interpretation of Weight Loss:  (48# (22.9%) wt loss over ~1 month (7/4-8/14))    Nutrition Focused Physical Exam Findings: visibly observed   Subcutaneous Fat Loss  Orbital Fat Pads: Mild-Moderate (slight dark circles and slight hollowing)  Buccal Fat Pads: Mild-Moderate (flat cheeks, minimal bounce)    Muscle Wasting  Temporalis: Severe (hollowed scooping depression)    Nutrition Significant Labs:  Lab Results   Component Value Date    WBC 12.3 (H) 08/15/2024    HGB 9.0 (L) 08/15/2024    HCT 29.8 (L) 08/15/2024     (H) 08/15/2024    CHOL 138 12/15/2021    TRIG 183 (H) 12/15/2021    HDL 30 (L) 12/15/2021    ALT 5 08/14/2024    AST 14 08/14/2024     (L) 08/15/2024    K 3.7 08/15/2024    CL 98 08/15/2024    CREATININE 0.60 08/15/2024    BUN 10 08/15/2024    CO2 23 (L) 08/15/2024    PSA 3.69 07/05/2024    INR 1.3 (H) " 07/08/2024     Nutrition Specific Medications:  amLODIPine, 10 mg, oral, Daily  aspirin, 81 mg, oral, Daily  atorvastatin, 20 mg, oral, Nightly  clopidogrel, 75 mg, oral, Daily  docusate sodium, 100 mg, oral, BID  famotidine, 20 mg, oral, Daily  heparin (porcine), 5,000 Units, subcutaneous, q8h SLIM  hydroCHLOROthiazide, 25 mg, oral, Daily  lactulose, 20 g, oral, Daily  metoprolol succinate XL, 50 mg, oral, Daily      Dietary Orders (From admission, onward)       Start     Ordered    08/14/24 2250  Adult diet Regular  Diet effective now        Question:  Diet type  Answer:  Regular    08/14/24 2249                  Estimated Needs:   Estimated Energy Needs  Total Energy Estimated Needs (kCal):  (4618-8660)  Total Estimated Energy Need per Day (kCal/kg):  (30-35)  Method for Estimating Needs: actual wt    Estimated Protein Needs  Total Protein Estimated Needs (g):  ()  Total Protein Estimated Needs (g/kg):  (1.2-1.5)  Method for Estimating Needs: actual wt    Estimated Fluid Needs  Method for Estimating Needs: 1 ml/kcal or per MD        Nutrition Diagnosis   Nutrition Diagnosis:  Malnutrition Diagnosis  Patient has Malnutrition Diagnosis: Yes  Diagnosis Status: New  Malnutrition Diagnosis: Severe malnutrition related to chronic disease or condition  As Evidenced by: moderate/severe subcutaneous fat loss and muscle wasting, po intake </= 75% estimated needs for >/= 1 month, 48# (22.9%) wt loss over ~1 month       Nutrition Interventions/Recommendations   Nutrition Interventions and Recommendations:    Nutrition Prescription:  Individualized Nutrition Prescription Provided for : 5597-7917 kcals and 88-110g protein to be provided via diet    Nutrition Interventions:   Food and/or Nutrient Delivery Interventions  Interventions: Meals and snacks, Medical food supplement  Meals and Snacks: General healthful diet  Goal: provide as ordered  Medical Food Supplement: Commercial beverage  Goal: will trial ensure clear and  vanilla magic cup. will provide one time order for supplements and follow up to monitor preference.    Education Documentation  No documentation found.           Nutrition Monitoring and Evaluation   Monitoring/Evaluation:   Food/Nutrient Related History Monitoring  Monitoring and Evaluation Plan: Energy intake  Energy Intake: Estimated energy intake  Criteria: pt to consume >/= 75% estimated needs    Body Composition/Growth/Weight History  Monitoring and Evaluation Plan: Weight  Weight: Measured weight  Criteria: pt will maintain wt at this time       Time Spent/Follow-up:   Follow Up  Time Spent (min): 30 minutes  Last Date of Nutrition Visit: 08/15/24  Nutrition Follow-Up Needed?: 3-5 days  Follow up Comment: 8/20/24

## 2024-08-15 NOTE — H&P
History Of Present Illness      Christophe Ambriz is a 74 y.o. male presenting with Back Hip Pain.    Patient is a 74-year-old male with history of metastatic renal cell carcinoma presenting for evaluation of low back pain radiating down his legs bilaterally. States the pain is severe. States that he was diagnosed with fractures in his back and had an L1-L5 fusion and a L2-L4 decompression and tumor debulking. His oncologist sent him in because he was concerned due to limited mobility and not being able to get to his oncology appointments for care. He also has no pain control at home he states. He denies saddle anesthesia, fevers or chills. Denies any loss of bowel or bladder function.     Vital signs in the ED were noted for Tmax 36.8, pulse rate 97, respiratory rate 16, /68.  Patient was saturating 100% on room air.    Upon arrival to the ED patient's ED diagnostic workup noted for a CBC with differential just above leukocytosis of 13.8.  H&H was low normal at 9.9/32.2.  Patient's platelet count was rated at 635.  There was a neutrophil predominance.  Patient's blood chemistry was essentially within normal limits.  Elevated glucose level 162.  The patient underwent an MRI of the lumbar spine with and without contrast.    It was noted for the following:      IMPRESSION:    * L3 compression fracture with retropulsion of the posterior cortex  and severe stenosis of the central spinal canal with nerve root  compression unchanged from the previous exam *No new foci of marrow  replacement or compression fracture *There is no measurable change  compared to the previous exam.    Interesting to note that the patient had an MRI of the lumbar spine with and without IV contrast on July 26, 2024.      Patient was recently discharged from [facility] on 07/12/2024.    Hospital Course    Christophe Ambriz is a 74-year-old male with past medical history significant for significant tobacco use, coronary artery  disease, hypertension, hyperlipidemia who was admitted as a transfer from [place] for a left renal mass and pathologic L3 fracture concerning for metastatic disease, newly identified on CT when the patient presented for 1 month of back pain. On admission patient had left lower extremity weakness and significant back and hip pain.  Patient underwent surgery on 7/9 for L1-5 fusion w/ L2-4 decompression, tumor debulking. Surgery resulted in slight improvement in pain and LLE weakness. Surgical pathology results returned on 7/11 show metastatic clear-cell renal cell carcinoma. PT/OT recommended acute rehab, but patient rejected and insisted on going home. Patient was discharged home with plans for outpatient follow up with  oncology, supportive onc, neurosurgery, and primary care.       Case discussed between ED PA and [facility].  Accepted by an Oncologist at [facility] by the name of Dr. Howard.        ED PA spoke with transfer center and currently no bed available at [facility] so request made to admit patient here until bed becomes available.      Past Medical History      CAD s/p stenting unsure year  Hypertension  Hyperlipidemia      Surgical History        PCI for CAD    L1-5 fusion, L2-4 decompression and tumor debulking  20930 - PR ALLOGRAFT FOR SPINE SURGERY ONLY MORSELIZED       Social History      Rare ethanol use  Smoked 3 packs/day for at least 50 years for 150-pack-year history  Denies illicit drug use      Family History      Denies family history of cancer  Mother and father both passed away from ischemic heart disease       Allergies      Oxycodone-acetaminophen      Review of Systems    14-point ROS otherwise negative, as per HPI/Interval History.    General: No change in weight. No weakenss. No Fevers/Chills/Night Sweats   Skin: No skin/hair/nail changes. No rashes or sores.  Head:  No trauma. No Headache/nasuea/vomitting.   Eyes: No visual changes. No tearing. No itching.   Ears: No  "hearing loss. No tinnitus. No vertigo. No discharge.  Nose, Sinuses: No rhinorrhea, No nasal congestion. No epistaxis.  Mouth, Throat, Neck: No bleeding gums, hoarseness, sore throat or swollen neck  Cardiac: No palpitations. No CHICAS. No PND. No Orthopnea.   Respiratory: No Shortness of Breath. No wheezing. No cough. No hemoptysis.   GI: No nausea/vomiting. No indigestion. No diarrhea. No constipation.   Extremities: No numbness or tingling. No paresthesias.   Urinary: No change in urinary frequency. No change in hesitancy. No hematuria. No incontinence.       Physical Exam        Constitutional:  Pleasant  Eyes: PERRL, EOMI,   ENMT: mucous membranes moist  Head/Neck: Neck supple, No JVD,   Respiratory/Thorax: Patent airways, CTAB,   Cardiovascular: Regular, rate and rhythm, no murmurs  Gastrointestinal: Soft, non-distended, +BS.  Musculoskeletal: ROM intact, no joint swelling, normal strength  Lumbar vertical incision clean dry intact  Extremities: peripheral pulses intact; no edema  Neurological: Alert and Oriented x 3; no focal deficits; gross motor and sensation intact; CN II-XII intact. No asterixis.  Psychological: Appropriate mood and behavior  Skin: No lesions, No rashes.  Various tattoos         Last Recorded Vitals  Blood pressure 134/72, pulse 93, temperature 36.8 °C (98.2 °F), resp. rate 18, height 1.854 m (6' 1\"), weight 79.4 kg (175 lb), SpO2 97%.    Relevant Results    Lab Results   Component Value Date    WBC 13.8 (H) 08/14/2024    HGB 9.9 (L) 08/14/2024    HCT 32.2 (L) 08/14/2024    MCV 83 08/14/2024     (H) 08/14/2024       Lab Results   Component Value Date    GLUCOSE 162 (H) 08/14/2024    CALCIUM 10.0 08/14/2024     08/14/2024    K 3.8 08/14/2024    CO2 23 (L) 08/14/2024    CL 99 08/14/2024    BUN 9 08/14/2024    CREATININE 0.70 08/14/2024       No results found for: \"HGBA1C\"      No CT head results found for the past 12 months      Scheduled medications  docusate sodium, 100 mg, " oral, BID      Continuous medications     PRN medications  PRN medications: HYDROmorphone        Assessment/Plan   Principal Problem:    Pain from bone metastases (Multi)  Active Problems:    Pathologic lumbar vertebral fracture, initial encounter    CAD (coronary artery disease)    Stented coronary artery    HTN (hypertension)    Hyperlipidemia    Cancer associated pain      Christophe Ambriz is a 74 y.o. male presenting with Back Hip Pain.  Patient admitted for further evaluation and management.      #Left Renal Mass  #L3 Lumbar Fracture concern for Spinal Mets  :: Patient with 1 month of severe back pain with point tenderness, recent weight loss, pain awakening at night concerning for malignant process with metastasis  :: CT and MRI imaging as above showing left renal mass concerning for RCC with enhancing metastatic lesion infiltrating L3 vertebral body and adjacent epidural invasion involving the ventral aspect of L3  ::s/p L1-5 fusion w/ L2-4 decompression, tumor debulking on 7/9  F/U pathology results  Surgical pathology results: Clear-cell carcinoma, metastatic renal cell carcnoma   Per NSGY: Maintain SBP <160 in perioperative period  Supportive onc pain management from 7/10  Tylenol 975 every 8 hours as needed, oxycodone 7.5 mg every 3 hours PRN moderate pain, Dilaudid 0.5 mg every 6 hours for breakthrough   C/w oxycodone IR 10 mg PO PRN severe pain   C/w gabapentin 100 mg PO at bedtime (for neuropathic pain coverage)   Follow NSGY recs:   Restart ASA POD7 (7/16, 2024)  Restart PLX POD14 (7/23, 2024)     #Constipation  :: patient claims to have baseline constipation, states he has BM every 1-2 weeks  C/w Lactulose 20 g daily        #CAD s/p PCI  #HTN  #HLD  Conitnue home plavix   continue home atorvastatin  continue home amlodipine 10mg daily  continue home hydrochlorothiazide 25mg daily   continue home metoprolol succinate 50mg daily        #insomnia  C/w trazadone 50 mg at bedtime     #Leukocytosis  :: No  current infectious symptoms, likely reactive in setting of likely malignancy and fracture       GI + DVT prophylaxis          Awaiting transfer to [facility].  Patient has been accepted by Dr. Howard.        The patient has been Instructed by me upon admission to follow-up with their PCP upon discharge to obtain repeat blood work and to maintain close medical follow-up for their current disease process.      This Dictation was Transcribed using a Nuance Dragon Voice Recognition System Device (with Compatible Computer + Software) and as such may contain Grammatical Errors and Unintentional Typing Misprints.      I spent 32 minutes in the professional and overall care of this patient.      Michael Sanderson MD

## 2024-08-15 NOTE — CONSULTS
Inpatient consult to Urology  Consult performed by: Divine Lopez MD  Consult ordered by: Michael Sanderson MD  Reason for consult: Metastatic renal cell carcinoma  Assessment/Recommendations: Agree with transfer to tertiary center to initiate urgent palliative xrt to bone mets, he has no hematuria nor renal impact at this point, so no intervention to kidney at this time        This is a 74 year old male who in mid May began to experience significant back pain, making it difficult to ambulate, ultimately was found to have multiple bone mets, w primary being renal cell carcinoma.  He has not been able to get out of house due to severe pain and has not been able to follow at any of his appointments for care, so sent to ER.  Ultimate plan to transfer to [facility] for palliative care    Active Ambulatory Problems     Diagnosis Date Noted    Pathologic lumbar vertebral fracture, initial encounter 07/06/2024    CAD (coronary artery disease) 07/09/2024    Stented coronary artery 07/09/2024    HTN (hypertension) 07/09/2024    Hyperlipidemia 07/09/2024     Resolved Ambulatory Problems     Diagnosis Date Noted    No Resolved Ambulatory Problems     Past Medical History:   Diagnosis Date    HLD (hyperlipidemia)     Metastasis (Multi)     Renal cell adenocarcinoma (Multi)         Past Surgical History:   Procedure Laterality Date    LUMBAR FUSION          Social History     Socioeconomic History    Marital status: Single     Spouse name: Not on file    Number of children: Not on file    Years of education: Not on file    Highest education level: Not on file   Occupational History    Not on file   Tobacco Use    Smoking status: Never     Passive exposure: Never    Smokeless tobacco: Never   Vaping Use    Vaping status: Never Used   Substance and Sexual Activity    Alcohol use: Not on file    Drug use: Not on file    Sexual activity: Not on file   Other Topics Concern    Not on file   Social History Narrative    Not on file      Social Determinants of Health     Financial Resource Strain: Low Risk  (2024)    Overall Financial Resource Strain (CARDIA)     Difficulty of Paying Living Expenses: Not hard at all   Food Insecurity: Not on file   Transportation Needs: No Transportation Needs (2024)    PRAPARE - Transportation     Lack of Transportation (Medical): No     Lack of Transportation (Non-Medical): No   Physical Activity: Not on file   Stress: Not on file   Social Connections: Feeling Socially Integrated (2024)    OASIS : Social Isolation     Frequency of experiencing loneliness or isolation: Never   Intimate Partner Violence: Not on file   Housing Stability: Low Risk  (2024)    Housing Stability Vital Sign     Unable to Pay for Housing in the Last Year: No     Number of Times Moved in the Last Year: 0     Homeless in the Last Year: No      No current facility-administered medications on file prior to encounter.     Current Outpatient Medications on File Prior to Encounter   Medication Sig Dispense Refill    acetaminophen (Tylenol) 500 mg tablet Take 2 tablets (1,000 mg) by mouth every 6 hours if needed for mild pain (1 - 3).      amLODIPine (Norvasc) 10 mg tablet Take 1 tablet (10 mg) by mouth once daily. 30 tablet 0    atorvastatin (Lipitor) 20 mg tablet Take 1 tablet (20 mg) by mouth once daily at bedtime. 30 tablet 0    gabapentin (Neurontin) 300 mg capsule Take 1 capsule (300 mg) by mouth once daily at bedtime for 10 days. 10 capsule 0    hydroCHLOROthiazide (HYDRODiuril) 25 mg tablet Take 1 tablet (25 mg) by mouth once daily. 90 tablet 3    [] lactulose 20 gram/30 mL oral solution Take 30 mL (20 g) by mouth once daily. 946 mL 0    metoprolol succinate XL (Toprol-XL) 50 mg 24 hr tablet Take 1 tablet (50 mg) by mouth once daily. 90 tablet 3    oxyCODONE (Roxicodone) 10 mg immediate release tablet Take 1 tablet (10 mg) by mouth every 6 hours if needed for severe pain (7 - 10). 15 tablet 0     "[] polyethylene glycol (Glycolax, Miralax) 17 gram packet Take 17 g by mouth once daily. 30 packet 0    [] sennosides (Senokot) 8.6 mg tablet Take 2 tablets (17.2 mg) by mouth 2 times a day. 120 tablet 0    traZODone (Desyrel) 50 mg tablet Take 1 tablet (50 mg) by mouth once daily at bedtime. 30 tablet 0      Allergies   Allergen Reactions    Oxycodone-Acetaminophen Nausea And Vomiting and GI Upset        PE:  awake, alert, cooperative, appears chronically ill  No sob nor wheezes  Pale, poor coloring, no rash  Abd soft  Extrem moving all but weak    Blood pressure 121/74, pulse 100, temperature 36.7 °C (98.1 °F), temperature source Temporal, resp. rate 18, height 1.854 m (6' 1\"), weight 73.7 kg (162 lb 7.7 oz), SpO2 96%.     Results for orders placed or performed during the hospital encounter of 24 (from the past 96 hour(s))   CBC and Auto Differential   Result Value Ref Range    WBC 13.8 (H) 4.4 - 11.3 x10*3/uL    nRBC 0.0 0.0 - 0.0 /100 WBCs    RBC 3.86 (L) 4.50 - 5.90 x10*6/uL    Hemoglobin 9.9 (L) 13.5 - 17.5 g/dL    Hematocrit 32.2 (L) 41.0 - 52.0 %    MCV 83 80 - 100 fL    MCH 25.6 (L) 26.0 - 34.0 pg    MCHC 30.7 (L) 32.0 - 36.0 g/dL    RDW 14.4 11.5 - 14.5 %    Platelets 635 (H) 150 - 450 x10*3/uL    Neutrophils % 75.5 40.0 - 80.0 %    Immature Granulocytes %, Automated 0.4 0.0 - 0.9 %    Lymphocytes % 17.6 13.0 - 44.0 %    Monocytes % 6.2 2.0 - 10.0 %    Eosinophils % 0.1 0.0 - 6.0 %    Basophils % 0.2 0.0 - 2.0 %    Neutrophils Absolute 10.43 (H) 1.60 - 5.50 x10*3/uL    Immature Granulocytes Absolute, Automated 0.06 0.00 - 0.50 x10*3/uL    Lymphocytes Absolute 2.43 0.80 - 3.00 x10*3/uL    Monocytes Absolute 0.86 (H) 0.05 - 0.80 x10*3/uL    Eosinophils Absolute 0.02 0.00 - 0.40 x10*3/uL    Basophils Absolute 0.03 0.00 - 0.10 x10*3/uL   Comprehensive metabolic panel   Result Value Ref Range    Glucose 162 (H) 65 - 99 mg/dL    Sodium 135 133 - 145 mmol/L    Potassium 3.8 3.4 - 5.1 mmol/L    " Chloride 99 97 - 107 mmol/L    Bicarbonate 23 (L) 24 - 31 mmol/L    Urea Nitrogen 9 8 - 25 mg/dL    Creatinine 0.70 0.40 - 1.60 mg/dL    eGFR >90 >60 mL/min/1.73m*2    Calcium 10.0 8.5 - 10.4 mg/dL    Albumin 3.4 (L) 3.5 - 5.0 g/dL    Alkaline Phosphatase 125 35 - 125 U/L    Total Protein 8.1 (H) 5.9 - 7.9 g/dL    AST 14 5 - 40 U/L    Bilirubin, Total 0.5 0.1 - 1.2 mg/dL    ALT 5 5 - 40 U/L    Anion Gap 13 <=19 mmol/L   Sedimentation rate, automated   Result Value Ref Range    Sedimentation Rate >130 (H) 0 - 20 mm/h   CBC   Result Value Ref Range    WBC 12.3 (H) 4.4 - 11.3 x10*3/uL    nRBC 0.0 0.0 - 0.0 /100 WBCs    RBC 3.57 (L) 4.50 - 5.90 x10*6/uL    Hemoglobin 9.0 (L) 13.5 - 17.5 g/dL    Hematocrit 29.8 (L) 41.0 - 52.0 %    MCV 84 80 - 100 fL    MCH 25.2 (L) 26.0 - 34.0 pg    MCHC 30.2 (L) 32.0 - 36.0 g/dL    RDW 14.3 11.5 - 14.5 %    Platelets 594 (H) 150 - 450 x10*3/uL   Basic metabolic panel   Result Value Ref Range    Glucose 147 (H) 65 - 99 mg/dL    Sodium 131 (L) 133 - 145 mmol/L    Potassium 3.7 3.4 - 5.1 mmol/L    Chloride 98 97 - 107 mmol/L    Bicarbonate 23 (L) 24 - 31 mmol/L    Urea Nitrogen 10 8 - 25 mg/dL    Creatinine 0.60 0.40 - 1.60 mg/dL    eGFR >90 >60 mL/min/1.73m*2    Calcium 9.6 8.5 - 10.4 mg/dL    Anion Gap 10 <=19 mmol/L    MR lumbar spine w and wo IV contrast    Result Date: 8/14/2024  Interpreted By:  Elroy Uribe, STUDY: MR LUMBAR SPINE W AND WO IV CONTRAST;  8/14/2024 3:38 pm   INDICATION: Signs/Symptoms:Bilateral lower extremity weakness, decreased rectal tone.   COMPARISON: July 26.   ACCESSION NUMBER(S): [id number]   ORDERING CLINICIAN: EDIL MARKS   TECHNIQUE: The lumbar spine was studied in the sagital, axial and coronal planes utiliing T1 and T2 weighted images.   FINDINGS: Heterogeneous decreased marrow signal throughout the lumbar spine is unchanged compared to the previous exam. This is a nonspecific finding consistent with hyperactive marrow as seen in anemia or marrow  infiltrative process due to hematologic or marrow malignancy. Alignment is normal. Images at each interspace reveal the following: T12/L1 There is normal alignment and vertebral body height. The disc space is normal. There is no evidence of canal or foraminal narrowing. There is no evidence of bulging or herniated disc. L1/L2 Pedicle screws and plates are present.  There is normal alignment and vertebral body height. The disc space is normal. There is no evidence of canal or foraminal narrowing. There is no evidence of bulging or herniated disc. L2/L3 Previous laminectomy unchanged from the previous exam. Pedicle screws and plates are present. Bulging intervertebral disc without canal stenosis. Mild bilateral foraminal narrowing. There is replacement of normal marrow signal within the L3 vertebral body with epidural mass as previously demonstrated and described. Marked narrowing of the thecal sac at this location without identified cerebral spinal fluid unchanged from the previous exam. Nerve root compression unchanged from the previous exam. No evidence of fluid collection or hemorrhage. L3/L4 Circumferential bulging intervertebral disc asymmetrical to the left. Marginal osteophyte formation and facet hypertrophy. No measurable canal stenosis. Left worse than right foraminal narrowing. L4/L5 Left paracentral herniated nucleus polyposis measuring a proximally 1 cm in size including annular tear best appreciated on axial T2 weighted image 18/30. Bilateral facet hypertrophy. Pedicle screws and plates are present. No measurable canal stenosis L5/S1 Circumferential bulging intervertebral disc and facet hypertrophy without canal stenosis. Mild bilateral foraminal narrowing.   The visualized sacrum is normal         * L3 compression fracture with retropulsion of the posterior cortex and severe stenosis of the central spinal canal with nerve root compression unchanged from the previous exam *No new foci of marrow  replacement or compression fracture *There is no measurable change compared to the previous exam.   MACRO: Critical Finding:  See findings. Notification was initiated on 8/14/2024 at 3:50 pm by  Elroy Uribe.  (**-OCF-**)   Signed by: Elroy Uribe 8/14/2024 3:50 PM Dictation workstation:   [id number]    MR lumbar spine w and wo IV contrast    Result Date: 7/26/2024  Interpreted By:  Jose Campbell, STUDY: MRI of the lumbar spine without IV contrast;  7/26/2024 9:30 pm   INDICATION: Signs/Symptoms:new pathologic fracture, recent surgery, tumor.   COMPARISON: Correlation made to MRI lumbar spine of 07/05/2024, lumbar spine radiography of 07/06/2024, CT lumbar spine of 07/26/2024.   ACCESSION NUMBER(S): [id number]   ORDERING CLINICIAN: SHERMAN CRYSTAL   TECHNIQUE: Sagittal and axial STIR and T1-weighted MRI images of the lumbar spine were acquired using a spondylolysis protocol.  No contrast was administered. Postcontrast T1 weighted images were also obtained following IV administration of 16 cc Dotarem gadolinium based IV contrast.   FINDINGS: Vertebrae/Intervertebral Discs: There is no evidence of spondylolysis or spondylolisthesis. Vertebral alignment is maintained.   There is interval partial laminectomy at L3 and posterior metallic fusion with bilateral pedicle screws and intervening laminar rods at L1, L2, L4 and L5. There is redemonstration of abnormal STIR hyperintense T1 hypointense signal in the L3 vertebral body compatible with diffuse metastatic involvement, with ventral epidural extension of tumor.  Mild the degree of pathologic compression is similar compared to lumbar spine radiography of 07/06/2024, the degree of ventral epidural tumor extension is worse than on the prior MRI of 07/05/2024, and at the level of the inferior L3 endplate results in complete effacement of subarachnoid CSF and cauda equina compression; this is along the superior margin of where the canal is decompressed from partial  laminectomy changes at the L3 level (see series' 7 and 12, images 23 and 24; series 4, image 14). There is also compromise of the bilateral L3-L4 neural foramina.   No other marrow replacing lesion is seen.   Vertebral stature is otherwise maintained. There is no other level of significant canal narrowing. Mild bilateral foraminal narrowing at L5-S1 secondary to endplate and facet osteophytes.     Cord: The lower thoracic cord appears unremarkable. The conus terminates at L1-L2.   Soft tissues: Prevertebral soft tissues are unremarkable. There is edema and enhancement in the posterior paraspinal soft tissues and subcutaneous fat, likely postsurgical in etiology.   Incidental exophytic left lower pole renal cortical cyst.       There is interval partial laminectomy at L3 and posterior metallic fusion with bilateral pedicle screws and intervening laminar rods at L1, L2, L4 and L5. There is redemonstration of abnormal STIR hyperintense T1 hypointense signal in the L3 vertebral body compatible with diffuse metastatic involvement, with ventral epidural extension of tumor.  Mild the degree of pathologic compression is similar compared to lumbar spine radiography of 07/06/2024, the degree of ventral epidural tumor extension is worse than on the prior MRI of 07/05/2024, and at the level of the inferior L3 endplate results in complete effacement of subarachnoid CSF and cauda equina compression; this is along the superior margin of where the canal is decompressed from partial laminectomy changes at the L3 level (see series' 7 and 12, images 23 and 24; series 4, image 14). There is also compromise of the bilateral L3-L4 neural foramina.   No other marrow replacing lesion is seen.   Vertebral stature is otherwise maintained. There is no other level of significant canal narrowing.   There is edema and enhancement in the posterior paraspinal soft tissues and subcutaneous fat, likely postsurgical in etiology.   I personally  reviewed the images/study and I agree with the findings as stated. This study was interpreted at [facility], [place], [place].   MACRO: None   Signed by: Jose Campbell 7/26/2024 10:41 PM Dictation workstation:   [id number]    Lower extremity venous duplex left    Result Date: 7/26/2024  Interpreted By:  Cuba Goodson, STUDY: VAS US LOWER EXTREMITY VENOUS DUPLEX LEFT;  7/26/2024 3:37 pm   INDICATION: Signs/Symptoms:left leg lumbness tingling, recent l spine surgery.   COMPARISON: None.   ACCESSION NUMBER(S): [id number]   ORDERING CLINICIAN: SHERMAN CRYSTAL   TECHNIQUE: Grayscale, color and spectral Doppler ultrasound evaluation of the left lower extremity deep venous system.   FINDINGS:     Left: There is normal compressibility of the common femoral (including saphenofemoral junction), deep femoral, femoral (including proximal, mid, and distal aspects), popliteal, posterior tibial and peroneal veins.  There is a normal, spontaneous and phasic venous spectral Doppler waveform throughout the lower extremity.   Right: There is normal, symmetric venous spectral Doppler waveform in the common femoral vein.       No deep venous thrombosis identified in the left lower extremity.   Signed by: Cuba Goodson 7/26/2024 3:44 PM Dictation workstation:   [id number]    CT lumbar spine wo IV contrast    Result Date: 7/26/2024  Interpreted By:  Cuba Goodson, STUDY: CT LUMBAR SPINE WO IV CONTRAST  7/26/2024 3:17 pm   INDICATION: Signs/Symptoms:back pain, radiculopathy L leg, recent surgery   COMPARISON: CT 07/05/2024   ACCESSION NUMBER(S): [id number]   ORDERING CLINICIAN: SHERMAN CRYSTAL   TECHNIQUE: Axial CT images of the lumbar spine are obtained. Axial, coronal and sagittal reconstructions are provided for review.   FINDINGS: There is a new acute nondisplaced fracture involving the right transverse process at L3. Lytic lesion involving nearly the entire L3 vertebral body is again noted. There is new mild  compression of the L3 vertebral body with approximately 20% height loss centrally. There is slight osseous retropulsion which causes severe canal stenosis. Pedicle screw tracts are now present. There has been a left facetectomy as well as partial resection of the spinous process.   No additional lumbar spine fractures are identified. There has been interval placement of posterior metallic fusion hardware from L1-L5. Pedicle screws at L1, L2, L4, and L5 appear well seated and intact without periprosthetic lucency. Interconnecting rods with adjacent bone graft material appear intact. There is a small amount of residual postoperative gas adjacent to the fusion hardware.   Extensive hardware beam hardening artifact limits evaluation.   L1-2: Patent canal and foramina. L2-3: Disc bulge and thickening of the ligamentum flavum cause mild canal stenosis. Osteophytes cause mild left and disc bulge causes moderate right foraminal stenosis. Osseous retropulsion at L3 causes moderate to severe canal stenosis. L3-4: Thecal sac partially decompressed by facetectomy defect. The left neural foramen is partially decompressed. Osteophytes cause moderate right foraminal stenosis. L4-5: Disc bulge causes mild canal stenosis. Osteophytes cause moderate left and moderate right foraminal stenosis. L5-S1: Endplate osteophyte causes mild canal stenosis. Osteophytes cause moderate left and moderate to severe right foraminal stenosis.   Partially imaged left renal mass. Abdominal aortic atherosclerosis without visualized aneurysm.         1.  Interval posterior fusion L1-L5. 2. Interval mild pathologic compression fracture related to diffuse osteolytic lesion of the L3 vertebral body. Osseous retropulsion at this level causes new severe canal stenosis. 3. L3 left facetectomy defect may partially decompressed the exiting nerve root. 4. New nondisplaced fracture right transverse process at L3.   MACRO: None   Signed by: Cuba Goodson 7/26/2024  3:41 PM Dictation workstation:   [id number]      A/P: metastatic renal cell carcinoma, agree with plan for transfer to [facility].  The kidney requires no intervention at this point

## 2024-08-15 NOTE — PROGRESS NOTES
08/15/24 1022   Discharge Planning   Expected Discharge Disposition Other Fac   Does the patient need discharge transport arranged? Yes   RoundTrip coordination needed? Yes     Patient awaiting a bed at [facility] for [facility]

## 2024-08-15 NOTE — CONSULTS
Inpatient consult to Palliative Care  Consult performed by: Gris Allen, VINNY-CNP  Consult ordered by: Michael Sanderson MD  Reason for consult: GOC          Reason For Consult  Reason for Consult: communication / medical decision making     History Of Present Illness  Christophe Ambriz is a 74 y.o. male with with history of metastatic renal cell carcinoma presenting for evaluation of low back pain radiating down his legs bilaterally. States the pain is severe. States that he was diagnosed with fractures in his back and had an L1-L5 fusion and a L2-L4 decompression and tumor debulking. His oncologist sent him in because he was concerned due to limited mobility and not being able to get to his oncology appointments for care. He also has no pain control at home he states. He denies saddle anesthesia, fevers or chills. Denies any loss of bowel or bladder function.   Upon arrival to the ED patient's ED diagnostic workup noted for a CBC with differential just above leukocytosis of 13.8.  H&H was low normal at 9.9/32.2.  Patient's platelet count was rated at 635.  There was a neutrophil predominance.  Patient's blood chemistry was essentially within normal limits.  Elevated glucose level 162.  The patient underwent an MRI of the lumbar spine with and without contrast.  * L3 compression fracture with retropulsion of the posterior cortex  and severe stenosis of the central spinal canal with nerve root  compression unchanged from the previous exam. Patient reports being unable to stand, bear weight or walk at home. Pain was rated 10/10 with neuropathy to both feet. No appetite, intermittent nausea. No constipation.     Symptoms (0 - 10, Best to Worst)  [place] Symptom Assessment System  0-10 (Numeric) Pain Score: 6    BM in last 48 hours? yes    Emotional/Psychological/Spiritual Needs  Over the past two weeks, how often has the patient been bothered by having little interest or pleasure in doing things?  occurrence (last  two weeks): several days    Over the past two weeks, how often has the patient been bothered by feeling down, depressed, or hopeless?  occurrence (last two weeks): several days      Serious Illness Conversation  What is your understanding now of where you are with your illness:  poor understanding of current diagnoses, requires further education.   How much information about what is likely to be ahead with your illness  would you like from me: fully disclose all information to patient and SO Iram.   What are your most important goals if your health situation worsens:  quality of life  What are your biggest fears and worries about the future with your health:  uncontrolled symptoms  What gives you strength as you think about the future with your illness:  support of SO  What abilities are so critical to your life that you can’t imagine living without them:  function  If you become sicker, how much are you willing to go through for the possibility of gaining more time:  not sure  How much does your family know about your priorities and wishes:  family is fully aware.     Personal/Social History    He reports that he has never smoked. He has never been exposed to tobacco smoke. He has never used smokeless tobacco. No history on file for alcohol use and drug use.    Functional Status    Bed bound at home prior to admission.     Caregiving/Caregiver Support  Does the patient require assistance in some or all components of his care, including coordination of medical care? Yes  If Yes, which person serves that role?  partner   Caregiver emotional or practical needs:      Past Medical History  He has a past medical history of HLD (hyperlipidemia), HTN (hypertension), Metastasis (Multi), and Renal cell adenocarcinoma (Multi).    Surgical History  He has a past surgical history that includes Lumbar fusion.     Family History  No family history on file.  Allergies  Oxycodone-acetaminophen    Review of Systems    Constitutional:  Positive for activity change and appetite change. Negative for chills, fatigue and fever.   HENT:  Negative for mouth sores, sinus pain and sore throat.    Eyes:  Negative for pain.   Respiratory:  Negative for cough and shortness of breath.    Cardiovascular:  Negative for chest pain, palpitations and leg swelling.   Gastrointestinal:  Negative for abdominal distention, abdominal pain, constipation, diarrhea and nausea.        Fecal sensation intact   Endocrine: Negative for polydipsia, polyphagia and polyuria.   Genitourinary:  Negative for difficulty urinating and hematuria.   Musculoskeletal:  Positive for back pain. Negative for arthralgias, gait problem and myalgias.        Lower back, 10/10 radiating down LLE   Skin:  Negative for color change, rash and wound.   Neurological:  Positive for dizziness, weakness and numbness. Negative for tremors, seizures and headaches.        Bottoms of both feet  Dizziness with standing  Progressive weakness and pain the longer patient stands, legs give out eventually   Psychiatric/Behavioral:  Positive for sleep disturbance. Negative for confusion. The patient is not nervous/anxious.         Physical Exam  Vitals and nursing note reviewed.   Constitutional:       General: He is in acute distress.      Appearance: He is ill-appearing.      Comments: Prominent cheekbones, thin, weight loss per SO   HENT:      Head: Normocephalic and atraumatic.      Nose: Nose normal.      Mouth/Throat:      Mouth: Mucous membranes are moist.      Pharynx: Oropharynx is clear.   Eyes:      Extraocular Movements: Extraocular movements intact.      Pupils: Pupils are equal, round, and reactive to light.   Cardiovascular:      Rate and Rhythm: Normal rate and regular rhythm.      Pulses: Normal pulses.      Heart sounds: No murmur heard.  Pulmonary:      Effort: Pulmonary effort is normal. No respiratory distress.      Breath sounds: Normal breath sounds.      Comments: Moist  "cough, no rhonchi noted.   Abdominal:      General: Abdomen is flat. Bowel sounds are normal. There is no distension.      Palpations: Abdomen is soft.      Tenderness: There is no abdominal tenderness.   Musculoskeletal:         General: No swelling, tenderness, deformity or signs of injury. Normal range of motion.      Cervical back: Normal range of motion and neck supple.      Comments: Tender over l spine, generalized weakness   Skin:     General: Skin is warm and dry.      Capillary Refill: Capillary refill takes 2 to 3 seconds.   Neurological:      General: No focal deficit present.      Mental Status: He is alert and oriented to person, place, and time.      Sensory: Sensory deficit present.      Motor: Weakness present.      Comments: Diminished sensation, ventral surfaces both feet.    Psychiatric:         Mood and Affect: Mood normal.         Last Recorded Vitals  Blood pressure 120/66, pulse 91, temperature 36.7 °C (98.1 °F), temperature source Temporal, resp. rate 18, height 1.854 m (6' 1\"), weight 73.7 kg (162 lb 7.7 oz), SpO2 95%.    Relevant Results  Results for orders placed or performed during the hospital encounter of 08/14/24 (from the past 24 hour(s))   CBC   Result Value Ref Range    WBC 12.3 (H) 4.4 - 11.3 x10*3/uL    nRBC 0.0 0.0 - 0.0 /100 WBCs    RBC 3.57 (L) 4.50 - 5.90 x10*6/uL    Hemoglobin 9.0 (L) 13.5 - 17.5 g/dL    Hematocrit 29.8 (L) 41.0 - 52.0 %    MCV 84 80 - 100 fL    MCH 25.2 (L) 26.0 - 34.0 pg    MCHC 30.2 (L) 32.0 - 36.0 g/dL    RDW 14.3 11.5 - 14.5 %    Platelets 594 (H) 150 - 450 x10*3/uL   Basic metabolic panel   Result Value Ref Range    Glucose 147 (H) 65 - 99 mg/dL    Sodium 131 (L) 133 - 145 mmol/L    Potassium 3.7 3.4 - 5.1 mmol/L    Chloride 98 97 - 107 mmol/L    Bicarbonate 23 (L) 24 - 31 mmol/L    Urea Nitrogen 10 8 - 25 mg/dL    Creatinine 0.60 0.40 - 1.60 mg/dL    eGFR >90 >60 mL/min/1.73m*2    Calcium 9.6 8.5 - 10.4 mg/dL    Anion Gap 10 <=19 mmol/L    MR lumbar " spine w and wo IV contrast    Result Date: 8/14/2024  Interpreted By:  Elroy Uribe, STUDY: MR LUMBAR SPINE W AND WO IV CONTRAST;  8/14/2024 3:38 pm   INDICATION: Signs/Symptoms:Bilateral lower extremity weakness, decreased rectal tone.   COMPARISON: July 26.   ACCESSION NUMBER(S): [id number]   ORDERING CLINICIAN: EDIL MARKS   TECHNIQUE: The lumbar spine was studied in the sagital, axial and coronal planes utiliing T1 and T2 weighted images.   FINDINGS: Heterogeneous decreased marrow signal throughout the lumbar spine is unchanged compared to the previous exam. This is a nonspecific finding consistent with hyperactive marrow as seen in anemia or marrow infiltrative process due to hematologic or marrow malignancy. Alignment is normal. Images at each interspace reveal the following: T12/L1 There is normal alignment and vertebral body height. The disc space is normal. There is no evidence of canal or foraminal narrowing. There is no evidence of bulging or herniated disc. L1/L2 Pedicle screws and plates are present.  There is normal alignment and vertebral body height. The disc space is normal. There is no evidence of canal or foraminal narrowing. There is no evidence of bulging or herniated disc. L2/L3 Previous laminectomy unchanged from the previous exam. Pedicle screws and plates are present. Bulging intervertebral disc without canal stenosis. Mild bilateral foraminal narrowing. There is replacement of normal marrow signal within the L3 vertebral body with epidural mass as previously demonstrated and described. Marked narrowing of the thecal sac at this location without identified cerebral spinal fluid unchanged from the previous exam. Nerve root compression unchanged from the previous exam. No evidence of fluid collection or hemorrhage. L3/L4 Circumferential bulging intervertebral disc asymmetrical to the left. Marginal osteophyte formation and facet hypertrophy. No measurable canal stenosis. Left worse  than right foraminal narrowing. L4/L5 Left paracentral herniated nucleus polyposis measuring a proximally 1 cm in size including annular tear best appreciated on axial T2 weighted image 18/30. Bilateral facet hypertrophy. Pedicle screws and plates are present. No measurable canal stenosis L5/S1 Circumferential bulging intervertebral disc and facet hypertrophy without canal stenosis. Mild bilateral foraminal narrowing.   The visualized sacrum is normal         * L3 compression fracture with retropulsion of the posterior cortex and severe stenosis of the central spinal canal with nerve root compression unchanged from the previous exam *No new foci of marrow replacement or compression fracture *There is no measurable change compared to the previous exam.   MACRO: Critical Finding:  See findings. Notification was initiated on 8/14/2024 at 3:50 pm by  Elroy Uribe.  (**-OCF-**)   Signed by: Elroy Uribe 8/14/2024 3:50 PM Dictation workstation:   [id number]    MR lumbar spine w and wo IV contrast    Result Date: 7/26/2024  Interpreted By:  Jose Campbell, STUDY: MRI of the lumbar spine without IV contrast;  7/26/2024 9:30 pm   INDICATION: Signs/Symptoms:new pathologic fracture, recent surgery, tumor.   COMPARISON: Correlation made to MRI lumbar spine of 07/05/2024, lumbar spine radiography of 07/06/2024, CT lumbar spine of 07/26/2024.   ACCESSION NUMBER(S): [id number]   ORDERING CLINICIAN: SHERMAN CRYSTAL   TECHNIQUE: Sagittal and axial STIR and T1-weighted MRI images of the lumbar spine were acquired using a spondylolysis protocol.  No contrast was administered. Postcontrast T1 weighted images were also obtained following IV administration of 16 cc Dotarem gadolinium based IV contrast.   FINDINGS: Vertebrae/Intervertebral Discs: There is no evidence of spondylolysis or spondylolisthesis. Vertebral alignment is maintained.   There is interval partial laminectomy at L3 and posterior metallic fusion with bilateral  pedicle screws and intervening laminar rods at L1, L2, L4 and L5. There is redemonstration of abnormal STIR hyperintense T1 hypointense signal in the L3 vertebral body compatible with diffuse metastatic involvement, with ventral epidural extension of tumor.  Mild the degree of pathologic compression is similar compared to lumbar spine radiography of 07/06/2024, the degree of ventral epidural tumor extension is worse than on the prior MRI of 07/05/2024, and at the level of the inferior L3 endplate results in complete effacement of subarachnoid CSF and cauda equina compression; this is along the superior margin of where the canal is decompressed from partial laminectomy changes at the L3 level (see series' 7 and 12, images 23 and 24; series 4, image 14). There is also compromise of the bilateral L3-L4 neural foramina.   No other marrow replacing lesion is seen.   Vertebral stature is otherwise maintained. There is no other level of significant canal narrowing. Mild bilateral foraminal narrowing at L5-S1 secondary to endplate and facet osteophytes.     Cord: The lower thoracic cord appears unremarkable. The conus terminates at L1-L2.   Soft tissues: Prevertebral soft tissues are unremarkable. There is edema and enhancement in the posterior paraspinal soft tissues and subcutaneous fat, likely postsurgical in etiology.   Incidental exophytic left lower pole renal cortical cyst.       There is interval partial laminectomy at L3 and posterior metallic fusion with bilateral pedicle screws and intervening laminar rods at L1, L2, L4 and L5. There is redemonstration of abnormal STIR hyperintense T1 hypointense signal in the L3 vertebral body compatible with diffuse metastatic involvement, with ventral epidural extension of tumor.  Mild the degree of pathologic compression is similar compared to lumbar spine radiography of 07/06/2024, the degree of ventral epidural tumor extension is worse than on the prior MRI of 07/05/2024,  and at the level of the inferior L3 endplate results in complete effacement of subarachnoid CSF and cauda equina compression; this is along the superior margin of where the canal is decompressed from partial laminectomy changes at the L3 level (see series' 7 and 12, images 23 and 24; series 4, image 14). There is also compromise of the bilateral L3-L4 neural foramina.   No other marrow replacing lesion is seen.   Vertebral stature is otherwise maintained. There is no other level of significant canal narrowing.   There is edema and enhancement in the posterior paraspinal soft tissues and subcutaneous fat, likely postsurgical in etiology.   I personally reviewed the images/study and I agree with the findings as stated. This study was interpreted at [place], [place].   MACRO: None   Signed by: Jose Campbell 7/26/2024 10:41 PM Dictation workstation:   [id number]    Lower extremity venous duplex left    Result Date: 7/26/2024  Interpreted By:  Cuba Goodson, STUDY: [facility] LOWER EXTREMITY VENOUS DUPLEX LEFT;  7/26/2024 3:37 pm   INDICATION: Signs/Symptoms:left leg lumbness tingling, recent l spine surgery.   COMPARISON: None.   ACCESSION NUMBER(S): [id number]   ORDERING CLINICIAN: SHERMAN CRYSTAL   TECHNIQUE: Grayscale, color and spectral Doppler ultrasound evaluation of the left lower extremity deep venous system.   FINDINGS:     Left: There is normal compressibility of the common femoral (including saphenofemoral junction), deep femoral, femoral (including proximal, mid, and distal aspects), popliteal, posterior tibial and peroneal veins.  There is a normal, spontaneous and phasic venous spectral Doppler waveform throughout the lower extremity.   Right: There is normal, symmetric venous spectral Doppler waveform in the common femoral vein.       No deep venous thrombosis identified in the left lower extremity.   Signed by: Cuba Goodson 7/26/2024 3:44 PM Dictation workstation:    [id number]    CT lumbar spine wo IV contrast    Result Date: 7/26/2024  Interpreted By:  Cuba Goodson, STUDY: CT LUMBAR SPINE WO IV CONTRAST  7/26/2024 3:17 pm   INDICATION: Signs/Symptoms:back pain, radiculopathy L leg, recent surgery   COMPARISON: CT 07/05/2024   ACCESSION NUMBER(S): [id number]   ORDERING CLINICIAN: SHERMAN CRYSTAL   TECHNIQUE: Axial CT images of the lumbar spine are obtained. Axial, coronal and sagittal reconstructions are provided for review.   FINDINGS: There is a new acute nondisplaced fracture involving the right transverse process at L3. Lytic lesion involving nearly the entire L3 vertebral body is again noted. There is new mild compression of the L3 vertebral body with approximately 20% height loss centrally. There is slight osseous retropulsion which causes severe canal stenosis. Pedicle screw tracts are now present. There has been a left facetectomy as well as partial resection of the spinous process.   No additional lumbar spine fractures are identified. There has been interval placement of posterior metallic fusion hardware from L1-L5. Pedicle screws at L1, L2, L4, and L5 appear well seated and intact without periprosthetic lucency. Interconnecting rods with adjacent bone graft material appear intact. There is a small amount of residual postoperative gas adjacent to the fusion hardware.   Extensive hardware beam hardening artifact limits evaluation.   L1-2: Patent canal and foramina. L2-3: Disc bulge and thickening of the ligamentum flavum cause mild canal stenosis. Osteophytes cause mild left and disc bulge causes moderate right foraminal stenosis. Osseous retropulsion at L3 causes moderate to severe canal stenosis. L3-4: Thecal sac partially decompressed by facetectomy defect. The left neural foramen is partially decompressed. Osteophytes cause moderate right foraminal stenosis. L4-5: Disc bulge causes mild canal stenosis. Osteophytes cause moderate left and moderate right foraminal  stenosis. L5-S1: Endplate osteophyte causes mild canal stenosis. Osteophytes cause moderate left and moderate to severe right foraminal stenosis.   Partially imaged left renal mass. Abdominal aortic atherosclerosis without visualized aneurysm.         1.  Interval posterior fusion L1-L5. 2. Interval mild pathologic compression fracture related to diffuse osteolytic lesion of the L3 vertebral body. Osseous retropulsion at this level causes new severe canal stenosis. 3. L3 left facetectomy defect may partially decompressed the exiting nerve root. 4. New nondisplaced fracture right transverse process at L3.   MACRO: None   Signed by: Cuba Goodson 7/26/2024 3:41 PM Dictation workstation:   [id number]       Assessment/Plan   IMP:    L3 Stenosis/pain  Renal cell cancer with spinal mets  BPH, possible urinary retention  Former Smoker, cough  Weight loss-attributed to pain, but suspect gastritis d/t pain meds  Palliative Care    DNRCCA/DNI  Capable  No advanced directives. Has 6 biological children, has been estranged for years.   SO Iram of 30+ years.     Discussed case with ortho spine, agree with plan to transfer to [facility] for further evaluation of stenosis by neurosurgery. Start high dose steroids for inflammation. Increased dilaudid dose as pain only reduced to 6-7/10. Start lidocaine patches. Started PPI for GI upset.     Patient has not yet followed up with outpatient oncology d/t functional decline and pain. Provided extensive education to patient and SO about status of cancer, and established goals. Goal would be to improve nutrition and functional status, then follow up with oncology to see what options are available for treatment. Patient values quality of life and has no desire to undergo CPR or life support measures should he arrest.     We discussed need for HPOA given his social situation and he wants to review papers. He is supportive of getting living will filled out. I asked HOA Pinedo to follow  up with patient.     Awaiting transfer to [facility].     I spent 100 minutes in the professional and overall care of this patient.  30min spent in acp discussion.     Gris Allen, APRN-CNP

## 2024-08-15 NOTE — PROGRESS NOTES
"   08/15/24 1045   Discharge Planning   Living Arrangements Spouse/significant other   Support Systems Spouse/significant other   Assistance Needed Per patient since his back surgery he has not been functional and not been getting up and walking.   Type of Residence Private residence   Who is requesting discharge planning? Provider   Home or Post Acute Services None   Expected Discharge Disposition Other Fac   Does the patient need discharge transport arranged? Yes   RoundTrip coordination needed? Yes   Has discharge transport been arranged? No   Financial Resource Strain   How hard is it for you to pay for the very basics like food, housing, medical care, and heating? Not hard   Housing Stability   In the last 12 months, was there a time when you were not able to pay the mortgage or rent on time? N   In the past 12 months, how many times have you moved where you were living? 0   At any time in the past 12 months, were you homeless or living in a shelter (including now)? N   Transportation Needs   In the past 12 months, has lack of transportation kept you from medical appointments or from getting medications? no   In the past 12 months, has lack of transportation kept you from meetings, work, or from getting things needed for daily living? No     MSW met with patient and wife at bedside. Patient reports all his \"troubles\" started when he was at [facility], and does not want to be transferred, but knows we are awaiting a bed for him. Patient states he has not been functional at home, and that home care had discharged him, he was active with  Home Care. Uncertain of discharge needs from the hospital at this time; Care Transitions will follow.   "

## 2024-08-15 NOTE — CONSULTS
Consults    Reason For Consult  {Reason for Consult:99663}     History Of Present Illness  Christophe Ambriz is a 74 y.o. male with past medical history of {Primary Serious Illness Diagnosis:34390} is presenting with ***     Symptoms (0 - 10, Best to Worst)  [place] Symptom Assessment System  0-10 (Numeric) Pain Score: 8    BM in last 48 hours? {RESPONSES; YES/NO/UNKNOWN:60000}    Emotional/Psychological/Spiritual Needs  Over the past two weeks, how often has the patient been bothered by having little interest or pleasure in doing things?  {occurrence (last two weeks):94931}    Over the past two weeks, how often has the patient been bothered by feeling down, depressed, or hopeless?  {occurrence (last two weeks):57066}    Screening for spiritual needs?  {yes,no:72604}  Alevism and importance of Faith: ***  Source for spiritual support/meaning: ***    Spiritual Hx:  Are you spiritual or Mu-ism?   What’s your alis background?  During difficult times in your life, what have you relied on for strength?    Music Hx:   Do you enjoy music? What type of music do you enjoy?     Serious Illness Conversation  What is your understanding now of where you are with your illness:  ***  How much information about what is likely to be ahead with your illness  would you like from me: ***  What are your most important goals if your health situation worsens:  ***  What are your biggest fears and worries about the future with your health:  ***  What gives you strength as you think about the future with your illness:  ***  What abilities are so critical to your life that you can’t imagine living without them:  ***  If you become sicker, how much are you willing to go through for the possibility of gaining more time:  ***  How much does your family know about your priorities and wishes:  ***    Personal/Social History  ***  He reports that he has never smoked. He has never been exposed to tobacco smoke. He has never used smokeless  "tobacco. No history on file for alcohol use and drug use.    Functional Status    {If you would like to include the Activities of Daily Living section, type .adl  :99}    Caregiving/Caregiver Support  Does the patient require assistance in some or all components of his care, including coordination of medical care? {yes,no:21211}  If Yes, which person serves that role?  {Family/Friend/Neighbor:80139}   Caregiver emotional or practical needs:      Past Medical History  He has a past medical history of HLD (hyperlipidemia), HTN (hypertension), Metastasis (Multi), and Renal cell adenocarcinoma (Multi).    Surgical History  He has a past surgical history that includes Lumbar fusion.     Family History  No family history on file.  Allergies  Oxycodone-acetaminophen    Review of Systems     Physical Exam    Last Recorded Vitals  Blood pressure 117/67, pulse 88, temperature 37 °C (98.6 °F), temperature source Temporal, resp. rate 17, height 1.854 m (6' 1\"), weight 73.7 kg (162 lb 7.7 oz), SpO2 95%.    Relevant Results  ***     Assessment/Plan   IMP:  ***    Symptom Management  Pain: ***  Medications recommended for pain?  {Responses; yes/no/unknown/maybe/na:87050}  Tiredness: ***  Nausea: ***  Depression: ***  Anxiety: ***  Drowsiness: ***  Appetite: ***  Wellbeing: ***  Dyspnea: ***  Intervention recommended for dyspnea?  {RESPONSES; YES/NO/UNKNOWN:53681}  Other: ***  Intervention recommended for constipation?  {Responses; yes/no/unknown/maybe/na:40704}    {Provider estimate of survival:60932:::1}  {Patient Prognostic Awareness:56150:::1}    Patient/proxy preference for information  Prefers {POD FULL/LIMTED/NO:61477} information    Goals of Care  ***    Is the patient hospice-eligible?   {yes,no:21211}  Was a discussion held re hospice services?   {Yes/no/unknown:40259::\"no\"}  Was a decision made re hospice services?  {Responses; yes/no/unknown/maybe/na:98057}    Other Palliative Support  ***    I spent *** minutes in the " professional and overall care of this patient.      Gris Allen, APRN-CNP

## 2024-08-15 NOTE — CARE PLAN
Problem: Discharge Planning  Goal: Discharge to home or other facility with appropriate resources  Outcome: Progressing     Problem: Chronic Conditions and Co-morbidities  Goal: Patient's chronic conditions and co-morbidity symptoms are monitored and maintained or improved  Outcome: Progressing     Problem: Fall/Injury  Goal: Not fall by end of shift  Outcome: Progressing  Goal: Be free from injury by end of the shift  Outcome: Progressing  Goal: Verbalize understanding of personal risk factors for fall in the hospital  Outcome: Progressing  Goal: Verbalize understanding of risk factor reduction measures to prevent injury from fall in the home  Outcome: Progressing  Goal: Use assistive devices by end of the shift  Outcome: Progressing  Goal: Pace activities to prevent fatigue by end of the shift  Outcome: Progressing     Problem: Pain  Goal: Takes deep breaths with improved pain control throughout the shift  Outcome: Progressing  Goal: Turns in bed with improved pain control throughout the shift  Outcome: Progressing  Goal: Walks with improved pain control throughout the shift  Outcome: Progressing  Goal: Performs ADL's with improved pain control throughout shift  Outcome: Progressing  Goal: Free from opioid side effects throughout the shift  Outcome: Progressing  Goal: Free from acute confusion related to pain meds throughout the shift  Outcome: Progressing     Problem: Skin  Goal: Participates in plan/prevention/treatment measures  Outcome: Progressing  Flowsheets (Taken 8/15/2024 0018)  Participates in plan/prevention/treatment measures: Elevate heels  Goal: Prevent/minimize sheer/friction injuries  Outcome: Progressing  Flowsheets (Taken 8/15/2024 0018)  Prevent/minimize sheer/friction injuries:   Use pull sheet   HOB 30 degrees or less  Goal: Promote/optimize nutrition  Outcome: Progressing  Flowsheets (Taken 8/15/2024 0018)  Promote/optimize nutrition:   Offer water/supplements/favorite foods   Consume > 50%  meals/supplements   Monitor/record intake including meals  Goal: Promote skin healing  Outcome: Progressing  Flowsheets (Taken 8/15/2024 0018)  Promote skin healing: Assess skin/pad under line(s)/device(s)     Problem: Nutrition  Goal: Adequate PO fluid intake  Outcome: Progressing  Goal: Nutrition support is meeting 75% of nutrient needs  Outcome: Progressing  Goal: Lab values WNL  Outcome: Progressing  Goal: Maintain stable weight  Outcome: Progressing   The patient's goals for the shift include Pain management, rest, monitor vitals, monitor labs    The clinical goals for the shift include Pain management, rest, monitor vitals, monitor labs      08/15/24 at 12:19 AM - Beatrice Montemayor RN

## 2024-08-15 NOTE — CONSULTS
"Reason For Consult Interprofessional consultation  Lumbar vertebral metastases    History Of Present Illness  Christophe Ambriz is a 74 y.o. male presenting with severe back pain and difficulty walking.  Patient was diagnosed with metastatic renal cell carcinoma.  They identified an extensive involvement of the L3 vertebral body with compression of the thecal sac.  On July 9 he underwent a laminectomy of L3 and instrumented fusion L1-L5 at [facility].    On July 26 he underwent MRI and CT scan of the lumbar spine.  They document the appropriate placement of hardware L1-L5, normal alignment, and expansion of the tumor mass of the L3 vertebral body into the epidural space.     Past Medical History  He has a past medical history of HLD (hyperlipidemia), HTN (hypertension), Metastasis (Multi), and Renal cell adenocarcinoma (Multi).    Surgical History  He has a past surgical history that includes Lumbar fusion.     Social History  He reports that he has never smoked. He has never been exposed to tobacco smoke. He has never used smokeless tobacco. No history on file for alcohol use and drug use.    Family History  No family history on file.     Allergies  Oxycodone-acetaminophen    Review of Systems       Physical Exam  He was examined in his bed.  He complains of intense back pain when he gets up but he is neurologically intact with good strength and sensation throughout both lower extremities.  He also reports that he is controlling his bladder well and has no abdominal complaints no bowel complaints his back incision is recent.  It is healed and dry with no fluctuance or local tenderness or erythema.  There is several scabs at the lower end of the incision.  The alignment of the lumbar spine is normal       Last Recorded Vitals  Blood pressure 117/67, pulse 88, temperature 37 °C (98.6 °F), temperature source Temporal, resp. rate 17, height 1.854 m (6' 1\"), weight 73.7 kg (162 lb 7.7 oz), SpO2 95%.    Relevant " Results  I reviewed the patient's records and extensive x-ray, MRI and CT imaging.  His MRI yesterday documents expansion of the tumor mass of the L3 vertebral body into the epidural space creating stenosis.  There is an instrumented fusion from L1-L5 with no other visible malignant vertebral involvement, fracture or stenosis.  There has been no change since the MRI image on July 26.  The CT imaging on July 26 shows appropriate placement of instrumentation and a laminectomy defect at L3.  The expansion of the tumor mass into the spinal canal was present at that time.      Scheduled medications  amLODIPine, 10 mg, oral, Daily  aspirin, 81 mg, oral, Daily  atorvastatin, 20 mg, oral, Nightly  clopidogrel, 75 mg, oral, Daily  docusate sodium, 100 mg, oral, BID  famotidine, 20 mg, oral, Daily  heparin (porcine), 5,000 Units, subcutaneous, q8h SLIM  hydroCHLOROthiazide, 25 mg, oral, Daily  lactulose, 20 g, oral, Daily  metoprolol succinate XL, 50 mg, oral, Daily      Continuous medications     PRN medications  PRN medications: acetaminophen **OR** acetaminophen **OR** acetaminophen, benzocaine-menthol, dextromethorphan-guaifenesin, gabapentin, guaiFENesin, HYDROmorphone, ondansetron ODT **OR** ondansetron, oxyCODONE, polyethylene glycol, traZODone  Results for orders placed or performed during the hospital encounter of 08/14/24 (from the past 24 hour(s))   CBC and Auto Differential   Result Value Ref Range    WBC 13.8 (H) 4.4 - 11.3 x10*3/uL    nRBC 0.0 0.0 - 0.0 /100 WBCs    RBC 3.86 (L) 4.50 - 5.90 x10*6/uL    Hemoglobin 9.9 (L) 13.5 - 17.5 g/dL    Hematocrit 32.2 (L) 41.0 - 52.0 %    MCV 83 80 - 100 fL    MCH 25.6 (L) 26.0 - 34.0 pg    MCHC 30.7 (L) 32.0 - 36.0 g/dL    RDW 14.4 11.5 - 14.5 %    Platelets 635 (H) 150 - 450 x10*3/uL    Neutrophils % 75.5 40.0 - 80.0 %    Immature Granulocytes %, Automated 0.4 0.0 - 0.9 %    Lymphocytes % 17.6 13.0 - 44.0 %    Monocytes % 6.2 2.0 - 10.0 %    Eosinophils % 0.1 0.0 - 6.0 %     Basophils % 0.2 0.0 - 2.0 %    Neutrophils Absolute 10.43 (H) 1.60 - 5.50 x10*3/uL    Immature Granulocytes Absolute, Automated 0.06 0.00 - 0.50 x10*3/uL    Lymphocytes Absolute 2.43 0.80 - 3.00 x10*3/uL    Monocytes Absolute 0.86 (H) 0.05 - 0.80 x10*3/uL    Eosinophils Absolute 0.02 0.00 - 0.40 x10*3/uL    Basophils Absolute 0.03 0.00 - 0.10 x10*3/uL   Comprehensive metabolic panel   Result Value Ref Range    Glucose 162 (H) 65 - 99 mg/dL    Sodium 135 133 - 145 mmol/L    Potassium 3.8 3.4 - 5.1 mmol/L    Chloride 99 97 - 107 mmol/L    Bicarbonate 23 (L) 24 - 31 mmol/L    Urea Nitrogen 9 8 - 25 mg/dL    Creatinine 0.70 0.40 - 1.60 mg/dL    eGFR >90 >60 mL/min/1.73m*2    Calcium 10.0 8.5 - 10.4 mg/dL    Albumin 3.4 (L) 3.5 - 5.0 g/dL    Alkaline Phosphatase 125 35 - 125 U/L    Total Protein 8.1 (H) 5.9 - 7.9 g/dL    AST 14 5 - 40 U/L    Bilirubin, Total 0.5 0.1 - 1.2 mg/dL    ALT 5 5 - 40 U/L    Anion Gap 13 <=19 mmol/L   Sedimentation rate, automated   Result Value Ref Range    Sedimentation Rate >130 (H) 0 - 20 mm/h   CBC   Result Value Ref Range    WBC 12.3 (H) 4.4 - 11.3 x10*3/uL    nRBC 0.0 0.0 - 0.0 /100 WBCs    RBC 3.57 (L) 4.50 - 5.90 x10*6/uL    Hemoglobin 9.0 (L) 13.5 - 17.5 g/dL    Hematocrit 29.8 (L) 41.0 - 52.0 %    MCV 84 80 - 100 fL    MCH 25.2 (L) 26.0 - 34.0 pg    MCHC 30.2 (L) 32.0 - 36.0 g/dL    RDW 14.3 11.5 - 14.5 %    Platelets 594 (H) 150 - 450 x10*3/uL   Basic metabolic panel   Result Value Ref Range    Glucose 147 (H) 65 - 99 mg/dL    Sodium 131 (L) 133 - 145 mmol/L    Potassium 3.7 3.4 - 5.1 mmol/L    Chloride 98 97 - 107 mmol/L    Bicarbonate 23 (L) 24 - 31 mmol/L    Urea Nitrogen 10 8 - 25 mg/dL    Creatinine 0.60 0.40 - 1.60 mg/dL    eGFR >90 >60 mL/min/1.73m*2    Calcium 9.6 8.5 - 10.4 mg/dL    Anion Gap 10 <=19 mmol/L        Assessment/Plan     The patient has significant stenosis at L3 directly from the expanding tumor mass of the L3 vertebral body.  His hardware and  instrumentation is intact.  There is no new failure or fracture and there is no other visible bony involvement in the lumbar spine from the renal cell carcinoma.  I agree with the plan to transfer him back to main campus for definitive treatment of the lumbar metastasis.  I explained the situation and the anatomic issues of his back to him and his significant other.  He appears to understand well his situation.  Definitive plans will be made after transfer to [facility]  I have spent 15 minutes reviewing patient's records, the extensive imaging, and preparing this report.    Serjio Young MD

## 2024-08-15 NOTE — PROGRESS NOTES
Christophe Ambriz is a 74 y.o. male on day 1 of admission presenting with Pain from bone metastases (Multi).      Subjective   Pt with metastatic renal cell carcinoma admitted yesterday for uncontrolled back pain. High dose IV steroids have been started. Awaiting transfer to [facility].        Objective     Last Recorded Vitals  /67 (BP Location: Left arm, Patient Position: Lying)   Pulse 88   Temp 37 °C (98.6 °F) (Temporal)   Resp 17   Wt 73.7 kg (162 lb 7.7 oz)   SpO2 95%   Intake/Output last 3 Shifts:    Intake/Output Summary (Last 24 hours) at 8/15/2024 1439  Last data filed at 8/15/2024 1400  Gross per 24 hour   Intake 420 ml   Output 500 ml   Net -80 ml       Admission Weight  Weight: 79.4 kg (175 lb) (08/14/24 1245)    Daily Weight  08/14/24 : 73.7 kg (162 lb 7.7 oz)    Image Results  MR lumbar spine w and wo IV contrast  Narrative: Interpreted By:  Elroy Uribe,   STUDY:  MR LUMBAR SPINE W AND WO IV CONTRAST;  8/14/2024 3:38 pm      INDICATION:  Signs/Symptoms:Bilateral lower extremity weakness, decreased rectal  tone.      COMPARISON:  July 26.      ACCESSION NUMBER(S):  [id number]      ORDERING CLINICIAN:  EDIL MARKS      TECHNIQUE:  The lumbar spine was studied in the sagital, axial and coronal planes  utiliing T1 and T2 weighted images.      FINDINGS:  Heterogeneous decreased marrow signal throughout the lumbar spine is  unchanged compared to the previous exam. This is a nonspecific  finding consistent with hyperactive marrow as seen in anemia or  marrow infiltrative process due to hematologic or marrow malignancy.  Alignment is normal. Images at each interspace reveal the following:  T12/L1  There is normal alignment and vertebral body height. The disc space  is normal. There is no evidence of canal or foraminal narrowing.  There is no evidence of bulging or herniated disc. L1/L2  Pedicle screws and plates are present.  There is normal alignment and  vertebral body height. The disc space  is normal. There is no evidence  of canal or foraminal narrowing. There is no evidence of bulging or  herniated disc. L2/L3  Previous laminectomy unchanged from the previous exam. Pedicle screws  and plates are present. Bulging intervertebral disc without canal  stenosis. Mild bilateral foraminal narrowing. There is replacement of  normal marrow signal within the L3 vertebral body with epidural mass  as previously demonstrated and described. Marked narrowing of the  thecal sac at this location without identified cerebral spinal fluid  unchanged from the previous exam. Nerve root compression unchanged  from the previous exam. No evidence of fluid collection or  hemorrhage. L3/L4 Circumferential bulging intervertebral disc  asymmetrical to the left. Marginal osteophyte formation and facet  hypertrophy. No measurable canal stenosis. Left worse than right  foraminal narrowing. L4/L5  Left paracentral herniated nucleus polyposis measuring a proximally 1  cm in size including annular tear best appreciated on axial T2  weighted image 18/30. Bilateral facet hypertrophy. Pedicle screws and  plates are present. No measurable canal stenosis L5/S1  Circumferential bulging intervertebral disc and facet hypertrophy  without canal stenosis. Mild bilateral foraminal narrowing.      The visualized sacrum is normal          Impression: * L3 compression fracture with retropulsion of the posterior cortex  and severe stenosis of the central spinal canal with nerve root  compression unchanged from the previous exam *No new foci of marrow  replacement or compression fracture *There is no measurable change  compared to the previous exam.      MACRO:  Critical Finding:  See findings. Notification was initiated on  8/14/2024 at 3:50 pm by  Elroy Uribe.  (**-OCF-**)      Signed by: Elroy Uribe 8/14/2024 3:50 PM  Dictation workstation:   [id number]      Physical Exam  HENT:      Head: Normocephalic and atraumatic.      Nose: Nose  normal.      Mouth/Throat:      Mouth: Mucous membranes are moist.      Pharynx: Oropharynx is clear.   Eyes:      Extraocular Movements: Extraocular movements intact.      Pupils: Pupils are equal, round, and reactive to light.   Cardiovascular:      Rate and Rhythm: Normal rate and regular rhythm.      Pulses: Normal pulses.   Pulmonary:      Effort: Pulmonary effort is normal.      Breath sounds: Normal breath sounds.   Abdominal:      General: Abdomen is flat. Bowel sounds are normal.      Palpations: Abdomen is soft.   Musculoskeletal:         General: Normal range of motion.   Skin:     General: Skin is warm and dry.      Capillary Refill: Capillary refill takes less than 2 seconds.   Neurological:      General: No focal deficit present.      Mental Status: He is oriented to person, place, and time.   Psychiatric:         Mood and Affect: Mood normal.       Relevant Results  Results for orders placed or performed during the hospital encounter of 08/14/24 (from the past 24 hour(s))   CBC   Result Value Ref Range    WBC 12.3 (H) 4.4 - 11.3 x10*3/uL    nRBC 0.0 0.0 - 0.0 /100 WBCs    RBC 3.57 (L) 4.50 - 5.90 x10*6/uL    Hemoglobin 9.0 (L) 13.5 - 17.5 g/dL    Hematocrit 29.8 (L) 41.0 - 52.0 %    MCV 84 80 - 100 fL    MCH 25.2 (L) 26.0 - 34.0 pg    MCHC 30.2 (L) 32.0 - 36.0 g/dL    RDW 14.3 11.5 - 14.5 %    Platelets 594 (H) 150 - 450 x10*3/uL   Basic metabolic panel   Result Value Ref Range    Glucose 147 (H) 65 - 99 mg/dL    Sodium 131 (L) 133 - 145 mmol/L    Potassium 3.7 3.4 - 5.1 mmol/L    Chloride 98 97 - 107 mmol/L    Bicarbonate 23 (L) 24 - 31 mmol/L    Urea Nitrogen 10 8 - 25 mg/dL    Creatinine 0.60 0.40 - 1.60 mg/dL    eGFR >90 >60 mL/min/1.73m*2    Calcium 9.6 8.5 - 10.4 mg/dL    Anion Gap 10 <=19 mmol/L     Assessment & Plan    Back Pain  -Secondary to lumbar metastasis  -Palliative follows  -Ortho spine follows  -IV steroids started  -Pain management    Renal Cell Carcinoma with mets  -Awaiting  transfer to [facility]    DVT Prophylaxis  -Heparin subcutaneous    Dispo  Accepted at [facility] and awaiting bed    Maddison Whitaker, APRN-CNP

## 2024-08-16 ENCOUNTER — HOSPITAL ENCOUNTER (INPATIENT)
Facility: HOSPITAL | Age: 74
DRG: 947 | End: 2024-08-16
Attending: INTERNAL MEDICINE | Admitting: INTERNAL MEDICINE
Payer: MEDICARE

## 2024-08-16 VITALS
SYSTOLIC BLOOD PRESSURE: 120 MMHG | HEART RATE: 94 BPM | HEIGHT: 73 IN | RESPIRATION RATE: 17 BRPM | WEIGHT: 162.48 LBS | BODY MASS INDEX: 21.53 KG/M2 | OXYGEN SATURATION: 92 % | DIASTOLIC BLOOD PRESSURE: 74 MMHG | TEMPERATURE: 98.6 F

## 2024-08-16 DIAGNOSIS — J96.01 ACUTE HYPOXIC RESPIRATORY FAILURE (MULTI): ICD-10-CM

## 2024-08-16 DIAGNOSIS — S32.009A CLOSED FRACTURE OF TRANSVERSE PROCESS OF LUMBAR VERTEBRA, INITIAL ENCOUNTER (MULTI): ICD-10-CM

## 2024-08-16 DIAGNOSIS — M79.2 NEUROPATHIC PAIN: ICD-10-CM

## 2024-08-16 DIAGNOSIS — K59.03 DRUG-INDUCED CONSTIPATION: ICD-10-CM

## 2024-08-16 DIAGNOSIS — C64.9: Primary | ICD-10-CM

## 2024-08-16 DIAGNOSIS — M84.48XA PATHOLOGICAL FRACTURE OF LUMBAR VERTEBRA, INITIAL ENCOUNTER: ICD-10-CM

## 2024-08-16 PROBLEM — R64 CANCER CACHEXIA (MULTI): Status: ACTIVE | Noted: 2024-08-16

## 2024-08-16 PROBLEM — Z51.5 PALLIATIVE CARE ENCOUNTER: Status: ACTIVE | Noted: 2024-08-16

## 2024-08-16 LAB
ANION GAP SERPL CALC-SCNC: 16 MMOL/L
BUN SERPL-MCNC: 13 MG/DL (ref 8–25)
CALCIUM SERPL-MCNC: 9.6 MG/DL (ref 8.5–10.4)
CHLORIDE SERPL-SCNC: 96 MMOL/L (ref 97–107)
CO2 SERPL-SCNC: 20 MMOL/L (ref 24–31)
CREAT SERPL-MCNC: 0.6 MG/DL (ref 0.4–1.6)
EGFRCR SERPLBLD CKD-EPI 2021: >90 ML/MIN/1.73M*2
ERYTHROCYTE [DISTWIDTH] IN BLOOD BY AUTOMATED COUNT: 14 % (ref 11.5–14.5)
GLUCOSE SERPL-MCNC: 269 MG/DL (ref 65–99)
HCT VFR BLD AUTO: 30.3 % (ref 41–52)
HGB BLD-MCNC: 9.3 G/DL (ref 13.5–17.5)
MCH RBC QN AUTO: 25.5 PG (ref 26–34)
MCHC RBC AUTO-ENTMCNC: 30.7 G/DL (ref 32–36)
MCV RBC AUTO: 83 FL (ref 80–100)
NRBC BLD-RTO: 0 /100 WBCS (ref 0–0)
PLATELET # BLD AUTO: 651 X10*3/UL (ref 150–450)
POTASSIUM SERPL-SCNC: 4.4 MMOL/L (ref 3.4–5.1)
RBC # BLD AUTO: 3.64 X10*6/UL (ref 4.5–5.9)
SODIUM SERPL-SCNC: 132 MMOL/L (ref 133–145)
WBC # BLD AUTO: 14.4 X10*3/UL (ref 4.4–11.3)

## 2024-08-16 PROCEDURE — 99232 SBSQ HOSP IP/OBS MODERATE 35: CPT | Performed by: NURSE PRACTITIONER

## 2024-08-16 PROCEDURE — 2500000004 HC RX 250 GENERAL PHARMACY W/ HCPCS (ALT 636 FOR OP/ED): Performed by: INTERNAL MEDICINE

## 2024-08-16 PROCEDURE — 2500000001 HC RX 250 WO HCPCS SELF ADMINISTERED DRUGS (ALT 637 FOR MEDICARE OP): Performed by: INTERNAL MEDICINE

## 2024-08-16 PROCEDURE — 2500000001 HC RX 250 WO HCPCS SELF ADMINISTERED DRUGS (ALT 637 FOR MEDICARE OP): Performed by: NURSE PRACTITIONER

## 2024-08-16 PROCEDURE — 82374 ASSAY BLOOD CARBON DIOXIDE: CPT | Performed by: NURSE PRACTITIONER

## 2024-08-16 PROCEDURE — 36415 COLL VENOUS BLD VENIPUNCTURE: CPT | Performed by: NURSE PRACTITIONER

## 2024-08-16 PROCEDURE — 2500000004 HC RX 250 GENERAL PHARMACY W/ HCPCS (ALT 636 FOR OP/ED): Performed by: NURSE PRACTITIONER

## 2024-08-16 PROCEDURE — 85027 COMPLETE CBC AUTOMATED: CPT | Performed by: NURSE PRACTITIONER

## 2024-08-16 PROCEDURE — 1170000001 HC PRIVATE ONCOLOGY ROOM DAILY

## 2024-08-16 PROCEDURE — 99223 1ST HOSP IP/OBS HIGH 75: CPT

## 2024-08-16 PROCEDURE — 99233 SBSQ HOSP IP/OBS HIGH 50: CPT

## 2024-08-16 SDOH — SOCIAL STABILITY: SOCIAL INSECURITY: DO YOU FEEL ANYONE HAS EXPLOITED OR TAKEN ADVANTAGE OF YOU FINANCIALLY OR OF YOUR PERSONAL PROPERTY?: NO

## 2024-08-16 SDOH — SOCIAL STABILITY: SOCIAL INSECURITY: DO YOU FEEL UNSAFE GOING BACK TO THE PLACE WHERE YOU ARE LIVING?: NO

## 2024-08-16 SDOH — SOCIAL STABILITY: SOCIAL INSECURITY: DOES ANYONE TRY TO KEEP YOU FROM HAVING/CONTACTING OTHER FRIENDS OR DOING THINGS OUTSIDE YOUR HOME?: NO

## 2024-08-16 SDOH — SOCIAL STABILITY: SOCIAL INSECURITY: HAVE YOU HAD THOUGHTS OF HARMING ANYONE ELSE?: NO

## 2024-08-16 SDOH — SOCIAL STABILITY: SOCIAL INSECURITY: HAS ANYONE EVER THREATENED TO HURT YOUR FAMILY OR YOUR PETS?: NO

## 2024-08-16 SDOH — SOCIAL STABILITY: SOCIAL INSECURITY: ARE THERE ANY APPARENT SIGNS OF INJURIES/BEHAVIORS THAT COULD BE RELATED TO ABUSE/NEGLECT?: NO

## 2024-08-16 SDOH — SOCIAL STABILITY: SOCIAL INSECURITY: ARE YOU OR HAVE YOU BEEN THREATENED OR ABUSED PHYSICALLY, EMOTIONALLY, OR SEXUALLY BY ANYONE?: NO

## 2024-08-16 SDOH — SOCIAL STABILITY: SOCIAL INSECURITY: WERE YOU ABLE TO COMPLETE ALL THE BEHAVIORAL HEALTH SCREENINGS?: YES

## 2024-08-16 SDOH — SOCIAL STABILITY: SOCIAL INSECURITY: HAVE YOU HAD ANY THOUGHTS OF HARMING ANYONE ELSE?: NO

## 2024-08-16 SDOH — SOCIAL STABILITY: SOCIAL INSECURITY: ABUSE: ADULT

## 2024-08-16 ASSESSMENT — COGNITIVE AND FUNCTIONAL STATUS - GENERAL
WALKING IN HOSPITAL ROOM: TOTAL
CLIMB 3 TO 5 STEPS WITH RAILING: TOTAL
WALKING IN HOSPITAL ROOM: A LOT
TOILETING: A LOT
CLIMB 3 TO 5 STEPS WITH RAILING: A LOT
DAILY ACTIVITIY SCORE: 20
MOVING TO AND FROM BED TO CHAIR: A LOT
DRESSING REGULAR LOWER BODY CLOTHING: A LITTLE
DRESSING REGULAR LOWER BODY CLOTHING: A LITTLE
STANDING UP FROM CHAIR USING ARMS: A LOT
CLIMB 3 TO 5 STEPS WITH RAILING: A LOT
STANDING UP FROM CHAIR USING ARMS: A LOT
TURNING FROM BACK TO SIDE WHILE IN FLAT BAD: A LITTLE
TOILETING: A LOT
WALKING IN HOSPITAL ROOM: A LOT
MOVING TO AND FROM BED TO CHAIR: A LOT
PATIENT BASELINE BEDBOUND: NO
HELP NEEDED FOR BATHING: A LITTLE
DRESSING REGULAR LOWER BODY CLOTHING: A LOT
MOBILITY SCORE: 16
STANDING UP FROM CHAIR USING ARMS: A LOT
DAILY ACTIVITIY SCORE: 19
MOBILITY SCORE: 16
TOILETING: A LOT
PERSONAL GROOMING: A LITTLE
HELP NEEDED FOR BATHING: A LITTLE
MOBILITY SCORE: 13
MOVING TO AND FROM BED TO CHAIR: A LOT
DAILY ACTIVITIY SCORE: 20

## 2024-08-16 ASSESSMENT — ACTIVITIES OF DAILY LIVING (ADL)
DRESSING YOURSELF: INDEPENDENT
ADEQUATE_TO_COMPLETE_ADL: YES
TOILETING: NEEDS ASSISTANCE
ASSISTIVE_DEVICE: WHEELCHAIR
PATIENT'S MEMORY ADEQUATE TO SAFELY COMPLETE DAILY ACTIVITIES?: YES
WALKS IN HOME: DEPENDENT
HEARING - LEFT EAR: FUNCTIONAL
HEARING - RIGHT EAR: FUNCTIONAL
GROOMING: NEEDS ASSISTANCE
JUDGMENT_ADEQUATE_SAFELY_COMPLETE_DAILY_ACTIVITIES: YES
BATHING: NEEDS ASSISTANCE
LACK_OF_TRANSPORTATION: NO
FEEDING YOURSELF: INDEPENDENT

## 2024-08-16 ASSESSMENT — PATIENT HEALTH QUESTIONNAIRE - PHQ9
1. LITTLE INTEREST OR PLEASURE IN DOING THINGS: NOT AT ALL
2. FEELING DOWN, DEPRESSED OR HOPELESS: NOT AT ALL
SUM OF ALL RESPONSES TO PHQ9 QUESTIONS 1 & 2: 0

## 2024-08-16 ASSESSMENT — LIFESTYLE VARIABLES
SKIP TO QUESTIONS 9-10: 1
HOW MANY STANDARD DRINKS CONTAINING ALCOHOL DO YOU HAVE ON A TYPICAL DAY: PATIENT DOES NOT DRINK
HOW OFTEN DO YOU HAVE A DRINK CONTAINING ALCOHOL: NEVER
AUDIT-C TOTAL SCORE: 0
HOW OFTEN DO YOU HAVE 6 OR MORE DRINKS ON ONE OCCASION: NEVER
AUDIT-C TOTAL SCORE: 0

## 2024-08-16 ASSESSMENT — PAIN SCALES - GENERAL
PAINLEVEL_OUTOF10: 3
PAINLEVEL_OUTOF10: 0 - NO PAIN
PAINLEVEL_OUTOF10: 3
PAINLEVEL_OUTOF10: 4
PAINLEVEL_OUTOF10: 7

## 2024-08-16 ASSESSMENT — PAIN - FUNCTIONAL ASSESSMENT
PAIN_FUNCTIONAL_ASSESSMENT: 0-10

## 2024-08-16 ASSESSMENT — PAIN DESCRIPTION - DESCRIPTORS: DESCRIPTORS: THROBBING

## 2024-08-16 ASSESSMENT — PAIN DESCRIPTION - LOCATION: LOCATION: BACK

## 2024-08-16 NOTE — CARE PLAN
The patient's goals for the shift include comfort    The clinical goals for the shift include pain management

## 2024-08-16 NOTE — NURSING NOTE
Assumed care of patient. patient is resting comfortably in the bed. Call bell and possessions within reach. Bed alarm on.

## 2024-08-16 NOTE — NURSING NOTE
Agree with previous assessment. Patient lying comfortable in bed. Not in any acute distress. Denies any needs. Call light and possessions within reach. Bed alarm on.

## 2024-08-16 NOTE — PROGRESS NOTES
08/16/24 1136   Discharge Planning   Expected Discharge Disposition Other Fac  (Transfer downtown)   Does the patient need discharge transport arranged? Yes   RoundTrip coordination needed? Yes   Has discharge transport been arranged? No       Transfer downtown

## 2024-08-16 NOTE — PROGRESS NOTES
"Christophe Ambriz is a 74 y.o. male on day 2 of admission presenting with Pain from bone metastases (Multi).    Subjective   The patient was seen and examined. Endorses greater pain relief today. Friend present at bedside.     Objective     Physical Exam  Vitals and nursing note reviewed.   Constitutional:       Appearance: He is ill-appearing.   HENT:      Head: Normocephalic.      Mouth/Throat:      Mouth: Mucous membranes are moist.   Eyes:      Extraocular Movements: Extraocular movements intact.      Pupils: Pupils are equal, round, and reactive to light.   Cardiovascular:      Rate and Rhythm: Normal rate and regular rhythm.      Heart sounds: No murmur heard.     No friction rub. No gallop.   Pulmonary:      Effort: Pulmonary effort is normal. No respiratory distress.      Breath sounds: Normal breath sounds.   Abdominal:      General: Bowel sounds are normal.   Musculoskeletal:         General: Tenderness present.      Cervical back: Neck supple.   Lymphadenopathy:      Cervical: No cervical adenopathy.   Skin:     General: Skin is dry.      Capillary Refill: Capillary refill takes 2 to 3 seconds.      Coloration: Skin is pale.   Neurological:      Mental Status: He is oriented to person, place, and time.      Sensory: Sensory deficit present.      Motor: Weakness present.         Last Recorded Vitals  Blood pressure 100/63, pulse 85, temperature 36.3 °C (97.3 °F), temperature source Temporal, resp. rate 16, height 1.854 m (6' 1\"), weight 73.7 kg (162 lb 7.7 oz), SpO2 95%.  Intake/Output last 3 Shifts:  I/O last 3 completed shifts:  In: 1070 (14.5 mL/kg) [P.O.:1070]  Out: 1000 (13.6 mL/kg) [Urine:1000 (0.4 mL/kg/hr)]  Weight: 73.7 kg     Relevant Results  Results for orders placed or performed during the hospital encounter of 08/14/24 (from the past 24 hour(s))   CBC   Result Value Ref Range    WBC 14.4 (H) 4.4 - 11.3 x10*3/uL    nRBC 0.0 0.0 - 0.0 /100 WBCs    RBC 3.64 (L) 4.50 - 5.90 x10*6/uL    Hemoglobin " 9.3 (L) 13.5 - 17.5 g/dL    Hematocrit 30.3 (L) 41.0 - 52.0 %    MCV 83 80 - 100 fL    MCH 25.5 (L) 26.0 - 34.0 pg    MCHC 30.7 (L) 32.0 - 36.0 g/dL    RDW 14.0 11.5 - 14.5 %    Platelets 651 (H) 150 - 450 x10*3/uL   Basic metabolic panel   Result Value Ref Range    Glucose 269 (H) 65 - 99 mg/dL    Sodium 132 (L) 133 - 145 mmol/L    Potassium 4.4 3.4 - 5.1 mmol/L    Chloride 96 (L) 97 - 107 mmol/L    Bicarbonate 20 (L) 24 - 31 mmol/L    Urea Nitrogen 13 8 - 25 mg/dL    Creatinine 0.60 0.40 - 1.60 mg/dL    eGFR >90 >60 mL/min/1.73m*2    Calcium 9.6 8.5 - 10.4 mg/dL    Anion Gap 16 <=19 mmol/L      Scheduled medications  amLODIPine, 10 mg, oral, Daily  aspirin, 81 mg, oral, Daily  atorvastatin, 20 mg, oral, Nightly  clopidogrel, 75 mg, oral, Daily  dexAMETHasone, 4 mg, oral, q8h SLIM  docusate sodium, 100 mg, oral, BID  gabapentin, 100 mg, oral, TID  heparin (porcine), 5,000 Units, subcutaneous, q8h SLIM  lactulose, 20 g, oral, Daily  lidocaine, 1 patch, transdermal, q24h  metoprolol succinate XL, 50 mg, oral, Daily  pantoprazole, 40 mg, oral, Daily before breakfast      Continuous medications     PRN medications  PRN medications: acetaminophen **OR** acetaminophen **OR** acetaminophen, benzocaine-menthol, dextromethorphan-guaifenesin, guaiFENesin, HYDROmorphone, ondansetron ODT **OR** ondansetron, oxyCODONE, polyethylene glycol, traZODone       Assessment/Plan   Assessment & Plan  Pain from bone metastases (Multi)  See MRI results below  Started on Decadron - this has provided much relief.   Pathologic lumbar vertebral fracture, initial encounter  MR lumbar spine shows L3 compression fracture with retropulsion of the posterior cortex and severe stenosis of the central spinal canal with nerve root compression unchanged from the previous exam.   CAD (coronary artery disease)    Cancer cachexia (Multi)  R/t active disease   Palliative care encounter    DNRCCA/DNI  Capable  No advanced directives. Has 6 biological  children, has been estranged for years.   SO Iram of 30+ years.     8/16/2024  Patient states pain is controlled with steroids, rating it a 4 out of 10. Has not taken any PRN Dilaudid in the past 18 hours. Did have a PRN dose of Oxy 10 this afternoon. We did fill out a POA-HC, placed in hard chart. His significant other Iram Armstrong is POA-HC.   We will continue to follow.     8/15/2024   Discussed case with ortho spine, agree with plan to transfer to [facility] for further evaluation of stenosis by neurosurgery. Start high dose steroids for inflammation. Increased dilaudid dose as pain only reduced to 6-7/10. Start lidocaine patches. Started PPI for GI upset.      Patient has not yet followed up with outpatient oncology d/t functional decline and pain. Provided extensive education to patient and SO about status of cancer, and established goals. Goal would be to improve nutrition and functional status, then follow up with oncology to see what options are available for treatment. Patient values quality of life and has no desire to undergo CPR or life support measures should he arrest.      We discussed need for HPOA given his social situation and he wants to review papers. He is supportive of getting living will filled out. I asked HOA Pinedo to follow up with patient.      Awaiting transfer to [facility].      I spent 60 minutes in the professional and overall care of this patient.      Rebecca Negrete, APRN-CNP

## 2024-08-16 NOTE — CARE PLAN
The patient's goals for the shift include pain control    The clinical goals for the shift include manage pain

## 2024-08-16 NOTE — CARE PLAN
The patient's goals for the shift include pain control    The clinical goals for the shift include safety, monitor labs and vitals, pain control      Problem: Discharge Planning  Goal: Discharge to home or other facility with appropriate resources  Outcome: Progressing     Problem: Chronic Conditions and Co-morbidities  Goal: Patient's chronic conditions and co-morbidity symptoms are monitored and maintained or improved  Outcome: Progressing     Problem: Fall/Injury  Goal: Not fall by end of shift  Outcome: Progressing  Goal: Be free from injury by end of the shift  Outcome: Progressing  Goal: Verbalize understanding of personal risk factors for fall in the hospital  Outcome: Progressing  Goal: Verbalize understanding of risk factor reduction measures to prevent injury from fall in the home  Outcome: Progressing  Goal: Use assistive devices by end of the shift  Outcome: Progressing  Goal: Pace activities to prevent fatigue by end of the shift  Outcome: Progressing     Problem: Pain  Goal: Takes deep breaths with improved pain control throughout the shift  Outcome: Progressing  Goal: Turns in bed with improved pain control throughout the shift  Outcome: Progressing  Goal: Walks with improved pain control throughout the shift  Outcome: Progressing  Goal: Performs ADL's with improved pain control throughout shift  Outcome: Progressing  Goal: Participates in PT with improved pain control throughout the shift  Outcome: Progressing  Goal: Free from opioid side effects throughout the shift  Outcome: Progressing  Goal: Free from acute confusion related to pain meds throughout the shift  Outcome: Progressing     Problem: Skin  Goal: Participates in plan/prevention/treatment measures  Outcome: Progressing  Goal: Prevent/minimize sheer/friction injuries  Outcome: Progressing  Goal: Promote/optimize nutrition  Outcome: Progressing  Goal: Promote skin healing  Outcome: Progressing     Problem: Nutrition  Goal: Adequate PO fluid  intake  Outcome: Progressing  Goal: Nutrition support is meeting 75% of nutrient needs  Outcome: Progressing  Goal: Lab values WNL  Outcome: Progressing  Goal: Maintain stable weight  Outcome: Progressing

## 2024-08-16 NOTE — PROGRESS NOTES
Christophe Ambriz is a 74 y.o. male on day 2 of admission presenting with Pain from bone metastases (Multi).      Subjective   Pt is significantly improved with addition of steroids. Has not required Dilaudid since yesterday. Appetite is poor. He is diffusely weak with overall fatigue. Awaiting bed at [facility].     Objective     Last Recorded Vitals  /63 (BP Location: Left arm, Patient Position: Lying)   Pulse 85   Temp 36.3 °C (97.3 °F) (Temporal)   Resp 16   Wt 73.7 kg (162 lb 7.7 oz)   SpO2 95%   Intake/Output last 3 Shifts:    Intake/Output Summary (Last 24 hours) at 8/16/2024 1038  Last data filed at 8/16/2024 0555  Gross per 24 hour   Intake 770 ml   Output 850 ml   Net -80 ml       Admission Weight  Weight: 79.4 kg (175 lb) (08/14/24 1245)    Daily Weight  08/14/24 : 73.7 kg (162 lb 7.7 oz)    Image Results  MR lumbar spine w and wo IV contrast  Narrative: Interpreted By:  Elroy Uribe,   STUDY:  MR LUMBAR SPINE W AND WO IV CONTRAST;  8/14/2024 3:38 pm      INDICATION:  Signs/Symptoms:Bilateral lower extremity weakness, decreased rectal  tone.      COMPARISON:  July 26.      ACCESSION NUMBER(S):  [id number]      ORDERING CLINICIAN:  EDIL MARKS      TECHNIQUE:  The lumbar spine was studied in the sagital, axial and coronal planes  utiliing T1 and T2 weighted images.      FINDINGS:  Heterogeneous decreased marrow signal throughout the lumbar spine is  unchanged compared to the previous exam. This is a nonspecific  finding consistent with hyperactive marrow as seen in anemia or  marrow infiltrative process due to hematologic or marrow malignancy.  Alignment is normal. Images at each interspace reveal the following:  T12/L1  There is normal alignment and vertebral body height. The disc space  is normal. There is no evidence of canal or foraminal narrowing.  There is no evidence of bulging or herniated disc. L1/L2  Pedicle screws and plates are present.  There is normal alignment and  vertebral  body height. The disc space is normal. There is no evidence  of canal or foraminal narrowing. There is no evidence of bulging or  herniated disc. L2/L3  Previous laminectomy unchanged from the previous exam. Pedicle screws  and plates are present. Bulging intervertebral disc without canal  stenosis. Mild bilateral foraminal narrowing. There is replacement of  normal marrow signal within the L3 vertebral body with epidural mass  as previously demonstrated and described. Marked narrowing of the  thecal sac at this location without identified cerebral spinal fluid  unchanged from the previous exam. Nerve root compression unchanged  from the previous exam. No evidence of fluid collection or  hemorrhage. L3/L4 Circumferential bulging intervertebral disc  asymmetrical to the left. Marginal osteophyte formation and facet  hypertrophy. No measurable canal stenosis. Left worse than right  foraminal narrowing. L4/L5  Left paracentral herniated nucleus polyposis measuring a proximally 1  cm in size including annular tear best appreciated on axial T2  weighted image 18/30. Bilateral facet hypertrophy. Pedicle screws and  plates are present. No measurable canal stenosis L5/S1  Circumferential bulging intervertebral disc and facet hypertrophy  without canal stenosis. Mild bilateral foraminal narrowing.      The visualized sacrum is normal          Impression: * L3 compression fracture with retropulsion of the posterior cortex  and severe stenosis of the central spinal canal with nerve root  compression unchanged from the previous exam *No new foci of marrow  replacement or compression fracture *There is no measurable change  compared to the previous exam.      MACRO:  Critical Finding:  See findings. Notification was initiated on  8/14/2024 at 3:50 pm by  Elroy Uribe.  (**-OCF-**)      Signed by: Elroy Uribe 8/14/2024 3:50 PM  Dictation workstation:   [id number]      Physical Exam  HENT:      Head: Normocephalic and  atraumatic.      Nose: Nose normal.      Mouth/Throat:      Mouth: Mucous membranes are moist.      Pharynx: Oropharynx is clear.   Eyes:      Extraocular Movements: Extraocular movements intact.      Pupils: Pupils are equal, round, and reactive to light.   Cardiovascular:      Rate and Rhythm: Normal rate and regular rhythm.      Pulses: Normal pulses.   Pulmonary:      Effort: Pulmonary effort is normal.      Breath sounds: Normal breath sounds.   Abdominal:      General: Abdomen is flat. Bowel sounds are normal.      Palpations: Abdomen is soft.   Musculoskeletal:         General: Normal range of motion.   Skin:     General: Skin is warm and dry.      Capillary Refill: Capillary refill takes less than 2 seconds.   Neurological:      General: No focal deficit present.      Mental Status: He is oriented to person, place, and time.   Psychiatric:         Mood and Affect: Mood normal.       Relevant Results  Results for orders placed or performed during the hospital encounter of 08/14/24 (from the past 24 hour(s))   CBC   Result Value Ref Range    WBC 14.4 (H) 4.4 - 11.3 x10*3/uL    nRBC 0.0 0.0 - 0.0 /100 WBCs    RBC 3.64 (L) 4.50 - 5.90 x10*6/uL    Hemoglobin 9.3 (L) 13.5 - 17.5 g/dL    Hematocrit 30.3 (L) 41.0 - 52.0 %    MCV 83 80 - 100 fL    MCH 25.5 (L) 26.0 - 34.0 pg    MCHC 30.7 (L) 32.0 - 36.0 g/dL    RDW 14.0 11.5 - 14.5 %    Platelets 651 (H) 150 - 450 x10*3/uL   Basic metabolic panel   Result Value Ref Range    Glucose 269 (H) 65 - 99 mg/dL    Sodium 132 (L) 133 - 145 mmol/L    Potassium 4.4 3.4 - 5.1 mmol/L    Chloride 96 (L) 97 - 107 mmol/L    Bicarbonate 20 (L) 24 - 31 mmol/L    Urea Nitrogen 13 8 - 25 mg/dL    Creatinine 0.60 0.40 - 1.60 mg/dL    eGFR >90 >60 mL/min/1.73m*2    Calcium 9.6 8.5 - 10.4 mg/dL    Anion Gap 16 <=19 mmol/L     Assessment & Plan    Back Pain  -Secondary to lumbar metastasis  -S/p laminectomy and fusion on 7/9 d/t compression w/neurosurgery at [facility]. Accepted and awaiting  bed  -MRI shows L3 compression fx and expansion of the tumor mass of the L3 vertebral body into the epidural space, causing stenosis  -Palliative follows  -Ortho spine follows  -Steroids started and pt has significant improvement in pain  -Pain management    Renal Cell Carcinoma with mets  -Not currently on treatment. Poor follow-up with Oncology due to limited mobility and pain  -Palliative follows. Pt not interested in Hospice at this time and wants to explore treatment options     DVT Prophylaxis  -Heparin subcutaneous    Dispo  Accepted at [facility] and awaiting bed    Maddison Whitaker, APRN-CNP

## 2024-08-17 PROBLEM — C64.9: Status: ACTIVE | Noted: 2024-08-17

## 2024-08-17 LAB
ALBUMIN SERPL BCP-MCNC: 3.4 G/DL (ref 3.4–5)
ALP SERPL-CCNC: 134 U/L (ref 33–136)
ALT SERPL W P-5'-P-CCNC: 21 U/L (ref 10–52)
ANION GAP SERPL CALC-SCNC: 19 MMOL/L (ref 10–20)
AST SERPL W P-5'-P-CCNC: 24 U/L (ref 9–39)
BASOPHILS # BLD AUTO: 0.02 X10*3/UL (ref 0–0.1)
BASOPHILS NFR BLD AUTO: 0.1 %
BILIRUB DIRECT SERPL-MCNC: 0.1 MG/DL (ref 0–0.3)
BILIRUB SERPL-MCNC: 0.3 MG/DL (ref 0–1.2)
BUN SERPL-MCNC: 22 MG/DL (ref 6–23)
CALCIUM SERPL-MCNC: 9.5 MG/DL (ref 8.6–10.6)
CHLORIDE SERPL-SCNC: 95 MMOL/L (ref 98–107)
CO2 SERPL-SCNC: 24 MMOL/L (ref 21–32)
CREAT SERPL-MCNC: 0.69 MG/DL (ref 0.5–1.3)
EGFRCR SERPLBLD CKD-EPI 2021: >90 ML/MIN/1.73M*2
EOSINOPHIL # BLD AUTO: 0.05 X10*3/UL (ref 0–0.4)
EOSINOPHIL NFR BLD AUTO: 0.2 %
ERYTHROCYTE [DISTWIDTH] IN BLOOD BY AUTOMATED COUNT: 14.3 % (ref 11.5–14.5)
GLUCOSE BLD MANUAL STRIP-MCNC: 218 MG/DL (ref 74–99)
GLUCOSE BLD MANUAL STRIP-MCNC: 225 MG/DL (ref 74–99)
GLUCOSE BLD MANUAL STRIP-MCNC: 261 MG/DL (ref 74–99)
GLUCOSE BLD MANUAL STRIP-MCNC: 264 MG/DL (ref 74–99)
GLUCOSE SERPL-MCNC: 205 MG/DL (ref 74–99)
HCT VFR BLD AUTO: 29.8 % (ref 41–52)
HGB BLD-MCNC: 9.2 G/DL (ref 13.5–17.5)
IMM GRANULOCYTES # BLD AUTO: 0.23 X10*3/UL (ref 0–0.5)
IMM GRANULOCYTES NFR BLD AUTO: 1 % (ref 0–0.9)
LYMPHOCYTES # BLD AUTO: 3.25 X10*3/UL (ref 0.8–3)
LYMPHOCYTES NFR BLD AUTO: 13.7 %
MAGNESIUM SERPL-MCNC: 2.06 MG/DL (ref 1.6–2.4)
MCH RBC QN AUTO: 25.2 PG (ref 26–34)
MCHC RBC AUTO-ENTMCNC: 30.9 G/DL (ref 32–36)
MCV RBC AUTO: 82 FL (ref 80–100)
MONOCYTES # BLD AUTO: 1.44 X10*3/UL (ref 0.05–0.8)
MONOCYTES NFR BLD AUTO: 6.1 %
NEUTROPHILS # BLD AUTO: 18.72 X10*3/UL (ref 1.6–5.5)
NEUTROPHILS NFR BLD AUTO: 78.9 %
NRBC BLD-RTO: 0 /100 WBCS (ref 0–0)
PHOSPHATE SERPL-MCNC: 3.6 MG/DL (ref 2.5–4.9)
PLATELET # BLD AUTO: 731 X10*3/UL (ref 150–450)
POTASSIUM SERPL-SCNC: 4.3 MMOL/L (ref 3.5–5.3)
PROT SERPL-MCNC: 6.9 G/DL (ref 6.4–8.2)
RBC # BLD AUTO: 3.65 X10*6/UL (ref 4.5–5.9)
SODIUM SERPL-SCNC: 134 MMOL/L (ref 136–145)
WBC # BLD AUTO: 23.7 X10*3/UL (ref 4.4–11.3)

## 2024-08-17 PROCEDURE — 82565 ASSAY OF CREATININE: CPT

## 2024-08-17 PROCEDURE — 2500000001 HC RX 250 WO HCPCS SELF ADMINISTERED DRUGS (ALT 637 FOR MEDICARE OP)

## 2024-08-17 PROCEDURE — 36415 COLL VENOUS BLD VENIPUNCTURE: CPT

## 2024-08-17 PROCEDURE — 84075 ASSAY ALKALINE PHOSPHATASE: CPT

## 2024-08-17 PROCEDURE — 84100 ASSAY OF PHOSPHORUS: CPT

## 2024-08-17 PROCEDURE — 1170000001 HC PRIVATE ONCOLOGY ROOM DAILY

## 2024-08-17 PROCEDURE — 2500000004 HC RX 250 GENERAL PHARMACY W/ HCPCS (ALT 636 FOR OP/ED)

## 2024-08-17 PROCEDURE — 2500000002 HC RX 250 W HCPCS SELF ADMINISTERED DRUGS (ALT 637 FOR MEDICARE OP, ALT 636 FOR OP/ED)

## 2024-08-17 PROCEDURE — 99221 1ST HOSP IP/OBS SF/LOW 40: CPT | Performed by: STUDENT IN AN ORGANIZED HEALTH CARE EDUCATION/TRAINING PROGRAM

## 2024-08-17 PROCEDURE — 85025 COMPLETE CBC W/AUTO DIFF WBC: CPT

## 2024-08-17 PROCEDURE — 99233 SBSQ HOSP IP/OBS HIGH 50: CPT

## 2024-08-17 PROCEDURE — 99222 1ST HOSP IP/OBS MODERATE 55: CPT | Performed by: STUDENT IN AN ORGANIZED HEALTH CARE EDUCATION/TRAINING PROGRAM

## 2024-08-17 PROCEDURE — 83735 ASSAY OF MAGNESIUM: CPT

## 2024-08-17 PROCEDURE — 82947 ASSAY GLUCOSE BLOOD QUANT: CPT

## 2024-08-17 PROCEDURE — 99223 1ST HOSP IP/OBS HIGH 75: CPT | Performed by: NURSE PRACTITIONER

## 2024-08-17 RX ORDER — HYDROCHLOROTHIAZIDE 25 MG/1
25 TABLET ORAL DAILY
Status: DISCONTINUED | OUTPATIENT
Start: 2024-08-17 | End: 2024-08-20

## 2024-08-17 RX ORDER — HEPARIN SODIUM 5000 [USP'U]/ML
5000 INJECTION, SOLUTION INTRAVENOUS; SUBCUTANEOUS EVERY 8 HOURS SCHEDULED
Status: DISCONTINUED | OUTPATIENT
Start: 2024-08-17 | End: 2024-08-17

## 2024-08-17 RX ORDER — CLOPIDOGREL BISULFATE 75 MG/1
75 TABLET ORAL DAILY
COMMUNITY

## 2024-08-17 RX ORDER — GABAPENTIN 100 MG/1
100 CAPSULE ORAL 3 TIMES DAILY
Status: DISCONTINUED | OUTPATIENT
Start: 2024-08-17 | End: 2024-08-17

## 2024-08-17 RX ORDER — INSULIN LISPRO 100 [IU]/ML
0-10 INJECTION, SOLUTION INTRAVENOUS; SUBCUTANEOUS
Status: DISCONTINUED | OUTPATIENT
Start: 2024-08-17 | End: 2024-08-20

## 2024-08-17 RX ORDER — OXYCODONE HYDROCHLORIDE 5 MG/1
10 TABLET ORAL EVERY 12 HOURS PRN
Status: DISCONTINUED | OUTPATIENT
Start: 2024-08-17 | End: 2024-08-17

## 2024-08-17 RX ORDER — ACETAMINOPHEN 160 MG/5ML
650 SOLUTION ORAL EVERY 4 HOURS PRN
Status: DISCONTINUED | OUTPATIENT
Start: 2024-08-17 | End: 2024-08-19

## 2024-08-17 RX ORDER — SENNOSIDES 8.6 MG/1
2 TABLET ORAL 2 TIMES DAILY
Status: DISCONTINUED | OUTPATIENT
Start: 2024-08-17 | End: 2024-08-28 | Stop reason: HOSPADM

## 2024-08-17 RX ORDER — TRAZODONE HYDROCHLORIDE 50 MG/1
50 TABLET ORAL NIGHTLY PRN
Status: DISCONTINUED | OUTPATIENT
Start: 2024-08-17 | End: 2024-08-19

## 2024-08-17 RX ORDER — ENOXAPARIN SODIUM 100 MG/ML
40 INJECTION SUBCUTANEOUS EVERY 24 HOURS
Status: DISCONTINUED | OUTPATIENT
Start: 2024-08-17 | End: 2024-08-17 | Stop reason: ALTCHOICE

## 2024-08-17 RX ORDER — ATORVASTATIN CALCIUM 20 MG/1
20 TABLET, FILM COATED ORAL NIGHTLY
Status: DISCONTINUED | OUTPATIENT
Start: 2024-08-17 | End: 2024-08-28 | Stop reason: HOSPADM

## 2024-08-17 RX ORDER — HYDROMORPHONE HYDROCHLORIDE 1 MG/ML
0.4 INJECTION, SOLUTION INTRAMUSCULAR; INTRAVENOUS; SUBCUTANEOUS
Status: DISCONTINUED | OUTPATIENT
Start: 2024-08-17 | End: 2024-08-20

## 2024-08-17 RX ORDER — GABAPENTIN 300 MG/1
300 CAPSULE ORAL NIGHTLY
Status: DISCONTINUED | OUTPATIENT
Start: 2024-08-17 | End: 2024-08-28 | Stop reason: HOSPADM

## 2024-08-17 RX ORDER — DEXTROSE 50 % IN WATER (D50W) INTRAVENOUS SYRINGE
12.5
Status: DISCONTINUED | OUTPATIENT
Start: 2024-08-17 | End: 2024-08-28 | Stop reason: HOSPADM

## 2024-08-17 RX ORDER — POLYETHYLENE GLYCOL 3350 17 G/17G
17 POWDER, FOR SOLUTION ORAL DAILY
Status: DISCONTINUED | OUTPATIENT
Start: 2024-08-17 | End: 2024-08-20 | Stop reason: SDUPTHER

## 2024-08-17 RX ORDER — ENOXAPARIN SODIUM 100 MG/ML
40 INJECTION SUBCUTANEOUS DAILY
Status: DISCONTINUED | OUTPATIENT
Start: 2024-08-17 | End: 2024-08-19

## 2024-08-17 RX ORDER — AMLODIPINE BESYLATE 10 MG/1
10 TABLET ORAL DAILY
Status: DISCONTINUED | OUTPATIENT
Start: 2024-08-17 | End: 2024-08-20

## 2024-08-17 RX ORDER — DEXAMETHASONE 4 MG/1
4 TABLET ORAL EVERY 8 HOURS SCHEDULED
Status: DISCONTINUED | OUTPATIENT
Start: 2024-08-17 | End: 2024-08-19

## 2024-08-17 RX ORDER — PANTOPRAZOLE SODIUM 40 MG/1
40 TABLET, DELAYED RELEASE ORAL
Status: DISCONTINUED | OUTPATIENT
Start: 2024-08-17 | End: 2024-08-19

## 2024-08-17 RX ORDER — METOPROLOL SUCCINATE 50 MG/1
50 TABLET, EXTENDED RELEASE ORAL DAILY
Status: DISCONTINUED | OUTPATIENT
Start: 2024-08-17 | End: 2024-08-20

## 2024-08-17 RX ORDER — ASPIRIN 81 MG/1
81 TABLET ORAL DAILY
Status: DISCONTINUED | OUTPATIENT
Start: 2024-08-17 | End: 2024-08-28 | Stop reason: HOSPADM

## 2024-08-17 RX ORDER — SENNOSIDES 8.6 MG/1
2 TABLET ORAL 2 TIMES DAILY
Status: DISCONTINUED | OUTPATIENT
Start: 2024-08-17 | End: 2024-08-17 | Stop reason: SDUPTHER

## 2024-08-17 RX ORDER — ACETAMINOPHEN 325 MG/1
650 TABLET ORAL EVERY 4 HOURS PRN
Status: DISCONTINUED | OUTPATIENT
Start: 2024-08-17 | End: 2024-08-28 | Stop reason: HOSPADM

## 2024-08-17 RX ORDER — OXYCODONE HYDROCHLORIDE 5 MG/1
10 TABLET ORAL EVERY 4 HOURS PRN
Status: DISCONTINUED | OUTPATIENT
Start: 2024-08-17 | End: 2024-08-22

## 2024-08-17 RX ORDER — DEXTROSE 50 % IN WATER (D50W) INTRAVENOUS SYRINGE
25
Status: DISCONTINUED | OUTPATIENT
Start: 2024-08-17 | End: 2024-08-28 | Stop reason: HOSPADM

## 2024-08-17 RX ORDER — ACETAMINOPHEN 650 MG/1
650 SUPPOSITORY RECTAL EVERY 4 HOURS PRN
Status: DISCONTINUED | OUTPATIENT
Start: 2024-08-17 | End: 2024-08-19

## 2024-08-17 RX ORDER — HYDROMORPHONE HYDROCHLORIDE 1 MG/ML
0.4 INJECTION, SOLUTION INTRAMUSCULAR; INTRAVENOUS; SUBCUTANEOUS EVERY 4 HOURS PRN
Status: DISCONTINUED | OUTPATIENT
Start: 2024-08-17 | End: 2024-08-17

## 2024-08-17 ASSESSMENT — PAIN SCALES - GENERAL
PAINLEVEL_OUTOF10: 7
PAINLEVEL_OUTOF10: 0 - NO PAIN

## 2024-08-17 ASSESSMENT — COGNITIVE AND FUNCTIONAL STATUS - GENERAL
MOBILITY SCORE: 15
DAILY ACTIVITIY SCORE: 20
MOVING TO AND FROM BED TO CHAIR: A LOT
HELP NEEDED FOR BATHING: A LITTLE
CLIMB 3 TO 5 STEPS WITH RAILING: TOTAL
HELP NEEDED FOR BATHING: A LITTLE
TURNING FROM BACK TO SIDE WHILE IN FLAT BAD: A LOT
DRESSING REGULAR LOWER BODY CLOTHING: A LITTLE
CLIMB 3 TO 5 STEPS WITH RAILING: TOTAL
WALKING IN HOSPITAL ROOM: A LOT
STANDING UP FROM CHAIR USING ARMS: A LOT
TOILETING: A LOT
MOBILITY SCORE: 13
WALKING IN HOSPITAL ROOM: A LOT
STANDING UP FROM CHAIR USING ARMS: A LOT
TOILETING: A LOT
DAILY ACTIVITIY SCORE: 20
MOVING TO AND FROM BED TO CHAIR: A LOT
DRESSING REGULAR LOWER BODY CLOTHING: A LITTLE

## 2024-08-17 NOTE — NURSING NOTE
Notified Chrissy SELBY at [facility] that patient just got picked up by transport.  Patient left the unit via [facility]. Significant other at [facility] and given the room and phone number at [facility]. Chart info and all belongings sent with patient.

## 2024-08-17 NOTE — CARE PLAN
Problem: Pain - Adult  Goal: Verbalizes/displays adequate comfort level or baseline comfort level  Outcome: Progressing     Problem: Safety - Adult  Goal: Free from fall injury  Outcome: Progressing     Problem: Discharge Planning  Goal: Discharge to home or other facility with appropriate resources  Outcome: Progressing     Problem: Chronic Conditions and Co-morbidities  Goal: Patient's chronic conditions and co-morbidity symptoms are monitored and maintained or improved  Outcome: Progressing   The patient's goals for the shift include rest    The clinical goals for the shift include pain and safety management

## 2024-08-17 NOTE — SIGNIFICANT EVENT
Imaging reviewed.  Patient with prior decompression and fusion at level of concern.  No further surgical intervention indicated at this time.  Recommend radiation oncology consult for urgent radiation.  Recommend steroids (amount and duration per primary team and rad onc).      No acute neurosurgical intervention or additional neuroimaging needed at this time. No need for neurosurgical follow-up.     Thank you for allowing us to participate in the care of this patient. Will sign off at this time. Please page with any questions or concerns.     Patient and imaging staffed with attending Dr. Garcia who agrees with the above recommendations.

## 2024-08-17 NOTE — CONSULTS
"SUPPORTIVE AND PALLIATIVE ONCOLOGY CONSULT    Inpatient consult to [facility]  Consult performed by: Liz Lechuga, APRN-CNP  Consult ordered by: Han Howard MD        SERVICE DATE: 8/17/2024      PALLIATIVE MEDICINE OUTPATIENT PROVIDER:  None  CURRENT ATTENDING PROVIDER: Han Howard MD     Medical Oncologist: Sahil Chavarria MD   Radiation Oncologist: No care team member to display  Primary Physician: Dejuan Dhaliwal  [phone]    REASON FOR CONSULT/CHIEF CONSULT COMPLAINT: pain management    Subjective   HISTORY OF PRESENT ILLNESS: Christophe Ambriz is a 74 y.o. male diagnosed with RCC with spinal mets s/p L1-5 fusion w/ L2-4 decompression  and tumor debulking (7/24). PMH significant for CAD, HTN, HLD. Admitted 8/16/2024 as a transfer from [facility] for further evaluation and management of back pain- now improving on high dose steroids. Course complicated by constipation. Supportive and Palliative Oncology is consulted for pain management.     Pt endorses above- states pain was so bad he was unable to walk or get to appointments.  This has been an ongoing issue.  At time of my visit pain has greatly improved- he rates it 3.5/10.  Reports steroids have made all the difference.  States he will be getting XRT on Monday.    Pt reports he has not had a BM in almost 2 weeks- states this is normal for him but that when he does have BMs they are \"mammoth, explosive and make a huge mess.\"    Asked pt about percocet allergy- states it made him extremely nauseas but states he was fine with straight oxycodone.    MR lumbar spine shows L3 compression fracture with retropulsion of the posterior cortex and severe stenosis of the central spinal canal with nerve root compression unchanged from the previous exam.      Pain Assessment:  Onset: 1 Weeks  Location:  B/L hips, pelvis, up to mid-back  Duration: Constant  Characteristics:   Rating: 3   Descriptors: aching, burning   Aggravating: " movement    Relieving: Analgesics, Positioning, and Modifying activity   Intolerances:Christophe Ambriz is allergic to oxycodone-acetaminophen.- nausea   Personal Pain Goal: 3    Interference with Function: Very Much   Coping Strategies: distraction and relaxation   Emotional Response: None   Barriers to Pain Management: None    Opioid Use  Past 24 h opioid use:   Hydromorphone 0.2 mg IV x 1 doses = 0.2 mg = 3.2 OME  Oxycodone IR 10 mg PO x 1 doses = 10 mg = 15 OME  Total 24h OME use:  18.2    OARRS/PDMP reviewed - no aberrant behavior noted.    Symptom Assessment:  Pain:somewhat  Headache: none  Dizziness:none  Lack of energy: a little  Difficulty sleeping: none  Worrying: a little  Anxiety: none  Depression: none  Pain in mouth/swallowing: none  Dry mouth: none  Taste changes: none  Shortness of breath: none  Lack of appetite: none   Nausea: none  Vomiting: none  Constipation: very much  Diarrhea: none  Sore muscles: a little  Numbness or tingling in hands/feet/other: none    Information obtained from: chart review, interview of patient, discussion with RN, and discussion with primary team  ______________________________________________________________________     Oncology History    No history exists.       Past Medical History:   Diagnosis Date    HLD (hyperlipidemia)     HTN (hypertension)     Metastasis (Multi)     Renal cell adenocarcinoma (Multi)      Past Surgical History:   Procedure Laterality Date    LUMBAR FUSION       No family history on file.     SOCIAL HISTORY:  Marital Status single; in relationship with significant other Iram Armstrong-states ok to discuss anything regarding his care with her;  6 biological children- have been estranged for years  Social History:  Rare ETOH use  Smoked 3 packs/day for at least 50 years for 150-pack-year history  Denies illicit drug use    REVIEW OF SYSTEMS:  Review of systems negative unless noted in HPI.       Objective       Lab Results   Component Value Date     WBC 23.7 (H) 08/17/2024    HGB 9.2 (L) 08/17/2024    HCT 29.8 (L) 08/17/2024    MCV 82 08/17/2024     (H) 08/17/2024      Lab Results   Component Value Date    GLUCOSE 205 (H) 08/17/2024    CALCIUM 9.5 08/17/2024     (L) 08/17/2024    K 4.3 08/17/2024    CO2 24 08/17/2024    CL 95 (L) 08/17/2024    BUN 22 08/17/2024    CREATININE 0.69 08/17/2024     Lab Results   Component Value Date    ALT 21 08/17/2024    AST 24 08/17/2024    ALKPHOS 134 08/17/2024    BILITOT 0.3 08/17/2024     Estimated Creatinine Clearance: 103.9 mL/min (by C-G formula based on SCr of 0.69 mg/dL).     Encounter Date: 07/06/24   ECG 12 lead   Result Value    Ventricular Rate 78    Atrial Rate 78    RI Interval 142    QRS Duration 94    QT Interval 400    QTC Calculation(Bazett) 456    P Axis 24    R Axis -27    T Axis 43    QRS Count 13    Q Onset 224    P Onset 153    P Offset 187    T Offset 424    QTC Fredericia 436    Narrative    Normal sinus rhythm  Normal ECG  When compared with ECG of 06-JUL-2024 06:13, (unconfirmed)  Sinus rhythm has replaced Junctional rhythm  Confirmed by Froylan Rodriguez ([id number]) on 7/9/2024 8:07:10 AM     Wt Readings from Last 5 Encounters:   08/16/24 79.4 kg (175 lb 0.7 oz)   08/14/24 73.7 kg (162 lb 7.7 oz)   07/26/24 80.7 kg (177 lb 14.4 oz)   07/06/24 91 kg (200 lb 9.9 oz)   07/04/24 95.3 kg (210 lb)       Current Outpatient Medications   Medication Instructions    acetaminophen (TYLENOL) 1,000 mg, oral, Every 6 hours PRN    amLODIPine (NORVASC) 10 mg, oral, Daily    atorvastatin (LIPITOR) 20 mg, oral, Nightly    gabapentin (NEURONTIN) 300 mg, oral, Nightly    hydroCHLOROthiazide (HYDRODIURIL) 25 mg, oral, Daily    metoprolol succinate XL (TOPROL-XL) 50 mg, oral, Daily    oxyCODONE (ROXICODONE) 10 mg, oral, Every 6 hours PRN    traZODone (DESYREL) 50 mg, oral, Nightly     Scheduled medications   [Held by provider] amLODIPine, 10 mg, oral, Daily  aspirin, 81 mg, oral, Daily  atorvastatin, 20 mg, oral,  Nightly  dexAMETHasone, 4 mg, oral, q8h SLIM  enoxaparin, 40 mg, subcutaneous, Daily  gabapentin, 100 mg, oral, TID  [Held by provider] hydroCHLOROthiazide, 25 mg, oral, Daily  insulin lispro, 0-10 Units, subcutaneous, TID  metoprolol succinate XL, 50 mg, oral, Daily  pantoprazole, 40 mg, oral, Daily before breakfast  polyethylene glycol, 17 g, oral, Daily  sennosides, 2 tablet, oral, BID      Continuous medications     PRN medications  acetaminophen, 650 mg, q4h PRN   Or  acetaminophen, 650 mg, q4h PRN   Or  acetaminophen, 650 mg, q4h PRN  dextrose, 12.5 g, q15 min PRN  dextrose, 25 g, q15 min PRN  glucagon, 1 mg, q15 min PRN  glucagon, 1 mg, q15 min PRN  HYDROmorphone, 0.4 mg, q4h PRN  oxyCODONE, 10 mg, q12h PRN  traZODone, 50 mg, Nightly PRN         Allergies:   Allergies   Allergen Reactions    Oxycodone-Acetaminophen Nausea And Vomiting and GI Upset                PHYSICAL EXAMINATION:  Vital Signs:   Vital signs reviewed  Vitals:    08/17/24 0940   BP: 120/74   Pulse: 77   Resp: 18   Temp: 36.1 °C (97 °F)   SpO2: 93%     Pain Score: 3     Physical Exam   Cachectic elderly  M Laying in bed, NAD  A&O x 3, pleasant and cooperative with interview & exam  Breathing comfortably on RA  Abd soft, NTND, BS +  No edema in ext      ASSESSMENT/PLAN:  Christophe Ambriz is a 74 y.o. male diagnosed with RCC with spinal mets s/p L1-5 fusion w/ L2-4 decompression  and tumor debulking (7/24). PMH significant for CAD, HTN, HLD. Admitted 8/16/2024 as a transfer from [facility] for further evaluation and management of back pain- now improving on high dose steroids. Course complicated by constipation. Supportive and Palliative Oncology is consulted for pain management.     Pain:  Hip, pelvic and back pain related to L3 compression fracture with retropulsion of the posterior cortex and severe stenosis of the central spinal canal with nerve root compression   Pain is: cancer related pain  Type: visceral and somatic  Pain control:  well-controlled  Home regimen:  Oxycodone 10mg and Gabapentin 100mg TID  Intolerances/previously tried: Asked pt about percocet allergy- states it made him extremely nauseas but states he was fine with straight oxycodone.  Personalized pain goal: 3  Total OME usage for the past 24 hours:  18.2  Defer to primary team on dexamethasone taper  Pt will be receiving XRT  INCREASE to 10mg oxycodone q4h PRN  INCREASE to 0.4mg IV dilaudid q3h PRN for BT pain  CHANGE to 300mg gabapentin at bedtime  Please order meds to bed 10 day supply for oxycodone and gabapentin  Continue to monitor pain scores and administer PRN medications as appropriate  Continue/initiate nonpharmacologic pain management strategies including ice/heat therapy, distraction techniques, deep breathing/relaxation techniques, calming music, and repositioning  Continue to monitor for signs of opioid efficacy (pain scores, improved functionality) and toxicity (pinpoint pupils, excess sedation/drowsiness/confusion, respiratory depression, etc.)    Constipation  At risk for constipation related to opioids, currently constipated  Usual bowel pattern:  reports every 1-2 weeks  Home regimen: none  LBM UNK- possibly 2 weeks per pt  Monitor BM frequency, adjust regimen as needed  Goal to have BM without straining q48-72h  Continue miralax daily  Continue 2 senna BID     Medical Decision Making/Goals of Care/Advance Care Planning:  Patient's current clinical condition, including diagnosis, prognosis, and management plan, and goals of care were discussed.   Life limiting disease: metastatic malignancy  Family: Supportive   Performance status: Major  limitations due to pain  Joys/meaning/strength: Family and [place]  Understanding of health: Demonstrates good understanding of disease process, understands he has a bone fracture- plan for XRT on Monday.  Reports steroids have relieved his pain.  Understasnds he has been missing appts d/t pain and is hopeful this will  be resolved so he can get back on track.  Information:Wants full disclosure  Goals: symptom control and cancer directed therapy  Worries and fears now and future: ongoing symptoms and inability to receive cancer treatment     Advance Directives  Existence of Advance Directives:Yes, documentation or copy in medical record  Decision maker: ZONIA is significant other Iram Armstrong   Code Status: DNR DNI    Introduction to Supportive and Palliative Oncology:  Spoke with pt at bedside  Introduced the role and philosophy of Supportive and Palliative oncology in the evaluation and management of symptoms during cancer treatment  Palliative care was introduced as a service for patients with serious illness to help with symptoms, assist with goals of care conversations, navigate complex decision making, improve quality of life for patients, and provide support both patients and families.  Patient seemed to appreciate the extra layer of support.     Supportive Interventions: Interventions: SPO Spiritual Care: referral placed    Disposition:  Please  start the process of having prior authorization with meds to beds deliver medications to patient prior to discharge via [facility] pharmacy. Prescriptions will need to be sent 48-72 hours prior to discharge so that a prior authorization can be completed.     Discharge date: unknown pending acute issues  Have requested an appointment with Outpatient Supportive Oncology       Signature and billing:  Thank you for allowing us to participate in the care of this patient. Recommendations will be communicated back to the consulting service by way of shared electronic medical record or face-to-face.    Medical complexity was high level due to due to complexity of problems, extensive data review, and high risk of management/treatment.    I spent 60 minutes in the care of this patient which included chart review, interviewing patient/family, discussion with primary team, coordination of  care, and documentation.      DATA   Diagnostic tests and information reviewed for today's visit:  Conversation with primary team, Most recent labs and imaging results, Medications       Some elements copied from H&P note on 8/16/24, the elements have been updated and all reflect current decision making from today, 8/17/2024.    Plan of Care discussed with: Provider, RN, Patient    Thank you for asking Supportive and Palliative Oncology to assist with care of this patient.  Recommendations will be communicated back to the consulting service by way of shared electronic medical record/secure chat/email or face-to-face.   We will continue to follow.  Please contact us for additional questions or concerns.      SIGNATURE: VINNY Pino-CNP  PAGER/CONTACT:  Contact information:  Supportive and Palliative Oncology  Monday-Friday 8 AM-5 PM  Epic Secure chat or pager [phone].  After hours and weekends:  pager [phone]

## 2024-08-17 NOTE — CARE PLAN
Problem: Pain - Adult  Goal: Verbalizes/displays adequate comfort level or baseline comfort level  8/17/2024 0208 by Tesha Ramírez RN  Outcome: Progressing  8/17/2024 0200 by Tesha Ramírez RN  Outcome: Progressing     Problem: Safety - Adult  Goal: Free from fall injury  8/17/2024 0208 by Tesha Ramírez RN  Outcome: Progressing  8/17/2024 0200 by Tesha Ramírez RN  Outcome: Progressing     Problem: Discharge Planning  Goal: Discharge to home or other facility with appropriate resources  8/17/2024 0208 by Tesha Ramírez RN  Outcome: Progressing  8/17/2024 0200 by Tesha Ramírez RN  Outcome: Progressing     Problem: Chronic Conditions and Co-morbidities  Goal: Patient's chronic conditions and co-morbidity symptoms are monitored and maintained or improved  8/17/2024 0208 by Tesha Ramírez RN  Outcome: Progressing  8/17/2024 0200 by Tesha Ramírez RN  Outcome: Progressing   The patient's goals for the shift include rest    The clinical goals for the shift include pain and safety management

## 2024-08-17 NOTE — PROGRESS NOTES
"Christophe Ambriz is a 74 y.o. 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    Subjective   Patient seen at bedside this morning. States his pain has significantly improved since starting steroids. No complaints at this time. Denies lower extremity weakness/numbness, incontinence. Does endorse chronic constipation        Objective     Physical Exam  Constitutional:       Appearance: Normal appearance.   Cardiovascular:      Rate and Rhythm: Normal rate and regular rhythm.      Pulses: Normal pulses.      Heart sounds: Normal heart sounds.   Pulmonary:      Effort: Pulmonary effort is normal.      Breath sounds: Normal breath sounds.   Abdominal:      General: Abdomen is flat.      Palpations: Abdomen is soft.   Musculoskeletal:      Right lower leg: No edema.      Left lower leg: No edema.   Neurological:      General: No focal deficit present.      Mental Status: He is alert and oriented to person, place, and time.      Sensory: No sensory deficit.      Motor: No weakness.   Psychiatric:         Mood and Affect: Mood normal.         Behavior: Behavior normal.         Last Recorded Vitals  Blood pressure 120/74, pulse 77, temperature 36.1 °C (97 °F), temperature source Temporal, resp. rate 18, height 1.854 m (6' 1\"), weight 79.4 kg (175 lb 0.7 oz), SpO2 93%.  Intake/Output last 3 Shifts:  No intake/output data recorded.    Relevant Results                 Scheduled medications  [Held by provider] amLODIPine, 10 mg, oral, Daily  aspirin, 81 mg, oral, Daily  atorvastatin, 20 mg, oral, Nightly  dexAMETHasone, 4 mg, oral, q8h SLIM  enoxaparin, 40 mg, subcutaneous, Daily  gabapentin, 100 mg, oral, TID  [Held by provider] hydroCHLOROthiazide, 25 mg, oral, Daily  insulin lispro, 0-10 Units, subcutaneous, TID  metoprolol succinate XL, 50 mg, oral, Daily  pantoprazole, 40 mg, oral, Daily before breakfast  polyethylene glycol, 17 g, oral, Daily  sennosides, 2 " tablet, oral, BID      Continuous medications     PRN medications  PRN medications: acetaminophen **OR** acetaminophen **OR** acetaminophen, dextrose, dextrose, glucagon, glucagon, HYDROmorphone, oxyCODONE, traZODone  Results for orders placed or performed during the hospital encounter of 08/16/24 (from the past 24 hour(s))   CBC and Auto Differential   Result Value Ref Range    WBC 23.7 (H) 4.4 - 11.3 x10*3/uL    nRBC 0.0 0.0 - 0.0 /100 WBCs    RBC 3.65 (L) 4.50 - 5.90 x10*6/uL    Hemoglobin 9.2 (L) 13.5 - 17.5 g/dL    Hematocrit 29.8 (L) 41.0 - 52.0 %    MCV 82 80 - 100 fL    MCH 25.2 (L) 26.0 - 34.0 pg    MCHC 30.9 (L) 32.0 - 36.0 g/dL    RDW 14.3 11.5 - 14.5 %    Platelets 731 (H) 150 - 450 x10*3/uL    Neutrophils % 78.9 40.0 - 80.0 %    Immature Granulocytes %, Automated 1.0 (H) 0.0 - 0.9 %    Lymphocytes % 13.7 13.0 - 44.0 %    Monocytes % 6.1 2.0 - 10.0 %    Eosinophils % 0.2 0.0 - 6.0 %    Basophils % 0.1 0.0 - 2.0 %    Neutrophils Absolute 18.72 (H) 1.60 - 5.50 x10*3/uL    Immature Granulocytes Absolute, Automated 0.23 0.00 - 0.50 x10*3/uL    Lymphocytes Absolute 3.25 (H) 0.80 - 3.00 x10*3/uL    Monocytes Absolute 1.44 (H) 0.05 - 0.80 x10*3/uL    Eosinophils Absolute 0.05 0.00 - 0.40 x10*3/uL    Basophils Absolute 0.02 0.00 - 0.10 x10*3/uL   Magnesium   Result Value Ref Range    Magnesium 2.06 1.60 - 2.40 mg/dL   Hepatic function panel   Result Value Ref Range    Albumin 3.4 3.4 - 5.0 g/dL    Bilirubin, Total 0.3 0.0 - 1.2 mg/dL    Bilirubin, Direct 0.1 0.0 - 0.3 mg/dL    Alkaline Phosphatase 134 33 - 136 U/L    ALT 21 10 - 52 U/L    AST 24 9 - 39 U/L    Total Protein 6.9 6.4 - 8.2 g/dL   Phosphorus   Result Value Ref Range    Phosphorus 3.6 2.5 - 4.9 mg/dL   Basic Metabolic Panel   Result Value Ref Range    Glucose 205 (H) 74 - 99 mg/dL    Sodium 134 (L) 136 - 145 mmol/L    Potassium 4.3 3.5 - 5.3 mmol/L    Chloride 95 (L) 98 - 107 mmol/L    Bicarbonate 24 21 - 32 mmol/L    Anion Gap 19 10 - 20 mmol/L     Urea Nitrogen 22 6 - 23 mg/dL    Creatinine 0.69 0.50 - 1.30 mg/dL    eGFR >90 >60 mL/min/1.73m*2    Calcium 9.5 8.6 - 10.6 mg/dL   POCT GLUCOSE   Result Value Ref Range    POCT Glucose 218 (H) 74 - 99 mg/dL                  Assessment/Plan   Assessment & Plan  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 male with a hx of CAD, HTN, HLD and RCC with spinal mets s/p L1-5 fusion w/ L2-4 decompression  and tumor debulking (7/24) presenting with neuropathic pain that starts in his hips and radiates to his b/l legs, transferred from [facility] to [facility] for further management. MRI at OSH showed stable L3 compression fracture, spinal canal stenosis and nerve root compression. Will continue steroids as pain has been improved and consult neurosurg for evaluation. Pt will likely need PT/OT for placement of homegoing and further oncological treatment planning as he had been lost to follow up.        Updates:  -No surgical intervention per neurosurgery  -Rad onc consult  -Supportive onc consult     #Metastatic renal cell carcinoma  #Back and leg pain  :: L1-5 fusion w/ L2-4 decompression  and tumor debulking (7/24)  at [facility] with NSGY  ::MRI 8/14: L3 compression fracture with retropulsion of the posterior cortex and severe stenosis of the central spinal canal with nerve root compression unchanged from the previous exam *No new foci of marrow replacement or compression fracture *There is no measurable change  compared to the previous exam.  ::Follows with Dr. Chavarria but has been lost to follow up given poor mobility  ::Pain improved with decadron at OSH  Plan:  -Neurosurgery consulted   -No surgical intervention needed  -Rad onc consulted  1-No indication for urgent/emergent radiation over the weekend.  2-Consult medical oncology for systemic therapy.  3-If the patient remains hospitalized on Monday, re-evaluate and consider an in-house CT simulation.  4-Plan for SBRT  in 5 fractions.   -Supportive oncology consulted for further pain management recs   -Continue gabapentin and oxy prn   -Dexamethasone 4mg q8h       #CAD s/p PCI (unknown year)  #HTN  #HLD  - continue home atorvastatin, ASA (held plavix as no indication for DAPT and in case of any procedures)  - continue home amlodipine 10mg daily  - continue home metoprolol succinate 50mg daily   - held home hydrochlorothiazide 25mg daily, restart as tolerated      #Leukocytosis  :: No current infectious symptoms, likely reactive in setting of malignancy and steroids  -Fup CBC     #Dispo  -PT/OT ordered     F: PRN  E: PRN  N: Regular  GI: None indicated  DVT prophylaxis: SubQ lovenox  Code status: DNR/DNI (CONFIRMED ON ADMISSION)  Surrogate decision maker:Tom Lara [phone]           Trent Montiel MD

## 2024-08-17 NOTE — H&P
HPI:  Christophe Ambriz is a 74 y.o. male with a hx of CAD, HTN, HLD and RCC with spinal mets s/p L1-5 fusion w/ L2-4 decompression  and tumor debulking (7/24) presented initially with back pain, transferred from [facility] for further management.     Pt stated that following the surgery, he continues to have pain with any movement that radiates into bilateral legs. Pain is burning in quality and starts primarily in his hips and shoots down both legs into his toes. The pain in his legs is worse than in his lower back. Pain also occasionally radiates into his buttocks. He denies any bowel/bladder changes or any weakness/numbness/paresthesias. He is chronically constipated with infrequent bowel movements. He has not been on a pain regimen since  his surgery and missed his follow up appointments as he has not been able to ambulate 2/2 pain. He has not walked in several weeks as the pain is too great. He states that the pain  has been reduced to a 3/10 instead of a 9-10/10 from when he first came in since receiving the steroids.     ONC timeline  -Admission 7/4 to OSH with low back pain shooting into left leg  -Admission 7/6-7/12 for left renal mass and pathologic L3 fracture and underwent L2-4 decompression, tumor debulking on 7/3. Surgical pathology with Clear-Cell renal cell carcinoma.   -Admission 7/24 -increasing back pain found to have lytic lesion in L3 left-sided kidney mass right thyroid nodule left ninth rib lytic lesion with pathological rib fracture, status post surgery L1 L5 fusion L2-L4 decompression and tumor debulking, supportive oncology follow-up appointment pathology consistent with clear-cell carcinoma involving L3   -ED visit 7/26/24 - back / pain negative work up  -Pt unable to make it to ONC appts 2/2 pain and difficulty with mobility.      Radiology review  7/4- MRI of the lumbar spine concerning for left renal cell carcinoma static lesion involving L3 with pathological fracture and epidural  invasion  7/5 CT chest abdomen pelvis enlarged right thyroid 5.8 x 2.1 cm mass arising from the superior aspect of the left kidney destructive changes of the right rib with a soft tissue mass with a soft tissue mass  US thyroid 7/24 -3.4 cm nodule in the right mid thyroid gland recommend FNA other nodules 0.7 cm and 1.6 cm  7/26 CT L spine / DVT / MRI spine - fusion of L1-L5, compression Fx L3 , DVT neg, L3 surgery , epidural extension, cauda equina compression,   8/14- MRI Spine- L3 compression fracture with retropulsion of the posterior cortex and severe stenosis of the central spinal canal with nerve root compression unchanged from the previous exam *No new foci of marrow replacement or compression fracture *There is no measurable change  compared to the previous exam.     Past medical history:  Past Medical History:   Diagnosis Date    HLD (hyperlipidemia)     HTN (hypertension)     Metastasis (Multi)     Renal cell adenocarcinoma (Multi)        Surgical history:  Past Surgical History:   Procedure Laterality Date    LUMBAR FUSION         Medications prior to admission:  Prior to Admission medications    Medication Sig Start Date End Date Taking? Authorizing Provider   acetaminophen (Tylenol) 500 mg tablet Take 2 tablets (1,000 mg) by mouth every 6 hours if needed for mild pain (1 - 3).    Historical Provider, MD   amLODIPine (Norvasc) 10 mg tablet Take 1 tablet (10 mg) by mouth once daily. 7/12/24 8/11/24  Gurjit Bahena MD   atorvastatin (Lipitor) 20 mg tablet Take 1 tablet (20 mg) by mouth once daily at bedtime. 7/12/24 8/11/24  Gurjit Bahena MD   gabapentin (Neurontin) 300 mg capsule Take 1 capsule (300 mg) by mouth once daily at bedtime for 10 days. 7/26/24 8/5/24  Yohan Lowery MD   hydroCHLOROthiazide (HYDRODiuril) 25 mg tablet Take 1 tablet (25 mg) by mouth once daily. 4/3/24 3/29/25  Michael Rivera MD   lactulose 20 gram/30 mL oral solution Take 30 mL (20 g) by mouth once daily. 7/13/24 8/12/24   Gurjit Bahena MD   metoprolol succinate XL (Toprol-XL) 50 mg 24 hr tablet Take 1 tablet (50 mg) by mouth once daily. 24  Michael Rivera MD   oxyCODONE (Roxicodone) 10 mg immediate release tablet Take 1 tablet (10 mg) by mouth every 6 hours if needed for severe pain (7 - 10). 24   Gurjit Bahena MD   polyethylene glycol (Glycolax, Miralax) 17 gram packet Take 17 g by mouth once daily. 24  Gurjit Bahena MD   sennosides (Senokot) 8.6 mg tablet Take 2 tablets (17.2 mg) by mouth 2 times a day. 24  Gurjit Bahena MD   traZODone (Desyrel) 50 mg tablet Take 1 tablet (50 mg) by mouth once daily at bedtime. 24  Gurjit Bahena MD           Allergies:  Allergies   Allergen Reactions    Oxycodone-Acetaminophen Nausea And Vomiting and GI Upset       Family history:  Denies family history of cancer    Social history:  Rare ethanol use  Smoked 3 packs/day for at least 50 years for 150-pack-year history  Denies illicit drug use  Lives with his fiance who helps him    Review of systems:  12 point ROS otherwise negative      Vitals:    24 Hour Vitals  Temp:  [36.3 °C (97.3 °F)-37 °C (98.6 °F)] 36.6 °C (97.9 °F)  Heart Rate:  [85-94] 88  Resp:  [16-17] 16  BP: (100-129)/(63-82) 122/70    Temp (24hrs), Av.7 °C (98.1 °F), Min:36.3 °C (97.3 °F), Max:37 °C (98.6 °F)     24 hour Intake/Output  No intake or output data in the 24 hours ending 24     Physical exam:  Constitutional: Thin male in no acute distress.  HEENT: NCAT. EOMI.   Respiratory: CTAB. No wheezes, crackles, or rhonchi. Normal respiratory effort on RA.  Cardiovascular: RRR, normal S1/S2, No murmurs, rubs, or gallops.   Abdominal: Soft, nondistended, nontender to palpation. No rebound or guarding  Neuro: CN II-XII grossly intact. Moving all extremities with no focal deficits. Able to lift both LE anti-gravity  MSK: WWP, no peripheral edema  Skin: No lesions, wounds, or bruising. Incision on spine  healed, nontender to palpation without erythema, fluctuance, or drainage.   Psych: Appropriate mood and affect.      Medications   Scheduled Medications  amLODIPine, 10 mg, oral, Daily  aspirin, 81 mg, oral, Daily  atorvastatin, 20 mg, oral, Nightly  [Held by provider] dexAMETHasone, 4 mg, oral, q8h SLIM  gabapentin, 100 mg, oral, TID  heparin (porcine), 5,000 Units, subcutaneous, q8h SLIM  [Held by provider] hydroCHLOROthiazide, 25 mg, oral, Daily  metoprolol succinate XL, 50 mg, oral, Daily  pantoprazole, 40 mg, oral, Daily before breakfast  polyethylene glycol, 17 g, oral, Daily  sennosides, 2 tablet, oral, BID     Continuous Medications    PRN Medications  PRN medications: acetaminophen **OR** acetaminophen **OR** acetaminophen, oxyCODONE, traZODone       Labs  CBC  Results from last 72 hours   Lab Units 08/16/24  0545 08/15/24  0518 08/14/24  1404   WBC AUTO x10*3/uL 14.4* 12.3* 13.8*   HEMOGLOBIN g/dL 9.3* 9.0* 9.9*   HEMATOCRIT % 30.3* 29.8* 32.2*   PLATELETS AUTO x10*3/uL 651* 594* 635*        BMP  Results from last 72 hours   Lab Units 08/16/24  0545 08/15/24  0519 08/14/24  1404   SODIUM mmol/L 132* 131* 135   POTASSIUM mmol/L 4.4 3.7 3.8   CHLORIDE mmol/L 96* 98 99   BUN mg/dL 13 10 9   CREATININE mg/dL 0.60 0.60 0.70       Imaging:  === 07/06/24 ===    US THYROID    - Impression -  The thyroid is enlarged/normal/atrophic in size and  homogenous/heterogenous in echotexture with single/multiple nodules  as described below:  1. A 3.4 cm nodule in the right mid thyroid gland is category 3.  Based on size criteria fine-needle aspiration is recommended.  2. A 0.7 cm nodule in theright upper thyroid gland is category 4.  Based on size criteria no follow-up is recommended.  3. A 1.6 cm nodule along the right isthmus is category 3. Based on  size criteria follow-up ultrasound in 1 year is recommended.      Please note that these statements are based on the recommendations of  the American College of Radiology  published in:    Nakia WD, Marissa SA, Shayan CC, et al. Multiinstitutional  Analysis of Thyroid Nodule Risk Stratification Using the American  College of Radiology Thyroid Imaging Reporting and Data System. AJR  Am J Roentgenol. 2017;:1-11.    Yuridia FN et al. ACR Thyroid Imaging, Reporting and Data System  (TI-RADS): White Paper of the ACR TI-RADS Committee. J Am Vinicio  Radiol. 2017 May; 14(5):587-595.    Gallegos EJ, Na DG, Linda JH, Isreal JY, Carolina JH, Leon SY. US Fine-Needle  Aspiration Biopsy for Thyroid Malignancy: Diagnostic Performance of  Seven Society Guidelines Applied to 2000 Thyroid Nodules. Radiology.  2018;287(3):893-900.    MACRO:  None    Signed by: Jono Sparrow 7/12/2024 3:02 PM  Dictation workstation:   [id number]   === 07/26/24 ===    CT LUMBAR SPINE WO IV CONTRAST    - Impression -  1.  Interval posterior fusion L1-L5.  2. Interval mild pathologic compression fracture related to diffuse  osteolytic lesion of the L3 vertebral body. Osseous retropulsion at  this level causes new severe canal stenosis.  3. L3 left facetectomy defect may partially decompressed the exiting  nerve root.  4. New nondisplaced fracture right transverse process at L3.    MACRO:  None    Signed by: Cuba Goodson 7/26/2024 3:41 PM  Dictation workstation:   [id number]            Assessment and plan:  Christophe Ambriz is a 74 y.o. male with a hx of CAD, HTN, HLD and RCC with spinal mets s/p L1-5 fusion w/ L2-4 decompression  and tumor debulking (7/24) presenting with neuropathic pain that starts in his hips and radiates to his b/l legs, transferred from [facility] to [facility] for further management. MRI at OSH showed stable L3 compression fracture, spinal canal stenosis and nerve root compression. Will continue steroids as pain has been improved and consult neurosurg for evaluation. Pt will likely need PT/OT for placement of homegoing and further oncological treatment planning as he had been lost to follow up.      #Metastatic renal cell carcinoma  #Back and leg pain  :: L1-5 fusion w/ L2-4 decompression  and tumor debulking (7/24)  at [facility] with NSGY  ::MRI 8/14: L3 compression fracture with retropulsion of the posterior cortex and severe stenosis of the central spinal canal with nerve root compression unchanged from the previous exam *No new foci of marrow replacement or compression fracture *There is no measurable change  compared to the previous exam.  ::Follows with Dr. Chavarria but has been lost to follow up given poor mobility  ::Pain improved with decadron at OSH  Plan:  -Neurosurgery consulted, appreciate recs  -Consider rad onc consult in am for bony mets  -Consider palliative consult for further pain management recs   -Continue with decadron   -Continue gabapentin and oxy prn     #CAD s/p PCI (unknown year)  #HTN  #HLD  - continue home atorvastatin, ASA (held plavix as no indication for DAPT and in case of any procedures)  - continue home amlodipine 10mg daily  - continue home metoprolol succinate 50mg daily   - held home hydrochlorothiazide 25mg daily, restart as tolerated     #Leukocytosis  :: No current infectious symptoms, likely reactive in setting of malignancy and steroids  -Fup CBC    #Dispo  -PT/OT ordered    F: PRN  E: PRN  N: NPO in case of procedure  GI: None indicated  DVT prophylaxis: SubQ lovenox  Code status: DNR/DNI (CONFIRMED ON ADMISSION)  Surrogate decision maker:Tom Lara [phone]      Renetta Henry MD  PGY-2 Internal Medicine

## 2024-08-17 NOTE — CONSULTS
Radiation Oncology Inpatient Consult  Patient Name:  Christophe Ambriz  MRN:  [id number]  :  1950    Referring Provider: Carmen Caldwell PA-C  Primary Care Provider: Dejuan Dhaliwal MD  Care Team: Patient Care Team:  Dejuan Dhaliwal MD as PCP - General (Hospitalist)  Sahil Chavarria MD as Consulting Physician (Hematology and Oncology)    Date of Service: 2024     SUBJECTIVE  History of Present Illness:  Christophe Ambriz is a 74-year-old male who initially presented in July with severe lumbar back pain radiating to the lower extremity and unintentional weight loss. Imaging revealed a lytic lesion in the L3 vertebra on CT spine, a 6x8 cm mass in the left kidney on CT CAP, a 2.1 cm right thyroid nodule, and a lytic lesion in the left 9th rib. An MRI further confirmed lytic metastasis to L3 with L3-4 neuroforaminal stenosis and L3-4 disc involvement with tumor. He underwent L1-5 fusion with L2-4 decompression and tumor debulking on . Pathology confirmed clear cell carcinoma.  The patient was discharged with a plan for outpatient follow-up with medical oncology, but unfortunately, he did not attend the follow-up appointments. He presented to the emergency department with lower back pain, which had persisted since the surgery but worsened over the past week, preventing him from walking comfortably. An MRI of the spine on  showed an L3 pathologic fracture with retropulsion and canal stenosis, which was stable compared to the MRI on .  The patient reports that after the surgery, he lost contact with medical oncology and did not have any pain medications at home. Over the past 1-2 weeks, he has experienced progressive lower back pain and bilateral lower extremity pain. He denies any weakness, numbness, saddle anesthesia, or loss of bowel or bladder control. He notes that since starting on steroids, his pain has significantly improved and is currently rated at 2/10.        Prior  Radiotherapy:  No  Radiation Treatments       No radiation treatments to show. (Treatments may have been administered in another system.)          Current Systemic Treatment:  No     Presence of Pacemaker or ICD:  No    Past Medical History:    Past Medical History:   Diagnosis Date    HLD (hyperlipidemia)     HTN (hypertension)     Metastasis (Multi)     Renal cell adenocarcinoma (Multi)         Past Surgical History:    Past Surgical History:   Procedure Laterality Date    LUMBAR FUSION          Family History:  Cancer-related family history is not on file.    Social History:    Social History     Tobacco Use    Smoking status: Never     Passive exposure: Never    Smokeless tobacco: Never   Vaping Use    Vaping status: Never Used       Allergies:    Allergies   Allergen Reactions    Oxycodone-Acetaminophen Nausea And Vomiting and GI Upset        Medications:    Current Facility-Administered Medications:     acetaminophen (Tylenol) tablet 650 mg, 650 mg, oral, q4h PRN **OR** acetaminophen (Tylenol) oral liquid 650 mg, 650 mg, nasogastric tube, q4h PRN **OR** acetaminophen (Tylenol) suppository 650 mg, 650 mg, rectal, q4h PRN, Renetta Henry MD    [Held by provider] amLODIPine (Norvasc) tablet 10 mg, 10 mg, oral, Daily, Renetta Henry MD    aspirin EC tablet 81 mg, 81 mg, oral, Daily, Renetta Henry MD, 81 mg at 08/17/24 1006    atorvastatin (Lipitor) tablet 20 mg, 20 mg, oral, Nightly, Renetta Henry MD    dexAMETHasone (Decadron) tablet 4 mg, 4 mg, oral, q8h SLIM, Renetta Henry MD, 4 mg at 08/17/24 0712    dextrose 50 % injection 12.5 g, 12.5 g, intravenous, q15 min PRN, Hayes Cartwright MD    dextrose 50 % injection 25 g, 25 g, intravenous, q15 min PRN, Hayes Cartwright MD    enoxaparin (Lovenox) syringe 40 mg, 40 mg, subcutaneous, Daily, Renetta Henry MD, 40 mg at 08/17/24 1006    gabapentin (Neurontin) capsule 100 mg, 100 mg, oral, TID, Renetta Henry MD, 100 mg at 08/17/24 1006    glucagon  "(Glucagen) injection 1 mg, 1 mg, intramuscular, q15 min PRN, Hayes Cartwright MD    glucagon (Glucagen) injection 1 mg, 1 mg, intramuscular, q15 min PRN, Hayes Cartwright MD    [Held by provider] hydroCHLOROthiazide (HYDRODiuril) tablet 25 mg, 25 mg, oral, Daily, Renetta Henry MD    HYDROmorphone (Dilaudid) injection 0.4 mg, 0.4 mg, intravenous, q4h PRN, Hayes Cartwright MD    insulin lispro (HumaLOG) injection 0-10 Units, 0-10 Units, subcutaneous, TID, Hayes Cartwright MD, 4 Units at 08/17/24 1006    metoprolol succinate XL (Toprol-XL) 24 hr tablet 50 mg, 50 mg, oral, Daily, Renetta Henry MD, 50 mg at 08/17/24 1006    oxyCODONE (Roxicodone) immediate release tablet 10 mg, 10 mg, oral, q12h PRN, Renetta Henry MD    pantoprazole (ProtoNix) EC tablet 40 mg, 40 mg, oral, Daily before breakfast, Renetta Henry MD, 40 mg at 08/17/24 0712    polyethylene glycol (Glycolax, Miralax) packet 17 g, 17 g, oral, Daily, Renetta Henry MD    sennosides (Senokot) tablet 17.2 mg, 2 tablet, oral, BID, Renetta Henry MD, 17.2 mg at 08/17/24 1006    traZODone (Desyrel) tablet 50 mg, 50 mg, oral, Nightly PRN, Renetta Henry MD      Review of Systems:    Except for the symptoms described above, the review of systems is negative. Specifically, except as noted in the HPI, when asked the patient expressed no complaints relative to constitutional (fever, weight loss), eyes, ears, nose, mouth, throat, neurologic, cardiovascular, pulmonary, breast, GI, , skin, musculoskeletal, endocrine, hematologic/lymphatic, or immunologic systems.      Performance Status:  The Karnofsky performance scale today is 90, Able to carry on normal activity; minor signs or symptoms of disease (ECOG equivalent 0).        OBJECTIVE  Physical Exam:  /74 (BP Location: Left arm, Patient Position: Lying)   Pulse 77   Temp 36.1 °C (97 °F) (Temporal)   Resp 18   Ht 1.854 m (6' 1\")   Wt 79.4 kg (175 lb 0.7 oz)   SpO2 93%   BMI 23.09 kg/m²    General: " no acute distress, engaged in conversation.   HEENT: Normocephalic, atraumatic. Extraocular movements are intact.   Neck: supple with trachea at midline, no palpable adenopathy.   Pulmonary: Breathing comfortably on room air, no respiratory distress  Cardiovascular: Regular rate, no cyanosis, well-perfused  Abdomen: Soft, nontender, nondistended.   Extremities: No lower extremity edema or cyanosis. Normal range of motion.  Skin: Without rash or obvious lesions.   Musculoskeletal: Normal range of motion. Able to raise both arms above head without issues  Neurologic: Alert and oriented x3. Cranial nerves grossly intact.        Laboratory Review/ Pathology Review:    All pertinent labs and pathology were personally reviewed. Findings as per HPI and EMR.    Imaging:    All imaging was personally reviewed and interpreted in clinic. Findings as per HPI and EMR.      ASSESSMENT:  A 74-year-old man with newly diagnosed oligometastatic renal cell carcinoma, status post L1-5 fusion with L2-4 decompression and tumor debulking performed on July 9th.    PLAN:      We had a lengthy discussion with the patient regarding the nature of his disease. We explained that, based on his current imaging, he has oligometastatic disease, which requires consideration of systemic therapy as the primary treatment. However, he is also a good candidate for radiation therapy to the lumbar spine with the goal of achieving local control and preventing disease progression.    We recommend a course of palliative stereotactic body radiation therapy (SBRT) to ablate the known site of metastatic disease in the lumbar spine and the right rib. . We emphasized that the goal of treatment is local control and improving the likelihood of durable survival, and he accepted these goals. The risks, benefits and alternatives of treatment were discussed indetail with the patient.     Potential acute effects include, but are not limited to, fatigue, pain flare, and  skin reactions. Long-term effects may include skin hyperpigmentation and spontaneous vertebral body and rib fractures within the radiation field. The patient acknowledges and accepts these risks.    Recommendation:    1-No indication for urgent/emergent radiation over the weekend.  2-Continue steroids at 4 mg every 6 hours.  3-Consult medical oncology for systemic therapy.  4-If the patient remains hospitalized on Monday, re-evaluate and consider an in-house CT simulation.  5-Plan for SBRT in 5 fractions.    Discussed with Attending Faculty, Dr. Colleen Menjivar MD  PGY-5, Radiation Oncology  8/17/2024 10:30 AM     I personally saw and evaluated the patient. I personally obtained key and critical portions of the history and physical exam and personally reviewed and interpreted imaging and laboratory data. I reviewed and edited the resident's documentation, and discussed the patient with the resident. I agree with the Dr. Martinez's plan as documented above.     Yefri Macias MD, MS  , Radiation Oncology

## 2024-08-17 NOTE — DISCHARGE SUMMARY
Discharge Diagnosis  Pain from bone metastases (Multi)    Issues Requiring Follow-Up  The patient has been Instructed by me upon admission to follow-up with their PCP upon discharge to obtain repeat blood work/CXR and to maintain close medical follow-up for their current disease process.    Patient instructed to follow-up with his oncology team    Instructed to follow-up with his urology team    Test Results Pending At Discharge  Pending Labs       No current pending labs.            Hospital Course    Assessment & Plan     Back Pain  -Secondary to lumbar metastasis  -S/p laminectomy and fusion on 7/9 d/t compression w/neurosurgery at [facility]. Accepted and awaiting bed  -MRI shows L3 compression fx and expansion of the tumor mass of the L3 vertebral body into the epidural space, causing stenosis  -Palliative follows  -Ortho spine follows  -Steroids started and pt has significant improvement in pain  -Pain management     Renal Cell Carcinoma with mets  -Not currently on treatment. Poor follow-up with Oncology due to limited mobility and pain  -Palliative follows. Pt not interested in Hospice at this time and wants to explore treatment options      DVT Prophylaxis  -Heparin subcutaneous     Dispo  Accepted at [facility] and awaiting bed       Pertinent Physical Exam At Time of Discharge  Physical Exam    Constitutional:  Pleasant  Eyes: PERRL, EOMI,   ENMT: mucous membranes moist  Head/Neck: Neck supple, No JVD,   Respiratory/Thorax: Patent airways, CTAB,   Cardiovascular: Regular, rate and rhythm, no murmurs  Gastrointestinal: Soft, non-distended, +BS.  Musculoskeletal: ROM intact, no joint swelling, normal strength  Extremities: peripheral pulses intact; no edema  Neurological: Alert and Oriented x 3; no focal deficits; gross motor and sensation intact; CN II-XII intact. No asterixis.  Psychological: Appropriate mood and behavior  Skin: No lesions, No rashes.  Home Medications     Medication List      ASK your doctor  about these medications     acetaminophen 500 mg tablet; Commonly known as: Tylenol   amLODIPine 10 mg tablet; Commonly known as: Norvasc; Take 1 tablet (10   mg) by mouth once daily.   atorvastatin 20 mg tablet; Commonly known as: Lipitor; Take 1 tablet (20   mg) by mouth once daily at bedtime.   gabapentin 300 mg capsule; Commonly known as: Neurontin; Take 1 capsule   (300 mg) by mouth once daily at bedtime for 10 days.   hydroCHLOROthiazide 25 mg tablet; Commonly known as: HYDRODiuril; Take 1   tablet (25 mg) by mouth once daily.   lactulose 20 gram/30 mL oral solution; Take 30 mL (20 g) by mouth once   daily.; Ask about: Should I take this medication?   metoprolol succinate XL 50 mg 24 hr tablet; Commonly known as:   Toprol-XL; Take 1 tablet (50 mg) by mouth once daily.   oxyCODONE 10 mg immediate release tablet; Commonly known as: Roxicodone;   Take 1 tablet (10 mg) by mouth every 6 hours if needed for severe pain (7   - 10).   polyethylene glycol 17 gram packet; Commonly known as: Glycolax,   Miralax; Take 17 g by mouth once daily.; Ask about: Should I take this   medication?   senna 8.6 mg tablet; Generic drug: sennosides; Take 2 tablets (17.2 mg)   by mouth 2 times a day.; Ask about: Should I take this medication?   traZODone 50 mg tablet; Commonly known as: Desyrel; Take 1 tablet (50   mg) by mouth once daily at bedtime.       Outpatient Follow-Up  No future appointments.    Michael Sanderson MD

## 2024-08-17 NOTE — PROGRESS NOTES
Pharmacy Medication History Review    Christophe Ambriz is a 74 y.o. 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 Pharmacy reviewed the patient's [id number] medications and allergies for accuracy.    Medications ADDED:  Clopidogrel  Medications CHANGED:  None  Medications REMOVED:   None    The list below reflects the updated PTA list.   Prior to Admission Medications   Prescriptions Last Dose Informant   acetaminophen (Tylenol) 500 mg tablet  Self   Sig: Take 2 tablets (1,000 mg) by mouth every 6 hours if needed for mild pain (1 - 3).  Patient not taking: Reported on 8/17/2024   amLODIPine (Norvasc) 10 mg tablet     Sig: Take 1 tablet (10 mg) by mouth once daily.   atorvastatin (Lipitor) 20 mg tablet     Sig: Take 1 tablet (20 mg) by mouth once daily at bedtime.   clopidogrel (Plavix) 75 mg tablet  Self   Sig: Take 1 tablet (75 mg) by mouth once daily.   gabapentin (Neurontin) 300 mg capsule     Sig: Take 1 capsule (300 mg) by mouth once daily at bedtime for 10 days.   hydroCHLOROthiazide (HYDRODiuril) 25 mg tablet  Self   Sig: Take 1 tablet (25 mg) by mouth once daily.   lactulose 20 gram/30 mL oral solution Not Taking    Sig: Take 30 mL (20 g) by mouth once daily.   Patient not taking: Reported on 8/17/2024   metoprolol succinate XL (Toprol-XL) 50 mg 24 hr tablet  Self   Sig: Take 1 tablet (50 mg) by mouth once daily.   oxyCODONE (Roxicodone) 10 mg immediate release tablet Not Taking Self   Sig: Take 1 tablet (10 mg) by mouth every 6 hours if needed for severe pain (7 - 10).   Patient not taking: Reported on 8/17/2024   polyethylene glycol (Glycolax, Miralax) 17 gram packet Not Taking    Sig: Take 17 g by mouth once daily.   Patient not taking: Reported on 8/17/2024   sennosides (Senokot) 8.6 mg tablet Not Taking    Sig: Take 2 tablets (17.2 mg) by mouth 2 times a day.   Patient not taking: Reported on 8/17/2024   traZODone (Desyrel) 50 mg tablet     Sig: Take 1  "tablet (50 mg) by mouth once daily at bedtime.   Patient taking differently: Take 1 tablet (50 mg) by mouth once daily as needed for sleep.      Facility-Administered Medications: None        The list below reflects the updated allergy list. Please review each documented allergy for additional clarification and justification.  Allergies  Reviewed by Mary Washington on 8/17/2024        Severity Reactions Comments    Oxycodone-acetaminophen Medium Nausea And Vomiting, GI Upset             Patient declines M2B at discharge.     Sources:   Patient interview  Dispense history  OARRS    Additional Comments:  Patient is a moderate historian. Patient was able to recall some medications but had to be prompted on most medications.  Patient reports taking gabapentin but he is not sure of the dose. Gabapentin 100 mg was dispensed on 7/12 for a 30 day supply and gabapentin 300 mg was dispensed on 7/27 for a 10 day supply.         MARY WASHINGTON  PGY-1 Pharmacy Resident, [facility]   08/17/24     Secure Chat preferred   If no response call [id number] or [employer] \"Med Rec\"   "

## 2024-08-17 NOTE — CONSULTS
"Inpatient consult to Neurosurgery  Consult performed by: Lili Solorzano MD  Consult ordered by: Han Howard MD      Date of Service:  8/17/2024 Attending Provider:  Han Howard MD     Reason for Consultation:  Christophe Ambriz is being seen today for a consult requested by Han Howard MD for back pain.    Subjective   History of Present Illness:  Christophe is a 74 y.o. 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 Had been missing his appointments due to being unable to ambulate in the setting of pain. Describes his BLE pain and shooting \"all over\", both front back and side, feels equal on both sides. Has not had any radiation or chemotherapy. Reported that his symptoms significantly improved with steroids.    Review of Systems negative other than listed in HPI.    Objective   Vitals:  Vitals:    08/17/24 0100   BP: 122/70   Pulse: 88   Resp: 16   Temp: 36.6 °C (97.9 °F)   SpO2: 94%         Exam:  Constitutional: No acute distress  Resp: breathing comfortably  Cardio: well perfused  GI: nondistended  MSK: full range of motion  Neuro: Awake, Ox3  face symmetric  RUE 5/5  LUE 5/5  RLE HF4+ KE/PF/DF 5  LLE HF4 KE4- DF/PF   sensation intact to light touch  B/l diffuse radiculopathy  No hoffmans, no clonus  Psych: appropriate  Skin: no obvious lesions    Medical History  Past Medical History:   Diagnosis Date    HLD (hyperlipidemia)     HTN (hypertension)     Metastasis (Multi)     Renal cell adenocarcinoma (Multi)        Surgical History  Past Surgical History:   Procedure Laterality Date    LUMBAR FUSION          Medications  Current Outpatient Medications   Medication Instructions    acetaminophen (TYLENOL) 1,000 mg, oral, Every 6 hours PRN    amLODIPine (NORVASC) 10 mg, oral, Daily    atorvastatin (LIPITOR) 20 mg, oral, Nightly    gabapentin (NEURONTIN) 300 mg, oral, Nightly    " hydroCHLOROthiazide (HYDRODIURIL) 25 mg, oral, Daily    metoprolol succinate XL (TOPROL-XL) 50 mg, oral, Daily    oxyCODONE (ROXICODONE) 10 mg, oral, Every 6 hours PRN    traZODone (DESYREL) 50 mg, oral, Nightly        Diagnostic Results:    Lab Results   Component Value Date    WBC 14.4 (H) 08/16/2024    HGB 9.3 (L) 08/16/2024    HCT 30.3 (L) 08/16/2024    MCV 83 08/16/2024     (H) 08/16/2024     Lab Results   Component Value Date    CREATININE 0.60 08/16/2024    BUN 13 08/16/2024     (L) 08/16/2024    K 4.4 08/16/2024    CL 96 (L) 08/16/2024    CO2 20 (L) 08/16/2024     Lab Results   Component Value Date    INR 1.3 (H) 07/08/2024    INR 1.2 (H) 07/06/2024    INR 1.0 03/20/2019    PROTIME 14.4 (H) 07/08/2024    PROTIME 13.9 (H) 07/06/2024    PROTIME 11.1 03/20/2019       === 07/26/24 ===    CT LUMBAR SPINE WO IV CONTRAST    - Impression -  1.  Interval posterior fusion L1-L5.  2. Interval mild pathologic compression fracture related to diffuse  osteolytic lesion of the L3 vertebral body. Osseous retropulsion at  this level causes new severe canal stenosis.  3. L3 left facetectomy defect may partially decompressed the exiting  nerve root.  4. New nondisplaced fracture right transverse process at L3.    MACRO:  None    Signed by: Cuba Goodson 7/26/2024 3:41 PM  Dictation workstation:   [id number]  === 08/14/24 ===    MR LUMBAR SPINE W AND WO CONTRAST    - Impression -  * L3 compression fracture with retropulsion of the posterior cortex  and severe stenosis of the central spinal canal with nerve root  compression unchanged from the previous exam *No new foci of marrow  replacement or compression fracture *There is no measurable change  compared to the previous exam.    MACRO:  Critical Finding:  See findings. Notification was initiated on  8/14/2024 at 3:50 pm by  Elroy Uribe.  (**-OCF-**)    Signed by: Elroy Uribe 8/14/2024 3:50 PM  Dictation workstation:   [id number]    Assessment/Plan    Assessment:  Christophe Ambriz is a 74 y.o. male with h/o HTN, HLD, CAD s/p PCI (2017) (on ASA/PLX), p/w LBP of 37d duration, CT T/L spine L3 lytic lesion, CT CAP 8 cm L kidney mass, 2.1 cm R thyroid nodule, L 9th rib lytic lesion, MRL LS lytic mets to L3 with L3-4 neuro foraminal stenosis (severe on the L), L3-4 disc involvement with tumor, 7/19 s/p L1-5 fusion with L3/4 decompression (renal cell (clear cell) carcinoma), 7/26 p/w LBP and BLE pain, MRI L spine worsened L3 pathologic fx w/ retropulsion and canal stenosis, 8/14 BLE weakness, MRI L spine stable to MRI on 7/26    Plan:  [place] primary  Recommend supportive onc for assistance with pain control  Palliative recs  Urology recs  Agree with dex  Please document RCRI/updated prognosis      Lili Solorzano MD

## 2024-08-18 VITALS
RESPIRATION RATE: 18 BRPM | SYSTOLIC BLOOD PRESSURE: 124 MMHG | OXYGEN SATURATION: 92 % | BODY MASS INDEX: 23.2 KG/M2 | HEART RATE: 69 BPM | HEIGHT: 73 IN | DIASTOLIC BLOOD PRESSURE: 74 MMHG | TEMPERATURE: 98.4 F | WEIGHT: 175.04 LBS

## 2024-08-18 LAB
ALBUMIN SERPL BCP-MCNC: 3.3 G/DL (ref 3.4–5)
ANION GAP SERPL CALC-SCNC: 17 MMOL/L (ref 10–20)
BASOPHILS # BLD AUTO: 0.02 X10*3/UL (ref 0–0.1)
BASOPHILS NFR BLD AUTO: 0.1 %
BUN SERPL-MCNC: 19 MG/DL (ref 6–23)
CALCIUM SERPL-MCNC: 9.2 MG/DL (ref 8.6–10.6)
CHLORIDE SERPL-SCNC: 96 MMOL/L (ref 98–107)
CO2 SERPL-SCNC: 25 MMOL/L (ref 21–32)
CREAT SERPL-MCNC: 0.59 MG/DL (ref 0.5–1.3)
EGFRCR SERPLBLD CKD-EPI 2021: >90 ML/MIN/1.73M*2
EOSINOPHIL # BLD AUTO: 0.02 X10*3/UL (ref 0–0.4)
EOSINOPHIL NFR BLD AUTO: 0.1 %
ERYTHROCYTE [DISTWIDTH] IN BLOOD BY AUTOMATED COUNT: 14.3 % (ref 11.5–14.5)
GLUCOSE BLD MANUAL STRIP-MCNC: 219 MG/DL (ref 74–99)
GLUCOSE BLD MANUAL STRIP-MCNC: 260 MG/DL (ref 74–99)
GLUCOSE BLD MANUAL STRIP-MCNC: 262 MG/DL (ref 74–99)
GLUCOSE BLD MANUAL STRIP-MCNC: 284 MG/DL (ref 74–99)
GLUCOSE SERPL-MCNC: 218 MG/DL (ref 74–99)
HCT VFR BLD AUTO: 29 % (ref 41–52)
HGB BLD-MCNC: 8.7 G/DL (ref 13.5–17.5)
IMM GRANULOCYTES # BLD AUTO: 0.24 X10*3/UL (ref 0–0.5)
IMM GRANULOCYTES NFR BLD AUTO: 1.1 % (ref 0–0.9)
LYMPHOCYTES # BLD AUTO: 3.57 X10*3/UL (ref 0.8–3)
LYMPHOCYTES NFR BLD AUTO: 16.3 %
MAGNESIUM SERPL-MCNC: 2.09 MG/DL (ref 1.6–2.4)
MCH RBC QN AUTO: 25.1 PG (ref 26–34)
MCHC RBC AUTO-ENTMCNC: 30 G/DL (ref 32–36)
MCV RBC AUTO: 84 FL (ref 80–100)
MONOCYTES # BLD AUTO: 1.04 X10*3/UL (ref 0.05–0.8)
MONOCYTES NFR BLD AUTO: 4.7 %
NEUTROPHILS # BLD AUTO: 17.04 X10*3/UL (ref 1.6–5.5)
NEUTROPHILS NFR BLD AUTO: 77.7 %
NRBC BLD-RTO: 0 /100 WBCS (ref 0–0)
PHOSPHATE SERPL-MCNC: 2.7 MG/DL (ref 2.5–4.9)
PLATELET # BLD AUTO: 653 X10*3/UL (ref 150–450)
POTASSIUM SERPL-SCNC: 4.6 MMOL/L (ref 3.5–5.3)
RBC # BLD AUTO: 3.47 X10*6/UL (ref 4.5–5.9)
SODIUM SERPL-SCNC: 133 MMOL/L (ref 136–145)
WBC # BLD AUTO: 21.9 X10*3/UL (ref 4.4–11.3)

## 2024-08-18 PROCEDURE — 2500000002 HC RX 250 W HCPCS SELF ADMINISTERED DRUGS (ALT 637 FOR MEDICARE OP, ALT 636 FOR OP/ED)

## 2024-08-18 PROCEDURE — 36415 COLL VENOUS BLD VENIPUNCTURE: CPT

## 2024-08-18 PROCEDURE — 1170000001 HC PRIVATE ONCOLOGY ROOM DAILY

## 2024-08-18 PROCEDURE — 85025 COMPLETE CBC W/AUTO DIFF WBC: CPT

## 2024-08-18 PROCEDURE — 80069 RENAL FUNCTION PANEL: CPT

## 2024-08-18 PROCEDURE — 2500000001 HC RX 250 WO HCPCS SELF ADMINISTERED DRUGS (ALT 637 FOR MEDICARE OP)

## 2024-08-18 PROCEDURE — 83735 ASSAY OF MAGNESIUM: CPT

## 2024-08-18 PROCEDURE — 99233 SBSQ HOSP IP/OBS HIGH 50: CPT

## 2024-08-18 PROCEDURE — 82947 ASSAY GLUCOSE BLOOD QUANT: CPT

## 2024-08-18 PROCEDURE — 2500000004 HC RX 250 GENERAL PHARMACY W/ HCPCS (ALT 636 FOR OP/ED)

## 2024-08-18 ASSESSMENT — PAIN SCALES - GENERAL
PAINLEVEL_OUTOF10: 3
PAINLEVEL_OUTOF10: 0 - NO PAIN
PAINLEVEL_OUTOF10: 5 - MODERATE PAIN

## 2024-08-18 ASSESSMENT — PAIN - FUNCTIONAL ASSESSMENT
PAIN_FUNCTIONAL_ASSESSMENT: 0-10

## 2024-08-18 ASSESSMENT — PAIN DESCRIPTION - LOCATION: LOCATION: BACK

## 2024-08-18 ASSESSMENT — ACTIVITIES OF DAILY LIVING (ADL): LACK_OF_TRANSPORTATION: NO

## 2024-08-18 NOTE — PROGRESS NOTES
08/18/24 1531   Discharge Planning   Living Arrangements Spouse/significant other   Support Systems Spouse/significant other   Type of Residence Private residence   Number of Stairs to Enter Residence 0   Number of Stairs Within Residence 0   Do you have animals or pets at home? No   Who is requesting discharge planning? Provider   Home or Post Acute Services None   Expected Discharge Disposition  Services   Does the patient need discharge transport arranged? Yes   RoundTrip coordination needed? Yes   Has discharge transport been arranged? No   Financial Resource Strain   How hard is it for you to pay for the very basics like food, housing, medical care, and heating? Not hard   Housing Stability   In the last 12 months, was there a time when you were not able to pay the mortgage or rent on time? N   At any time in the past 12 months, were you homeless or living in a shelter (including now)? N   Transportation Needs   In the past 12 months, has lack of transportation kept you from medical appointments or from getting medications? no   In the past 12 months, has lack of transportation kept you from meetings, work, or from getting things needed for daily living? No     Patient lives at home with fiance, ranch style home uses a cane/walker. Will need assistance for transportation once medically ready for discharge. Patient is agreeable to home care if it were recommended, does not want SNF.     Originally admitted [facility] for evaluation of low back pain radiating down his legs bilaterally, history of metastatic renal cell carcinoma. His oncologist sent him in because he was concerned due to limited mobility and not being able to get to his oncology appointments for care.

## 2024-08-18 NOTE — HOSPITAL COURSE
Christophe Ambriz is a 74-year-old male with a history of coronary artery disease (CAD), hypertension (HTN), hyperlipidemia (HLD), and renal cell carcinoma (RCC) with spinal metastases. He underwent an L1-5 fusion with L2-4 decompression and tumor debulking on July 24, 2024. He was transferred from [facility] for further management.    Post-surgery, Mr. Ambriz reported persistent pain with movement, radiating into both legs. The pain, described as burning, starts in his hips and extends down to his toes. It is more intense in his legs than in his lower back and occasionally radiates into his buttocks. He denies any bowel or bladder changes, weakness, numbness, or paresthesias. He experiences chronic constipation and has not adhered to a pain regimen since the surgery. He missed follow-up appointments due to pain preventing him from ambulating and has been unable to walk for several weeks.    Decadron 4 mg every 8 hours was started. An MRI of the spine revealed an L3 compression fracture with retropulsion of the posterior cortex, severe stenosis of the central spinal canal, and nerve root compression, unchanged from prior exams. Neurosurgery determined that no surgical intervention was needed. Radiation oncology plans to initiate external beam radiation therapy (EBRT) in 10 fractions starting August 27.    On August 19, Mr. Ambriz was transferred to the Medical Intensive Care Unit (MICU) after desaturation to 70% on room air, unresponsive to a non-rebreather mask (15 L), with saturation improving only to 84%. A rapid response was initiated due to his somnolence and hypotension, though he was afebrile. Physical examination showed no significant respiratory findings. Arterial blood gas analysis revealed hypoxemia with an oxygen level of 60. He was placed on CPAP (10 cm H2O, 45% FiO2), with oxygen saturation improving to 88-92%. A chest X-ray showed left lower lobe (LLL) consolidation, and a CT scan ruled out pulmonary  embolism but indicated multifocal pneumonia with possible aspiration components. He was treated with Vancomycin and Piperacillin/Tazobactam and transferred back to the regular floor on August 22. He was weaned off oxygen as tolerated.    The radiation oncology team decided to schedule his radiation sessions on an outpatient basis.    Given his hemodynamic stability, cessation of oxygen therapy, and a clear plan for radiation, Mr. Ambriz was discharged with follow-up appointments scheduled with radiation oncology, neurosurgery and his primary oncologist.

## 2024-08-18 NOTE — PROGRESS NOTES
"Christophe Ambriz is a 74 y.o. 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    Subjective   Patient seen at bedside this morning. States his pain has significantly improved since starting steroids. No complaints at this time. Denies lower extremity weakness/numbness, incontinence. Does endorse chronic constipation        Objective     Physical Exam  Constitutional:       Appearance: Normal appearance.   Cardiovascular:      Rate and Rhythm: Normal rate and regular rhythm.      Pulses: Normal pulses.      Heart sounds: Normal heart sounds.   Pulmonary:      Effort: Pulmonary effort is normal.      Breath sounds: Normal breath sounds.   Abdominal:      General: Abdomen is flat.      Palpations: Abdomen is soft.   Musculoskeletal:      Right lower leg: No edema.      Left lower leg: No edema.   Neurological:      General: No focal deficit present.      Mental Status: He is alert and oriented to person, place, and time.      Sensory: No sensory deficit.      Motor: No weakness.   Psychiatric:         Mood and Affect: Mood normal.         Behavior: Behavior normal.         Last Recorded Vitals  Blood pressure 105/66, pulse 76, temperature 36 °C (96.8 °F), temperature source Temporal, resp. rate 16, height 1.854 m (6' 1\"), weight 79.4 kg (175 lb 0.7 oz), SpO2 96%.  Intake/Output last 3 Shifts:  I/O last 3 completed shifts:  In: 390 (4.9 mL/kg) [P.O.:390]  Out: 1025 (12.9 mL/kg) [Urine:1025 (0.4 mL/kg/hr)]  Weight: 79.4 kg     Relevant Results                 Scheduled medications  [Held by provider] amLODIPine, 10 mg, oral, Daily  aspirin, 81 mg, oral, Daily  atorvastatin, 20 mg, oral, Nightly  dexAMETHasone, 4 mg, oral, q8h SLIM  enoxaparin, 40 mg, subcutaneous, Daily  gabapentin, 300 mg, oral, Nightly  [Held by provider] hydroCHLOROthiazide, 25 mg, oral, Daily  insulin lispro, 0-10 Units, subcutaneous, TID  metoprolol succinate XL, 50 mg, oral, " Daily  pantoprazole, 40 mg, oral, Daily before breakfast  polyethylene glycol, 17 g, oral, Daily  sennosides, 2 tablet, oral, BID      Continuous medications     PRN medications  PRN medications: acetaminophen **OR** acetaminophen **OR** acetaminophen, dextrose, dextrose, glucagon, glucagon, HYDROmorphone, oxyCODONE, traZODone  Results for orders placed or performed during the hospital encounter of 08/16/24 (from the past 24 hour(s))   POCT GLUCOSE   Result Value Ref Range    POCT Glucose 218 (H) 74 - 99 mg/dL   POCT GLUCOSE   Result Value Ref Range    POCT Glucose 225 (H) 74 - 99 mg/dL   POCT GLUCOSE   Result Value Ref Range    POCT Glucose 261 (H) 74 - 99 mg/dL   POCT GLUCOSE   Result Value Ref Range    POCT Glucose 264 (H) 74 - 99 mg/dL                  Assessment/Plan   Assessment & Plan  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 male with a hx of CAD, HTN, HLD and RCC with spinal mets s/p L1-5 fusion w/ L2-4 decompression  and tumor debulking (7/24) presenting with neuropathic pain that starts in his hips and radiates to his b/l legs, transferred from [facility] to [facility] for further management. MRI at OSH showed stable L3 compression fracture, spinal canal stenosis and nerve root compression. Will continue steroids as pain has been improved and consult neurosurg for evaluation. Pt will likely need PT/OT for placement of homegoing and further oncological treatment planning as he had been lost to follow up.        Updates:  -No surgical intervention per neurosurgery  -Plans for radiation in 5 fractions starting Monday     #Metastatic renal cell carcinoma  #Back and leg pain  :: L1-5 fusion w/ L2-4 decompression  and tumor debulking (7/24)  at [facility] with NSGY  ::MRI 8/14: L3 compression fracture with retropulsion of the posterior cortex and severe stenosis of the central spinal canal with nerve root compression unchanged from the previous exam *No new foci  of marrow replacement or compression fracture *There is no measurable change  compared to the previous exam.  ::Follows with Dr. Chavarria but has been lost to follow up given poor mobility  ::Pain improved with decadron at OSH  Plan:  -Neurosurgery consulted   -No surgical intervention needed  -Rad onc consulted  1-No indication for urgent/emergent radiation over the weekend.  2-Consult medical oncology for systemic therapy.  3-If the patient remains hospitalized on Monday, re-evaluate and consider an in-house CT simulation.  4-Plan for SBRT in 5 fractions.   -Supportive oncology consulted for further pain management recs   -Continue gabapentin and oxy prn   -Dexamethasone 4mg q8h       #CAD s/p PCI (unknown year)  #HTN  #HLD  - continue home atorvastatin, ASA (held plavix as no indication for DAPT and in case of any procedures)  - continue home amlodipine 10mg daily  - continue home metoprolol succinate 50mg daily   - held home hydrochlorothiazide 25mg daily, restart as tolerated      #Leukocytosis  :: No current infectious symptoms, likely reactive in setting of malignancy and steroids  -Fup CBC     #Dispo  -PT/OT ordered     F: PRN  E: PRN  N: Regular  GI: Protonix  DVT prophylaxis: SubQ lovenox  Code status: DNR/DNI (CONFIRMED ON ADMISSION)  Surrogate decision maker:Tom Lara [phone]           Trent Montiel MD

## 2024-08-18 NOTE — DISCHARGE INSTRUCTIONS
Dear Mr. Ambriz,    You were admitted on August 16, 2024, with lower back pain that radiated to both legs. An MRI revealed nerve compression in your spine, and you were started on steroids. Radiation oncology has been consulted and plans to administer radiation to your lower back, which we hope will alleviate your pain. During your stay, your oxygen levels dropped, leading to your transfer to the intensive care unit, where you were treated with antibiotics that helped resolve your chest infection. We recommend following a pureed diet as advised by the swallowing specialist. Additionally, please take your medications as prescribed and continue to follow up with the neurosurgery and radiation oncology teams.    MEDICATION CHANGES:   START:  - Decadron 4mg every 8 hours    CHANGE:  -None    STOP:   -None    CONTINUE:   - All other home medications     FOLLOW UP APPOINTMENTS:  - Please follow up with neurosurgery and radiation oncology doctors. Referral orders have been placed.       It was pleasure to take care of you   Team

## 2024-08-19 ENCOUNTER — APPOINTMENT (OUTPATIENT)
Dept: RADIOLOGY | Facility: HOSPITAL | Age: 74
DRG: 947 | End: 2024-08-19
Payer: MEDICARE

## 2024-08-19 LAB
ALBUMIN SERPL BCP-MCNC: 3.1 G/DL (ref 3.4–5)
ANION GAP BLDA CALCULATED.4IONS-SCNC: 10 MMO/L (ref 10–25)
ANION GAP BLDV CALCULATED.4IONS-SCNC: 12 MMOL/L (ref 10–25)
ANION GAP BLDV CALCULATED.4IONS-SCNC: 13 MMOL/L (ref 10–25)
ANION GAP SERPL CALC-SCNC: 18 MMOL/L (ref 10–20)
APPEARANCE UR: CLEAR
BASE EXCESS BLDA CALC-SCNC: 1.9 MMOL/L (ref -2–3)
BASE EXCESS BLDA CALC-SCNC: 2 MMOL/L (ref -2–3)
BASE EXCESS BLDV CALC-SCNC: 2.3 MMOL/L (ref -2–3)
BASE EXCESS BLDV CALC-SCNC: 4 MMOL/L (ref -2–3)
BASOPHILS # BLD MANUAL: 0 X10*3/UL (ref 0–0.1)
BASOPHILS NFR BLD MANUAL: 0 %
BILIRUB UR STRIP.AUTO-MCNC: NEGATIVE MG/DL
BODY TEMPERATURE: 37 DEGREES CELSIUS
BUN SERPL-MCNC: 19 MG/DL (ref 6–23)
CA-I BLDA-SCNC: 1.24 MMOL/L (ref 1.1–1.33)
CA-I BLDV-SCNC: 1.21 MMOL/L (ref 1.1–1.33)
CA-I BLDV-SCNC: 1.24 MMOL/L (ref 1.1–1.33)
CALCIUM SERPL-MCNC: 8.9 MG/DL (ref 8.6–10.6)
CHLORIDE BLDA-SCNC: 98 MMOL/L (ref 98–107)
CHLORIDE BLDV-SCNC: 92 MMOL/L (ref 98–107)
CHLORIDE BLDV-SCNC: 94 MMOL/L (ref 98–107)
CHLORIDE SERPL-SCNC: 93 MMOL/L (ref 98–107)
CO2 SERPL-SCNC: 25 MMOL/L (ref 21–32)
COLOR UR: YELLOW
CREAT SERPL-MCNC: 0.66 MG/DL (ref 0.5–1.3)
EGFRCR SERPLBLD CKD-EPI 2021: >90 ML/MIN/1.73M*2
EOSINOPHIL # BLD MANUAL: 0 X10*3/UL (ref 0–0.4)
EOSINOPHIL NFR BLD MANUAL: 0 %
ERYTHROCYTE [DISTWIDTH] IN BLOOD BY AUTOMATED COUNT: 14.4 % (ref 11.5–14.5)
GLUCOSE BLD MANUAL STRIP-MCNC: 260 MG/DL (ref 74–99)
GLUCOSE BLD MANUAL STRIP-MCNC: 292 MG/DL (ref 74–99)
GLUCOSE BLDA-MCNC: 225 MG/DL (ref 74–99)
GLUCOSE BLDV-MCNC: 301 MG/DL (ref 74–99)
GLUCOSE BLDV-MCNC: 333 MG/DL (ref 74–99)
GLUCOSE SERPL-MCNC: 219 MG/DL (ref 74–99)
GLUCOSE UR STRIP.AUTO-MCNC: NORMAL MG/DL
HCO3 BLDA-SCNC: 25.6 MMOL/L (ref 22–26)
HCO3 BLDA-SCNC: 26.6 MMOL/L (ref 22–26)
HCO3 BLDV-SCNC: 26.4 MMOL/L (ref 22–26)
HCO3 BLDV-SCNC: 29.2 MMOL/L (ref 22–26)
HCT VFR BLD AUTO: 31.6 % (ref 41–52)
HCT VFR BLD EST: 23 % (ref 41–52)
HCT VFR BLD EST: 29 % (ref 41–52)
HCT VFR BLD EST: 30 % (ref 41–52)
HGB BLD-MCNC: 9.6 G/DL (ref 13.5–17.5)
HGB BLDA-MCNC: 10.1 G/DL (ref 13.5–17.5)
HGB BLDV-MCNC: 7.6 G/DL (ref 13.5–17.5)
HGB BLDV-MCNC: 9.5 G/DL (ref 13.5–17.5)
IMM GRANULOCYTES # BLD AUTO: 0.36 X10*3/UL (ref 0–0.5)
IMM GRANULOCYTES NFR BLD AUTO: 1.1 % (ref 0–0.9)
INHALED O2 CONCENTRATION: 100 %
INHALED O2 CONCENTRATION: 100 %
INHALED O2 CONCENTRATION: 50 %
INHALED O2 CONCENTRATION: 65 %
KETONES UR STRIP.AUTO-MCNC: NEGATIVE MG/DL
LACTATE BLDA-SCNC: 2.6 MMOL/L (ref 0.4–2)
LACTATE BLDV-SCNC: 2.3 MMOL/L (ref 0.4–2)
LACTATE BLDV-SCNC: 2.3 MMOL/L (ref 0.4–2)
LACTATE BLDV-SCNC: 3.4 MMOL/L (ref 0.4–2)
LACTATE SERPL-SCNC: 2.9 MMOL/L (ref 0.4–2)
LACTATE SERPL-SCNC: 3.1 MMOL/L (ref 0.4–2)
LACTATE SERPL-SCNC: 3.5 MMOL/L (ref 0.4–2)
LACTATE SERPL-SCNC: 3.6 MMOL/L (ref 0.4–2)
LEUKOCYTE ESTERASE UR QL STRIP.AUTO: NEGATIVE
LYMPHOCYTES # BLD MANUAL: 4.26 X10*3/UL (ref 0.8–3)
LYMPHOCYTES NFR BLD MANUAL: 13.2 %
MAGNESIUM SERPL-MCNC: 1.94 MG/DL (ref 1.6–2.4)
MCH RBC QN AUTO: 25.3 PG (ref 26–34)
MCHC RBC AUTO-ENTMCNC: 30.4 G/DL (ref 32–36)
MCV RBC AUTO: 83 FL (ref 80–100)
METAMYELOCYTES # BLD MANUAL: 0.29 X10*3/UL
METAMYELOCYTES NFR BLD MANUAL: 0.9 %
MONOCYTES # BLD MANUAL: 1.97 X10*3/UL (ref 0.05–0.8)
MONOCYTES NFR BLD MANUAL: 6.1 %
MUCOUS THREADS #/AREA URNS AUTO: NORMAL /LPF
NEUTS SEG # BLD MANUAL: 25.78 X10*3/UL (ref 1.6–5)
NEUTS SEG NFR BLD MANUAL: 79.8 %
NITRITE UR QL STRIP.AUTO: NEGATIVE
NRBC BLD-RTO: 0 /100 WBCS (ref 0–0)
OXYHGB MFR BLDA: 88.6 % (ref 94–98)
OXYHGB MFR BLDA: 96.7 % (ref 94–98)
OXYHGB MFR BLDV: 36.3 % (ref 45–75)
OXYHGB MFR BLDV: 56.2 % (ref 45–75)
PCO2 BLDA: 36 MM HG (ref 38–42)
PCO2 BLDA: 41 MM HG (ref 38–42)
PCO2 BLDV: 38 MM HG (ref 41–51)
PCO2 BLDV: 46 MM HG (ref 41–51)
PH BLDA: 7.42 PH (ref 7.38–7.42)
PH BLDA: 7.46 PH (ref 7.38–7.42)
PH BLDV: 7.41 PH (ref 7.33–7.43)
PH BLDV: 7.45 PH (ref 7.33–7.43)
PH UR STRIP.AUTO: 6 [PH]
PHOSPHATE SERPL-MCNC: 3.7 MG/DL (ref 2.5–4.9)
PLATELET # BLD AUTO: 786 X10*3/UL (ref 150–450)
PO2 BLDA: 141 MM HG (ref 85–95)
PO2 BLDA: 60 MM HG (ref 85–95)
PO2 BLDV: 26 MM HG (ref 35–45)
PO2 BLDV: 34 MM HG (ref 35–45)
POLYCHROMASIA BLD QL SMEAR: ABNORMAL
POTASSIUM BLDA-SCNC: 4.6 MMOL/L (ref 3.5–5.3)
POTASSIUM BLDV-SCNC: 4.9 MMOL/L (ref 3.5–5.3)
POTASSIUM BLDV-SCNC: 5.1 MMOL/L (ref 3.5–5.3)
POTASSIUM SERPL-SCNC: 5 MMOL/L (ref 3.5–5.3)
PROCALCITONIN SERPL-MCNC: 0.06 NG/ML
PROCALCITONIN SERPL-MCNC: 0.34 NG/ML
PROT UR STRIP.AUTO-MCNC: ABNORMAL MG/DL
RBC # BLD AUTO: 3.8 X10*6/UL (ref 4.5–5.9)
RBC # UR STRIP.AUTO: NEGATIVE /UL
RBC #/AREA URNS AUTO: NORMAL /HPF
RBC MORPH BLD: ABNORMAL
SAO2 % BLDA: 100 % (ref 94–100)
SAO2 % BLDA: 90 % (ref 94–100)
SAO2 % BLDV: 37 % (ref 45–75)
SAO2 % BLDV: 58 % (ref 45–75)
SARS-COV-2 RNA RESP QL NAA+PROBE: NOT DETECTED
SODIUM BLDA-SCNC: 130 MMOL/L (ref 136–145)
SODIUM BLDV-SCNC: 128 MMOL/L (ref 136–145)
SODIUM BLDV-SCNC: 128 MMOL/L (ref 136–145)
SODIUM SERPL-SCNC: 131 MMOL/L (ref 136–145)
SP GR UR STRIP.AUTO: 1.05
TOTAL CELLS COUNTED BLD: 114
UROBILINOGEN UR STRIP.AUTO-MCNC: NORMAL MG/DL
WBC # BLD AUTO: 32.3 X10*3/UL (ref 4.4–11.3)
WBC #/AREA URNS AUTO: NORMAL /HPF

## 2024-08-19 PROCEDURE — 87899 AGENT NOS ASSAY W/OPTIC: CPT

## 2024-08-19 PROCEDURE — 99233 SBSQ HOSP IP/OBS HIGH 50: CPT | Performed by: INTERNAL MEDICINE

## 2024-08-19 PROCEDURE — 83605 ASSAY OF LACTIC ACID: CPT

## 2024-08-19 PROCEDURE — 71045 X-RAY EXAM CHEST 1 VIEW: CPT | Performed by: RADIOLOGY

## 2024-08-19 PROCEDURE — 82435 ASSAY OF BLOOD CHLORIDE: CPT

## 2024-08-19 PROCEDURE — 87635 SARS-COV-2 COVID-19 AMP PRB: CPT

## 2024-08-19 PROCEDURE — 84145 PROCALCITONIN (PCT): CPT

## 2024-08-19 PROCEDURE — 71275 CT ANGIOGRAPHY CHEST: CPT

## 2024-08-19 PROCEDURE — 2550000001 HC RX 255 CONTRASTS: Performed by: INTERNAL MEDICINE

## 2024-08-19 PROCEDURE — 85007 BL SMEAR W/DIFF WBC COUNT: CPT

## 2024-08-19 PROCEDURE — 85027 COMPLETE CBC AUTOMATED: CPT

## 2024-08-19 PROCEDURE — 2500000005 HC RX 250 GENERAL PHARMACY W/O HCPCS: Performed by: INTERNAL MEDICINE

## 2024-08-19 PROCEDURE — 36415 COLL VENOUS BLD VENIPUNCTURE: CPT

## 2024-08-19 PROCEDURE — 80069 RENAL FUNCTION PANEL: CPT

## 2024-08-19 PROCEDURE — 87081 CULTURE SCREEN ONLY: CPT

## 2024-08-19 PROCEDURE — 2500000004 HC RX 250 GENERAL PHARMACY W/ HCPCS (ALT 636 FOR OP/ED): Performed by: INTERNAL MEDICINE

## 2024-08-19 PROCEDURE — 2020000001 HC ICU ROOM DAILY

## 2024-08-19 PROCEDURE — 81001 URINALYSIS AUTO W/SCOPE: CPT

## 2024-08-19 PROCEDURE — 2500000005 HC RX 250 GENERAL PHARMACY W/O HCPCS

## 2024-08-19 PROCEDURE — 87040 BLOOD CULTURE FOR BACTERIA: CPT

## 2024-08-19 PROCEDURE — 94660 CPAP INITIATION&MGMT: CPT

## 2024-08-19 PROCEDURE — 36600 WITHDRAWAL OF ARTERIAL BLOOD: CPT

## 2024-08-19 PROCEDURE — 2500000004 HC RX 250 GENERAL PHARMACY W/ HCPCS (ALT 636 FOR OP/ED)

## 2024-08-19 PROCEDURE — 87449 NOS EACH ORGANISM AG IA: CPT

## 2024-08-19 PROCEDURE — 83735 ASSAY OF MAGNESIUM: CPT

## 2024-08-19 PROCEDURE — 74018 RADEX ABDOMEN 1 VIEW: CPT

## 2024-08-19 PROCEDURE — 87632 RESP VIRUS 6-11 TARGETS: CPT

## 2024-08-19 PROCEDURE — 99291 CRITICAL CARE FIRST HOUR: CPT

## 2024-08-19 PROCEDURE — 71045 X-RAY EXAM CHEST 1 VIEW: CPT

## 2024-08-19 PROCEDURE — 74018 RADEX ABDOMEN 1 VIEW: CPT | Performed by: STUDENT IN AN ORGANIZED HEALTH CARE EDUCATION/TRAINING PROGRAM

## 2024-08-19 PROCEDURE — 82947 ASSAY GLUCOSE BLOOD QUANT: CPT

## 2024-08-19 PROCEDURE — 2500000002 HC RX 250 W HCPCS SELF ADMINISTERED DRUGS (ALT 637 FOR MEDICARE OP, ALT 636 FOR OP/ED)

## 2024-08-19 PROCEDURE — 82805 BLOOD GASES W/O2 SATURATION: CPT

## 2024-08-19 PROCEDURE — 87186 SC STD MICRODIL/AGAR DIL: CPT

## 2024-08-19 PROCEDURE — 2500000001 HC RX 250 WO HCPCS SELF ADMINISTERED DRUGS (ALT 637 FOR MEDICARE OP)

## 2024-08-19 PROCEDURE — 71275 CT ANGIOGRAPHY CHEST: CPT | Performed by: RADIOLOGY

## 2024-08-19 PROCEDURE — 5A09457 ASSISTANCE WITH RESPIRATORY VENTILATION, 24-96 CONSECUTIVE HOURS, CONTINUOUS POSITIVE AIRWAY PRESSURE: ICD-10-PCS | Performed by: STUDENT IN AN ORGANIZED HEALTH CARE EDUCATION/TRAINING PROGRAM

## 2024-08-19 RX ORDER — ALBUTEROL SULFATE 0.83 MG/ML
2.5 SOLUTION RESPIRATORY (INHALATION) EVERY 6 HOURS PRN
Status: DISCONTINUED | OUTPATIENT
Start: 2024-08-19 | End: 2024-08-28 | Stop reason: HOSPADM

## 2024-08-19 RX ORDER — POLYETHYLENE GLYCOL 3350 17 G/17G
17 POWDER, FOR SOLUTION ORAL DAILY PRN
Status: DISCONTINUED | OUTPATIENT
Start: 2024-08-19 | End: 2024-08-20

## 2024-08-19 RX ORDER — VANCOMYCIN HYDROCHLORIDE 1 G/20ML
INJECTION, POWDER, LYOPHILIZED, FOR SOLUTION INTRAVENOUS DAILY PRN
Status: DISCONTINUED | OUTPATIENT
Start: 2024-08-19 | End: 2024-08-22

## 2024-08-19 RX ORDER — ENOXAPARIN SODIUM 100 MG/ML
40 INJECTION SUBCUTANEOUS EVERY 24 HOURS
Status: DISCONTINUED | OUTPATIENT
Start: 2024-08-20 | End: 2024-08-28 | Stop reason: HOSPADM

## 2024-08-19 RX ORDER — CLOPIDOGREL BISULFATE 75 MG/1
75 TABLET ORAL DAILY
Status: DISCONTINUED | OUTPATIENT
Start: 2024-08-19 | End: 2024-08-26

## 2024-08-19 RX ORDER — PANTOPRAZOLE SODIUM 40 MG/10ML
40 INJECTION, POWDER, LYOPHILIZED, FOR SOLUTION INTRAVENOUS DAILY
Status: DISCONTINUED | OUTPATIENT
Start: 2024-08-19 | End: 2024-08-26

## 2024-08-19 ASSESSMENT — COGNITIVE AND FUNCTIONAL STATUS - GENERAL
CLIMB 3 TO 5 STEPS WITH RAILING: TOTAL
TOILETING: A LOT
STANDING UP FROM CHAIR USING ARMS: A LOT
WALKING IN HOSPITAL ROOM: A LOT
MOVING TO AND FROM BED TO CHAIR: A LOT
MOBILITY SCORE: 13
TURNING FROM BACK TO SIDE WHILE IN FLAT BAD: A LOT
DRESSING REGULAR LOWER BODY CLOTHING: A LITTLE
HELP NEEDED FOR BATHING: A LITTLE
DAILY ACTIVITIY SCORE: 20

## 2024-08-19 ASSESSMENT — PAIN SCALES - GENERAL
PAINLEVEL_OUTOF10: 5 - MODERATE PAIN
PAINLEVEL_OUTOF10: 10 - WORST POSSIBLE PAIN
PAINLEVEL_OUTOF10: 0 - NO PAIN
PAINLEVEL_OUTOF10: 5 - MODERATE PAIN

## 2024-08-19 ASSESSMENT — ENCOUNTER SYMPTOMS
CHILLS: 0
MUSCULOSKELETAL NEGATIVE: 1
VOICE CHANGE: 0
FEVER: 0
HEADACHES: 1
EYES NEGATIVE: 1
APPETITE CHANGE: 0
TROUBLE SWALLOWING: 0
CONSTIPATION: 1
CHEST TIGHTNESS: 0

## 2024-08-19 ASSESSMENT — PAIN - FUNCTIONAL ASSESSMENT
PAIN_FUNCTIONAL_ASSESSMENT: 0-10
PAIN_FUNCTIONAL_ASSESSMENT: 0-10

## 2024-08-19 ASSESSMENT — PAIN DESCRIPTION - ORIENTATION: ORIENTATION: LOWER

## 2024-08-19 ASSESSMENT — PAIN DESCRIPTION - LOCATION: LOCATION: BACK

## 2024-08-19 NOTE — CONSULTS
Vancomycin Dosing by Pharmacy- INITIAL    Christophe Ambriz is a 74 y.o. year old male who Pharmacy has been consulted for vancomycin dosing for pneumonia. Based on the patient's indication and renal status this patient will be dosed based on a goal AUC of 400-600.     Renal function is currently stable.    Visit Vitals  BP 99/64   Pulse 81   Temp 36.5 °C (97.7 °F) (Temporal)   Resp (!) 36 Comment: RN notified        Lab Results   Component Value Date    CREATININE 0.59 2024    CREATININE 0.69 2024    CREATININE 0.60 2024    CREATININE 0.60 08/15/2024        Patient weight is as follows:   Vitals:    24 2256   Weight: 79.4 kg (175 lb 0.7 oz)       Cultures:  No results found for the encounter in last 14 days.        I/O last 3 completed shifts:  In: 200 (2.5 mL/kg) [P.O.:200]  Out:  (25.5 mL/kg) [Urine: (0.7 mL/kg/hr)]  Weight: 79.4 kg   I/O during current shift:  No intake/output data recorded.    Temp (24hrs), Av.5 °C (97.7 °F), Min:36.2 °C (97.1 °F), Max:36.9 °C (98.4 °F)         Assessment/Plan     Patient will not be given a loading dose.  Will initiate vancomycin maintenance,  1250 mg every 12 hours.    This dosing regimen is predicted by [employer] to result in the following pharmacokinetic parameters:  Loading dose: N/A  Regimen: 1250 mg IV every 12 hours.  Start time: 09:22 on 2024  Exposure target: AUC24 (range)400-600 mg/L.hr   AUC24,ss: 542 mg/L.hr  Probability of AUC24 > 400: 80 %  Ctrough,ss: 16.4 mg/L  Probability of Ctrough,ss > 20: 34 %  Probability of nephrotoxicity (Lodise ELVIA ): 12 %    Follow-up level will be ordered on  at am draw unless clinically indicated sooner.  Will continue to monitor renal function daily while on vancomycin and order serum creatinine at least every 48 hours if not already ordered.  Follow for continued vancomycin needs, clinical response, and signs/symptoms of toxicity.       Tina Jeronimo, PharmD

## 2024-08-19 NOTE — PROGRESS NOTES
Christophe Ambriz is a 74 y.o. 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    Subjective   Rapid Response 8/19 - Transfer to MICU  Called to bedside due to patient desaturating to 70% on room air. Patient was placed on 15 L nonrebreather; however, continues to saturate at 84%. Rapid response was called. Upon evaluation patient appeared somnolent and was hypotensive; however was afebrile. Physical exam unremarkable for coarse breath sounds or crackles. ABG obtained was notable for oxygen of 60; however, other values were within normal limits. Patient was placed on CPAP 10 with FiO2 of 45% and saturation improved to 88% to 92%. Chest x-ray obtained showed LLL consolidation. CT PE with no evidence of pulmonary embolism and interval increase in multifocal nodular consolidations and tree-in-bud like nodularity within the left-greater-than-right lungs which is most likely secondary to a multifocal pneumonia. Given additional findings of filling defects within multiple left lower lobar left-greater-than-right lower lobar airways, a component of   aspiration should also be considered in the appropriate clinical   setting.     Hospital Course:  Christophe Ambriz is a 74 y.o. male with a hx of CAD, HTN, HLD and RCC with spinal mets s/p L1-5 fusion w/ L2-4 decompression  and tumor debulking (7/24) presented initially with back pain, transferred from [facility] [place]note 2[place] for further management.      Pt stated that following the surgery, he continues to have pain with any movement that radiates into bilateral legs. Pain is burning in quality and starts primarily in his hips and shoots down both legs into his toes. The pain in his legs is worse than in his lower back. Pain also occasionally radiates into his buttocks. He denies any bowel/bladder changes or any weakness/numbness/paresthesias. He is chronically constipated with infrequent bowel movements. He has not been on a  "pain regimen since  his surgery and missed his follow up appointments as he has not been able to ambulate 2/2 pain. He has not walked in several weeks as the pain is too great.     Patient was started on decadron 4mg q8h. MRI spine showed L3 compression fracture with retropulsion of the posterior cortex and severe stenosis of the central spinal canal with nerve root compression unchanged from the previous exam. Neurosurgery consulted and determined no need for surgical intervention. Radiation oncology planning SBRT in 5 fractions. CT sim planned for Tuesday (8/20).     Rapid Response call 8/19 for desaturation to 73% and somnolence. Details noted above.        Objective     Physical Exam  Constitutional:       Appearance: Normal appearance.   Cardiovascular:      Rate and Rhythm: Normal rate and regular rhythm.      Pulses: Normal pulses.      Heart sounds: Normal heart sounds.   Pulmonary:      Effort: Pulmonary effort is normal.      Breath sounds: Normal breath sounds.   Abdominal:      General: Abdomen is flat.      Palpations: Abdomen is soft.   Musculoskeletal:      Right lower leg: No edema.      Left lower leg: No edema.   Neurological:      General: No focal deficit present.      Mental Status: He is alert and oriented to person, place, and time.      Sensory: No sensory deficit.      Motor: No weakness.   Psychiatric:         Mood and Affect: Mood normal.         Behavior: Behavior normal.         Last Recorded Vitals  Blood pressure 138/79, pulse 86, temperature 36.5 °C (97.7 °F), temperature source Temporal, resp. rate 18, height 1.854 m (6' 1\"), weight 79.4 kg (175 lb 0.7 oz), SpO2 91%.  Intake/Output last 3 Shifts:  I/O last 3 completed shifts:  In: 490 (6.2 mL/kg) [P.O.:490]  Out: 1500 (18.9 mL/kg) [Urine:1500 (0.5 mL/kg/hr)]  Weight: 79.4 kg     Relevant Results                 Scheduled medications  [Held by provider] amLODIPine, 10 mg, oral, Daily  aspirin, 81 mg, oral, Daily  atorvastatin, 20 mg, " oral, Nightly  [Held by provider] clopidogrel, 75 mg, oral, Daily  dexAMETHasone, 4 mg, oral, q8h SLIM  enoxaparin, 40 mg, subcutaneous, Daily  gabapentin, 300 mg, oral, Nightly  [Held by provider] hydroCHLOROthiazide, 25 mg, oral, Daily  insulin lispro, 0-10 Units, subcutaneous, TID  metoprolol succinate XL, 50 mg, oral, Daily  pantoprazole, 40 mg, oral, Daily before breakfast  polyethylene glycol, 17 g, oral, Daily  sennosides, 2 tablet, oral, BID      Continuous medications     PRN medications  PRN medications: acetaminophen **OR** acetaminophen **OR** acetaminophen, dextrose, dextrose, glucagon, glucagon, HYDROmorphone, oxyCODONE, traZODone  Results for orders placed or performed during the hospital encounter of 08/16/24 (from the past 24 hour(s))   CBC and Auto Differential   Result Value Ref Range    WBC 21.9 (H) 4.4 - 11.3 x10*3/uL    nRBC 0.0 0.0 - 0.0 /100 WBCs    RBC 3.47 (L) 4.50 - 5.90 x10*6/uL    Hemoglobin 8.7 (L) 13.5 - 17.5 g/dL    Hematocrit 29.0 (L) 41.0 - 52.0 %    MCV 84 80 - 100 fL    MCH 25.1 (L) 26.0 - 34.0 pg    MCHC 30.0 (L) 32.0 - 36.0 g/dL    RDW 14.3 11.5 - 14.5 %    Platelets 653 (H) 150 - 450 x10*3/uL    Neutrophils % 77.7 40.0 - 80.0 %    Immature Granulocytes %, Automated 1.1 (H) 0.0 - 0.9 %    Lymphocytes % 16.3 13.0 - 44.0 %    Monocytes % 4.7 2.0 - 10.0 %    Eosinophils % 0.1 0.0 - 6.0 %    Basophils % 0.1 0.0 - 2.0 %    Neutrophils Absolute 17.04 (H) 1.60 - 5.50 x10*3/uL    Immature Granulocytes Absolute, Automated 0.24 0.00 - 0.50 x10*3/uL    Lymphocytes Absolute 3.57 (H) 0.80 - 3.00 x10*3/uL    Monocytes Absolute 1.04 (H) 0.05 - 0.80 x10*3/uL    Eosinophils Absolute 0.02 0.00 - 0.40 x10*3/uL    Basophils Absolute 0.02 0.00 - 0.10 x10*3/uL   Renal Function Panel   Result Value Ref Range    Glucose 218 (H) 74 - 99 mg/dL    Sodium 133 (L) 136 - 145 mmol/L    Potassium 4.6 3.5 - 5.3 mmol/L    Chloride 96 (L) 98 - 107 mmol/L    Bicarbonate 25 21 - 32 mmol/L    Anion Gap 17 10 - 20  mmol/L    Urea Nitrogen 19 6 - 23 mg/dL    Creatinine 0.59 0.50 - 1.30 mg/dL    eGFR >90 >60 mL/min/1.73m*2    Calcium 9.2 8.6 - 10.6 mg/dL    Phosphorus 2.7 2.5 - 4.9 mg/dL    Albumin 3.3 (L) 3.4 - 5.0 g/dL   Magnesium   Result Value Ref Range    Magnesium 2.09 1.60 - 2.40 mg/dL   POCT GLUCOSE   Result Value Ref Range    POCT Glucose 260 (H) 74 - 99 mg/dL   POCT GLUCOSE   Result Value Ref Range    POCT Glucose 262 (H) 74 - 99 mg/dL   POCT GLUCOSE   Result Value Ref Range    POCT Glucose 219 (H) 74 - 99 mg/dL   POCT GLUCOSE   Result Value Ref Range    POCT Glucose 284 (H) 74 - 99 mg/dL     Imaging:  CT angio 8/19:  1. No evidence of acute pulmonary embolism.   2. Interval increase in multifocal nodular consolidations and   tree-in-bud like nodularity within the left-greater-than-right lungs   which is most likely secondary to a multifocal pneumonia. Given   additional findings of filling defects within multiple left lower   lobar left-greater-than-right lower lobar airways, a component of   aspiration should also be considered in the appropriate clinical   setting.   3. Asymmetric enlargement of the right thyroid lobe with right   thyroid nodule. This was better assessed on prior thyroid ultrasound   from 07/12/2024.   4. Interval enlargement of right lower chest wall mass within the   right 9th rib which is most consistent with progression of metastatic   disease.     CXR 8/19:  IMPRESSION:  1.  Retrocardiac density and mid left mid lung fields hazy  opacification consistent with left lower lobe infiltrate.  New  findings since earlier exam.         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 male with a hx of CAD, HTN, HLD and RCC with spinal mets s/p L1-5 fusion w/ L2-4 decompression  and tumor debulking (7/24) presenting with neuropathic pain that starts in his hips and radiates to his b/l legs, transferred from  [facility] to [facility] for further management. MRI at OSH showed stable L3 compression fracture, spinal canal stenosis and nerve root compression. Will continue steroids as pain has been improved and consult neurosurg for evaluation. Pt will likely need PT/OT for placement of homegoing and further oncological treatment planning as he had been lost to follow up.        Updates:  -Rapid response for desat to 70%, transferring to MICU     #Metastatic renal cell carcinoma  #Back and leg pain  :: L1-5 fusion w/ L2-4 decompression  and tumor debulking (7/24)  at [facility] with NSGY  ::MRI 8/14: L3 compression fracture with retropulsion of the posterior cortex and severe stenosis of the central spinal canal with nerve root compression unchanged from the previous exam *No new foci of marrow replacement or compression fracture *There is no measurable change  compared to the previous exam.  ::Follows with Dr. Chavarria but has been lost to follow up given poor mobility  ::Pain improved with decadron at OSH  Plan:  -Neurosurgery consulted   -No surgical intervention needed  -Rad onc consulted  1-No indication for urgent/emergent radiation over the weekend.  2-Consult medical oncology for systemic therapy.  3-CT simulation planned for Tuesday 8/20   4-Plan for SBRT in 5 fractions.   -Supportive oncology consulted for further pain management recs   -Continue gabapentin and oxy prn   -Dexamethasone 4mg q8h       #CAD s/p PCI (unknown year)  #HTN  #HLD  - continue home atorvastatin, ASA (held plavix as no indication for DAPT and in case of any procedures)  - continue home amlodipine 10mg daily  - continue home metoprolol succinate 50mg daily   - held home hydrochlorothiazide 25mg daily, restart as tolerated      #Leukocytosis  :: No current infectious symptoms, likely reactive in setting of malignancy and steroids  -Fup CBC     #Dispo  -PT/OT ordered     F: PRN  E: PRN  N: Regular  GI: Protonix  DVT prophylaxis: SubQ lovenox  Code  status: DNR/DNI (CONFIRMED ON ADMISSION)  Surrogate decision maker:Tom Lara [phone]           Trent Montiel MD    I saw and evaluated the patient. I personally obtained the key and critical portions of the history and physical exam or was physically present for key and critical portions performed by the resident/fellow. I reviewed the resident/fellow's documentation and discussed the patient with the resident/fellow. I agree with the resident/fellow's medical decision making as documented in the note.     Acute hypoxic failure with high FiO2 requirements  Tx to MICU   CTA ordered    Alondra Saravia MD

## 2024-08-19 NOTE — H&P
Medical Intensive Care - History and Physical   Subjective    Christophe Ambriz is a 74 y.o. year old male patient admitted on 08/19/24  with following ICU needs: high flow oxygen (arivo) 54% FiO2, 50L/min 2/2 hypoxic respiratory failure.    HPI:  Christophe Ambriz is a 74-year-old male with a history of CAD, HTN, HLD, and RCC with spinal mets, status post L1-5 fusion, L2-4 decompression, and tumor debulking on 07/24/24. The patient initially presented to the ED on 08/14/24 with severe back and hip pain radiating to bilateral legs, described as burning and shooting down to his toes, with the leg pain being more severe than lower back pain. There were no bowel or bladder changes, nor any neurological deficits reported. The pain has been debilitating, preventing ambulation for several weeks. MRI showed a stable L3 compression fracture with severe spinal canal stenosis and nerve root compression. Neurosurgery advised against further surgical intervention, and radiation oncology is planning SBRT in five fractions.    The patient was transferred to the MICU following desaturation to 70% on room air, unresponsive to a non-rebreather mask (15 L), with saturation only improving to 84%. Rapid response was called due to his somnolent state and hypotension, though he was afebrile. Physical exam was unremarkable for significant respiratory findings. ABG revealed hypoxemia with an oxygen level of 60. The patient was placed on CPAP (10 cm H2O, 45% FiO2), with improved oxygen saturation to 88-92%. Chest X-ray showed LLL consolidation. CT scan ruled out PE but showed increased multifocal nodular consolidations, suggesting multifocal pneumonia with a possible aspiration component.    Review of Systems:  Review of Systems   Constitutional:  Negative for appetite change, chills and fever.   HENT:  Negative for trouble swallowing and voice change.    Eyes: Negative.    Respiratory:  Negative for chest tightness.    Cardiovascular:   Negative for chest pain.   Gastrointestinal:  Positive for constipation.   Genitourinary: Negative.    Musculoskeletal: Negative.    Neurological:  Positive for headaches (gets a sharp pain over right eye when he turns his head to the right. Happening for 2 weeks.).          Meds    Home medications:  Current Outpatient Medications   Medication Instructions    acetaminophen (TYLENOL) 1,000 mg, oral, Every 6 hours PRN    amLODIPine (NORVASC) 10 mg, oral, Daily    atorvastatin (LIPITOR) 20 mg, oral, Nightly    clopidogrel (PLAVIX) 75 mg, oral, Daily    gabapentin (NEURONTIN) 300 mg, oral, Nightly    hydroCHLOROthiazide (HYDRODIURIL) 25 mg, oral, Daily    metoprolol succinate XL (TOPROL-XL) 50 mg, oral, Daily    oxyCODONE (ROXICODONE) 10 mg, oral, Every 6 hours PRN    traZODone (DESYREL) 50 mg, oral, Nightly        Inpatient medications:  Scheduled medications  [Held by provider] amLODIPine, 10 mg, oral, Daily  [Transfer Hold] aspirin, 81 mg, oral, Daily  [Transfer Hold] atorvastatin, 20 mg, oral, Nightly  [Held by provider] clopidogrel, 75 mg, oral, Daily  [Transfer Hold] dexAMETHasone, 4 mg, oral, q8h SLIM  [Transfer Hold] enoxaparin, 40 mg, subcutaneous, Daily  [Transfer Hold] gabapentin, 300 mg, oral, Nightly  [Held by provider] hydroCHLOROthiazide, 25 mg, oral, Daily  [Transfer Hold] insulin lispro, 0-10 Units, subcutaneous, TID  [Transfer Hold] metoprolol succinate XL, 50 mg, oral, Daily  [Transfer Hold] oxygen, , inhalation, Continuous - Inhalation  [Transfer Hold] pantoprazole, 40 mg, oral, Daily before breakfast  piperacillin-tazobactam, 3.375 g, intravenous, q6h  [Transfer Hold] polyethylene glycol, 17 g, oral, Daily  [Transfer Hold] sennosides, 2 tablet, oral, BID  vancomycin, 1,250 mg, intravenous, q12h      Continuous medications     PRN medications  PRN medications: [Transfer Hold] acetaminophen **OR** [Transfer Hold] acetaminophen **OR** [Transfer Hold] acetaminophen, [Transfer Hold] albuterol, [Transfer  "Hold] dextrose, [Transfer Hold] dextrose, [Transfer Hold] glucagon, [Transfer Hold] glucagon, [Transfer Hold] HYDROmorphone, [Transfer Hold] oxyCODONE, [Transfer Hold] traZODone, vancomycin     Objective    Blood pressure 109/69, pulse 92, temperature 37.5 °C (99.5 °F), temperature source Temporal, resp. rate 26, height 1.854 m (6' 1\"), weight 79.4 kg (175 lb 0.7 oz), SpO2 94%.     Physical Exam  Constitutional:       General: He is awake.   Eyes:      Pupils: Pupils are equal, round, and reactive to light.   Neck:      Comments: Pain in lower back down to lower legs.  Cardiovascular:      Rate and Rhythm: Normal rate and regular rhythm.      Pulses: Normal pulses.      Heart sounds: Normal heart sounds.   Pulmonary:      Effort: Pulmonary effort is normal.      Breath sounds: Normal breath sounds.   Abdominal:      General: Abdomen is flat. Bowel sounds are normal.   Neurological:      General: No focal deficit present.      Mental Status: He is alert.   Psychiatric:         Behavior: Behavior is cooperative.            Intake/Output Summary (Last 24 hours) at 8/19/2024 1548  Last data filed at 8/19/2024 1536  Gross per 24 hour   Intake 950 ml   Output 950 ml   Net 0 ml     Labs:   Results from last 72 hours   Lab Units 08/19/24  1236 08/18/24  0838 08/17/24  0338   SODIUM mmol/L 131* 133* 134*   POTASSIUM mmol/L 5.0 4.6 4.3   CHLORIDE mmol/L 93* 96* 95*   CO2 mmol/L 25 25 24   BUN mg/dL 19 19 22   CREATININE mg/dL 0.66 0.59 0.69   GLUCOSE mg/dL 219* 218* 205*   CALCIUM mg/dL 8.9 9.2 9.5   ANION GAP mmol/L 18 17 19   EGFR mL/min/1.73m*2 >90 >90 >90   PHOSPHORUS mg/dL 3.7 2.7 3.6      Results from last 72 hours   Lab Units 08/19/24  0924 08/18/24  0838 08/17/24  0338   WBC AUTO x10*3/uL 32.3* 21.9* 23.7*   HEMOGLOBIN g/dL 9.6* 8.7* 9.2*   HEMATOCRIT % 31.6* 29.0* 29.8*   PLATELETS AUTO x10*3/uL 786* 653* 731*   NEUTROS PCT AUTO %  --  77.7 78.9   LYMPHO PCT MAN % 13.2  --   --    LYMPHS PCT AUTO %  --  16.3 13.7 "   MONO PCT MAN % 6.1  --   --    MONOS PCT AUTO %  --  4.7 6.1   EOSINO PCT MAN % 0.0  --   --    EOS PCT AUTO %  --  0.1 0.2        Micro/ID:     Lab Results   Component Value Date    BLOODCULT Loaded on Instrument - Culture in progress 08/19/2024       Summary of Key Imaging Results  CXR 8/1924:: Decrease in prominence of opacification in the left lower lung field  CTAC for PE 8/19/24: negative for PE, possible multifocal pneumonia, possible aspiration, +ve for right lower chest wall mass within the right 9th rib consistent with progression of metastitic disease    Assessment and Plan     Assessment:  Christophe Ambriz is a 74 y.o. year old male patient admitted to the MICU on 08/19/24 for high flow oxygen (arivo) 54% FiO2, 50L/min 2/2 hypoxic respiratory failure. Christophe has a PMHx of CAD, HTN, HLD, and RCC with spinal mets, status post L1-5 fusion, L2-4 decompression, and tumor debulking on 07/24/24.    Mechanical Ventilation: none  Sedation/Analgesia:  none  Restraints: no    Summary for 8/19/24  - Continued home meds:  gabapentin and oxy prn  Dexamethasone 4mg q8h  continue home atorvastatin, ASA (held plavix as no indication for DAPT and in case of any procedures)  continue home amlodipine 10mg daily  continue home metoprolol succinate 50mg daily         - Pulmonary work up:  Swallow study ordered  Blood cultures 2x sent, Respiratory Viral Panel, Legionela, Strep Pneumo, SARS-CoV-2 PCR, Staph Aureus / MRSA,, BGVP, Procalcitonin, Respiratory culture and smear  CXR and CTPE (read below)  Azithro, Vanc, and Zosyn Started  On high flow oxygen 50%FiO2, 50L/min             Plan:  NEUROLOGY/PSYCH:  Dx: #Bilateral Radicular Pain   L1-5 fusion w/ L2-4 decompression and tumor debulking (7/24)  at [facility] with NSGY  MRI 8/14: L3 compression fracture with retropulsion of the posterior cortex and severe stenosis of the central spinal canal with nerve root compression unchanged from the previous exam *No new foci of marrow  replacement or compression fracture *There is no measurable change compared to the previous exam.  Management:  Continue gabapentin and oxy prn   Dexamethasone 4mg q8h  NSGY consulted - no surgicial intervention needed  Rad onc consulted  1-No indication for urgent/emergent radiation over the weekend.  2-Consult medical oncology for systemic therapy.  3-CT simulation planned for Tuesday 8/20   4-Plan for SBRT in 5 fractions.     CARDIOVASCULAR:  Dx: #CAD s/p PCI (unknown year)   #HTN, #HLD  Management:  continue home atorvastatin, ASA (held plavix as no indication for DAPT and in case of any procedures)  continue home amlodipine 10mg daily  continue home metoprolol succinate 50mg daily   held home hydrochlorothiazide 25mg daily, restart as tolerated     PULMONARY:  Dx: #AHRF  Currently on high flow oxygen (arivo) 54% FiO2, 50L/min 2/2 hypoxic respiratory failure.  CXR 8/1924:: Decrease in prominence of opacification in the left lower lung field  CTAC for PE 8/19/24: negative for PE, possible multifocal pneumonia, possible aspiration, +ve for right lower chest wall mass within the right 9th rib consistent with progression of metastitic disease  Management:  Swallow study ordered  Blood cultures 2x sent, Respiratory Viral Panel, Legionela, Strep Pneumo, SARS-CoV-2 PCR, Staph Aureus / MRSA,, BGVP, Procalcitonin, Respiratory culture and smear  Azithromycin, Vanc and Zosyn     RENAL/GENITOURINARY:  Dx: #Metastatic renal cell carcinoma   Management:  CT simulation planned for Tuesday 8/20   4-Plan for SBRT in 5 fractions.   Consult oncology for pain management recs    GASTROENTEROLOGY:  JUSTIN    ENDOCRINOLOGY:  Dx: #Exogenous Steroids  Receiving dexamathasone 4mg q8  Management:  Goal glucose between 140-180  POCT glucose q4    HEMATOLOGY:  Dx:  #Leukocytosis  Management:  Trend WBC  Antibiotics    MUSCULOSKELETAL/ SKIN:  Dx:  JUSTIN  Management:      INFECTIOUS DISEASE:  Dx:  #Possible Aspriation vs CAP vs  HAP  Management:  Ordered full work-up  Started on azithro, vanc and zosyn until blood cultures come back    ICU Check List     FEN  Fluids: Fluids PRN  Electrolytes: PRN  Nutrition: NPO  Prophylaxis:  DVT ppx: Lovenox  GI ppx: Protonix  Bowel care: miralax PRN  Hardware:  Catheter: no  Access: PIV, right upper aarm, left forearm  Drains: no  Lines: no  Social:  Code: DNR and No Intubation    HPOA: Tom Iramgeo William   [facility]    Lamar Moreau MD   08/19/24 at 3:48 PM     Disclaimer: Documentation completed with the information available at the time of input. The times in the chart may not be reflective of actual patient care times, interventions, or procedures. Documentation occurs after the physical care of the patient.

## 2024-08-19 NOTE — SIGNIFICANT EVENT
Called to bedside due to patient desaturating to 70% on room air.  Patient was placed on 15 L nonrebreather; however, continues to saturate at 84%.  Rapid response was called.  Upon evaluation patient appeared somnolent and was hypotensive; however was afebrile.  Physical exam unremarkable for coarse breath sounds or crackles.  ABG obtained was notable for oxygen of 60; however, other values were within normal limits.  Patient was placed on CPAP 10 with FiO2 of 45% and saturation improved to 88% to 92%.  Chest x-ray obtained was unremarkable for consolidation or atelectasis.  Will obtain CT PE once patient is stable to rule out PE.   I, Pascual Calderon, performed a face to face bedside interview with this patient regarding history of present illness, review of symptoms and relevant past medical, social and family history.  I completed an independent physical examination. I have communicated the patient’s plan of care and disposition with the ACP.        pt placed in observation for pain management of metatstatic lung ca to spine; as per recommendations of pain management  consult palliative care; pt also for chemotherapy at [facility]; pt noted no improvement of pain with iv medications; pt to be admitted for intractable pain from lung ca; admit to medical service

## 2024-08-19 NOTE — PROGRESS NOTES
Physical Therapy    Therapy Communication Note    Patient Name: Christophe Ambriz  MRN: [id number]  Today's Date: 8/19/2024       Discipline: Physical Therapy      Missed Visit: Yes  Missed Visit Reason: Patient placed on medical hold (CTA present upon arrival checking vitals; SpO2 72% on room air; CTA to notify nurse; will follow-up when appropriate)      08/19/24 at 9:00 AM   Annika Askew, PT

## 2024-08-19 NOTE — CARE PLAN
Problem: Pain - Adult  Goal: Verbalizes/displays adequate comfort level or baseline comfort level  Outcome: Progressing     Problem: Safety - Adult  Goal: Free from fall injury  Outcome: Progressing     Problem: Discharge Planning  Goal: Discharge to home or other facility with appropriate resources  Outcome: Progressing     Problem: Chronic Conditions and Co-morbidities  Goal: Patient's chronic conditions and co-morbidity symptoms are monitored and maintained or improved  Outcome: Progressing     Problem: Skin  Goal: Decreased wound size/increased tissue granulation at next dressing change  Outcome: Progressing  Flowsheets (Taken 8/19/2024 0216)  Decreased wound size/increased tissue granulation at next dressing change: Promote sleep for wound healing  Goal: Participates in plan/prevention/treatment measures  Outcome: Progressing  Flowsheets (Taken 8/19/2024 0216)  Participates in plan/prevention/treatment measures: Elevate heels  Goal: Prevent/manage excess moisture  Outcome: Progressing  Flowsheets (Taken 8/19/2024 0216)  Prevent/manage excess moisture: Moisturize dry skin  Goal: Prevent/minimize sheer/friction injuries  Outcome: Progressing  Flowsheets (Taken 8/19/2024 0216)  Prevent/minimize sheer/friction injuries: HOB 30 degrees or less  Goal: Promote/optimize nutrition  Outcome: Progressing  Flowsheets (Taken 8/19/2024 0216)  Promote/optimize nutrition: Monitor/record intake including meals  Goal: Promote skin healing  Outcome: Progressing  Flowsheets (Taken 8/19/2024 0216)  Promote skin healing: Assess skin/pad under line(s)/device(s)     The clinical goals for the shift include patient will remain free of falls and injury through end of shift 8/19 0700

## 2024-08-19 NOTE — CONSULTS
Nutrition Note:   Nutrition Assessment    Reason for Assessment: Admission nursing screening  Consulted for Admission risk screen    Attempted to meet with patient today x 2 attempts. Will hold off on evaluation a this time d/t rapid response, pt awaiting transfer to ICU and not appropriate at this time. Communicated with ICU RDN, this service will continue to be involved as needed once pt is transferred.     Time Spent (min): 30 minutes

## 2024-08-19 NOTE — SIGNIFICANT EVENT
"Rapid Response Note     08/19/24 0909   Onset Documentation   Rapid Response Initiated By RN   Location/Room [facility]   Pager Time 0909   Arrival Time 0909   Event End Time 0925   Level II Called No   Primary Reason for Call FiO2 greater than or equal to 50%     Rapid Response paged for patient with increasing O2 needs and decreased mentation.  Upon arrival to bedside, Primary Team present.  Patient with new \"wet sounding cough\", desaturation and requiring 100% NRB with Pox remaining in the 80s.  Patient responding appropriately to questions.  ABG performed.  Results reviewed with Primary Team.  Patient placed on CPAP 10/45%.  Repeat ABG attained.  Patient tolerating NIV.  MICU Fellow notified concerning patient's increased O2 requirements.  Plan for patient to transfer to ICU when bed available.    6333 - 5125 - Patient placed on 50 Liters/85% Airvo per RT.  Patient awaiting ICU bed for transfer.  Tolerating Airvo adjustment.  "

## 2024-08-19 NOTE — PROGRESS NOTES
Occupational Therapy                 Therapy Communication Note    Patient Name: Christophe Ambriz  MRN: [id number]  Today's Date: 8/19/2024     Discipline: Occupational Therapy    Missed Visit Reason: Missed Visit Reason: Patient placed on medical hold (Pt with rapid response called, plan is to transfer to ICU 2/2 low O2 sats)    Missed Time: Attempt    Comment:

## 2024-08-20 ENCOUNTER — APPOINTMENT (OUTPATIENT)
Dept: RADIATION ONCOLOGY | Facility: HOSPITAL | Age: 74
End: 2024-08-20
Payer: MEDICARE

## 2024-08-20 ENCOUNTER — APPOINTMENT (OUTPATIENT)
Dept: CARDIOLOGY | Facility: HOSPITAL | Age: 74
DRG: 947 | End: 2024-08-20
Payer: MEDICARE

## 2024-08-20 ENCOUNTER — APPOINTMENT (OUTPATIENT)
Dept: RADIOLOGY | Facility: HOSPITAL | Age: 74
DRG: 947 | End: 2024-08-20
Payer: MEDICARE

## 2024-08-20 LAB
ABO GROUP (TYPE) IN BLOOD: NORMAL
ALBUMIN SERPL BCP-MCNC: 2.9 G/DL (ref 3.4–5)
ANION GAP SERPL CALC-SCNC: 17 MMOL/L (ref 10–20)
ANTIBODY SCREEN: NORMAL
AORTIC VALVE PEAK VELOCITY: 1.36 M/S
AV PEAK GRADIENT: 7.4 MMHG
BASOPHILS # BLD AUTO: 0.03 X10*3/UL (ref 0–0.1)
BASOPHILS NFR BLD AUTO: 0.1 %
BUN SERPL-MCNC: 16 MG/DL (ref 6–23)
CALCIUM SERPL-MCNC: 8.9 MG/DL (ref 8.6–10.6)
CHLORIDE SERPL-SCNC: 91 MMOL/L (ref 98–107)
CO2 SERPL-SCNC: 27 MMOL/L (ref 21–32)
CREAT SERPL-MCNC: 0.61 MG/DL (ref 0.5–1.3)
EGFRCR SERPLBLD CKD-EPI 2021: >90 ML/MIN/1.73M*2
EJECTION FRACTION APICAL 4 CHAMBER: 66.3
EJECTION FRACTION: 62 %
EOSINOPHIL # BLD AUTO: 0.01 X10*3/UL (ref 0–0.4)
EOSINOPHIL NFR BLD AUTO: 0 %
ERYTHROCYTE [DISTWIDTH] IN BLOOD BY AUTOMATED COUNT: 14.5 % (ref 11.5–14.5)
GLUCOSE BLD MANUAL STRIP-MCNC: 186 MG/DL (ref 74–99)
GLUCOSE BLD MANUAL STRIP-MCNC: 208 MG/DL (ref 74–99)
GLUCOSE BLD MANUAL STRIP-MCNC: 218 MG/DL (ref 74–99)
GLUCOSE BLD MANUAL STRIP-MCNC: 219 MG/DL (ref 74–99)
GLUCOSE BLD MANUAL STRIP-MCNC: 223 MG/DL (ref 74–99)
GLUCOSE BLD MANUAL STRIP-MCNC: 249 MG/DL (ref 74–99)
GLUCOSE SERPL-MCNC: 241 MG/DL (ref 74–99)
HCT VFR BLD AUTO: 28.7 % (ref 41–52)
HGB BLD-MCNC: 9.2 G/DL (ref 13.5–17.5)
IMM GRANULOCYTES # BLD AUTO: 0.22 X10*3/UL (ref 0–0.5)
IMM GRANULOCYTES NFR BLD AUTO: 0.8 % (ref 0–0.9)
LEFT ATRIUM VOLUME AREA LENGTH INDEX BSA: 24 ML/M2
LEFT VENTRICLE INTERNAL DIMENSION DIASTOLE: 4.74 CM (ref 3.5–6)
LEFT VENTRICULAR OUTFLOW TRACT DIAMETER: 2 CM
LEGIONELLA AG UR QL: NEGATIVE
LEGIONELLA AG UR QL: NEGATIVE
LYMPHOCYTES # BLD AUTO: 4.83 X10*3/UL (ref 0.8–3)
LYMPHOCYTES NFR BLD AUTO: 17.1 %
MAGNESIUM SERPL-MCNC: 2.15 MG/DL (ref 1.6–2.4)
MCH RBC QN AUTO: 25.6 PG (ref 26–34)
MCHC RBC AUTO-ENTMCNC: 32.1 G/DL (ref 32–36)
MCV RBC AUTO: 80 FL (ref 80–100)
MITRAL VALVE E/A RATIO: 0.86
MONOCYTES # BLD AUTO: 1.3 X10*3/UL (ref 0.05–0.8)
MONOCYTES NFR BLD AUTO: 4.6 %
NEUTROPHILS # BLD AUTO: 21.86 X10*3/UL (ref 1.6–5.5)
NEUTROPHILS NFR BLD AUTO: 77.4 %
NRBC BLD-RTO: 0 /100 WBCS (ref 0–0)
PHOSPHATE SERPL-MCNC: 4 MG/DL (ref 2.5–4.9)
PLATELET # BLD AUTO: 607 X10*3/UL (ref 150–450)
POTASSIUM SERPL-SCNC: 5.2 MMOL/L (ref 3.5–5.3)
RBC # BLD AUTO: 3.6 X10*6/UL (ref 4.5–5.9)
RH FACTOR (ANTIGEN D): NORMAL
RIGHT VENTRICLE FREE WALL PEAK S': 11 CM/S
RIGHT VENTRICLE PEAK SYSTOLIC PRESSURE: 45.1 MMHG
S PNEUM AG UR QL: NEGATIVE
S PNEUM AG UR QL: NEGATIVE
SODIUM SERPL-SCNC: 130 MMOL/L (ref 136–145)
TRICUSPID ANNULAR PLANE SYSTOLIC EXCURSION: 2.2 CM
VANCOMYCIN SERPL-MCNC: 13.9 UG/ML (ref 5–20)
WBC # BLD AUTO: 28.3 X10*3/UL (ref 4.4–11.3)

## 2024-08-20 PROCEDURE — 82947 ASSAY GLUCOSE BLOOD QUANT: CPT

## 2024-08-20 PROCEDURE — 99291 CRITICAL CARE FIRST HOUR: CPT

## 2024-08-20 PROCEDURE — 2500000002 HC RX 250 W HCPCS SELF ADMINISTERED DRUGS (ALT 637 FOR MEDICARE OP, ALT 636 FOR OP/ED)

## 2024-08-20 PROCEDURE — 71045 X-RAY EXAM CHEST 1 VIEW: CPT | Performed by: STUDENT IN AN ORGANIZED HEALTH CARE EDUCATION/TRAINING PROGRAM

## 2024-08-20 PROCEDURE — 2500000005 HC RX 250 GENERAL PHARMACY W/O HCPCS

## 2024-08-20 PROCEDURE — 86901 BLOOD TYPING SEROLOGIC RH(D): CPT

## 2024-08-20 PROCEDURE — 97162 PT EVAL MOD COMPLEX 30 MIN: CPT | Mod: GP

## 2024-08-20 PROCEDURE — 93306 TTE W/DOPPLER COMPLETE: CPT

## 2024-08-20 PROCEDURE — 99231 SBSQ HOSP IP/OBS SF/LOW 25: CPT | Performed by: PHYSICIAN ASSISTANT

## 2024-08-20 PROCEDURE — 93306 TTE W/DOPPLER COMPLETE: CPT | Performed by: INTERNAL MEDICINE

## 2024-08-20 PROCEDURE — 2500000004 HC RX 250 GENERAL PHARMACY W/ HCPCS (ALT 636 FOR OP/ED)

## 2024-08-20 PROCEDURE — 71045 X-RAY EXAM CHEST 1 VIEW: CPT

## 2024-08-20 PROCEDURE — 80202 ASSAY OF VANCOMYCIN: CPT

## 2024-08-20 PROCEDURE — 83735 ASSAY OF MAGNESIUM: CPT

## 2024-08-20 PROCEDURE — 2020000001 HC ICU ROOM DAILY

## 2024-08-20 PROCEDURE — 99233 SBSQ HOSP IP/OBS HIGH 50: CPT | Performed by: NURSE PRACTITIONER

## 2024-08-20 PROCEDURE — 36415 COLL VENOUS BLD VENIPUNCTURE: CPT

## 2024-08-20 PROCEDURE — 5A0935A ASSISTANCE WITH RESPIRATORY VENTILATION, LESS THAN 24 CONSECUTIVE HOURS, HIGH NASAL FLOW/VELOCITY: ICD-10-PCS | Performed by: STUDENT IN AN ORGANIZED HEALTH CARE EDUCATION/TRAINING PROGRAM

## 2024-08-20 PROCEDURE — 80069 RENAL FUNCTION PANEL: CPT

## 2024-08-20 PROCEDURE — 85025 COMPLETE CBC W/AUTO DIFF WBC: CPT

## 2024-08-20 PROCEDURE — 87040 BLOOD CULTURE FOR BACTERIA: CPT

## 2024-08-20 RX ORDER — HYDROMORPHONE HYDROCHLORIDE 1 MG/ML
1 INJECTION, SOLUTION INTRAMUSCULAR; INTRAVENOUS; SUBCUTANEOUS EVERY 2 HOUR PRN
Status: DISCONTINUED | OUTPATIENT
Start: 2024-08-20 | End: 2024-08-22

## 2024-08-20 RX ORDER — INSULIN LISPRO 100 [IU]/ML
0-10 INJECTION, SOLUTION INTRAVENOUS; SUBCUTANEOUS EVERY 4 HOURS
Status: DISCONTINUED | OUTPATIENT
Start: 2024-08-20 | End: 2024-08-28 | Stop reason: HOSPADM

## 2024-08-20 RX ORDER — POLYETHYLENE GLYCOL 3350 17 G/17G
17 POWDER, FOR SOLUTION ORAL DAILY PRN
Status: DISCONTINUED | OUTPATIENT
Start: 2024-08-20 | End: 2024-08-23

## 2024-08-20 RX ORDER — BISACODYL 10 MG/1
10 SUPPOSITORY RECTAL DAILY PRN
Status: DISCONTINUED | OUTPATIENT
Start: 2024-08-20 | End: 2024-08-28 | Stop reason: HOSPADM

## 2024-08-20 RX ORDER — POLYETHYLENE GLYCOL 3350 17 G/17G
17 POWDER, FOR SOLUTION ORAL DAILY
Status: DISCONTINUED | OUTPATIENT
Start: 2024-08-20 | End: 2024-08-28 | Stop reason: HOSPADM

## 2024-08-20 SDOH — ECONOMIC STABILITY: FOOD INSECURITY: WITHIN THE PAST 12 MONTHS, THE FOOD YOU BOUGHT JUST DIDN'T LAST AND YOU DIDN'T HAVE MONEY TO GET MORE.: NEVER TRUE

## 2024-08-20 SDOH — SOCIAL STABILITY: SOCIAL INSECURITY: WITHIN THE LAST YEAR, HAVE YOU BEEN AFRAID OF YOUR PARTNER OR EX-PARTNER?: NO

## 2024-08-20 SDOH — SOCIAL STABILITY: SOCIAL INSECURITY: WITHIN THE LAST YEAR, HAVE YOU BEEN HUMILIATED OR EMOTIONALLY ABUSED IN OTHER WAYS BY YOUR PARTNER OR EX-PARTNER?: NO

## 2024-08-20 SDOH — ECONOMIC STABILITY: INCOME INSECURITY: IN THE PAST 12 MONTHS, HAS THE ELECTRIC, GAS, OIL, OR WATER COMPANY THREATENED TO SHUT OFF SERVICE IN YOUR HOME?: NO

## 2024-08-20 SDOH — ECONOMIC STABILITY: FOOD INSECURITY: WITHIN THE PAST 12 MONTHS, YOU WORRIED THAT YOUR FOOD WOULD RUN OUT BEFORE YOU GOT MONEY TO BUY MORE.: NEVER TRUE

## 2024-08-20 ASSESSMENT — PAIN - FUNCTIONAL ASSESSMENT
PAIN_FUNCTIONAL_ASSESSMENT: 0-10

## 2024-08-20 ASSESSMENT — COGNITIVE AND FUNCTIONAL STATUS - GENERAL
MOVING TO AND FROM BED TO CHAIR: TOTAL
CLIMB 3 TO 5 STEPS WITH RAILING: TOTAL
TURNING FROM BACK TO SIDE WHILE IN FLAT BAD: TOTAL
WALKING IN HOSPITAL ROOM: TOTAL
STANDING UP FROM CHAIR USING ARMS: TOTAL
MOVING FROM LYING ON BACK TO SITTING ON SIDE OF FLAT BED WITH BEDRAILS: A LOT
MOBILITY SCORE: 7

## 2024-08-20 ASSESSMENT — PAIN SCALES - GENERAL
PAINLEVEL_OUTOF10: 3
PAINLEVEL_OUTOF10: 3
PAINLEVEL_OUTOF10: 6
PAINLEVEL_OUTOF10: 2
PAINLEVEL_OUTOF10: 4

## 2024-08-20 ASSESSMENT — ACTIVITIES OF DAILY LIVING (ADL): ADL_ASSISTANCE: NEEDS ASSISTANCE

## 2024-08-20 NOTE — CARE PLAN
The patient's goals for the shift include watch television    The clinical goals for the shift include VS within MD ordered parameters    Over the shift, the patient did not make progress toward the following goals. Barriers to progression include critical illness. Recommendations to address these barriers include per MD order  Problem: Safety - Adult  Goal: Free from fall injury  Outcome: Progressing   .

## 2024-08-20 NOTE — PROGRESS NOTES
Speech-Language Pathology                 Therapy Communication Note    Patient Name: Christophe Ambriz  MRN: [id number]  Today's Date: 8/20/2024     Discipline: Speech Language Pathology    Speech Therapy consult received. Discussed pt status w/ RN.  Pt not appropriate for evaluation at this time given respiratory status.  On Airvo at 50L and w/ increased RR.  Will continue to follow and assess as able.      08/20/24 at 11:37 AM - Bonita Shukla, SLP

## 2024-08-20 NOTE — PROGRESS NOTES
Christophe Ambriz is a 74 y.o. 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      Subjective   Admitted to the MICU with ARF, currently stable on high flow O2, DNR, DNI.   Back pain / R rib pain improve from a 10/10 to a 4/10 with meds.  C/o weakness in both legs > 1.5 months, no numbness or incontinence.       Objective     Last Recorded Vitals  /58   Pulse 82   Temp 36.3 °C (97.3 °F)   Resp 23   Wt 79.4 kg (175 lb 0.7 oz)   SpO2 98%     Image Results  XR chest 1 view  Narrative: Interpreted By:  Garth Kauffman,  and Warren Gilbert   STUDY:  XR CHEST 1 VIEW;  8/20/2024 7:22 am      INDICATION:  Signs/Symptoms:Pneumonia.      COMPARISON:  Chest radiograph 08/19/2024 and chest CT 08/19/2024      ACCESSION NUMBER(S):  [id number]      ORDERING CLINICIAN:  JESUS LEVIN      FINDINGS:  AP radiograph of the chest was provided.      CARDIOMEDIASTINAL SILHOUETTE:  Cardiomediastinal silhouette is stable in size and configuration.      LUNGS:  Dense retrocardiac opacity is increased compared to the prior exam.  Mildly low lung volumes with bronchovascular crowding. No sizable  effusion or pneumothorax.      ABDOMEN:  No remarkable upper abdominal findings.      BONES:  No acute osseous abnormality.      Impression: 1. Increased dense retrocardiac opacity compared to the prior exam .  Correlate with concern for increasing atelectasis/infiltration. Also  correlate with increasing central mucous plugging.      I personally reviewed the image(s)/study and resident interpretation  as stated by Dr. Ofelia Kelley MD. I agree with the findings as  stated. This study was interpreted at [facility], [place], [place].      MACRO:  None      Signed by: Garth Kauffman 8/20/2024 9:13 AM  Dictation workstation:   [id number]  XR abdomen 1 view  Narrative: Interpreted By:  Garth Kauffman,   STUDY:  XR ABDOMEN 1 VIEW;  8/19/2024  6:48 pm      INDICATION:  Signs/Symptoms:constipated.      COMPARISON:  CT scan of lumbar spine 07/26/2024      ACCESSION NUMBER(S):  [id number]      ORDERING CLINICIAN:  JESUS LEVIN      FINDINGS:  AP radiographs of the abdomen available for interpretation.  Nonobstructive bowel gas pattern. Moderate colonic stool burden.  Limited evaluation of pneumoperitoneum on supine imaging, however no  gross evidence of free air is noted.      Visualized lung bases demonstrate left basilar  atelectasis/consolidation.      Osseous structures demonstrate no acute bony changes. Posterior  lumbar spine fusion hardware in place.      Impression: 1.  Nonobstructive bowel gas pattern. Moderate colonic stool burden.      Signed by: Garth Kauffman 8/20/2024 9:11 AM  Dictation workstation:   [id number]      Physical Exam    General: awake, alert, resting comfortably, frail, malnourished in appearance  HEENT: pupils equal and round, no scleral icterus  Pulmonary: Breathing comfortably at rest with high flow O2  Cardiac: regular rate  Abdomen: soft, nondistended, non tender to palpation   EXT: no peripheral edema, no asymmetry noted  MSK: no focal joint swelling noted  Neuro: A&O x 3, cranial nerves II through XII grossly intact, sensation intact, B/L weakness - primarily in the hips  Psych: Normal affect     Relevant Results  Lab Results   Component Value Date    WBC 28.3 (H) 08/20/2024    HGB 9.2 (L) 08/20/2024    HCT 28.7 (L) 08/20/2024    MCV 80 08/20/2024     (H) 08/20/2024     Lab Results   Component Value Date    GLUCOSE 241 (H) 08/20/2024    CALCIUM 8.9 08/20/2024     (L) 08/20/2024    K 5.2 08/20/2024    CO2 27 08/20/2024    CL 91 (L) 08/20/2024    BUN 16 08/20/2024    CREATININE 0.61 08/20/2024                Assessment/Plan   This is a 74-year-old male who initially presented in July with severe lumbar back pain radiating to the lower extremity and unintentional weight loss. Imaging revealed a lytic lesions at L  9th rib and in the L3, and a 6x8 cm mass in the left kidney.  He underwent L1-5 fusion with L2-4 decompression and tumor debulking on July 9th. Pathology confirmed clear cell carcinoma.     The patient has been unable to follow up with medical oncology, as scheduled.   He presented to the ED on 7/26 with worsening back pain.  MRI showed increased epidural extension at L3, with severe canal stenosis.   The patient was discharged with  NSGY follow up. He returned to the ED 8/14 with worsening pain.  MRI was repeated 8/14, and continues to show extensive disease at L3 with severe canal stenosis.  The patient is also noted to have a large, painful, lateral R rib metastasis.      On 8/16 the patient was admitted to the MICU for ARF, possible PNA.   He is currently DNR, DNI, stable on highflow O2.  There are no pending surgical interventions.     PLAN:  The patient was discussed with my attending provide, Dr. Macias.     We discussed proceeding with CT SIM for palliative RT to L3 and R 9th rib lesion.   Tentative plan is to treat with SBRT 30Gy in 5 fractions.  If the patient's condition fails to improve, we will consider adjusting to 3D plan, and treating more urgently as an inpatient.       -CT SIM on 8/21 for palliative SBRT to R 9th rib and L3 metastases  -Continue Dexamethasone with GI Ppx, ok to decrease and continue at 4mg BID oral          I spent ___25__ minutes with this patient. Greater than 50% of this time was spent in the counseling and/or coordination of care of this patient.    Landon Mark PA-C

## 2024-08-20 NOTE — PROGRESS NOTES
"Medical Intensive Care - Daily Progress Note   Subjective    Christophe Ambriz is a 74 y.o. year old male patient admitted on 8/16/2024 with following ICU needs:  high flow oxygen (arivo) 54% FiO2, 50L/min 2/2 hypoxic respiratory failure.     Overnight:  - hypoxemic -> required 100% FiO2 overnight  - TTE w/ bubble study ordered   - 8/19 blood cultures: GPCC  - procal 0.34, lactate 3.6 -> 2.9  - TTE w/ bubble study ordered    Subjective  This morning patient is comfortable in bed. Has a productive cough, does not feel short of breath. Currently on 92% FiO2 at 50L.    Meds    Scheduled medications  [Held by provider] amLODIPine, 10 mg, oral, Daily  aspirin, 81 mg, oral, Daily  atorvastatin, 20 mg, oral, Nightly  azithromycin, 500 mg, intravenous, q24h  [Held by provider] clopidogrel, 75 mg, oral, Daily  dexAMETHasone, 4 mg, intravenous, q8h  enoxaparin, 40 mg, subcutaneous, q24h  gabapentin, 300 mg, oral, Nightly  [Held by provider] hydroCHLOROthiazide, 25 mg, oral, Daily  insulin lispro, 0-10 Units, subcutaneous, TID  [Held by provider] metoprolol succinate XL, 50 mg, oral, Daily  oxygen, , inhalation, Continuous - Inhalation  pantoprazole, 40 mg, intravenous, Daily  piperacillin-tazobactam, 3.375 g, intravenous, q6h  polyethylene glycol, 17 g, oral, Daily  sennosides, 2 tablet, oral, BID  vancomycin, 1,250 mg, intravenous, q12h      Continuous medications     PRN medications  PRN medications: acetaminophen **OR** [DISCONTINUED] acetaminophen **OR** [DISCONTINUED] acetaminophen, albuterol, dextrose, dextrose, glucagon, glucagon, HYDROmorphone, oxyCODONE, oxygen, polyethylene glycol, vancomycin     Objective    Blood pressure 106/58, pulse 67, temperature 36.1 °C (97 °F), resp. rate 12, height 1.854 m (6' 1\"), weight 79.4 kg (175 lb 0.7 oz), SpO2 99%.     Physical Exam       Intake/Output Summary (Last 24 hours) at 8/20/2024 0647  Last data filed at 8/20/2024 0500  Gross per 24 hour   Intake 1450 ml   Output 800 ml "   Net 650 ml     Labs:     - glucose 241, sodium 130, potassium 5.2  - albumin 2.9, lactate 2.9, procalciton 0.34  - arterial blood gas at 345am: pH 7.13, CO2 47, O2 81    Results from last 72 hours   Lab Units 08/20/24  0459 08/19/24  1236 08/18/24  0838   SODIUM mmol/L 130* 131* 133*   POTASSIUM mmol/L 5.2 5.0 4.6   CHLORIDE mmol/L 91* 93* 96*   CO2 mmol/L 27 25 25   BUN mg/dL 16 19 19   CREATININE mg/dL 0.61 0.66 0.59   GLUCOSE mg/dL 241* 219* 218*   CALCIUM mg/dL 8.9 8.9 9.2   ANION GAP mmol/L 17 18 17   EGFR mL/min/1.73m*2 >90 >90 >90   PHOSPHORUS mg/dL 4.0 3.7 2.7      Results from last 72 hours   Lab Units 08/20/24  0459 08/19/24  0924 08/18/24  0838   WBC AUTO x10*3/uL 28.3* 32.3* 21.9*   HEMOGLOBIN g/dL 9.2* 9.6* 8.7*   HEMATOCRIT % 28.7* 31.6* 29.0*   PLATELETS AUTO x10*3/uL 607* 786* 653*   NEUTROS PCT AUTO % 77.4  --  77.7   LYMPHO PCT MAN %  --  13.2  --    LYMPHS PCT AUTO % 17.1  --  16.3   MONO PCT MAN %  --  6.1  --    MONOS PCT AUTO % 4.6  --  4.7   EOSINO PCT MAN %  --  0.0  --    EOS PCT AUTO % 0.0  --  0.1        Micro/ID:     Lab Results   Component Value Date    BLOODCULT  08/19/2024     Identification and susceptibility testing to follow       Summary of key imaging results from the last 24 hours  CXR 8/20/24: ordered this morning at 7am, expecting it to look worse  KUB 8/19/24: no read yet, looks like some gas in the in the bowels, awaiting read  CXR 8/19/24: Decrease in prominence of opacification in the left lower lung field  CTAC for PE 8/19/24: negative for PE, possible multifocal pneumonia, possible aspiration, +ve for right lower chest wall mass within the right 9th rib consistent with progression of metastitic disease     Assessment and Plan     Assessment: Christophe Ambriz is a 74 y.o. year old male patient admitted on 8/19/24  to the MICU for high flow oxygen (arivo) 54% FiO2, 50L/min 2/2 hypoxic respiratory failure. Christophe has a PMHx of CAD, HTN, HLD, and RCC with spinal mets,  status post L1-5 fusion, L2-4 decompression, and tumor debulking on 07/24/24.     Mechanical Ventilation: none  Sedation/Analgesia:  none  Restraints: no    Summary for 08/20/24  :  Emailed Dr. Aura Disla CXR ordered: increase atelectasis/infiltration + increase mucous plugging  Supportive Onc: Dilaudid 1mg Q2 (hypoxia likely due to pain so this should help)  Reached out to Rad Onc re: CT simulation / radiation / steroids dosage  Getting CT simulation tomorrow 930AM  Incentive spirometry ordered   Fiance called and updated   Night team to follow TTE results and Rad Onc Recs  Discontinued Azithro: Legionella + Strep Pneumo -ve  Miralax PRN ordered    Plan:  NEUROLOGY/PSYCH:  Dx: #Bilateral Radicular Pain   L1-5 fusion w/ L2-4 decompression and tumor debulking (7/24)  at [facility] with NSGY  MRI 8/14: L3 compression fracture with retropulsion of the posterior cortex and severe stenosis of the central spinal canal with nerve root compression unchanged from the previous exam *No new foci of marrow replacement or compression fracture *There is no measurable change compared to the previous exam.  Management:  Dilaudid 1mg q2   Dexamethasone 4mg q8h (radiation oncology thomas tell us if we should lower oer not)  Consult medical oncology for systemic therapy  CT simulation planned for today  Plan for SBRT in 5 fractions.      CARDIOVASCULAR:  Dx: #CAD s/p PCI (unknown year)        #HTN, #HLD  Management:  continue home atorvastatin, ASA (held plavix as no indication for DAPT and in case of any procedures)  HOLD: home amlodipine 10mg daily, home metoprolol succinate 50mg daily, home hydrochlorothiazide 25mg daily     PULMONARY:  Dx: #AHRF 2/2 #pneumonia of unknown cause  CXR 8/1924:: Decrease in prominence of opacification in the left lower lung field  CTAC for PE 8/19/24: negative for PE, possible multifocal pneumonia, possible aspiration, +ve for right lower chest wall mass within the right 9th rib consistent with  progression of metastitic disease  8/20/24: required 100% overnight, currently on 92% FiO2 50L  8/20/24: both sets of cultures grew GPCC, COVID -ve,   Management:  Hypoxia likely due to pain  Procalc 0.34  Urine culture ,MRSA, Respiratory Viral Panel, Respirtatory Culture and Smear Legionela, Strep Pneumo still in process  Swallow study _________  Continue: Azithromycin, Vanc and Zosyn      RENAL/GENITOURINARY:  Dx: #Metastatic renal cell carcinoma   Management:  CT simulation planned for Tuesday 8/20   4-Plan for SBRT in 5 fractions.   Consult oncology for pain management recs     GASTROENTEROLOGY:  JUSTIN     ENDOCRINOLOGY:  Dx: #Exogenous Steroids #Hyperglycemic  Receiving dexamathasone 4mg q8  Glucose this   Management:  Goal glucose between 140-180  POCT glucose q4     HEMATOLOGY:  Dx:  #Leukocytosis  Management:  Trend WBC  Antibiotics     MUSCULOSKELETAL/ SKIN:  Dx:  JUSTIN  Management:        INFECTIOUS DISEASE:  Dx:  #Pneumonia of unknown cause  Management:  Repeat CXR, GPCC+ve, repeat blood cultures ordered, awaiting other labs  Started on azithro, vanc and zosyn until blood cultures come back    ICU Check List         FEN  Fluids: Fluids PRN  Electrolytes: PRN  Nutrition: NPO  Prophylaxis:  DVT ppx: Lovenox  GI ppx: Protonix  Bowel care: miralax PRN  Hardware:  Catheter: no  Access: PIV, right upper aarm, left forearm  Drains: no  Lines: no  Social:  Code: DNR and No Intubation    HPOA: Tom Iramgeo William   Disposition: JOSEPH Moreau MD   08/20/24 at 6:47 AM     Disclaimer: Documentation completed with the information available at the time of input. The times in the chart may not be reflective of actual patient care times, interventions, or procedures. Documentation occurs after the physical care of the patient.

## 2024-08-20 NOTE — PROGRESS NOTES
"    SOCIAL WORK NOTE  NOK: KRAIG Martinez (alleged POA)   DME: has, not used  Home Care: HC previously, no longer active   HD: denied  PCP: ILA Luis Appointment pending?   Additional information: SW met with patient at bedside for assessment (please see flowsheets for further details). Patient normally lives at home with SO. He states that he needs help with some care, normally provided by SO. He had [facility] coming out, but \"got rid of it\". When prompted about PCP he states they \"ticked him off\", but he is trying to get appointment. He states states that Tom Walden is decision maker. No POA papers in chart. SW spoke with SO, she will bring copy when she comes to hospital next. Social work to follow.  Clau Gomez, MSW, LISW-S ([id number])       "

## 2024-08-20 NOTE — PROGRESS NOTES
Christophe Ambriz is a 74 y.o. 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    SUPPORTIVE AND PALLIATIVE ONCOLOGY INPATIENT FOLLOW-UP      SERVICE DATE: 08/20/24     SUBJECTIVE:  Interval Events:  Pt was transferred to MICU yesterday for hypoxic respiratory failure- now on Airvo.  Pain Assessment:  Location: B/L hips, pelvis, up to mid-back   Duration: Constant  Characteristics:   Rating: Moderate   Descriptors: aching and burning   Aggravating: movement    Relieving: Analgesics opioids, Positioning, and Modifying activity   Interference with Function: Very Much    Opioid Use  Past 24 h prn opioid use: Hydromorphone 0.4 mg IV x 4 doses = 1.6 mg = 6.4 OME  Total 24h OME use:  6.4    Symptom Assessment:  Shortness of breath very much   Constipation very much   Lack of energy somewhat    Information obtained from: chart review, interview of patient, discussion with RN, and discussion with primary team  ______________________________________________________________________        OBJECTIVE:    Lab Results   Component Value Date    WBC 28.3 (H) 08/20/2024    HGB 9.2 (L) 08/20/2024    HCT 28.7 (L) 08/20/2024    MCV 80 08/20/2024     (H) 08/20/2024      Lab Results   Component Value Date    GLUCOSE 241 (H) 08/20/2024    CALCIUM 8.9 08/20/2024     (L) 08/20/2024    K 5.2 08/20/2024    CO2 27 08/20/2024    CL 91 (L) 08/20/2024    BUN 16 08/20/2024    CREATININE 0.61 08/20/2024     Lab Results   Component Value Date    ALT 21 08/17/2024    AST 24 08/17/2024    ALKPHOS 134 08/17/2024    BILITOT 0.3 08/17/2024     Estimated Creatinine Clearance: 117.5 mL/min (by C-G formula based on SCr of 0.61 mg/dL).     Scheduled medications  aspirin, 81 mg, oral, Daily  atorvastatin, 20 mg, oral, Nightly  [Held by provider] clopidogrel, 75 mg, oral, Daily  dexAMETHasone, 4 mg, intravenous, q8h  enoxaparin, 40 mg, subcutaneous, q24h  gabapentin, 300 mg, oral,  Nightly  insulin lispro, 0-10 Units, subcutaneous, q4h  oxygen, , inhalation, Continuous - Inhalation  pantoprazole, 40 mg, intravenous, Daily  piperacillin-tazobactam, 3.375 g, intravenous, q6h  polyethylene glycol, 17 g, oral, Daily  sennosides, 2 tablet, oral, BID  vancomycin, 1,250 mg, intravenous, q12h      Continuous medications     PRN medications  acetaminophen, 650 mg, q4h PRN  albuterol, 2.5 mg, q6h PRN  bisacodyl, 10 mg, Daily PRN  dextrose, 12.5 g, q15 min PRN  dextrose, 25 g, q15 min PRN  glucagon, 1 mg, q15 min PRN  glucagon, 1 mg, q15 min PRN  HYDROmorphone, 1 mg, q2h PRN  [Held by provider] oxyCODONE, 10 mg, q4h PRN  oxygen, , Continuous PRN - O2/gases  polyethylene glycol, 17 g, Daily PRN  vancomycin, , Daily PRN      }     PHYSICAL EXAMINATION:    Vital Signs:   Vital signs reviewed  Visit Vitals  BP 96/65   Pulse 79   Temp 36.3 °C (97.3 °F)   Resp 19        0-10 (Numeric) Pain Score: 6       Physical Exam   Chronically ill-appearing elderly  M Laying in bed, NAD  A&O x 3, pleasant and cooperative with interview & exam  Breathing comfortably on Airvo  Abd soft, NTND, BS hypoactive  No edema in ext      ASSESSMENT/PLAN:  Christophe Ambriz is a 74 y.o. male diagnosed with RCC with spinal mets s/p L1-5 fusion w/ L2-4 decompression  and tumor debulking (7/24). PMH significant for CAD, HTN, HLD. Admitted 8/16/2024 as a transfer from [facility] for further evaluation and management of back pain- now improving on high dose steroids. Course complicated by constipation. Supportive and Palliative Oncology is consulted for pain management.      Pain:  Hip, pelvic and back pain related to L3 compression fracture with retropulsion of the posterior cortex and severe stenosis of the central spinal canal with nerve root compression   Pain is: cancer related pain  Type: visceral and somatic  Pain control: well-controlled  Home regimen:  Oxycodone 10mg and Gabapentin 100mg TID  Intolerances/previously tried:  Asked pt about percocet allergy- states it made him extremely nauseas but states he was fine with straight oxycodone.  Personalized pain goal: 3  Total OME usage for the past 24 hours:  18.2  Defer to primary team on dexamethasone taper  Pt will be receiving XRT  Hold 10mg oxycodone q4h PRN while on Airvo  INCREASE to 1.0mg IV dilaudid q2h PRN for BT pain  Hold 300mg gabapentin at bedtime while on Airvo  Please order meds to bed 10 day supply for oxycodone and gabapentin  Continue to monitor pain scores and administer PRN medications as appropriate  Continue/initiate nonpharmacologic pain management strategies including ice/heat therapy, distraction techniques, deep breathing/relaxation techniques, calming music, and repositioning  Continue to monitor for signs of opioid efficacy (pain scores, improved functionality) and toxicity (pinpoint pupils, excess sedation/drowsiness/confusion, respiratory depression, etc.)     Constipation  At risk for constipation related to opioids, currently constipated  Usual bowel pattern:  reports every 1-2 weeks  Home regimen: none  LBM UNK- possibly 2 weeks per pt  Monitor BM frequency, adjust regimen as needed  Goal to have BM without straining q48-72h  Continue miralax daily  Continue 2 senna BID   Please give suppository today     Medical Decision Making/Goals of Care/Advance Care Planning:  Patient's current clinical condition, including diagnosis, prognosis, and management plan, and goals of care were discussed.   Life limiting disease: metastatic malignancy  Family: Supportive   Performance status: Major  limitations due to pain  Joys/meaning/strength: Family and [place]  Understanding of health: Demonstrates good understanding of disease process, understands he has a bone fracture- plan for XRT on Monday.  Reports steroids have relieved his pain.  Understasnds he has been missing appts d/t pain and is hopeful this will be resolved so he can get back on  track.  Information:Wants full disclosure  Goals: symptom control and cancer directed therapy  Worries and fears now and future: ongoing symptoms and inability to receive cancer treatment      Advance Directives  Existence of Advance Directives:Yes, documentation or copy in medical record  Decision maker: ZONIA is significant other Iram Armstrong   Code Status: DNR DNI    Supportive Interventions: Interventions: SPO Spiritual Care: referral placed     Disposition:  Please  start the process of having prior authorization with meds to beds deliver medications to patient prior to discharge via [facility] pharmacy. Prescriptions will need to be sent 48-72 hours prior to discharge so that a prior authorization can be completed.      Discharge date: unknown pending acute issues  Pt has an appointment with Outpatient Supportive Oncology Ludmila Mcneill CNP 9/9/24 at 1PM    Supportive and Palliative Oncology encounter:  Spoke with patient at bedside  Emotional support provided  Coordination of care     Signature and billing:  Thank you for allowing us to participate in the care of this patient. Recommendations will be communicated back to the consulting service by way of shared electronic medical record or face-to-face.    Medical complexity was high level due to due to complexity of problems, extensive data review, and high risk of management/treatment.    I spent 45 minutes in the care of this patient which included chart review, interviewing patient/family, discussion with primary team, coordination of care, and documentation.    Data:   Diagnostic tests and information reviewed for today's visit:  Conversation with primary team, Most recent labs and imaging results, Medications       Some elements copied from my note on 8/17/24, the elements have been updated and all reflect current decision making from today, 08/20/24       Plan of Care discussed with: Provider, RN, Patient    Thank you for asking Supportive and  Palliative Oncology to assist with care of this patient.  Recommendations will be communicated back to the consulting service by way of shared electronic medical record/secure chat/email or face-to-face.   We will continue to follow  Please contact us for additional questions or concerns.      SIGNATURE: RENETAT Pino   PAGER/CONTACT:  Contact information:  Supportive and Palliative Oncology  Monday-Friday 8 AM-5 PM  Epic Secure chat or pager [phone].  After hours and weekends:  pager [phone]

## 2024-08-20 NOTE — PROGRESS NOTES
Vancomycin Dosing by Pharmacy- FOLLOW UP    Christophe Ambriz is a 74 y.o. year old male who Pharmacy has been consulted for vancomycin dosing for pneumonia. Based on the patient's indication and renal status this patient is being dosed based on a goal AUC of 400-600.     Renal function is currently stable.    Current vancomycin dose: 1250 mg given every 12 hours    Estimated vancomycin AUC on current dose: 548 mg/L.hr     Visit Vitals  /58   Pulse 67   Temp 36.1 °C (97 °F)   Resp 12        Lab Results   Component Value Date    CREATININE 0.61 2024    CREATININE 0.66 2024    CREATININE 0.59 2024    CREATININE 0.69 2024        Patient weight is as follows:   Vitals:    24 2256   Weight: 79.4 kg (175 lb 0.7 oz)       Cultures:  No results found for the encounter in last 14 days.       I/O last 3 completed shifts:  In: 1300 (16.4 mL/kg) [P.O.:200; IV Piggyback:1100]  Out: 1425 (17.9 mL/kg) [Urine:1425 (0.5 mL/kg/hr)]  Weight: 79.4 kg   I/O during current shift:  I/O this shift:  In: 350 [IV Piggyback:350]  Out: 600 [Urine:600]    Temp (24hrs), Av.5 °C (97.7 °F), Min:35.9 °C (96.6 °F), Max:37.5 °C (99.5 °F)      Assessment/Plan    Within goal AUC range. Continue current vancomycin regimen.    Regimen: 1250 mg IV every 12 hours.  Start time: 10:00 on 2024  Exposure target: AUC24 (range)400-600 mg/L.hr   AUC24,ss: 548 mg/L.hr  Probability of AUC24 > 400: 93 %  Ctrough,ss: 16.8 mg/L  Probability of Ctrough,ss > 20: 31 %  Probability of nephrotoxicity (Lodise ELVIA ): 12 %    The next level will be obtained on 24 at AM labs. May be obtained sooner if clinically indicated.   Will continue to monitor renal function daily while on vancomycin and order serum creatinine at least every 48 hours if not already ordered.  Follow for continued vancomycin needs, clinical response, and signs/symptoms of toxicity.       Dilia Gonzalez, KinD

## 2024-08-20 NOTE — PROGRESS NOTES
Physical Therapy    Physical Therapy Evaluation    Patient Name: Christophe Ambriz  MRN: [id number]  Today's Date: 8/20/2024   Time Calculation  Start Time: 1152  Stop Time: 1219  Time Calculation (min): 27 min    Assessment/Plan   PT Assessment  PT Assessment Results: Decreased strength, Impaired balance, Decreased endurance, Pain, Decreased mobility  Rehab Prognosis: Fair  Evaluation/Treatment Tolerance: Other (Comment) (primarily limited d/t patient reported pain and questionably limited willingness to participate)  End of Session Communication: Bedside nurse, Physician  End of Session Patient Position: Bed, 3 rail up, Alarm off, not on at start of session  IP OR SWING BED PT PLAN  Inpatient or Swing Bed: Inpatient  PT Plan  Treatment/Interventions: Bed mobility, Transfer training, Gait training, Balance training, Strengthening, Endurance training, Therapeutic exercise, Therapeutic activity, Positioning, Postural re-education  PT Plan: Ongoing PT  PT Frequency: 2 times per week (increase frequency pending increase patient participation)  PT Discharge Recommendations: Moderate intensity level of continued care (24/7 assist)  PT - OK to Discharge: Yes      Subjective   General Visit Information:  General  Reason for Referral: initially presented to the ED on 08/14/24 with severe back and hip pain radiating to bilateral legs, described as burning and shooting down to his toes, with the leg pain being more severe than lower back pain. MRI showed a stable L3 compression fracture with severe spinal canal stenosis and nerve root compression. Neurosurgery advised against further surgical intervention, and radiation oncology is planning SBRT in five fractions. Transferred to the MICU d/t increase supplemental o2 needs, now on AirVo. CT PE (-)  Past Medical History Relevant to Rehab: CAD, HTN, HLD, and RCC with spinal mets, status post L1-5 fusion, L2-4 decompression, and tumor debulking on 07/24/24. Reports hx of  "falls  Family/Caregiver Present: No  Prior to Session Communication: Bedside nurse  Patient Position Received: Bed, 3 rail up, Alarm off, not on at start of session  General Comment: patient received supine, HOB elevated; patient extremely verbally and physically resistive to attempt any mobility. RN pre-medicated patient with IV Dilaudid prior to session. tele, 50L/90% AirVo, tele  Home Living:  Home Living  Type of Home: House  Lives With: Significant other  Home Adaptive Equipment:  (urinal, BSC, walker, wheelchair (manual))  Home Layout: One level  Home Access: No concerns  Bathroom Shower/Tub: Tub/shower unit (patient sponge bathes)  Home Living Comments: reports staying in bed  Prior Level of Function:  Prior Function Per Pt/Caregiver Report  Level of [place]: Needs assistance with ADLs, Needs assistance with homemaking, Needs assistance with functional transfers  Receives Help From:  (significant other)  ADL Assistance: Needs assistance  Bath:  (needs assist for sponge bathing by fiance)  Toileting:  (reports using urinal; reports using \"pads\" for bowel movements, fiance assists with celena-care)  Dressing:  (needs assist, however, patient stated, \"I go naked, only a sheet\")  Homemaking Assistance: Needs assistance (fiance)  Ambulatory Assistance:  (reports NOT walking lately, reports trying to walk and \"stumbling forward\" and \"legs giving out\" when he tries to ambulate. Reports spending majority of time in bed)  Hand Dominance: Right  Precautions:  Precautions  Hearing/Visual Limitations: hearing appears WFL  Medical Precautions: Fall precautions, Oxygen therapy device and L/min  Precautions Comment: log roll for bed mobility d/t known spine mets s/p surgery in July 2024.  Vital Signs:  Vital Signs  Heart Rate:  (pre: 78 post: 83)  Resp:  (pre: 29 post: 20)  SpO2:  (pre: 94% post: 96%)  BP:  (pre: 108/59 MAP 71, post: 106/68 MAP 77)  BP Method: Automatic    Objective   Pain:  Pain Assessment  Pain " Assessment: 0-10  0-10 (Numeric) Pain Score: 4  Pain Type: Chronic pain  Pain Location: Back  Pain Orientation: Lower  Pain Interventions:  (RN provided patient with pain medication prior to mobility)  Cognition:  Cognition  Overall Cognitive Status: Impaired  Arousal/Alertness: Appropriate responses to stimuli (within abilities, appears self-directed)  Orientation Level:  (appears oriented to self, location)  Following Commands:  (self-directed, limited command following, unclear willingness?)    General Assessments:      Activity Tolerance  Endurance:  (poor tolerance for any mobility 2/2 potential patient reported pain and patient's questionable unwillingness to attempt mobility)  Early Mobility/Exercise Safety Screen: Proceed with mobilization - No exclusion criteria met    Sensation  Sensation Comment: denied numbness/tingling at this time    Strength  Strength Comments: BUE:  4-/5, remainder >/=3/5; BLE: PF/DF 4-/5, knee ext >/=3-/5           Functional Assessments:  Bed Mobility  Bed Mobility: Yes  Bed Mobility 1  Bed Mobility 1: Rolling left  Level of Assistance 1: Maximum assistance, Maximum verbal cues, Maximum tactile cues  Bed Mobility Comments 1: x1 assist; attempting log rolling L in an attempt to complete supine->sit task, however, once patient in sidelying, patient became agitated, verbally and physically with therapist, therefore, PT assisted patient back to supine; DEP x2 via draw sheet to boost       Extremity/Trunk Assessments:  RUE   RUE :  (AROM WFL)  LUE   LUE:  (AROM WFL)  RLE   RLE :  (PF/DF AROM WFL, knee ext to neutral, knee flexion ~50 degrees AROM)  LLE   LLE :  (PF/DF AROM WFL, knee ext to neutral, knee flexion ~50 degrees AROM)  Outcome Measures:  [facility] Basic Mobility  Turning from your back to your side while in a flat bed without using bedrails: A lot  Moving from lying on your back to sitting on the side of a flat bed without using bedrails: Total  Moving to and from bed to  chair (including a wheelchair): Total  Standing up from a chair using your arms (e.g. wheelchair or bedside chair): Total  To walk in hospital room: Total  Climbing 3-5 steps with railing: Total  Basic Mobility - Total Score: 7    FSS-ICU  Ambulation: Unable to attempt due to weakness  Rolling: Maximal assistance (performs 25% - 49% of task)  Sitting: Unable to perform  Transfer Sit-to-Stand: Unable to perform  Transfer Supine-to-Sit: Unable to perform  Total Score: 2      Early Mobility/Exercise Safety Screen: Proceed with mobilization - No exclusion criteria met  ICU Mobility Scale: Sitting in bed, exercises in bed [1]    Encounter Problems       Encounter Problems (Active)       PT Problem       Patient will complete bed mobility with MODx1 via log roll sequencing, using bedrail as needed         Start:  08/20/24    Expected End:  09/10/24            Patient will complete STS with MODx2 using LRAD without acute LOB         Start:  08/20/24    Expected End:  09/10/24            Patient will ambulate >/=5' with LRAD with MODx2 without acute LOB        Start:  08/20/24    Expected End:  09/10/24            Patient will participate in BLE there-ex program in order to assist in improving strength and to assist with the completion of functional mobility tasks.        Start:  08/20/24    Expected End:  09/10/24            Patient will complete static (stand by assist) and dynamic (contact guard assist) sitting balance activities using UE support as needed in order to maintain midline without acute LOB.        Start:  08/20/24    Expected End:  09/10/24                            Education Documentation  Mobility Training, taught by Evelyn Espinosa PT at 8/20/2024  3:26 PM.  Learner: Patient  Readiness: Nonacceptance  Method: Explanation  Response: No Evidence of Learning  Comment: role of PT services; expectation to participate in therapy as part of active care plan    Education Comments  No comments found.        Evelyn  ELIAZAR Espinosa, PT, DPT

## 2024-08-21 ENCOUNTER — APPOINTMENT (OUTPATIENT)
Dept: RADIOLOGY | Facility: HOSPITAL | Age: 74
DRG: 947 | End: 2024-08-21
Payer: MEDICARE

## 2024-08-21 ENCOUNTER — HOSPITAL ENCOUNTER (OUTPATIENT)
Dept: RADIOLOGY | Facility: EXTERNAL LOCATION | Age: 74
Discharge: HOME | End: 2024-08-21

## 2024-08-21 ENCOUNTER — HOSPITAL ENCOUNTER (OUTPATIENT)
Dept: RADIATION ONCOLOGY | Facility: HOSPITAL | Age: 74
Setting detail: RADIATION/ONCOLOGY SERIES
Discharge: HOME | End: 2024-08-21
Payer: MEDICARE

## 2024-08-21 DIAGNOSIS — C64.9 MALIGNANT NEOPLASM OF KIDNEY EXCLUDING RENAL PELVIS, UNSPECIFIED LATERALITY (MULTI): Primary | ICD-10-CM

## 2024-08-21 DIAGNOSIS — C64.9 MALIGNANT NEOPLASM OF KIDNEY EXCLUDING RENAL PELVIS, UNSPECIFIED LATERALITY (MULTI): ICD-10-CM

## 2024-08-21 LAB
ADENOVIRUS RVP, VIRC: NOT DETECTED
ALBUMIN SERPL BCP-MCNC: 3 G/DL (ref 3.4–5)
ANION GAP SERPL CALC-SCNC: 14 MMOL/L (ref 10–20)
BACTERIA UR CULT: ABNORMAL
BASOPHILS # BLD AUTO: 0.04 X10*3/UL (ref 0–0.1)
BASOPHILS NFR BLD AUTO: 0.1 %
BUN SERPL-MCNC: 14 MG/DL (ref 6–23)
CALCIUM SERPL-MCNC: 8.7 MG/DL (ref 8.6–10.6)
CHLORIDE SERPL-SCNC: 94 MMOL/L (ref 98–107)
CO2 SERPL-SCNC: 28 MMOL/L (ref 21–32)
CREAT SERPL-MCNC: 0.52 MG/DL (ref 0.5–1.3)
EGFRCR SERPLBLD CKD-EPI 2021: >90 ML/MIN/1.73M*2
ENTEROVIRUS/RHINOVIRUS RVP, VIRC: NOT DETECTED
EOSINOPHIL # BLD AUTO: 0.08 X10*3/UL (ref 0–0.4)
EOSINOPHIL NFR BLD AUTO: 0.3 %
ERYTHROCYTE [DISTWIDTH] IN BLOOD BY AUTOMATED COUNT: 14.8 % (ref 11.5–14.5)
GLUCOSE BLD MANUAL STRIP-MCNC: 173 MG/DL (ref 74–99)
GLUCOSE BLD MANUAL STRIP-MCNC: 179 MG/DL (ref 74–99)
GLUCOSE BLD MANUAL STRIP-MCNC: 183 MG/DL (ref 74–99)
GLUCOSE BLD MANUAL STRIP-MCNC: 191 MG/DL (ref 74–99)
GLUCOSE BLD MANUAL STRIP-MCNC: 209 MG/DL (ref 74–99)
GLUCOSE BLD MANUAL STRIP-MCNC: 209 MG/DL (ref 74–99)
GLUCOSE SERPL-MCNC: 193 MG/DL (ref 74–99)
HCT VFR BLD AUTO: 27.8 % (ref 41–52)
HGB BLD-MCNC: 8.7 G/DL (ref 13.5–17.5)
HUMAN BOCAVIRUS RVP, VIRC: NOT DETECTED
HUMAN CORONAVIRUS RVP, VIRC: NOT DETECTED
IMM GRANULOCYTES # BLD AUTO: 0.41 X10*3/UL (ref 0–0.5)
IMM GRANULOCYTES NFR BLD AUTO: 1.3 % (ref 0–0.9)
INFLUENZA A , VIRC: NOT DETECTED
INFLUENZA A H1N1-09 , VIRC: NOT DETECTED
INFLUENZA B PCR, VIRC: NOT DETECTED
LYMPHOCYTES # BLD AUTO: 4.41 X10*3/UL (ref 0.8–3)
LYMPHOCYTES NFR BLD AUTO: 14.1 %
MAGNESIUM SERPL-MCNC: 2.11 MG/DL (ref 1.6–2.4)
MCH RBC QN AUTO: 25.9 PG (ref 26–34)
MCHC RBC AUTO-ENTMCNC: 31.3 G/DL (ref 32–36)
MCV RBC AUTO: 83 FL (ref 80–100)
METAPNEUMOVIRUS , VIRC: NOT DETECTED
MONOCYTES # BLD AUTO: 1.77 X10*3/UL (ref 0.05–0.8)
MONOCYTES NFR BLD AUTO: 5.7 %
NEUTROPHILS # BLD AUTO: 24.47 X10*3/UL (ref 1.6–5.5)
NEUTROPHILS NFR BLD AUTO: 78.5 %
NRBC BLD-RTO: 0.1 /100 WBCS (ref 0–0)
PARAINFLUENZA PCR, VIRC: NOT DETECTED
PHOSPHATE SERPL-MCNC: 2.5 MG/DL (ref 2.5–4.9)
PLATELET # BLD AUTO: 570 X10*3/UL (ref 150–450)
POTASSIUM SERPL-SCNC: 4.5 MMOL/L (ref 3.5–5.3)
RBC # BLD AUTO: 3.36 X10*6/UL (ref 4.5–5.9)
RSV PCR, RVP, VIRC: NOT DETECTED
SODIUM SERPL-SCNC: 131 MMOL/L (ref 136–145)
STAPHYLOCOCCUS SPEC CULT: ABNORMAL
WBC # BLD AUTO: 31.2 X10*3/UL (ref 4.4–11.3)

## 2024-08-21 PROCEDURE — 77290 THER RAD SIMULAJ FIELD CPLX: CPT | Performed by: STUDENT IN AN ORGANIZED HEALTH CARE EDUCATION/TRAINING PROGRAM

## 2024-08-21 PROCEDURE — 36415 COLL VENOUS BLD VENIPUNCTURE: CPT

## 2024-08-21 PROCEDURE — 1170000001 HC PRIVATE ONCOLOGY ROOM DAILY

## 2024-08-21 PROCEDURE — 82947 ASSAY GLUCOSE BLOOD QUANT: CPT

## 2024-08-21 PROCEDURE — 2500000005 HC RX 250 GENERAL PHARMACY W/O HCPCS

## 2024-08-21 PROCEDURE — 85025 COMPLETE CBC W/AUTO DIFF WBC: CPT

## 2024-08-21 PROCEDURE — 83735 ASSAY OF MAGNESIUM: CPT

## 2024-08-21 PROCEDURE — 80069 RENAL FUNCTION PANEL: CPT

## 2024-08-21 PROCEDURE — 2500000004 HC RX 250 GENERAL PHARMACY W/ HCPCS (ALT 636 FOR OP/ED)

## 2024-08-21 PROCEDURE — 2500000002 HC RX 250 W HCPCS SELF ADMINISTERED DRUGS (ALT 637 FOR MEDICARE OP, ALT 636 FOR OP/ED)

## 2024-08-21 PROCEDURE — 99291 CRITICAL CARE FIRST HOUR: CPT

## 2024-08-21 PROCEDURE — 71045 X-RAY EXAM CHEST 1 VIEW: CPT

## 2024-08-21 PROCEDURE — 71045 X-RAY EXAM CHEST 1 VIEW: CPT | Performed by: RADIOLOGY

## 2024-08-21 PROCEDURE — 77334 RADIATION TREATMENT AID(S): CPT | Performed by: STUDENT IN AN ORGANIZED HEALTH CARE EDUCATION/TRAINING PROGRAM

## 2024-08-21 ASSESSMENT — PAIN SCALES - GENERAL
PAINLEVEL_OUTOF10: 5 - MODERATE PAIN
PAINLEVEL_OUTOF10: 7
PAINLEVEL_OUTOF10: 0 - NO PAIN
PAINLEVEL_OUTOF10: 2
PAINLEVEL_OUTOF10: 0 - NO PAIN

## 2024-08-21 ASSESSMENT — PAIN DESCRIPTION - LOCATION: LOCATION: BACK

## 2024-08-21 ASSESSMENT — PAIN - FUNCTIONAL ASSESSMENT
PAIN_FUNCTIONAL_ASSESSMENT: 0-10

## 2024-08-21 ASSESSMENT — PAIN SCALES - WONG BAKER: WONGBAKER_NUMERICALRESPONSE: HURTS WHOLE LOT

## 2024-08-21 NOTE — CARE PLAN
The patient's goals for the shift include rest    The clinical goals for the shift include patient will remain free of falls and injury through end of shift 8/19 0700    Over the shift, the patient did not make progress toward the following goals. Barriers to progression include respiratory status and pain medications. Recommendations to address these barriers include continue plan of care.

## 2024-08-21 NOTE — PROGRESS NOTES
"Medical Intensive Care - Daily Progress Note   Subjective    Christophe Ambriz is a 74 y.o. year old male patient admitted on 8/16/2024 with following ICU needs:  high flow oxygen (arivo) 30% FiO2, 40L/min 2/2 hypoxic respiratory failure.     Overnight:  NAEO    Subjective  This morning patient is comfortable in bed.  Had CT Sim done this morning. Currently on 40% FiO2 at 30L.   Denies any significant pain. Denies CP, SOB, LE Pain.     Meds    Scheduled medications  aspirin, 81 mg, oral, Daily  atorvastatin, 20 mg, oral, Nightly  [Held by provider] clopidogrel, 75 mg, oral, Daily  dexAMETHasone, 4 mg, intravenous, q12h  enoxaparin, 40 mg, subcutaneous, q24h  gabapentin, 300 mg, oral, Nightly  insulin lispro, 0-10 Units, subcutaneous, q4h  oxygen, , inhalation, Continuous - Inhalation  pantoprazole, 40 mg, intravenous, Daily  piperacillin-tazobactam, 3.375 g, intravenous, q6h  polyethylene glycol, 17 g, oral, Daily  sennosides, 2 tablet, oral, BID  vancomycin, 1,250 mg, intravenous, q12h      Continuous medications     PRN medications  PRN medications: acetaminophen **OR** [DISCONTINUED] acetaminophen **OR** [DISCONTINUED] acetaminophen, albuterol, bisacodyl, dextrose, dextrose, glucagon, glucagon, HYDROmorphone, [Held by provider] oxyCODONE, oxygen, polyethylene glycol, vancomycin     Objective    Blood pressure 102/59, pulse 70, temperature 36 °C (96.8 °F), temperature source Temporal, resp. rate 22, height 1.854 m (6' 1\"), weight 74.3 kg (163 lb 12.8 oz), SpO2 (!) 88%.     Physical Exam   Constitutional:       General: He is awake. On Airvo cannula  Eyes:      Pupils: Pupils are equal, round, and reactive to light.   Neck:      Comments: Pain in lower back down to lower legs.  Cardiovascular:      Rate and Rhythm: Normal rate and regular rhythm.      Pulses: Normal pulses.      Heart sounds: Normal heart sounds.   Pulmonary:      Effort: Pulmonary effort is normal.      Breath sounds: Normal breath sounds. "   Abdominal:      General: Abdomen is flat. Bowel sounds are normal.   Neurological:      General: No focal deficit present.      Mental Status: He is alert.   Psychiatric:         Behavior: Behavior is cooperative.     Intake/Output Summary (Last 24 hours) at 8/21/2024 1109  Last data filed at 8/21/2024 0920  Gross per 24 hour   Intake 300 ml   Output 420 ml   Net -120 ml     Labs:     - glucose 241, sodium 130, potassium 5.2  - albumin 2.9, lactate 2.9, procalciton 0.34  - arterial blood gas at 345am: pH 7.13, CO2 47, O2 81    Results from last 72 hours   Lab Units 08/21/24  0352 08/20/24  0459 08/19/24  1236   SODIUM mmol/L 131* 130* 131*   POTASSIUM mmol/L 4.5 5.2 5.0   CHLORIDE mmol/L 94* 91* 93*   CO2 mmol/L 28 27 25   BUN mg/dL 14 16 19   CREATININE mg/dL 0.52 0.61 0.66   GLUCOSE mg/dL 193* 241* 219*   CALCIUM mg/dL 8.7 8.9 8.9   ANION GAP mmol/L 14 17 18   EGFR mL/min/1.73m*2 >90 >90 >90   PHOSPHORUS mg/dL 2.5 4.0 3.7      Results from last 72 hours   Lab Units 08/21/24  0352 08/20/24  0459 08/19/24  0924   WBC AUTO x10*3/uL 31.2* 28.3* 32.3*   HEMOGLOBIN g/dL 8.7* 9.2* 9.6*   HEMATOCRIT % 27.8* 28.7* 31.6*   PLATELETS AUTO x10*3/uL 570* 607* 786*   NEUTROS PCT AUTO % 78.5 77.4  --    LYMPHO PCT MAN %  --   --  13.2   LYMPHS PCT AUTO % 14.1 17.1  --    MONO PCT MAN %  --   --  6.1   MONOS PCT AUTO % 5.7 4.6  --    EOSINO PCT MAN %  --   --  0.0   EOS PCT AUTO % 0.3 0.0  --         Micro/ID:     Lab Results   Component Value Date    URINECULTURE 20,000 - 80,000 Enteric bacilli (A) 08/19/2024    BLOODCULT Loaded on Instrument - Culture in progress 08/20/2024    BLOODCULT Loaded on Instrument - Culture in progress 08/20/2024       Summary of key imaging results from the last 24 hours  CXR 8/20/24: ordered this morning at 7am, expecting it to look worse  KUB 8/19/24: no read yet, looks like some gas in the in the bowels, awaiting read  CXR 8/19/24: Decrease in prominence of opacification in the left lower lung  field  CTAC for PE 8/19/24: negative for PE, possible multifocal pneumonia, possible aspiration, +ve for right lower chest wall mass within the right 9th rib consistent with progression of metastitic disease     Assessment and Plan     Assessment: Christophe Ambriz is a 74 y.o. year old male patient admitted on 8/19/24  to the MICU for high flow oxygen (arivo) 54% FiO2, 50L/min 2/2 hypoxic respiratory failure. Christophe has a PMHx of CAD, HTN, HLD, and RCC with spinal mets, status post L1-5 fusion, L2-4 decompression, and tumor debulking on 07/24/24.     Mechanical Ventilation: none  Sedation/Analgesia:  none  Restraints: no    Summary for 08/21/24  :  CT Sim done today   Chest Xray  Sputum Culture ordered  SLP pending    Plan:  NEUROLOGY/PSYCH:  Dx: #Bilateral Radicular Pain   L1-5 fusion w/ L2-4 decompression and tumor debulking (7/24)  at [facility] with NSGY  MRI 8/14: L3 compression fracture with retropulsion of the posterior cortex and severe stenosis of the central spinal canal with nerve root compression unchanged from the previous exam *No new foci of marrow replacement or compression fracture *There is no measurable change compared to the previous exam.  Management:  Dilaudid 1mg q2   Dexamethasone 4mg BID (radiation oncology thomas tell us if we should lower oer not)  Consult medical oncology for systemic therapy  CT simulation planned for today  Plan for SBRT in 5 fractions.      CARDIOVASCULAR:  Dx: #CAD s/p PCI (unknown year)        #HTN, #HLD  Management:  continue home atorvastatin, ASA (held plavix as no indication for DAPT and in case of any procedures)  HOLD: home amlodipine 10mg daily, home metoprolol succinate 50mg daily, home hydrochlorothiazide 25mg daily     PULMONARY:  Dx: #AHRF 2/2 #pneumonia of unknown cause  CXR 8/1924:: Decrease in prominence of opacification in the left lower lung field  CTAC for PE 8/19/24: negative for PE, possible multifocal pneumonia, possible aspiration, +ve for right lower  chest wall mass within the right 9th rib consistent with progression of metastitic disease  8/20/24: required 100% overnight, currently on 92% FiO2 50L  8/20/24: both sets of cultures grew GPCC, COVID -ve,   Management:  Hypoxia likely due to pain  Procalc 0.34  Urine culture ,MRSA, Respiratory Viral Panel, Respirtatory Culture and Smear Legionela, Strep Pneumo still in process  Swallow study pending  Continue: Azithromycin, Vanc and Zosyn      RENAL/GENITOURINARY:  Dx: #Metastatic renal cell carcinoma   Management:  CT simulation planned for Tuesday 8/20   4-Plan for SBRT in 5 fractions.   Supportive Onc: Dilaudid 1mg Q2 (hypoxia likely due to pain so this should help)  Rad Onc re:   CT simulation  Considering Paliative RT   Dex 4mg BID  Dr. Chavarria emailed about patient      GASTROENTEROLOGY:  JUSTIN  -SLP eval pending      ENDOCRINOLOGY:  Dx: #Exogenous Steroids #Hyperglycemic  Receiving dexamathasone 4mg BID  Glucose this   Management:  Goal glucose between 140-180  POCT glucose q4     HEMATOLOGY:  Dx:  #Leukocytosis  Management:  Trend WBC  Antibiotics     MUSCULOSKELETAL/ SKIN:  Dx:  JUSTIN  Management:        INFECTIOUS DISEASE:  Dx:  #Pneumonia of unknown cause  Management:  Repeat CXR, GPCC+ve, repeat blood cultures ordered, awaiting other labs  Started on azithro, vanc and zosyn until blood cultures come back    ICU Check List         FEN  Fluids: Fluids PRN  Electrolytes: PRN  Nutrition: NPO  Prophylaxis:  DVT ppx: Lovenox  GI ppx: Protonix  Bowel care: miralax PRN  Hardware:  Catheter: no  Access: PIV, right upper aarm, left forearm  Drains: no  Lines: no  Social:  Code: DNR and No Intubation    HPOA: Iram Santos   Disposition: JOSEPH Hirsch MD   08/21/24 at 11:09 AM     Disclaimer: Documentation completed with the information available at the time of input. The times in the chart may not be reflective of actual patient care times, interventions, or  procedures. Documentation occurs after the physical care of the patient.

## 2024-08-21 NOTE — PROGRESS NOTES
Occupational Therapy                 Therapy Communication Note    Patient Name: Christophe Ambriz  MRN: [id number]  Today's Date: 8/21/2024     Discipline: Occupational Therapy    Missed Visit Reason: Missed Visit Reason:  (Pt currently off floor at scan. No OT eval at this time.)    Missed Time: Attempt 1041

## 2024-08-21 NOTE — PROGRESS NOTES
Speech-Language Pathology                 Therapy Communication Note    Patient Name: Christophe Ambriz  MRN: [id number]  Today's Date: 8/21/2024     Discipline: Speech Language Pathology    Missed Visit Reason:  Swallow evaluation attempted. Pt off the floor. Will re-attempt as pt able/schedule permits.     Missed Time: Attempt

## 2024-08-22 ENCOUNTER — APPOINTMENT (OUTPATIENT)
Dept: RADIOLOGY | Facility: HOSPITAL | Age: 74
DRG: 947 | End: 2024-08-22
Payer: MEDICARE

## 2024-08-22 LAB
ALBUMIN SERPL BCP-MCNC: 3 G/DL (ref 3.4–5)
ANION GAP SERPL CALC-SCNC: 18 MMOL/L (ref 10–20)
BASOPHILS # BLD AUTO: 0.06 X10*3/UL (ref 0–0.1)
BASOPHILS NFR BLD AUTO: 0.2 %
BUN SERPL-MCNC: 14 MG/DL (ref 6–23)
CALCIUM SERPL-MCNC: 8.6 MG/DL (ref 8.6–10.6)
CHLORIDE SERPL-SCNC: 93 MMOL/L (ref 98–107)
CO2 SERPL-SCNC: 23 MMOL/L (ref 21–32)
CREAT SERPL-MCNC: 0.63 MG/DL (ref 0.5–1.3)
EGFRCR SERPLBLD CKD-EPI 2021: >90 ML/MIN/1.73M*2
EOSINOPHIL # BLD AUTO: 0 X10*3/UL (ref 0–0.4)
EOSINOPHIL NFR BLD AUTO: 0 %
ERYTHROCYTE [DISTWIDTH] IN BLOOD BY AUTOMATED COUNT: 15.1 % (ref 11.5–14.5)
GLUCOSE BLD MANUAL STRIP-MCNC: 150 MG/DL (ref 74–99)
GLUCOSE BLD MANUAL STRIP-MCNC: 176 MG/DL (ref 74–99)
GLUCOSE BLD MANUAL STRIP-MCNC: 195 MG/DL (ref 74–99)
GLUCOSE BLD MANUAL STRIP-MCNC: 200 MG/DL (ref 74–99)
GLUCOSE BLD MANUAL STRIP-MCNC: 214 MG/DL (ref 74–99)
GLUCOSE SERPL-MCNC: 211 MG/DL (ref 74–99)
HCT VFR BLD AUTO: 29.1 % (ref 41–52)
HGB BLD-MCNC: 8.7 G/DL (ref 13.5–17.5)
IMM GRANULOCYTES # BLD AUTO: 0.58 X10*3/UL (ref 0–0.5)
IMM GRANULOCYTES NFR BLD AUTO: 2.1 % (ref 0–0.9)
LYMPHOCYTES # BLD AUTO: 3.97 X10*3/UL (ref 0.8–3)
LYMPHOCYTES NFR BLD AUTO: 14.2 %
MAGNESIUM SERPL-MCNC: 2.2 MG/DL (ref 1.6–2.4)
MCH RBC QN AUTO: 24.6 PG (ref 26–34)
MCHC RBC AUTO-ENTMCNC: 29.9 G/DL (ref 32–36)
MCV RBC AUTO: 82 FL (ref 80–100)
MONOCYTES # BLD AUTO: 1.51 X10*3/UL (ref 0.05–0.8)
MONOCYTES NFR BLD AUTO: 5.4 %
NEUTROPHILS # BLD AUTO: 21.93 X10*3/UL (ref 1.6–5.5)
NEUTROPHILS NFR BLD AUTO: 78.1 %
NRBC BLD-RTO: 0 /100 WBCS (ref 0–0)
PHOSPHATE SERPL-MCNC: 3.1 MG/DL (ref 2.5–4.9)
PLATELET # BLD AUTO: 553 X10*3/UL (ref 150–450)
POTASSIUM SERPL-SCNC: 4.3 MMOL/L (ref 3.5–5.3)
RBC # BLD AUTO: 3.53 X10*6/UL (ref 4.5–5.9)
SODIUM SERPL-SCNC: 130 MMOL/L (ref 136–145)
VANCOMYCIN SERPL-MCNC: 24.1 UG/ML (ref 5–20)
WBC # BLD AUTO: 28.1 X10*3/UL (ref 4.4–11.3)

## 2024-08-22 PROCEDURE — 2500000004 HC RX 250 GENERAL PHARMACY W/ HCPCS (ALT 636 FOR OP/ED)

## 2024-08-22 PROCEDURE — 2500000005 HC RX 250 GENERAL PHARMACY W/O HCPCS

## 2024-08-22 PROCEDURE — 82374 ASSAY BLOOD CARBON DIOXIDE: CPT

## 2024-08-22 PROCEDURE — 85025 COMPLETE CBC W/AUTO DIFF WBC: CPT

## 2024-08-22 PROCEDURE — 97530 THERAPEUTIC ACTIVITIES: CPT | Mod: GP | Performed by: STUDENT IN AN ORGANIZED HEALTH CARE EDUCATION/TRAINING PROGRAM

## 2024-08-22 PROCEDURE — 80202 ASSAY OF VANCOMYCIN: CPT

## 2024-08-22 PROCEDURE — 82947 ASSAY GLUCOSE BLOOD QUANT: CPT

## 2024-08-22 PROCEDURE — 99233 SBSQ HOSP IP/OBS HIGH 50: CPT | Performed by: NURSE PRACTITIONER

## 2024-08-22 PROCEDURE — 71045 X-RAY EXAM CHEST 1 VIEW: CPT

## 2024-08-22 PROCEDURE — 2500000001 HC RX 250 WO HCPCS SELF ADMINISTERED DRUGS (ALT 637 FOR MEDICARE OP)

## 2024-08-22 PROCEDURE — 71045 X-RAY EXAM CHEST 1 VIEW: CPT | Performed by: RADIOLOGY

## 2024-08-22 PROCEDURE — 36415 COLL VENOUS BLD VENIPUNCTURE: CPT

## 2024-08-22 PROCEDURE — 97166 OT EVAL MOD COMPLEX 45 MIN: CPT | Mod: GO

## 2024-08-22 PROCEDURE — 97530 THERAPEUTIC ACTIVITIES: CPT | Mod: GO

## 2024-08-22 PROCEDURE — 99233 SBSQ HOSP IP/OBS HIGH 50: CPT | Performed by: INTERNAL MEDICINE

## 2024-08-22 PROCEDURE — 2500000002 HC RX 250 W HCPCS SELF ADMINISTERED DRUGS (ALT 637 FOR MEDICARE OP, ALT 636 FOR OP/ED)

## 2024-08-22 PROCEDURE — 83735 ASSAY OF MAGNESIUM: CPT

## 2024-08-22 PROCEDURE — 1200000003 HC ONCOLOGY  ROOM WITH TELEMETRY DAILY

## 2024-08-22 RX ORDER — OXYCODONE HYDROCHLORIDE 5 MG/1
5 TABLET ORAL EVERY 4 HOURS
Status: DISCONTINUED | OUTPATIENT
Start: 2024-08-22 | End: 2024-08-22

## 2024-08-22 RX ORDER — OXYCODONE HYDROCHLORIDE 5 MG/1
5 TABLET ORAL EVERY 4 HOURS PRN
Status: DISCONTINUED | OUTPATIENT
Start: 2024-08-22 | End: 2024-08-28 | Stop reason: HOSPADM

## 2024-08-22 ASSESSMENT — COGNITIVE AND FUNCTIONAL STATUS - GENERAL
HELP NEEDED FOR BATHING: TOTAL
DRESSING REGULAR LOWER BODY CLOTHING: A LOT
EATING MEALS: A LITTLE
MOBILITY SCORE: 8
CLIMB 3 TO 5 STEPS WITH RAILING: TOTAL
TURNING FROM BACK TO SIDE WHILE IN FLAT BAD: TOTAL
DAILY ACTIVITIY SCORE: 12
DAILY ACTIVITIY SCORE: 9
WALKING IN HOSPITAL ROOM: TOTAL
TOILETING: TOTAL
TOILETING: TOTAL
MOVING TO AND FROM BED TO CHAIR: TOTAL
DRESSING REGULAR LOWER BODY CLOTHING: A LOT
MOBILITY SCORE: 7
DRESSING REGULAR UPPER BODY CLOTHING: TOTAL
MOVING FROM LYING ON BACK TO SITTING ON SIDE OF FLAT BED WITH BEDRAILS: A LOT
TOILETING: A LOT
DRESSING REGULAR LOWER BODY CLOTHING: TOTAL
PERSONAL GROOMING: TOTAL
HELP NEEDED FOR BATHING: A LOT
WALKING IN HOSPITAL ROOM: TOTAL
MOVING FROM LYING ON BACK TO SITTING ON SIDE OF FLAT BED WITH BEDRAILS: A LOT
CLIMB 3 TO 5 STEPS WITH RAILING: TOTAL
EATING MEALS: A LITTLE
DRESSING REGULAR UPPER BODY CLOTHING: A LOT
MOVING FROM LYING ON BACK TO SITTING ON SIDE OF FLAT BED WITH BEDRAILS: A LOT
DAILY ACTIVITIY SCORE: 13
TURNING FROM BACK TO SIDE WHILE IN FLAT BAD: A LOT
PERSONAL GROOMING: A LOT
MOVING TO AND FROM BED TO CHAIR: A LOT
MOVING TO AND FROM BED TO CHAIR: TOTAL
STANDING UP FROM CHAIR USING ARMS: TOTAL
HELP NEEDED FOR BATHING: A LOT
CLIMB 3 TO 5 STEPS WITH RAILING: TOTAL
TURNING FROM BACK TO SIDE WHILE IN FLAT BAD: A LOT
MOBILITY SCORE: 10
PERSONAL GROOMING: A LITTLE
STANDING UP FROM CHAIR USING ARMS: TOTAL
DRESSING REGULAR UPPER BODY CLOTHING: A LOT
WALKING IN HOSPITAL ROOM: TOTAL
STANDING UP FROM CHAIR USING ARMS: A LOT
EATING MEALS: A LITTLE

## 2024-08-22 ASSESSMENT — ACTIVITIES OF DAILY LIVING (ADL)
ADL_ASSISTANCE: NEEDS ASSISTANCE
BATHING_ASSISTANCE: MAXIMAL

## 2024-08-22 ASSESSMENT — PAIN DESCRIPTION - LOCATION: LOCATION: BACK

## 2024-08-22 ASSESSMENT — PAIN - FUNCTIONAL ASSESSMENT
PAIN_FUNCTIONAL_ASSESSMENT: 0-10

## 2024-08-22 ASSESSMENT — PAIN SCALES - GENERAL
PAINLEVEL_OUTOF10: 0 - NO PAIN
PAINLEVEL_OUTOF10: 6
PAINLEVEL_OUTOF10: 7
PAINLEVEL_OUTOF10: 4
PAINLEVEL_OUTOF10: 0 - NO PAIN
PAINLEVEL_OUTOF10: 7
PAINLEVEL_OUTOF10: 1

## 2024-08-22 NOTE — PROGRESS NOTES
"24-hour updates:  -transfer from CICU (as MICU boarder) to floor  -on HFNC 30 L/min, 40% FiO2  -CT SIM done 8/21, plan for spinal RT  -on vanc, pip-tazo for PNA coverage      S  No complaints. No LH/dizziness. No CP. At baseline SOB. No abdominal pain. No dysuria. No n/v but does endorse constipation. Not sure when last BM was. No LE pain/swelling.    Says was not on O2 prior to this admission. Long time smoker, quit 1y ago. Has baseline cough, currently worse w/ more sputum. No fevers or chills.    Hospital course from signout to admissions:  \"Christophe Ambriz is a 74-year-old male with a history of CAD, HTN, HLD, and RCC with spinal mets, status post L1-5 fusion, L2-4 decompression, and tumor debulking on 07/24/24. The patient initially presented to the ED on 08/14/24 with severe back and hip pain radiating to bilateral legs, described as burning and shooting down to his toes, with the leg pain being more severe than lower back pain. There were no bowel or bladder changes, nor any neurological deficits reported. The pain has been debilitating, preventing ambulation for several weeks. MRI showed a stable L3 compression fracture with severe spinal canal stenosis and nerve root compression. Neurosurgery advised against further surgical intervention, and radiation oncology is planning SBRT in five fractions.    The patient was transferred to the MICU following desaturation to 70% on room air, unresponsive to a non-rebreather mask (15 L), with saturation only improving to 84%. Rapid response was called due to his somnolent state and hypotension, though he was afebrile. Physical exam was unremarkable for significant respiratory findings. ABG revealed hypoxemia with an oxygen level of 60. The patient was placed on CPAP (10 cm H2O, 45% FiO2), with improved oxygen saturation to 88-92%. Chest X-ray showed LLL consolidation. CT scan ruled out PE but showed increased multifocal nodular consolidations, suggesting multifocal " "pneumonia with a possible aspiration component.\"    Pt is now on vanc, pip-tazo.      O  VT  Temp:  [36 °C (96.8 °F)-37.7 °C (99.9 °F)] 36.5 °C (97.7 °F)  Heart Rate:  [69-87] 69  Resp:  [12-34] 20  BP: ()/(49-89) 130/88  FiO2 (%):  [40 %-60 %] 40 %     PE  General: no acute distress, HFNC  Cardio: normal rate, regular rhythm, no rubs/murmurs, no S3/S4  Pulm: non-labored, no accessory muscle usage, BS symmetric, no crackles/wheezing/stridor  GI: non-distended, appropriately soft, no masses, non-tender  : no nelson  MSK: no joint swelling, grossly full active ROM  HEENT: grossly normocephalic  Neuro: grossly oriented, CN grossly intact, extremities moving symmetrically  Psych: appropriate affect  Volume: mucous membranes moist, no LE pitting edema  Perfusion: no cyanosis, distal extremities warm      A/P  \"Christophe Ambriz is a 74-year-old male with a history of CAD, HTN, HLD, and RCC with spinal mets, status post L1-5 fusion, L2-4 decompression, and tumor debulking on 07/24/24. The patient initially presented to the ED on 08/14/24 with severe back and hip pain radiating to bilateral legs, described as burning and shooting down to his toes, with the leg pain being more severe than lower back pain. There were no bowel or bladder changes, nor any neurological deficits reported. The pain has been debilitating, preventing ambulation for several weeks. MRI showed a stable L3 compression fracture with severe spinal canal stenosis and nerve root compression. Neurosurgery advised against further surgical intervention, and radiation oncology is planning SBRT in five fractions.    The patient was transferred to the MICU following desaturation to 70% on room air, unresponsive to a non-rebreather mask (15 L), with saturation only improving to 84%. Rapid response was called due to his somnolent state and hypotension, though he was afebrile. Physical exam was unremarkable for significant respiratory findings. ABG revealed " "hypoxemia with an oxygen level of 60. The patient was placed on CPAP (10 cm H2O, 45% FiO2), with improved oxygen saturation to 88-92%. Chest X-ray showed LLL consolidation. CT scan ruled out PE but showed increased multifocal nodular consolidations, suggesting multifocal pneumonia with a possible aspiration component.\"    Pt is now on vanc, pip-tazo. Saturating mid-90s on HFNC. CT sim done, plan for likely radiation to spine, on dex. Transferred to floor 8/22.    Dx: #AHRF 2/2 #pneumonia of unknown cause, possible PNA  CXR 8/1924:: Decrease in prominence of opacification in the left lower lung field  CTAC for PE 8/19/24: negative for PE, possible multifocal pneumonia, possible aspiration, +ve for right lower chest wall mass within the right 9th rib consistent with progression of metastitic disease  8/20/24: required 100% overnight, currently on 92% FiO2 50L  8/20/24: both sets of cultures grew GPCC, COVID -ve,   Management:  Hypoxia likely due to pain  Procalc 0.34  Urine culture ,MRSA, Respiratory Viral Panel, Respirtatory Culture and Smear Legionela, Strep Pneumo still in process  Swallow study pending  Continue: , Vanc and Zosyn     Dx: #Pneumonia   Management:  Repeat CXR, GPCC+ve, repeat blood cultures ordered, awaiting other labs  Started, vanc and zosyn until blood cultures come back    Dx: #Bilateral Radicular Pain   L1-5 fusion w/ L2-4 decompression and tumor debulking (7/24)  at [facility] with NSGY  MRI 8/14: L3 compression fracture with retropulsion of the posterior cortex and severe stenosis of the central spinal canal with nerve root compression unchanged from the previous exam *No new foci of marrow replacement or compression fracture *There is no measurable change compared to the previous exam.  Management:  Dilaudid 1mg q2   Dexamethasone 4mg BID (radiation oncology thomas tell us if we should lower oer not)  Consult medical oncology for systemic therapy  CT simulation 8/21  Plan for SBRT in 5 fractions. "     Dx: #Exogenous Steroids #Hyperglycemic  Receiving dexamathasone 4mg BID  Glucose this   Management:  Goal glucose between 140-180  POCT glucose q4    Dx: #Metastatic renal cell carcinoma   Management:  CT simulation planned for Tuesday 8/20   4-Plan for SBRT in 5 fractions.   Supportive Onc: Dilaudid 1mg Q2 (hypoxia likely due to pain so this should help)  Rad Onc re:   CT simulation  Considering Paliative RT   Dex 4mg BID  Dr. Chavarria emailed about patient     Dx: #CAD s/p PCI (unknown year)        #HTN, #HLD  Management:  continue home atorvastatin, ASA (held plavix as no indication for DAPT and in case of any procedures)  HOLD: home amlodipine 10mg daily, home metoprolol succinate 50mg daily, home hydrochlorothiazide 25mg daily    Disposition  -barriers: safe dispo  -destination: pending PT  -f/u: TBD    Diet: NPO  Electrolytes: K >3.5; Mg >2  DVT ppx: enoxaparin  GI ppx: pantoprazole (on CS)  Lines/tubes: PIV  O2: HFNC  ggt: none  Baseline Cr: 0.6  T+S: 8/20  Code status: DNR/DNI  Surrogate decision maker: Iram William ([facility]) [phone]      Gary Ta MD  IM, PGY3

## 2024-08-22 NOTE — PROGRESS NOTES
Speech-Language Pathology                 Therapy Communication Note    Patient Name: Christophe Ambriz  MRN: [id number]  Today's Date: 8/22/2024     Discipline: Speech Language Pathology    Comment: Orders received for swallow evaluation. Upon chart review, SLP noted continued tenuous respiratory status; receiving o2 via airvo. Discussed case with medical team. Will hold off on swallow evaluation until respiratory status stabilizes/returns closer to baseline. MD aware.

## 2024-08-22 NOTE — SIGNIFICANT EVENT
Rapid Response RN Note    RADAR score 6 due to the following VS: T 36.1 °C; HR 72; RR 22; /70; SPO2 95% on 40% Airvo.     Reviewed above VS with bedside RN via phone.  Vital signs within patient's current trends.  No acute change in condition.  No interventions by rapid response team indicated at this time.    Staff to page rapid response for any concerns or acute change in condition/VS. Ant Toscano RN.

## 2024-08-22 NOTE — CARE PLAN
The patient's goals for the shift include rest    The clinical goals for the shift include patient will remain free of falls and injury through end of shift 8/19 0700      Problem: Pain - Adult  Goal: Verbalizes/displays adequate comfort level or baseline comfort level  Outcome: Progressing     Problem: Safety - Adult  Goal: Free from fall injury  Outcome: Progressing     Problem: Discharge Planning  Goal: Discharge to home or other facility with appropriate resources  Outcome: Progressing     Problem: Chronic Conditions and Co-morbidities  Goal: Patient's chronic conditions and co-morbidity symptoms are monitored and maintained or improved  Outcome: Progressing     Problem: Skin  Goal: Decreased wound size/increased tissue granulation at next dressing change  Outcome: Progressing  Flowsheets (Taken 8/22/2024 0458)  Decreased wound size/increased tissue granulation at next dressing change: Promote sleep for wound healing  Goal: Participates in plan/prevention/treatment measures  Outcome: Progressing  Flowsheets (Taken 8/22/2024 0458)  Participates in plan/prevention/treatment measures:   Discuss with provider PT/OT consult   Increase activity/out of bed for meals  Goal: Prevent/manage excess moisture  Outcome: Progressing  Flowsheets (Taken 8/22/2024 0458)  Prevent/manage excess moisture:   Monitor for/manage infection if present   Use wicking fabric (obtain order)   Moisturize dry skin  Goal: Prevent/minimize sheer/friction injuries  Outcome: Progressing  Flowsheets (Taken 8/22/2024 0458)  Prevent/minimize sheer/friction injuries:   Increase activity/out of bed for meals   Utilize specialty bed per algorithm   Use pull sheet  Goal: Promote/optimize nutrition  Outcome: Progressing  Flowsheets (Taken 8/22/2024 0458)  Promote/optimize nutrition:   Monitor/record intake including meals   Consume > 50% meals/supplements   Offer water/supplements/favorite foods  Goal: Promote skin healing  Outcome:  Progressing  Flowsheets (Taken 8/22/2024 7267)  Promote skin healing:   Ensure correct size (line/device) and apply per  instructions   Rotate device position/do not position patient on device   Protective dressings over bony prominences

## 2024-08-22 NOTE — PROGRESS NOTES
Vancomycin Dosing by Pharmacy- FOLLOW UP    Christophe Ambriz is a 74 y.o. year old male who Pharmacy has been consulted for vancomycin dosing for pneumonia. Based on the patient's indication and renal status this patient is being dosed based on a goal AUC of 500-600.     Renal function is currently stable.    Current vancomycin dose: 1250 mg given every 12 hours    Most recent random level: 24 mcg/mL    Visit Vitals  /67 (BP Location: Left arm, Patient Position: Lying)   Pulse 65   Temp 35.9 °C (96.6 °F) (Temporal)   Resp 20        Lab Results   Component Value Date    CREATININE 0.63 08/22/2024    CREATININE 0.52 08/21/2024    CREATININE 0.61 08/20/2024    CREATININE 0.66 08/19/2024        Patient weight is as follows:   Vitals:    08/21/24 0600   Weight: 74.3 kg (163 lb 12.8 oz)       Cultures:  Susceptibility data for the encounter in last 14 days.  Collected Specimen Info Organism Amoxicillin/Clavulanate Ampicillin Ampicillin/Sulbactam Cefazolin Cefazolin (uncomplicated UTIs only) Ciprofloxacin Clindamycin Erythromycin Gentamicin Nitrofurantoin Oxacillin   08/19/24 Swab from Anterior Nares Methicillin Susceptible Staphylococcus aureus (MSSA)              08/19/24 Urine from Clean Catch/Voided Klebsiella pneumoniae/variicola  S  R  S  S  S  S    S  S    08/19/24 Blood culture from Peripheral Venipuncture Staphylococcus epidermidis              08/19/24 Blood culture from Peripheral Venipuncture Staphylococcus epidermidis        S  S    R     Collected Specimen Info Organism Piperacillin/Tazobactam Tetracycline Trimethoprim/Sulfamethoxazole Vancomycin   08/19/24 Swab from Anterior Nares Methicillin Susceptible Staphylococcus aureus (MSSA)       08/19/24 Urine from Clean Catch/Voided Klebsiella pneumoniae/variicola  S   S    08/19/24 Blood culture from Peripheral Venipuncture Staphylococcus epidermidis       08/19/24 Blood culture from Peripheral Venipuncture Staphylococcus epidermidis   S  S  S        I/O  last 3 completed shifts:  In: 850 (11.4 mL/kg) [IV Piggyback:850]  Out: 1540 (20.7 mL/kg) [Urine:1540 (0.6 mL/kg/hr)]  Weight: 74.3 kg   I/O during current shift:  No intake/output data recorded.    Temp (24hrs), Av.3 °C (97.4 °F), Min:35.9 °C (96.6 °F), Max:36.8 °C (98.2 °F)      Assessment/Plan    Within goal AUC range. Continue current vancomycin regimen.    This dosing regimen is predicted by [employer] to result in the following pharmacokinetic parameters:  Regimen: 1250 mg IV every 12 hours.  Start time: 14:35 on 2024  Exposure target: AUC24 (range)400-600 mg/L.hr   AUC24,ss: 524 mg/L.hr  Probability of AUC24 > 400: 95 %  Ctrough,ss: 13.2 mg/L  Probability of Ctrough,ss > 20: 4 %  Probability of nephrotoxicity (Lodise ELVIA ): 8 %      The next level will be obtained on mid morning labs at . May be obtained sooner if clinically indicated.   Will continue to monitor renal function daily while on vancomycin and order serum creatinine at least every 48 hours if not already ordered.  Follow for continued vancomycin needs, clinical response, and signs/symptoms of toxicity.       Yaneth Avery [facility]

## 2024-08-22 NOTE — TREATMENT PLAN
Updated plan for palliative RT to R rib and L3     30Gy in 10 fractions, tentatively plan to begin 8/23, with the option of completing treatment at  [facility], pending dispo

## 2024-08-22 NOTE — PROGRESS NOTES
Vancomycin Dosing by Pharmacy- Cessation of Therapy    Consult to pharmacy for vancomycin dosing has been discontinued by the prescriber, pharmacy will sign off at this time.    Please call pharmacy if there are further questions or re-enter a consult if vancomycin is resumed.     Kin ClayD

## 2024-08-22 NOTE — PROGRESS NOTES
"Christophe Ambriz is a 74 y.o. 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    Subjective   Mr. Ambriz transferred from the MICU this morning. Seen at bedside. Patient breathing comfortably on 6L HFNC. Says pain is well controlled at this time.       Objective     Physical Exam  Constitutional:       Comments: Thin appearing male    Cardiovascular:      Rate and Rhythm: Normal rate and regular rhythm.      Pulses: Normal pulses.      Heart sounds: Normal heart sounds.   Pulmonary:      Effort: Pulmonary effort is normal.      Breath sounds: Normal breath sounds.   Abdominal:      General: Abdomen is flat.      Palpations: Abdomen is soft.   Musculoskeletal:      Right lower leg: No edema.      Left lower leg: No edema.   Neurological:      General: No focal deficit present.      Mental Status: He is alert and oriented to person, place, and time.   Psychiatric:         Mood and Affect: Mood normal.         Behavior: Behavior normal.         Last Recorded Vitals  Blood pressure 113/67, pulse 65, temperature 35.9 °C (96.6 °F), temperature source Temporal, resp. rate 20, height 1.854 m (6' 1\"), weight 74.3 kg (163 lb 12.8 oz), SpO2 94%.  Intake/Output last 3 Shifts:  I/O last 3 completed shifts:  In: 850 (11.4 mL/kg) [IV Piggyback:850]  Out: 1540 (20.7 mL/kg) [Urine:1540 (0.6 mL/kg/hr)]  Weight: 74.3 kg     Relevant Results                 Scheduled medications  aspirin, 81 mg, oral, Daily  atorvastatin, 20 mg, oral, Nightly  [Held by provider] clopidogrel, 75 mg, oral, Daily  dexAMETHasone, 4 mg, intravenous, q12h  enoxaparin, 40 mg, subcutaneous, q24h  gabapentin, 300 mg, oral, Nightly  insulin lispro, 0-10 Units, subcutaneous, q4h  pantoprazole, 40 mg, intravenous, Daily  piperacillin-tazobactam, 3.375 g, intravenous, q6h  polyethylene glycol, 17 g, oral, Daily  sennosides, 2 tablet, oral, BID  vancomycin, 1,250 mg, intravenous, q12h      Continuous " medications     PRN medications  PRN medications: acetaminophen **OR** [DISCONTINUED] acetaminophen **OR** [DISCONTINUED] acetaminophen, albuterol, bisacodyl, dextrose, dextrose, glucagon, glucagon, oxyCODONE, oxygen, polyethylene glycol, vancomycin    Results for orders placed or performed during the hospital encounter of 08/16/24 (from the past 24 hour(s))   POCT GLUCOSE   Result Value Ref Range    POCT Glucose 173 (H) 74 - 99 mg/dL   POCT GLUCOSE   Result Value Ref Range    POCT Glucose 183 (H) 74 - 99 mg/dL   POCT GLUCOSE   Result Value Ref Range    POCT Glucose 209 (H) 74 - 99 mg/dL   POCT GLUCOSE   Result Value Ref Range    POCT Glucose 179 (H) 74 - 99 mg/dL   POCT GLUCOSE   Result Value Ref Range    POCT Glucose 150 (H) 74 - 99 mg/dL   POCT GLUCOSE   Result Value Ref Range    POCT Glucose 214 (H) 74 - 99 mg/dL       Imaging:  CXR (8/21):  New finding of the left chest hazy density and retrocardiac  density suspect atelectasis/consolidation. Suspect left chest pleural  effusion.         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 The patient initially presented to the ED on 08/14/24 with severe back and hip pain radiating to bilateral legs. Hospitalization complicated by AHRF 2/2 to PNA requiring MICU transfer from 8/19 - 8/22. Now on HFNC and vanc/zosyn    Updates:  -Continue antibiotics    #AHRF 2/2 pneumonia of unknown cause  ::CXR 8/21/24:: New finding of the left chest hazy density and retrocardiac density suspect atelectasis/consolidation. Suspect left chest pleural effusion.  ::CTAC for PE 8/19/24: negative for PE, possible multifocal pneumonia, possible aspiration, +ve for right lower chest wall mass within the right 9th rib consistent with progression of metastitic  disease  ::8/20/24: both sets of cultures grew GPCC, COVID -ve,   Management:  -Procalc 0.34  -Urine culture ,MRSA, Respiratory Viral Panel, Respirtatory Culture and  Smear Legionela, Strep Pneumo still in process  -Swallow study pending  -Continue: Vanc and Zosyn   -Repeat cultures/CXR       #Metastatic renal cell carcinoma   #Bilateral Radicular Pain   ::L1-5 fusion w/ L2-4 decompression and tumor debulking (7/24)  at [facility] with NSGY  ::MRI 8/14: L3 compression fracture with retropulsion of the posterior cortex and severe stenosis of the central spinal canal with nerve root compression unchanged from the previous exam *No new foci of marrow replacement or compression fracture *There is no measurable change compared to the previous exam.  Management:  -Dexamethasone 4mg BID - continue while RT pending   -CT simulation 8/21  -Plan for SBRT in 5 fractions.   -Tylenol and oxycodone prn pain        #Hyperglycemia  ::Receiving dexamathasone 4mg BID  Management:  -Goal glucose between 140-180  -POCT glucose q4        #CAD s/p PCI (unknown year)   #HTN, #HLD  -continue home atorvastatin, ASA (held plavix as no indication for DAPT and in case of any procedures)  -HOLD: home amlodipine 10mg daily, home metoprolol succinate 50mg daily, home hydrochlorothiazide 25mg daily         F: PRN  E: PRN  N: Regular  GI: Pantoprazole  Code Status: DNR/DNI  Trent Montiel MD    I saw and evaluated the patient. I personally obtained the key and critical portions of the history and physical exam or was physically present for key and critical portions performed by the resident/fellow. I reviewed the resident/fellow's documentation and discussed the patient with the resident/fellow. I agree with the resident/fellow's medical decision making as documented in the note.     Alondra Saravia MD MS

## 2024-08-22 NOTE — PROGRESS NOTES
Physical Therapy    Physical Therapy Treatment    Patient Name: Christophe Ambriz  MRN: [id number]  Today's Date: 8/22/2024  Room: [address] [place] [address]  Time Calculation  Start Time: 1011  Stop Time: 1038  Time Calculation (min): 27 min       Assessment/Plan      PT Plan  Treatment/Interventions: Bed mobility, Transfer training, Gait training, Balance training, Strengthening, Endurance training, Therapeutic exercise, Therapeutic activity, Positioning, Postural re-education  PT Plan: Ongoing PT  PT Frequency: 3 times per week  PT Discharge Recommendations: Moderate intensity level of continued care (24/7 assist)  PT - OK to Discharge: Yes  PT Plan  Treatment/Interventions: Bed mobility, Transfer training, Gait training, Balance training, Strengthening, Endurance training, Therapeutic exercise, Therapeutic activity, Positioning, Postural re-education  PT Plan: Ongoing PT  PT Frequency: 3 times per week  PT Discharge Recommendations: Moderate intensity level of continued care (24/7 assist)  PT - OK to Discharge: Yes      General Visit Information:   PT  Visit  PT Received On: 08/22/24  Co-Treatment: OT  Co-Treatment Reason: To increase patient safety and functional capacity  Prior to Session Communication: Bedside nurse  Patient Position Received: Bed, 3 rail up, Alarm off, caregiver present     Subjective   Subjective: Patient is alert, agreeable to PT.  Fiancee in room and supportive throughout session.  Patient feels like sitting up went better than he did a few weeks ago  Precautions:  Precautions  Medical Precautions: Fall precautions, Spinal precautions  Vital Signs:  Vital Signs  SpO2: 93 % (86 low with upright activity, 93 end of session)  BP: 115/72 (sitting 100/56, post 118/76)    Objective   Pain:  Pain Assessment  0-10 (Numeric) Pain Score: 1 (post 6)  Pain Type: Chronic pain  Pain Location: Back  Cognition:  Cognition  Overall Cognitive Status: Within Functional Limits  Lines/Tubes/Drains:  External Urinary Catheter  (Active)   Number of days: 0     Medical Gas Therapy: Supplemental oxygen  Continuous Medications/Drips:       PT Treatments:  Therapeutic Exercise  Therapeutic Exercise Performed: Yes  Therapeutic Exercise Activity 1: BLE PF, DF, heel slide 5 x 1 AROM  Therapeutic Exercise Activity 2: BLE SLR 1 x 1 minimal bed clearance  Therapeutic Activity  Therapeutic Activity Performed: Yes  Therapeutic Activity 1: EOB min a 7 min a BUE support, intermittnet CGA limited by increased LBP     Bed Mobility  Bed Mobility: Yes  Bed Mobility 1  Bed Mobility 1: Supine to sitting  Level of Assistance 1: Moderate assistance, +2  Bed Mobility Comments 1: log roll, TAPS,  Bed Mobility 2  Bed Mobility  2: Sitting to supine  Level of Assistance 2: Maximum assistance, +2  Bed Mobility Comments 2: TAPS                   Outcome Measures:  [facility] Basic Mobility  Turning from your back to your side while in a flat bed without using bedrails: A lot  Moving from lying on your back to sitting on the side of a flat bed without using bedrails: A lot  Moving to and from bed to chair (including a wheelchair): Total  Standing up from a chair using your arms (e.g. wheelchair or bedside chair): Total  To walk in hospital room: Total  Climbing 3-5 steps with railing: Total  Basic Mobility - Total Score: 8                            Education Documentation  Precautions, taught by Gulshan Parker PT at 8/22/2024 11:15 AM.  Learner: Patient  Readiness: Acceptance  Method: Explanation  Response: Needs Reinforcement    Mobility Training, taught by Gulshan Parker PT at 8/22/2024 11:15 AM.  Learner: Patient  Readiness: Acceptance  Method: Explanation  Response: Needs Reinforcement    Education Comments  No comments found.          OP EDUCATION:       Encounter Problems       Encounter Problems (Active)       PT Problem       Patient will complete bed mobility with MODx1 via log roll sequencing, using bedrail as needed   (Progressing)       Start:  08/20/24     Expected End:  09/10/24            Patient will complete STS with MODx2 using LRAD without acute LOB   (Progressing)       Start:  08/20/24    Expected End:  09/10/24            Patient will ambulate >/=5' with LRAD with MODx2 without acute LOB  (Progressing)       Start:  08/20/24    Expected End:  09/10/24            Patient will participate in BLE there-ex program in order to assist in improving strength and to assist with the completion of functional mobility tasks.  (Progressing)       Start:  08/20/24    Expected End:  09/10/24            Patient will complete static (stand by assist) and dynamic (contact guard assist) sitting balance activities using UE support as needed in order to maintain midline without acute LOB.  (Progressing)       Start:  08/20/24    Expected End:  09/10/24               Pain - Adult              Assessment: Patient is progressing Well with therapy this date.  Patient able to tolerate bed level there-ex and EOB activity this date.  Improved upright tolerance with no LOB, limited by steadily increasing fatigue and LBP this date.  Patient remains appropriate for MOD intensity therapy when medically appropriate for discharge from acute stay.  Will continue to follow.      08/22/24 at 11:15 AM   Gulshan Parker, PT   Rehab Office: [phone]

## 2024-08-22 NOTE — PROGRESS NOTES
Occupational Therapy    Evaluation and Treatment    Patient Name: Christophe Ambriz  MRN: [id number]  Today's Date: 8/22/2024  Room: [address] [place], [place] [address]  Time Calculation  Start Time: 1012  Stop Time: 1039  Time Calculation (min): 27 min    Assessment  IP OT Assessment  OT Assessment: Pt presents with impaired functional mobility, balance, endurance, cognition, and ADLs/IADLs. Pt requires MaxA-DepA for most ADLs and all IADLs prior to admission. Pt has good social support from his fiance but has been limited to bed level activity prior to admission. Pt tolerated tx fairly demonstrating good effort throguhout but ultimately limited by pain. Pt previously higher level of function and would benefit from skilled OT services at a MOD intensity to address noted deficits.  Prognosis: Fair  Barriers to Discharge: None  Evaluation/Treatment Tolerance: Patient tolerated treatment well  Medical Staff Made Aware: Yes  End of Session Communication: Bedside nurse  End of Session Patient Position: Bed, 3 rail up, Alarm off, not on at start of session  Plan:  Inpatient Plan  Treatment Interventions: ADL retraining, Functional transfer training, Endurance training, Cognitive reorientation, Equipment evaluation/education, Compensatory technique education  OT Frequency: 2 times per week  OT Discharge Recommendations: Moderate intensity level of continued care  Equipment Recommended upon Discharge: Other (comment) (TBD at next level of care)  OT Recommended Transfer Status: Assist of 2  OT - OK to Discharge: Yes  OT Assessment  OT Assessment Results: Decreased ADL status, Decreased cognition, Decreased endurance, Decreased functional mobility, Decreased IADLs  Prognosis: Fair  Barriers to Discharge: None  Evaluation/Treatment Tolerance: Patient tolerated treatment well  Medical Staff Made Aware: Yes  Strengths: Attitude of self  Barriers to Participation: Comorbidities    Subjective   Current Problem:  1. Renal cell carcinoma associated with  acquired cystic disease (Multi)        2. Acute hypoxic respiratory failure (Multi)  Transthoracic Echo (TTE) Complete    Transthoracic Echo (TTE) Complete        General:  Reason for Referral: initially presented to the ED on 08/14/24 with severe back and hip pain radiating to bilateral legs, described as burning and shooting down to his toes, with the leg pain being more severe than lower back pain. MRI showed a stable L3 compression fracture with severe spinal canal stenosis and nerve root compression. Neurosurgery advised against further surgical intervention, and radiation oncology is planning SBRT in five fractions. Transferred to the MICU d/t increase supplemental o2 needs, now on AirVo. CT PE (-)  Past Medical History Relevant to Rehab: CAD, HTN, HLD, and RCC with spinal mets, status post L1-5 fusion, L2-4 decompression, and tumor debulking on 07/24/24. Reports hx of falls  Co-Treatment: PT  Co-Treatment Reason: To increase patient participation, safety and functional tolerance  Prior to Session Communication: Bedside nurse  Patient Position Received: Bed, 3 rail up, Alarm off, caregiver present  Family/Caregiver Present: Yes  Caregiver Feedback: Pts fiance present and supportive throughout  General Comment: Patient supine in bed upon arrival. Pleasant and agreeable to OT eval and tx.   Precautions:  Medical Precautions: Fall precautions, Spinal precautions  Vital Signs:  Heart Rate: 68 (65 post)  SpO2: 93 % (93 post, lowest 86 during mobility)  BP:  (118/76 pre, 118/76 post)  Pain:  Pain Assessment  Pain Assessment: 0-10  0-10 (Numeric) Pain Score: 4  Pain Type: Chronic pain  Pain Location: Back  Pain Interventions: Repositioned, Ambulation/increased activity, Relaxation technique, Rest  Response to Interventions: increased to 6 after mobility, resting comfortably supine at end of session  Lines/Tubes/Drains:  External Urinary Catheter (Active)   Number of days: 0         Objective   Cognition:  Overall  Cognitive Status: Within Functional Limits  Arousal/Alertness: Appropriate responses to stimuli  Orientation Level: Disoriented to time  Following Commands: Follows one step commands with repetition  Insight: Mild  Impulsive: Within functional limits  Processing Speed: Within funtional limits    Home Living:  Type of Home: Apartment (handicap accessible apt)  Lives With: Significant other (fiance)  Home Adaptive Equipment: Walker rolling or standard, Wheelchair-manual  Home Layout: One level, Laundry outside, Able to live on main level with bedroom/bathroom  Home Access: Level entry, No concerns  Bathroom Shower/Tub:  (completes sponge baths)  Bathroom Toilet: Standard  Bathroom Equipment: Bedside commode, Other (Comment) (urinal)  Home Living Comments: stays in bed, limited transfers to BSC/toilet near bedside per fiance report   Prior Function:  Level of [place]: Needs assistance with ADLs, Needs assistance with functional transfers, Needs assistance with homemaking  Receives Help From: Family (fiance)  ADL Assistance: Needs assistance (MaxA- Dep for most ADLs)  Homemaking Assistance: Needs assistance (Fiance completes all homemaking tasks)  Ambulatory Assistance: Needs assistance  Hand Dominance: Right  IADL History:  Homemaking Responsibilities: No  Current License: No  Occupation: Retired  ADL:  Eating Assistance: Stand by  Eating Deficit: Setup, Increased time to complete  Grooming Assistance: Stand by  Grooming Deficit: Setup  Bathing Assistance: Maximal (anticipated)  UE Dressing Assistance: Maximal (anticipated)  LE Dressing Assistance: Total (anticipated)  Toileting Assistance with Device: Total (anticipated)  Activity Tolerance:  Endurance: Tolerates less than 10 min exercise with changes in vital signs  Balance:  Dynamic Sitting Balance  Dynamic Sitting-Balance Support: Bilateral upper extremity supported  Dynamic Sitting-Level of Assistance: Minimum assistance  Dynamic Sitting-Balance: Reaching for  objects  Static Sitting Balance  Static Sitting-Balance Support: Bilateral upper extremity supported, Feet supported  Static Sitting-Level of Assistance: Contact guard, Minimum assistance  Static Sitting-Comment/Number of Minutes: Kel with intermittent CGA with BUE support  Bed Mobility/Transfers: Bed Mobility/Transfers: Bed Mobility  Bed Mobility: Yes  Bed Mobility 1  Bed Mobility 1: Supine to sitting  Level of Assistance 1: Moderate assistance, +2  Bed Mobility Comments 1: Cues and assist for log roll, use of TAPS  Bed Mobility 2  Bed Mobility  2: Sitting to supine  Level of Assistance 2: Maximum assistance, +2  Bed Mobility Comments 2: use of TAPS  Bed Mobility 3  Bed Mobility 3: Rolling left  Level of Assistance 3: Contact guard, Minimal tactile cues, Minimal verbal cues  Bed Mobility Comments 3: Cues for hand placement, increased time to complete  Functional Mobility  Functional Mobility Performed: No   and Transfers  Transfer: No  IADL's:   Homemaking Responsibilities: No  Current License: No  Occupation: Retired  Vision: Vision - Basic Assessment  Current Vision: Wears glasses all the time   and Vision - Complex Assessment  Ocular Range of Motion: Within Functional Limits  Sensation:  Light Touch: No apparent deficits (BUE)  Strength:  Strength Comments: BUE WFL- grossly 4/5 bilaterally  Perception:  Inattention/Neglect: Appears intact  Coordination:  Movements are Fluid and Coordinated: Yes   Hand Function:  Hand Function  Gross Grasp: Functional  Coordination: Functional  Extremities:   RUE   RUE : Within Functional Limits, LUE   LUE: Within Functional Limits,  , and        Outcome Measures: [facility] Daily Activity  Putting on and taking off regular lower body clothing: Total  Bathing (including washing, rinsing, drying): A lot  Putting on and taking off regular upper body clothing: A lot  Toileting, which includes using toilet, bedpan or urinal: Total  Taking care of personal grooming such as brushing teeth:  A little  Eating Meals: A little  Daily Activity - Total Score: 12         ,     OT Adult Other Outcome Measures  4AT: +    Education Documentation  Body Mechanics, taught by Josue Romano OT at 8/22/2024 12:34 PM.  Learner: Significant Other, Patient  Readiness: Acceptance  Method: Explanation  Response: Verbalizes Understanding, Demonstrated Understanding    Precautions, taught by Josue Romano OT at 8/22/2024 12:34 PM.  Learner: Significant Other, Patient  Readiness: Acceptance  Method: Explanation  Response: Verbalizes Understanding, Demonstrated Understanding    Education Comments  No comments found.        Goals:   Encounter Problems       Encounter Problems (Active)       ADLs       Pt will complete toilet hygiene while seated or at bed level with MAX level of assistance.         Start:  08/22/24    Expected End:  09/05/24            Pt will complete LB dressing with MAX level of assistance while seated and/or at bed level          Start:  08/22/24    Expected End:  09/05/24               BALANCE       Pt will maintain dynamic sitting balance during ADL task with stand by assist level of assistance in order to demonstrate improved postural control and increased independence during ADL tasks.       Start:  08/22/24    Expected End:  09/05/24               COGNITION/SAFETY       Patient will demonstrated orientation x4 with no more than 1 verbal cue.       Start:  08/22/24    Expected End:  09/05/24       ORIENTATION            TRANSFERS       Patient will perform bed mobility modified independent level of assistance and bed rails in order to improve safety and independence with mobility       Start:  08/22/24    Expected End:  09/05/24                   Treatment Completed on Evaluation    Therapy/Activity:     Therapeutic Activity  Therapeutic Activity Performed: Yes  Therapeutic Activity 1: EOB for ~7 with Kel and BUE support, able to maintain intermittent CGA (limited by back  pain)  Therapeutic Activity 2: Pt and wife educated on therapuetic benefits of OT and therapy outlook while admitted  Therapeutic Activity 3: Functional mobility training as noted requiring skilled assistance and cueing    08/22/24 at 12:35 PM   Josue Romano, OT   Rehab Office: [phone]

## 2024-08-22 NOTE — CARE PLAN
Problem: Pain - Adult  Goal: Verbalizes/displays adequate comfort level or baseline comfort level  Outcome: Progressing       The clinical goals for the shift include Pt will be safe, comfortable, and HD stable overnight.

## 2024-08-23 ENCOUNTER — APPOINTMENT (OUTPATIENT)
Dept: RADIOLOGY | Facility: HOSPITAL | Age: 74
DRG: 947 | End: 2024-08-23
Payer: MEDICARE

## 2024-08-23 LAB
ALBUMIN SERPL BCP-MCNC: 3 G/DL (ref 3.4–5)
ANION GAP SERPL CALC-SCNC: 16 MMOL/L (ref 10–20)
BACTERIA BLD AEROBE CULT: ABNORMAL
BACTERIA BLD CULT: ABNORMAL
BASOPHILS # BLD MANUAL: 0 X10*3/UL (ref 0–0.1)
BASOPHILS NFR BLD MANUAL: 0 %
BUN SERPL-MCNC: 18 MG/DL (ref 6–23)
CALCIUM SERPL-MCNC: 8.8 MG/DL (ref 8.6–10.6)
CHLORIDE SERPL-SCNC: 97 MMOL/L (ref 98–107)
CO2 SERPL-SCNC: 25 MMOL/L (ref 21–32)
CREAT SERPL-MCNC: 0.51 MG/DL (ref 0.5–1.3)
DACRYOCYTES BLD QL SMEAR: ABNORMAL
EGFRCR SERPLBLD CKD-EPI 2021: >90 ML/MIN/1.73M*2
EOSINOPHIL # BLD MANUAL: 0 X10*3/UL (ref 0–0.4)
EOSINOPHIL NFR BLD MANUAL: 0 %
ERYTHROCYTE [DISTWIDTH] IN BLOOD BY AUTOMATED COUNT: 15.2 % (ref 11.5–14.5)
GLUCOSE BLD MANUAL STRIP-MCNC: 147 MG/DL (ref 74–99)
GLUCOSE BLD MANUAL STRIP-MCNC: 154 MG/DL (ref 74–99)
GLUCOSE BLD MANUAL STRIP-MCNC: 163 MG/DL (ref 74–99)
GLUCOSE BLD MANUAL STRIP-MCNC: 166 MG/DL (ref 74–99)
GLUCOSE BLD MANUAL STRIP-MCNC: 173 MG/DL (ref 74–99)
GLUCOSE SERPL-MCNC: 139 MG/DL (ref 74–99)
GRAM STN SPEC: ABNORMAL
HCT VFR BLD AUTO: 31.4 % (ref 41–52)
HGB BLD-MCNC: 9.5 G/DL (ref 13.5–17.5)
IMM GRANULOCYTES # BLD AUTO: 0.6 X10*3/UL (ref 0–0.5)
IMM GRANULOCYTES NFR BLD AUTO: 2.7 % (ref 0–0.9)
LYMPHOCYTES # BLD MANUAL: 2.72 X10*3/UL (ref 0.8–3)
LYMPHOCYTES NFR BLD MANUAL: 12.2 %
MAGNESIUM SERPL-MCNC: 2.38 MG/DL (ref 1.6–2.4)
MCH RBC QN AUTO: 25.3 PG (ref 26–34)
MCHC RBC AUTO-ENTMCNC: 30.3 G/DL (ref 32–36)
MCV RBC AUTO: 84 FL (ref 80–100)
MONOCYTES # BLD MANUAL: 1.63 X10*3/UL (ref 0.05–0.8)
MONOCYTES NFR BLD MANUAL: 7.3 %
MYELOCYTES # BLD MANUAL: 0.18 X10*3/UL
MYELOCYTES NFR BLD MANUAL: 0.8 %
NEUTS SEG # BLD MANUAL: 17.77 X10*3/UL (ref 1.6–5)
NEUTS SEG NFR BLD MANUAL: 79.7 %
NRBC BLD-RTO: 0 /100 WBCS (ref 0–0)
OVALOCYTES BLD QL SMEAR: ABNORMAL
PHOSPHATE SERPL-MCNC: 3.4 MG/DL (ref 2.5–4.9)
PLATELET # BLD AUTO: 536 X10*3/UL (ref 150–450)
POLYCHROMASIA BLD QL SMEAR: ABNORMAL
POTASSIUM SERPL-SCNC: 4.3 MMOL/L (ref 3.5–5.3)
RBC # BLD AUTO: 3.76 X10*6/UL (ref 4.5–5.9)
RBC MORPH BLD: ABNORMAL
SODIUM SERPL-SCNC: 134 MMOL/L (ref 136–145)
TOTAL CELLS COUNTED BLD: 123
VANCOMYCIN SERPL-MCNC: 6.5 UG/ML (ref 5–20)
WBC # BLD AUTO: 22.3 X10*3/UL (ref 4.4–11.3)

## 2024-08-23 PROCEDURE — 85027 COMPLETE CBC AUTOMATED: CPT

## 2024-08-23 PROCEDURE — 2500000004 HC RX 250 GENERAL PHARMACY W/ HCPCS (ALT 636 FOR OP/ED)

## 2024-08-23 PROCEDURE — 92610 EVALUATE SWALLOWING FUNCTION: CPT | Mod: GN | Performed by: SPEECH-LANGUAGE PATHOLOGIST

## 2024-08-23 PROCEDURE — 36415 COLL VENOUS BLD VENIPUNCTURE: CPT

## 2024-08-23 PROCEDURE — 83735 ASSAY OF MAGNESIUM: CPT

## 2024-08-23 PROCEDURE — 74230 X-RAY XM SWLNG FUNCJ C+: CPT

## 2024-08-23 PROCEDURE — 2500000002 HC RX 250 W HCPCS SELF ADMINISTERED DRUGS (ALT 637 FOR MEDICARE OP, ALT 636 FOR OP/ED)

## 2024-08-23 PROCEDURE — 36415 COLL VENOUS BLD VENIPUNCTURE: CPT | Performed by: STUDENT IN AN ORGANIZED HEALTH CARE EDUCATION/TRAINING PROGRAM

## 2024-08-23 PROCEDURE — 1200000003 HC ONCOLOGY  ROOM WITH TELEMETRY DAILY

## 2024-08-23 PROCEDURE — 99233 SBSQ HOSP IP/OBS HIGH 50: CPT

## 2024-08-23 PROCEDURE — 85007 BL SMEAR W/DIFF WBC COUNT: CPT

## 2024-08-23 PROCEDURE — 92611 MOTION FLUOROSCOPY/SWALLOW: CPT | Mod: GN | Performed by: SPEECH-LANGUAGE PATHOLOGIST

## 2024-08-23 PROCEDURE — 2500000001 HC RX 250 WO HCPCS SELF ADMINISTERED DRUGS (ALT 637 FOR MEDICARE OP)

## 2024-08-23 PROCEDURE — 82947 ASSAY GLUCOSE BLOOD QUANT: CPT

## 2024-08-23 PROCEDURE — 80202 ASSAY OF VANCOMYCIN: CPT | Performed by: STUDENT IN AN ORGANIZED HEALTH CARE EDUCATION/TRAINING PROGRAM

## 2024-08-23 PROCEDURE — 74230 X-RAY XM SWLNG FUNCJ C+: CPT | Performed by: STUDENT IN AN ORGANIZED HEALTH CARE EDUCATION/TRAINING PROGRAM

## 2024-08-23 PROCEDURE — 2500000005 HC RX 250 GENERAL PHARMACY W/O HCPCS: Performed by: STUDENT IN AN ORGANIZED HEALTH CARE EDUCATION/TRAINING PROGRAM

## 2024-08-23 PROCEDURE — 80069 RENAL FUNCTION PANEL: CPT

## 2024-08-23 RX ORDER — HYDROMORPHONE HYDROCHLORIDE 1 MG/ML
0.4 INJECTION, SOLUTION INTRAMUSCULAR; INTRAVENOUS; SUBCUTANEOUS ONCE
Status: COMPLETED | OUTPATIENT
Start: 2024-08-23 | End: 2024-08-23

## 2024-08-23 ASSESSMENT — COGNITIVE AND FUNCTIONAL STATUS - GENERAL
MOVING FROM LYING ON BACK TO SITTING ON SIDE OF FLAT BED WITH BEDRAILS: A LOT
MOBILITY SCORE: 10
TURNING FROM BACK TO SIDE WHILE IN FLAT BAD: A LOT
DAILY ACTIVITIY SCORE: 13
HELP NEEDED FOR BATHING: A LOT
STANDING UP FROM CHAIR USING ARMS: A LOT
TOILETING: A LOT
WALKING IN HOSPITAL ROOM: TOTAL
CLIMB 3 TO 5 STEPS WITH RAILING: TOTAL
DRESSING REGULAR LOWER BODY CLOTHING: A LOT
PERSONAL GROOMING: A LOT
DRESSING REGULAR UPPER BODY CLOTHING: A LOT
EATING MEALS: A LITTLE
MOVING TO AND FROM BED TO CHAIR: A LOT

## 2024-08-23 ASSESSMENT — ACTIVITIES OF DAILY LIVING (ADL): LACK_OF_TRANSPORTATION: NO

## 2024-08-23 ASSESSMENT — PAIN SCALES - GENERAL: PAINLEVEL_OUTOF10: 8

## 2024-08-23 NOTE — PROGRESS NOTES
Speech-Language Pathology  Adult Inpatient Clinical Bedside Swallow Evaluation    Patient Name: Christophe Ambriz  MRN: [id number]  Today's Date: 8/23/2024   Start Time: 900  Stop Time: 925  Time Calculation (min): 25    History of Present Illness:   Christophe Ambriz is a 74 y.o. 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     Assessment:   Clinical bedside swallow evaluation completed with pt's verbal consent. Pt seen sitting upright in bed and A&Ox4.  Pt's oral motor exam appeared WNL. Pt able to elicit a weak/wet/nonproductive volitional cough. Pt given ice chips and teaspoon sips of water. When 3 oz water protocol completed pt with overt s/s of aspiration e/b wet coughing/throat clearing. Due to pt's hx and s/s of aspiration at the bedside, an instrumental evaluation via MBSS is indicated to determine safety of PO intake. Pt recommended NPO with 2-3 ice chips an hour and small sips of water. If pt status changes, please change to strict NPO and alert SLP. Nursing made aware. Will complete MBSS as orders placed by MD and radiology schedule permits.      Recommendations:  NPO with 2-3 ice chips an hour/small sips of water after frequent aggressive oral care.  -Instrumental evaluation to determine safety of PO intake      Goal:   Pt will tolerate least restrictive diet with no overt clinical s/s aspiration 100% of the time.   Start Date: 8/23/2024  Expected Time Frame to Meet Goal: 1-2 weeks       Plan:  SLP Services Indicated: Yes  Frequency: 2x week  Discussed POC with patient  SLP - OK to Discharge    Pain:   0-10  0 = No pain.     Inpatient Education:  Extensive education provided to patient regarding current swallow function, recommendations/results, and POC.      Consultations/Referrals/Coordination of Services:   N/A

## 2024-08-23 NOTE — SIGNIFICANT EVENT
Discussed in detail with the patient multiple core morbidities and recovering from potential pneumonia and performance status is very poor.  Did review in detail metastatic renal cell carcinoma with mostly oligometastatic disease does have a primary in place with bony metastases status post decompression of tumor continue with recommendations for palliative radiation pain control discussed options such as systemic treatment and risk benefits and alternatives we will address once he is discharged IO/IO vs IO/TKI.

## 2024-08-23 NOTE — CARE PLAN
Problem: Pain - Adult  Goal: Verbalizes/displays adequate comfort level or baseline comfort level  Outcome: Progressing     Problem: Safety - Adult  Goal: Free from fall injury  Outcome: Progressing     Problem: Discharge Planning  Goal: Discharge to home or other facility with appropriate resources  Outcome: Progressing     Problem: Chronic Conditions and Co-morbidities  Goal: Patient's chronic conditions and co-morbidity symptoms are monitored and maintained or improved  Outcome: Progressing     Problem: Skin  Goal: Decreased wound size/increased tissue granulation at next dressing change  Outcome: Progressing  Flowsheets (Taken 8/23/2024 0958)  Decreased wound size/increased tissue granulation at next dressing change: Promote sleep for wound healing  Goal: Participates in plan/prevention/treatment measures  Outcome: Progressing  Flowsheets (Taken 8/23/2024 0958)  Participates in plan/prevention/treatment measures: Elevate heels  Goal: Prevent/manage excess moisture  Outcome: Progressing  Flowsheets (Taken 8/23/2024 0958)  Prevent/manage excess moisture: Monitor for/manage infection if present  Goal: Prevent/minimize sheer/friction injuries  Outcome: Progressing  Flowsheets (Taken 8/23/2024 0958)  Prevent/minimize sheer/friction injuries:   Use pull sheet   Turn/reposition every 2 hours/use positioning/transfer devices   HOB 30 degrees or less  Goal: Promote/optimize nutrition  Outcome: Progressing  Flowsheets (Taken 8/23/2024 0958)  Promote/optimize nutrition: Discuss with provider if NPO > 2 days  Goal: Promote skin healing  Outcome: Progressing  Flowsheets (Taken 8/23/2024 0958)  Promote skin healing: Turn/reposition every 2 hours/use positioning/transfer devices   The patient's goals for the shift include rest    The clinical goals for the shift include pt will remain free from injury this shift

## 2024-08-23 NOTE — PROGRESS NOTES
"Christophe Ambriz is a 74 y.o. 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    Subjective   Seen at bedside this morning. Patient breathing comfortably on 7L HFNC. Says pain is well controlled at this time. Asking for a diet. Speech eval will be done this morning.       Objective     Physical Exam  Constitutional:       Comments: Thin appearing male    Cardiovascular:      Rate and Rhythm: Normal rate and regular rhythm.      Pulses: Normal pulses.      Heart sounds: Normal heart sounds.   Pulmonary:      Effort: Pulmonary effort is normal.      Breath sounds: Normal breath sounds.   Abdominal:      General: Abdomen is flat.      Palpations: Abdomen is soft.   Musculoskeletal:      Right lower leg: No edema.      Left lower leg: No edema.   Neurological:      General: No focal deficit present.      Mental Status: He is alert and oriented to person, place, and time.   Psychiatric:         Mood and Affect: Mood normal.         Behavior: Behavior normal.         Last Recorded Vitals  Blood pressure 114/64, pulse 64, temperature 35.7 °C (96.3 °F), temperature source Temporal, resp. rate 18, height 1.854 m (6' 1\"), weight 74.3 kg (163 lb 12.8 oz), SpO2 93%.  Intake/Output last 3 Shifts:  I/O last 3 completed shifts:  In: 850 (11.4 mL/kg) [IV Piggyback:850]  Out: 2000 (26.9 mL/kg) [Urine:2000 (0.7 mL/kg/hr)]  Weight: 74.3 kg     Relevant Results                 Scheduled medications  aspirin, 81 mg, oral, Daily  atorvastatin, 20 mg, oral, Nightly  [Held by provider] clopidogrel, 75 mg, oral, Daily  dexAMETHasone, 4 mg, intravenous, q12h  enoxaparin, 40 mg, subcutaneous, q24h  gabapentin, 300 mg, oral, Nightly  HYDROmorphone, 0.4 mg, intravenous, Once  insulin lispro, 0-10 Units, subcutaneous, q4h  pantoprazole, 40 mg, intravenous, Daily  piperacillin-tazobactam, 3.375 g, intravenous, q6h  polyethylene glycol, 17 g, oral, Daily  sennosides, 2 tablet, oral, " BID      Continuous medications     PRN medications  PRN medications: acetaminophen **OR** [DISCONTINUED] acetaminophen **OR** [DISCONTINUED] acetaminophen, albuterol, bisacodyl, dextrose, dextrose, glucagon, glucagon, oxyCODONE, oxygen, polyethylene glycol    Results for orders placed or performed during the hospital encounter of 08/16/24 (from the past 24 hour(s))   POCT GLUCOSE   Result Value Ref Range    POCT Glucose 200 (H) 74 - 99 mg/dL   POCT GLUCOSE   Result Value Ref Range    POCT Glucose 176 (H) 74 - 99 mg/dL   POCT GLUCOSE   Result Value Ref Range    POCT Glucose 195 (H) 74 - 99 mg/dL   POCT GLUCOSE   Result Value Ref Range    POCT Glucose 166 (H) 74 - 99 mg/dL   POCT GLUCOSE   Result Value Ref Range    POCT Glucose 154 (H) 74 - 99 mg/dL       Imaging:  CXR (8/21):  New finding of the left chest hazy density and retrocardiac  density suspect atelectasis/consolidation. Suspect left chest pleural  effusion.         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 The patient initially presented to the ED on 08/14/24 with severe back and hip pain radiating to bilateral legs. Hospitalization complicated by AHRF 2/2 to PNA requiring MICU transfer from 8/19 - 8/22. Now on HFNC and vanc/zosyn    Updates:  -Continue antibiotics  -Barium swallow today  -Continue to wean O2  -First radiation treatment today     #AHRF 2/2 pneumonia of unknown cause  ::CXR 8/21/24:: New finding of the left chest hazy density and retrocardiac density suspect atelectasis/consolidation. Suspect left chest pleural effusion.  ::CTAC for PE 8/19/24: negative for PE, possible multifocal pneumonia, possible aspiration, +ve for right lower chest wall mass within the right 9th rib consistent with progression of  metastitic disease  ::8/20/24: both sets of cultures grew GPCC, COVID -ve, MRSA nares +  Management:  -Procalc 0.34  -Urine culture, Respiratory Viral Panel, Respirtatory Culture and  Smear Legionela, Strep Pneumo negative  -Modified barium swallow pending   -Continue: Zosyn   -Repeat cultures/CXR       #Metastatic renal cell carcinoma   #Bilateral Radicular Pain   ::L1-5 fusion w/ L2-4 decompression and tumor debulking (7/24)  at [facility] with NSGY  ::MRI 8/14: L3 compression fracture with retropulsion of the posterior cortex and severe stenosis of the central spinal canal with nerve root compression unchanged from the previous exam *No new foci of marrow replacement or compression fracture *There is no measurable change compared to the previous exam.  Management:  -Dexamethasone 4mg BID - continue while RT pending   -CT simulation 8/21  -Plan for EBRT in 10 fractions starting 8/23  -Tylenol and oxycodone prn pain        #Hyperglycemia  ::Receiving dexamathasone 4mg BID  Management:  -Goal glucose between 140-180  -POCT glucose q4        #CAD s/p PCI (unknown year)   #HTN, #HLD  -continue home atorvastatin, ASA (held plavix as no indication for DAPT and in case of any procedures)  -HOLD: home amlodipine 10mg daily, home metoprolol succinate 50mg daily, home hydrochlorothiazide 25mg daily         F: PRN  E: PRN  N: Regular  GI: Pantoprazole  Code Status: DNR/DNI  Trent Montiel MD

## 2024-08-23 NOTE — CARE PLAN
The patient's goals for the shift include rest    The clinical goals for the shift include pt will remain free from injury this shift      Problem: Pain - Adult  Goal: Verbalizes/displays adequate comfort level or baseline comfort level  Outcome: Progressing     Problem: Safety - Adult  Goal: Free from fall injury  Outcome: Progressing     Problem: Discharge Planning  Goal: Discharge to home or other facility with appropriate resources  Outcome: Progressing     Problem: Chronic Conditions and Co-morbidities  Goal: Patient's chronic conditions and co-morbidity symptoms are monitored and maintained or improved  Outcome: Progressing     Problem: Skin  Goal: Decreased wound size/increased tissue granulation at next dressing change  Outcome: Progressing  Goal: Participates in plan/prevention/treatment measures  Outcome: Progressing  Goal: Prevent/manage excess moisture  Outcome: Progressing  Goal: Prevent/minimize sheer/friction injuries  Outcome: Progressing  Goal: Promote/optimize nutrition  Outcome: Progressing  Goal: Promote skin healing  Outcome: Progressing

## 2024-08-23 NOTE — PROCEDURES
Speech-Language Pathology  Adult Inpatient Modified Barium Swallow Study (MBSS)    Patient Name: Christophe Ambriz  MRN: [id number]  Today's Date: 8/23/2024   Start Time: 1320  Stop Time: 1350  Time Calculation (min): 30    Initial MBSS: Yes    Respiratory Status:  HFNC    History of Present Illness:   Christophe Ambriz is a 74 y.o. 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     Impression:   Modified Barium Swallow Study completed. Verbal consent obtained. Given trials of thin liquids, mildly thick liquids, puree and soft solids.  Decreased bolus formation, control and reduced A-P transit resulting in premature spillage into the pharynx and min-mod oral residue across tested consistencies. Delayed swallow onset with mistiming of laryngeal vestibular closure and reduced laryngeal excursion resulting in laryngeal penetration before the swallow and subsequent SILENT aspiration during the swallow in min amounts with thin liquids. No penetration or aspiration with mildly thick liquids, puree and soft solid consistencies. Reduced pharyngeal squeeze during the swallow resulting in min-mod amounts of pharyngeal residue across tested consistencies after the swallow. Majority of pharyngeal residue clears with independent cleansing swallow or liquid wash. SLP recommends cautious initiation of Puree (level 4) diet/Mildly thick liquids. See additional PO intake guidelines outlined below. If pt demonstrates any change/decline in medical/mental/respiratory status please make NPO and alert SLP. Recommend repeat instrumental swallow assessment in 1-2 weeks to determine improvement in swallow function/prior to diet/liquid advancement. Will continue to follow while in acute care setting to ensure diet tolerance and use of safe swallow guidelines. MD aware of recommendations.      Seth:  Thin: 8 - Material enters airway, passes below the folds, no effort to eject (no  cough).  Nectar: 1 - Material does not enter airway.   Puree: 1 - Material does not enter airway.   Solids: 1 - Material does not enter airway.     Recommendations:  Puree (level 4) diet and mildly thickened liquids  -Pills with puree or mildly thickened liquid  -Pt seated upright during meals  -Small bites/single sips only  -Alternate bites/sips  -straws ok  -if pt demonstrates any change/decline in medical/mental/RESPIRATORY status please make NPO and alert SLP    Goal:   Pt will tolerate least restrictive diet with no overt clinical s/s aspiration 100% of the time.   Pt will recall/utilize safe swallow guidelines as provided by % of the time.  Start Date: 8/23/2024  Expected Time Frame to Meet Goal: 2-3 weeks       Plan:  SLP Services Indicated: Yes  Frequency: 2x week  Discussed POC with patient  SLP - OK to Discharge    Pain:   0-10  0 = No pain.     Inpatient Education:  Extensive education provided to patient regarding current swallow function, recommendations/results, and POC.      Consultations/Referrals/Coordination of Services:   N/A

## 2024-08-23 NOTE — PROGRESS NOTES
08/23/24 1100   Discharge Planning   Living Arrangements Spouse/significant other   Support Systems Spouse/significant other   Assistance Needed mobility   Type of Residence Private residence   Number of Stairs to Enter Residence 0   Number of Stairs Within Residence 0   Do you have animals or pets at home? No   Who is requesting discharge planning? Provider   Home or Post Acute Services Post acute facilities (Rehab/SNF/etc);In home services   Type of Post Acute Facility Services Skilled nursing   Type of Home Care Services Home nursing visits;Home OT;Home PT   Expected Discharge Disposition SNF   Financial Resource Strain   How hard is it for you to pay for the very basics like food, housing, medical care, and heating? Not hard   Housing Stability   In the last 12 months, was there a time when you were not able to pay the mortgage or rent on time? N   At any time in the past 12 months, were you homeless or living in a shelter (including now)? N   Transportation Needs   In the past 12 months, has lack of transportation kept you from medical appointments or from getting medications? no   In the past 12 months, has lack of transportation kept you from meetings, work, or from getting things needed for daily living? No     SW met with pt to discuss PT and OT rec for SNF. Pt declines SNF at this time and reported that he has other things to think about.   Pt agreed that SW can return in a day or so to follow up.   SW wrote contact details on the whiteboard and offered to return if there was anything pt or spouse Iram wanted to talk about.  Other discharge needs are pending updates from the care team.   SW will follow. Serjio MCINTOSH, LSW.

## 2024-08-24 LAB
ALBUMIN SERPL BCP-MCNC: 3 G/DL (ref 3.4–5)
ANION GAP SERPL CALC-SCNC: 16 MMOL/L (ref 10–20)
BACTERIA BLD AEROBE CULT: ABNORMAL
BACTERIA BLD CULT: ABNORMAL
BACTERIA BLD CULT: NORMAL
BACTERIA BLD CULT: NORMAL
BASOPHILS # BLD AUTO: 0.09 X10*3/UL (ref 0–0.1)
BASOPHILS NFR BLD AUTO: 0.3 %
BUN SERPL-MCNC: 15 MG/DL (ref 6–23)
CALCIUM SERPL-MCNC: 8.6 MG/DL (ref 8.6–10.6)
CHLORIDE SERPL-SCNC: 99 MMOL/L (ref 98–107)
CO2 SERPL-SCNC: 26 MMOL/L (ref 21–32)
CREAT SERPL-MCNC: 0.51 MG/DL (ref 0.5–1.3)
EGFRCR SERPLBLD CKD-EPI 2021: >90 ML/MIN/1.73M*2
EOSINOPHIL # BLD AUTO: 0.02 X10*3/UL (ref 0–0.4)
EOSINOPHIL NFR BLD AUTO: 0.1 %
ERYTHROCYTE [DISTWIDTH] IN BLOOD BY AUTOMATED COUNT: 15.3 % (ref 11.5–14.5)
GLUCOSE BLD MANUAL STRIP-MCNC: 195 MG/DL (ref 74–99)
GLUCOSE BLD MANUAL STRIP-MCNC: 214 MG/DL (ref 74–99)
GLUCOSE BLD MANUAL STRIP-MCNC: 219 MG/DL (ref 74–99)
GLUCOSE BLD MANUAL STRIP-MCNC: 223 MG/DL (ref 74–99)
GLUCOSE BLD MANUAL STRIP-MCNC: 226 MG/DL (ref 74–99)
GLUCOSE BLD MANUAL STRIP-MCNC: 231 MG/DL (ref 74–99)
GLUCOSE SERPL-MCNC: 160 MG/DL (ref 74–99)
GRAM STN SPEC: ABNORMAL
GRAM STN SPEC: ABNORMAL
HCT VFR BLD AUTO: 31.8 % (ref 41–52)
HGB BLD-MCNC: 9.7 G/DL (ref 13.5–17.5)
IMM GRANULOCYTES # BLD AUTO: 0.89 X10*3/UL (ref 0–0.5)
IMM GRANULOCYTES NFR BLD AUTO: 3.4 % (ref 0–0.9)
LYMPHOCYTES # BLD AUTO: 5.23 X10*3/UL (ref 0.8–3)
LYMPHOCYTES NFR BLD AUTO: 20.1 %
MAGNESIUM SERPL-MCNC: 2.13 MG/DL (ref 1.6–2.4)
MCH RBC QN AUTO: 25.1 PG (ref 26–34)
MCHC RBC AUTO-ENTMCNC: 30.5 G/DL (ref 32–36)
MCV RBC AUTO: 82 FL (ref 80–100)
MONOCYTES # BLD AUTO: 1.96 X10*3/UL (ref 0.05–0.8)
MONOCYTES NFR BLD AUTO: 7.5 %
NEUTROPHILS # BLD AUTO: 17.81 X10*3/UL (ref 1.6–5.5)
NEUTROPHILS NFR BLD AUTO: 68.6 %
NRBC BLD-RTO: 0 /100 WBCS (ref 0–0)
PHOSPHATE SERPL-MCNC: 3.1 MG/DL (ref 2.5–4.9)
PLATELET # BLD AUTO: 502 X10*3/UL (ref 150–450)
POTASSIUM SERPL-SCNC: 3.9 MMOL/L (ref 3.5–5.3)
RBC # BLD AUTO: 3.87 X10*6/UL (ref 4.5–5.9)
SODIUM SERPL-SCNC: 137 MMOL/L (ref 136–145)
WBC # BLD AUTO: 26 X10*3/UL (ref 4.4–11.3)

## 2024-08-24 PROCEDURE — 1200000003 HC ONCOLOGY  ROOM WITH TELEMETRY DAILY

## 2024-08-24 PROCEDURE — 2500000002 HC RX 250 W HCPCS SELF ADMINISTERED DRUGS (ALT 637 FOR MEDICARE OP, ALT 636 FOR OP/ED)

## 2024-08-24 PROCEDURE — 2500000004 HC RX 250 GENERAL PHARMACY W/ HCPCS (ALT 636 FOR OP/ED)

## 2024-08-24 PROCEDURE — 80069 RENAL FUNCTION PANEL: CPT

## 2024-08-24 PROCEDURE — 2500000001 HC RX 250 WO HCPCS SELF ADMINISTERED DRUGS (ALT 637 FOR MEDICARE OP)

## 2024-08-24 PROCEDURE — 85025 COMPLETE CBC W/AUTO DIFF WBC: CPT

## 2024-08-24 PROCEDURE — 82947 ASSAY GLUCOSE BLOOD QUANT: CPT

## 2024-08-24 PROCEDURE — 99233 SBSQ HOSP IP/OBS HIGH 50: CPT | Performed by: INTERNAL MEDICINE

## 2024-08-24 PROCEDURE — 36415 COLL VENOUS BLD VENIPUNCTURE: CPT

## 2024-08-24 PROCEDURE — 83735 ASSAY OF MAGNESIUM: CPT

## 2024-08-24 ASSESSMENT — COGNITIVE AND FUNCTIONAL STATUS - GENERAL
STANDING UP FROM CHAIR USING ARMS: A LOT
DRESSING REGULAR UPPER BODY CLOTHING: A LOT
EATING MEALS: A LITTLE
TOILETING: A LOT
WALKING IN HOSPITAL ROOM: A LOT
HELP NEEDED FOR BATHING: A LOT
CLIMB 3 TO 5 STEPS WITH RAILING: A LOT
MOBILITY SCORE: 12
TURNING FROM BACK TO SIDE WHILE IN FLAT BAD: A LOT
DRESSING REGULAR UPPER BODY CLOTHING: A LOT
TOILETING: A LOT
WALKING IN HOSPITAL ROOM: TOTAL
MOVING FROM LYING ON BACK TO SITTING ON SIDE OF FLAT BED WITH BEDRAILS: A LOT
MOBILITY SCORE: 10
DAILY ACTIVITIY SCORE: 13
EATING MEALS: A LOT
DAILY ACTIVITIY SCORE: 12
DRESSING REGULAR LOWER BODY CLOTHING: A LOT
DRESSING REGULAR LOWER BODY CLOTHING: A LOT
HELP NEEDED FOR BATHING: A LOT
STANDING UP FROM CHAIR USING ARMS: A LOT
MOVING TO AND FROM BED TO CHAIR: A LOT
MOVING TO AND FROM BED TO CHAIR: A LOT
TURNING FROM BACK TO SIDE WHILE IN FLAT BAD: A LOT
CLIMB 3 TO 5 STEPS WITH RAILING: TOTAL
MOVING FROM LYING ON BACK TO SITTING ON SIDE OF FLAT BED WITH BEDRAILS: A LOT
PERSONAL GROOMING: A LOT
PERSONAL GROOMING: A LOT

## 2024-08-24 ASSESSMENT — PAIN SCALES - GENERAL
PAINLEVEL_OUTOF10: 5 - MODERATE PAIN
PAINLEVEL_OUTOF10: 0 - NO PAIN
PAINLEVEL_OUTOF10: 0 - NO PAIN

## 2024-08-24 ASSESSMENT — PAIN SCALES - WONG BAKER: WONGBAKER_NUMERICALRESPONSE: NO HURT

## 2024-08-24 NOTE — PROGRESS NOTES
Subjective   No significant events overnight. Patient states his SOB has improved from yesterday; however, states he continues to feel SOB with speaking. He otherwise has no concerns at this time.      Objective     Vitals:  Vitals:    08/24/24 1704   BP: 121/73   Pulse: 66   Resp: 18   Temp: 36.3 °C (97.3 °F)   SpO2: 93%       I/O last 3 completed shifts:  In: 840 (11.3 mL/kg) [P.O.:690; IV Piggyback:150]  Out: 820 (11 mL/kg) [Urine:820 (0.3 mL/kg/hr)]  Weight: 74.3 kg   No intake/output data recorded.    Physical exam:  Constitutional: thin, no acute distress.  HEENT: Normocephalic, atraumatic. PERRL.  Respiratory: CTA bilaterally. No wheezes, rales, or rhonchi. Normal respiratory effort.  Cardiovascular: RRR. No murmurs, gallops, or rubs  Abdominal: Soft, nondistended, nontender to palpation. Bowel sounds present. No hepatosplenomegaly or masses. No CVA tenderness.  Neuro: CN II-XII intact. UE and LE strength 5/5 bilaterally and sensation intact. Normal FTN testing.  MSK: No LE edema bilaterally.  Skin: Warm, dry. No rashes or wounds.  Psych: Appropriate mood and affect.    Medications:  aspirin, 81 mg, oral, Daily  atorvastatin, 20 mg, oral, Nightly  [Held by provider] clopidogrel, 75 mg, oral, Daily  dexAMETHasone, 4 mg, intravenous, q12h  enoxaparin, 40 mg, subcutaneous, q24h  gabapentin, 300 mg, oral, Nightly  insulin lispro, 0-10 Units, subcutaneous, q4h  pantoprazole, 40 mg, intravenous, Daily  piperacillin-tazobactam, 3.375 g, intravenous, q6h  polyethylene glycol, 17 g, oral, Daily  sennosides, 2 tablet, oral, BID         PRN medications: acetaminophen **OR** [DISCONTINUED] acetaminophen **OR** [DISCONTINUED] acetaminophen, albuterol, bisacodyl, dextrose, dextrose, glucagon, glucagon, oxyCODONE, oxygen    Labs:  Results for orders placed or performed during the hospital encounter of 08/16/24 (from the past 24 hour(s))   POCT GLUCOSE   Result Value Ref Range    POCT Glucose 214 (H) 74 - 99 mg/dL   POCT  GLUCOSE   Result Value Ref Range    POCT Glucose 219 (H) 74 - 99 mg/dL   CBC and Auto Differential   Result Value Ref Range    WBC 26.0 (H) 4.4 - 11.3 x10*3/uL    nRBC 0.0 0.0 - 0.0 /100 WBCs    RBC 3.87 (L) 4.50 - 5.90 x10*6/uL    Hemoglobin 9.7 (L) 13.5 - 17.5 g/dL    Hematocrit 31.8 (L) 41.0 - 52.0 %    MCV 82 80 - 100 fL    MCH 25.1 (L) 26.0 - 34.0 pg    MCHC 30.5 (L) 32.0 - 36.0 g/dL    RDW 15.3 (H) 11.5 - 14.5 %    Platelets 502 (H) 150 - 450 x10*3/uL    Neutrophils % 68.6 40.0 - 80.0 %    Immature Granulocytes %, Automated 3.4 (H) 0.0 - 0.9 %    Lymphocytes % 20.1 13.0 - 44.0 %    Monocytes % 7.5 2.0 - 10.0 %    Eosinophils % 0.1 0.0 - 6.0 %    Basophils % 0.3 0.0 - 2.0 %    Neutrophils Absolute 17.81 (H) 1.60 - 5.50 x10*3/uL    Immature Granulocytes Absolute, Automated 0.89 (H) 0.00 - 0.50 x10*3/uL    Lymphocytes Absolute 5.23 (H) 0.80 - 3.00 x10*3/uL    Monocytes Absolute 1.96 (H) 0.05 - 0.80 x10*3/uL    Eosinophils Absolute 0.02 0.00 - 0.40 x10*3/uL    Basophils Absolute 0.09 0.00 - 0.10 x10*3/uL   Renal function panel   Result Value Ref Range    Glucose 160 (H) 74 - 99 mg/dL    Sodium 137 136 - 145 mmol/L    Potassium 3.9 3.5 - 5.3 mmol/L    Chloride 99 98 - 107 mmol/L    Bicarbonate 26 21 - 32 mmol/L    Anion Gap 16 10 - 20 mmol/L    Urea Nitrogen 15 6 - 23 mg/dL    Creatinine 0.51 0.50 - 1.30 mg/dL    eGFR >90 >60 mL/min/1.73m*2    Calcium 8.6 8.6 - 10.6 mg/dL    Phosphorus 3.1 2.5 - 4.9 mg/dL    Albumin 3.0 (L) 3.4 - 5.0 g/dL   Magnesium   Result Value Ref Range    Magnesium 2.13 1.60 - 2.40 mg/dL   POCT GLUCOSE   Result Value Ref Range    POCT Glucose 195 (H) 74 - 99 mg/dL   POCT GLUCOSE   Result Value Ref Range    POCT Glucose 223 (H) 74 - 99 mg/dL   POCT GLUCOSE   Result Value Ref Range    POCT Glucose 226 (H) 74 - 99 mg/dL       Imaging:  FL modified barium swallow study    Result Date: 8/23/2024  Interpreted By:  Marcia Dejesus, STUDY: FL MODIFIED BARIUM SWALLOW STUDY; 8/23/2024 1:58 pm    INDICATION: Signs/Symptoms:possible aspiration PNA.   COMPARISON: None.   ACCESSION NUMBER(S): [id number]   ORDERING CLINICIAN: JESUS LEVIN   TECHNIQUE: MBSS completed. Informed verbal consent obtained prior to completion of exam. Trials of thin, nectar thick, honey thick, puree, soft-solids, and regular solids given. Fluoro time: 3.9 minutes   SLP: Roxanne Dejesus Phone/Pager: Epic secure message   SPEECH FINDINGS: Patient Name: Christophe Ambriz MRN: [id number] Today's Date: 8/23/2024 Start Time: 1320 Stop Time: 1350 Time Calculation (min): 30   Initial MBSS: Yes   Respiratory Status:  HFNC   History of Present Illness: Christophe Ambriz is a 74 y.o. 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       Impression: Modified Barium Swallow Study completed. Verbal consent obtained. Given trials of thin liquids, mildly thick liquids, puree and soft solids.  Decreased bolus formation, control and reduced A-P transit resulting in premature spillage into the pharynx and min-mod oral residue across tested consistencies. Delayed swallow onset with mistiming of laryngeal vestibular closure and reduced laryngeal excursion resulting in laryngeal penetration before the swallow and subsequent SILENT aspiration during the swallow in min amounts with thin liquids. No penetration or aspiration with mildly thick liquids, puree and soft solid consistencies. Reduced pharyngeal squeeze during the swallow resulting in min-mod amounts of pharyngeal residue across tested consistencies after the swallow. Majority of pharyngeal residue clears with independent cleansing swallow or liquid wash. SLP recommends cautious initiation of Puree (level 4) diet/Mildly thick liquids. See additional PO intake guidelines outlined below. If pt demonstrates any change/decline in medical/mental/respiratory status please make NPO and alert SLP. Recommend repeat instrumental swallow assessment in  1-2 weeks to determine improvement in swallow function/prior to diet/liquid advancement. Will continue to follow while in acute care setting to ensure diet tolerance and use of safe swallow guidelines. MD aware of recommendations.   [place]: Thin: 8 - Material enters airway, passes below the folds, no effort to eject (no cough). Nectar: 1 - Material does not enter airway. Puree: 1 - Material does not enter airway. Solids: 1 - Material does not enter airway.   Recommendations: Puree (level 4) diet and mildly thickened liquids -Pills with puree or mildly thickened liquid -Pt seated upright during meals -Small bites/single sips only -Alternate bites/sips -straws ok -if pt demonstrates any change/decline in medical/mental/RESPIRATORY status please make NPO and alert SLP   Goal: Pt will tolerate least restrictive diet with no overt clinical s/s aspiration 100% of the time. Pt will recall/utilize safe swallow guidelines as provided by % of the time. Start Date: 8/23/2024 Expected Time Frame to Meet Goal: 2-3 weeks     Plan: SLP Services Indicated: Yes Frequency: 2x week Discussed POC with patient SLP - OK to Discharge   Pain: 0-10 0 = No pain.   Inpatient Education: Extensive education provided to patient regarding current swallow function, recommendations/results, and POC.   Consultations/Referrals/Coordination of Services: N/A   Speech Therapy section of this report signed by Roxanne Dejesus on 8/23/2024 at 3:13 pm.   RADIOLOGY FINDINGS: Radiopaque contrast in coursing through the esophagus. Moderate degenerative osseous changes noted throughout the cervical spine.   Radiology section of this report signed by Narendra Santos MD.   IMPRESSION: 1. Please refer to detailed swallow study evaluation by speech pathologist.   2. No radiographic evidence of acute osseous abnormalities within limits of the current examination.   MACRO: None     Dictation workstation:   [id number]    XR chest 1 view    Result Date:  8/23/2024  Interpreted By:  Laureano Moore, STUDY: XR CHEST 1 VIEW; 8/22/2024 12:43 pm   INDICATION: Signs/Symptoms:Pneumonia.   COMPARISON: 08/21/2024.   ACCESSION NUMBER(S): [id number]   ORDERING CLINICIAN: JESUS LEVIN   FINDINGS:     CARDIOMEDIASTINAL SILHOUETTE: Cardiomediastinal silhouette is normal in size and configuration.   LUNGS: Slight interval improvement in left basilar infiltrate/lung aeration. Residual left basilar atelectasis.   ABDOMEN: No remarkable upper abdominal findings.   BONES: No acute osseous changes.       1.  Interval improvement in left basilar aeration/infiltrate. Correlate with residual pneumonia. Follow-up with PA and lateral x-ray to resolution recommended.     Signed by: Laureano Moore 8/23/2024 7:55 AM Dictation workstation:   [id number]    XR chest 1 view    Result Date: 8/21/2024  Interpreted By:  Jose Silva, STUDY: XR CHEST 1 VIEW;  8/21/2024 1:10 pm   INDICATION: Signs/Symptoms:PNA.   COMPARISON: 08/20/2024   ACCESSION NUMBER(S): [id number]   ORDERING CLINICIAN: JESUS LEVIN   FINDINGS: Supine portable chest.       CARDIOMEDIASTINAL SILHOUETTE: Cardiomediastinal silhouette is normal in size and configuration.   LUNGS: There is increased central pulmonary vascularity which can be seen in supine chest image as compared to previous exam however there is increased opacification in the left upper mid and lower chest which is hazy in uniform density and suggest effusion there is retrocardiac density and atelectasis/infiltrate. No sizable pneumothorax.   ABDOMEN: No remarkable upper abdominal findings.   BONES: No acute osseous changes.       1.  New finding of the left chest hazy density and retrocardiac density suspect atelectasis/consolidation. Suspect left chest pleural effusion.       MACRO: None   Signed by: Jose Silva 8/21/2024 2:45 PM Dictation workstation:   [id number]    Rad Onc CT Sim Images    Result Date: 8/21/2024  These images are not reportable by  radiology and will not be interpreted by  Radiologists.    Transthoracic Echo (TTE) Complete    Result Date: 8/20/2024   [facility], [address] [place] [address]                Tel [phone] and Fax [phone] TRANSTHORACIC ECHOCARDIOGRAM REPORT  Patient Name:      NIDIA MILLER      Reading Physician:    [id number] Sanjuanita Jama MD Study Date:        8/20/2024            Ordering Provider:    [id number] JESUSROSEY PERSAUDEMILIANO MRN/PID:           [id number]             Fellow: Accession#:        [id number]         Nurse: Date of Birth/Age: 1950 / 74 years Sonographer:          Juana Walker RDCS Gender:            M                    Additional Staff: Height:            185.42 cm            Admit Date: Weight:            79.38 kg             Admission Status:     Inpatient -                                                               Routine BSA / BMI:         2.03 m2 / 23.09      Encounter#:           [id number]                    kg/m2 Blood Pressure:    120/78 mmHg          Department Location: Study Type:    TRANSTHORACIC ECHO (TTE) COMPLETE Diagnosis/ICD: Acute respiratory failure with hypoxia-J96.01 Indication:    Hypoxia CPT Code:      Echo Complete w Full [id number] Patient History: Pertinent History: CAD, HTN and Hyperlipidemia. Study Detail: The following Echo studies were performed: 2D, M-Mode, Doppler and               color flow. Technically challenging study due to body habitus and               patient lying in supine position. Agitated saline used as a               contrast agent for intraseptal flow evaluation.  PHYSICIAN INTERPRETATION: Left Ventricle: Left ventricular ejection fraction is normal, calculated by Barry's biplane at 62%. There are no regional left ventricular wall motion abnormalities. The left ventricular cavity size is normal.  Spectral Doppler shows a normal pattern of left ventricular diastolic filling. Left Atrium: The left atrium is normal in size. A bubble study using agitated saline was performed. Bubble study is negative. Suboptimal agitated saline contrast suty did not show any obvious intracardiac shunt. Right Ventricle: The right ventricle is normal in size. There is normal right ventricular global systolic function. Right Atrium: The right atrium is mildly dilated. Aortic Valve: The aortic valve is trileaflet. There is no evidence of aortic valve regurgitation. The peak instantaneous gradient of the aortic valve is 7.4 mmHg. Mitral Valve: The mitral valve is normal in structure. There is trace mitral valve regurgitation. Tricuspid Valve: The tricuspid valve is structurally normal. There is mild tricuspid regurgitation. The Doppler estimated RVSP is mildly elevated at 45.1 mmHg. Pulmonic Valve: The pulmonic valve is not well visualized. There is physiologic pulmonic valve regurgitation. Pericardium: There is a trivial pericardial effusion. Aorta: The aortic root is normal. The aortic root is at the upper limits of normal size. Systemic Veins: The inferior vena cava appears to be of normal size. There is IVC inspiratory collapse greater than 50%. In comparison to the previous echocardiogram(s): There are no prior studies on this patient for comparison purposes. No prior echocardiogram available for comparison.  CONCLUSIONS:  1. Left ventricular ejection fraction is normal, calculated by Barry's biplane at 62%.  2. There is normal right ventricular global systolic function.  3. Suboptimal agitated saline contrast suty did not show any obvious intracardiac shunt.  4. Mildly elevated RVSP.  5. No prior echocardiogram available for comparison. QUANTITATIVE DATA SUMMARY: 2D MEASUREMENTS:                         Normal Ranges: IVSd:          1.10 cm  (0.6-1.1cm) LVPWd:         1.07 cm  (0.6-1.1cm) LVIDd:         4.74 cm  (3.9-5.9cm)  LVIDs:         3.28 cm LV Mass Index: 92 g/m2 LVEDV Index:   56 ml/m2 LV % FS        30.9 % LA VOLUME:                               Normal Ranges: LA Vol A4C:        42.9 ml    (22+/-6mL/m2) LA Vol A2C:        55.4 ml LA Vol BP:         48.7 ml LA Vol Index A4C:  21.1ml/m2 LA Vol Index A2C:  27.2 ml/m2 LA Vol Index BP:   24.0 ml/m2 LA Area A4C:       15.4 cm2 LA Area A2C:       17.5 cm2 LA Major Axis A4C: 4.7 cm LA Major Axis A2C: 4.7 cm LA Volume Index:   24.0 ml/m2 RA VOLUME BY A/L METHOD:                               Normal Ranges: RA Vol A4C:        64.7 ml    (8.3-19.5ml) RA Vol Index A4C:  31.8 ml/m2 RA Area A4C:       18.3 cm2 RA Major Axis A4C: 4.4 cm M-MODE MEASUREMENTS:              Normal Ranges: LAs: 2.53 cm (2.7-4.0cm) AORTA MEASUREMENTS:                      Normal Ranges: Ao Sinus, d: 3.60 cm (2.1-3.5cm) Ao STJ, d:   2.80 cm (1.7-3.4cm) Asc Ao, d:   3.30 cm (2.1-3.4cm) LV SYSTOLIC FUNCTION BY 2D PLANIMETRY (MOD):                      Normal Ranges: EF-A4C View:    66 % (>=55%) EF-A2C View:    56 % EF-Biplane:     62 % LV EF Reported: 62 % LV DIASTOLIC FUNCTION:                         Normal Ranges: MV Peak E:    0.64 m/s  (0.7-1.2 m/s) MV Peak A:    0.74 m/s  (0.42-0.7 m/s) E/A Ratio:    0.86      (1.0-2.2) MV e'         0.116 m/s (>8.0) MV lateral e' 0.14 m/s MV medial e'  0.09 m/s E/e' Ratio:   5.46      (<8.0) MV DT:        248 msec  (150-240 msec) MITRAL VALVE:                 Normal Ranges: MV DT: 248 msec (150-240msec) AORTIC VALVE:                         Normal Ranges: AoV Vmax:      1.36 m/s (<=1.7m/s) AoV Peak P.4 mmHg (<20mmHg) LVOT Diameter: 2.00 cm  (1.8-2.4cm)  RIGHT VENTRICLE: RV Basal 3.60 cm RV Mid   3.00 cm RV Major 7.8 cm TAPSE:   22.0 mm RV s'    0.11 m/s TRICUSPID VALVE/RVSP:                             Normal Ranges: Peak TR Velocity: 3.25 m/s RV Syst Pressure: 45.1 mmHg (< 30mmHg) IVC Diam:         1.60 cm PULMONIC VALVE:                      Normal Ranges: PV Max Jordi:  1.0 m/s  (0.6-0.9m/s) PV Max P.1 mmHg  [id number] Sanjuanita Jama MD Electronically signed on 2024 at 3:24:13 PM  ** Final **     XR chest 1 view    Result Date: 2024  Interpreted By:  Garth Kauffman  and Warren Gilbert STUDY: XR CHEST 1 VIEW;  2024 7:22 am   INDICATION: Signs/Symptoms:Pneumonia.   COMPARISON: Chest radiograph 2024 and chest CT 2024   ACCESSION NUMBER(S): [id number]   ORDERING CLINICIAN: JESUS LEVIN   FINDINGS: AP radiograph of the chest was provided.   CARDIOMEDIASTINAL SILHOUETTE: Cardiomediastinal silhouette is stable in size and configuration.   LUNGS: Dense retrocardiac opacity is increased compared to the prior exam. Mildly low lung volumes with bronchovascular crowding. No sizable effusion or pneumothorax.   ABDOMEN: No remarkable upper abdominal findings.   BONES: No acute osseous abnormality.       1. Increased dense retrocardiac opacity compared to the prior exam . Correlate with concern for increasing atelectasis/infiltration. Also correlate with increasing central mucous plugging.   I personally reviewed the image(s)/study and resident interpretation as stated by Dr. Ofelia Kelley MD. I agree with the findings as stated. This study was interpreted at [facility], [place], [place].   MACRO: None   Signed by: Garth Kauffman 2024 9:13 AM Dictation workstation:   [id number]    XR abdomen 1 view    Result Date: 2024  Interpreted By:  Garth Kauffman, STUDY: XR ABDOMEN 1 VIEW;  2024 6:48 pm   INDICATION: Signs/Symptoms:constipated.   COMPARISON: CT scan of lumbar spine 2024   ACCESSION NUMBER(S): [id number]   ORDERING CLINICIAN: JESUS LEVIN   FINDINGS: AP radiographs of the abdomen available for interpretation. Nonobstructive bowel gas pattern. Moderate colonic stool burden. Limited evaluation of pneumoperitoneum on supine imaging, however no gross evidence of free air is noted.   Visualized lung bases  demonstrate left basilar atelectasis/consolidation.   Osseous structures demonstrate no acute bony changes. Posterior lumbar spine fusion hardware in place.       1.  Nonobstructive bowel gas pattern. Moderate colonic stool burden.   Signed by: Garth Kauffman 8/20/2024 9:11 AM Dictation workstation:   [id number]    CT angio chest for pulmonary embolism    Result Date: 8/19/2024  Interpreted By:  Ammon Huang and Awan Komal STUDY: CT ANGIO CHEST FOR PULMONARY EMBOLISM;  8/19/2024 11:23 am   INDICATION: Signs/Symptoms:AHRF.   COMPARISON: CT dated 07/05/2024   ACCESSION NUMBER(S): [id number]   ORDERING CLINICIAN: JIMBO CAR   TECHNIQUE: Helical data acquisition of the chest was obtained after intravenous administration of 46 mL Omnipaque 350, as per PE protocol. Images were reformatted in coronal and sagittal planes. Axial and coronal maximum intensity projection (MIP) images were created and reviewed.   FINDINGS: POTENTIAL LIMITATIONS OF THE STUDY: None   HEART AND VESSELS: There are no discrete filling defects within main pulmonary artery and its branches to suggest acute pulmonary embolism. Main pulmonary artery and its branches are normal in caliber.   The thoracic aorta normal in course and caliber.There is mild scattered atherosclerosis present, including calcified and noncalcified plaques. Severe coronary artery calcifications are seen. Please note,the study is not optimized for evaluation of coronary arteries.   The cardiac chambers are not enlarged.   There is no pericardial effusion seen.   MEDIASTINUM AND CELE, LOWER NECK AND AXILLA: Asymmetric enlargement of the right thyroid lobe. Suggestion of a hypodense nodule within the right thyroid, though limited evaluation secondary to artifact from streak artifact due to the inflowing contrast. No evidence of thoracic lymphadenopathy by CT criteria. Esophagus appears within normal limits as seen.   LUNGS AND AIRWAYS: The trachea and central airways are  patent. No endobronchial lesion is seen.Interval development of multiple left lower lobar segmental and subsegmental airway filling defects. Additional filling defects within multiple right lower lobe airways.   Interval development of multifocal areas of nodular consolidation and tree-in-bud like nodularity throughout the left lower lobe, left upper lobe and right lower lobe. No pleural effusion or pneumothorax.     UPPER ABDOMEN: Partially visualized heterogeneous left renal mass which is incompletely characterized. Prominent perinephric fat stranding within the partially visualized left retroperitoneum.       CHEST WALL AND OSSEOUS STRUCTURES: Interval increase in right lower chest wall mass within the right 9th rib measuring 5.4 x 3.2 cm (previously measuring 4 x 2.6 cm). No acute osseous pathology.There are no suspicious osseous lesions.Partially visualized lumbar spine fixation hardware.       1. No evidence of acute pulmonary embolism. 2. Interval increase in multifocal nodular consolidations and tree-in-bud like nodularity within the left-greater-than-right lungs which is most likely secondary to a multifocal pneumonia. Given additional findings of filling defects within multiple left lower lobar left-greater-than-right lower lobar airways, a component of aspiration should also be considered in the appropriate clinical setting. 3. Asymmetric enlargement of the right thyroid lobe with right thyroid nodule. This was better assessed on prior thyroid ultrasound from 07/12/2024. 4. Interval enlargement of right lower chest wall mass within the right 9th rib which is most consistent with progression of metastatic disease.   MACRO: None   Signed by: Ammon Huang 8/19/2024 12:10 PM Dictation workstation:   [id number]    XR chest 1 view    Result Date: 8/19/2024  Interpreted By:  Jose Silva, STUDY: XR CHEST 1 VIEW;  8/19/2024 9:45 am   INDICATION: Signs/Symptoms:desat to 70%.   COMPARISON: 08/19/2024 at 7:59  hours a.m.   ACCESSION NUMBER(S): [id number]   ORDERING CLINICIAN: JIMBO CAR   FINDINGS: Upright portable semi-erect chest shows slight increase in aeration as compared to previous exam. There is mild narrowing of a section the trachea at the 3rd thoracic vertebral body level which may be related to previous intubation.       CARDIOMEDIASTINAL SILHOUETTE: Cardiomediastinal silhouette is normal in size and configuration. There is mild aortic arch calcification.   LUNGS: Lungs are better expanded on current exam right chest remain stable. Left chest increased mid and lower lung fields density is significantly decreased. There is still some retrocardiac increased markings as compared to earlier exams. There is blunting of the left costophrenic angle. There is no significant effusion.   ABDOMEN: No remarkable upper abdominal findings.   BONES: No acute osseous changes.       1.  Decrease in prominence of left lower lung field opacification persistent retrocardiac densities. Follow up on therapy.       MACRO: None   Signed by: Jose Silva 8/19/2024 10:29 AM Dictation workstation:   [id number]    XR chest 1 view    Result Date: 8/19/2024  Interpreted By:  Jose Silva, STUDY: XR CHEST 1 VIEW;  8/19/2024 8:06 am   INDICATION: Signs/Symptoms:New wet cough, elevated white count.   COMPARISON: 07/06/2024.   ACCESSION NUMBER(S): [id number]   ORDERING CLINICIAN: JIMBO CAR   FINDINGS: Portable semi-erect chest       CARDIOMEDIASTINAL SILHOUETTE: Cardiomediastinal silhouette is normal in size and configuration.   LUNGS: The lungs are moderately expanded. Right chest shows mild basilar atelectasis left chest shows increased opacification retrocardiac position with some mid left lung fields prominence this is suspicious for early infiltrate and/or consolidation.   ABDOMEN: No remarkable upper abdominal findings.   BONES: No acute osseous changes.       1.  Retrocardiac density and mid left mid lung fields hazy  opacification consistent with left lower lobe infiltrate.  New findings since earlier exam.       MACRO: None   Signed by: Jose Silva 8/19/2024 9:58 AM Dictation workstation:   [id number]    MR lumbar spine w and wo IV contrast    Result Date: 8/14/2024  Interpreted By:  Elroy Uribe, STUDY: MR LUMBAR SPINE W AND WO IV CONTRAST;  8/14/2024 3:38 pm   INDICATION: Signs/Symptoms:Bilateral lower extremity weakness, decreased rectal tone.   COMPARISON: July 26.   ACCESSION NUMBER(S): [id number]   ORDERING CLINICIAN: EDIL MARKS   TECHNIQUE: The lumbar spine was studied in the sagital, axial and coronal planes utiliing T1 and T2 weighted images.   FINDINGS: Heterogeneous decreased marrow signal throughout the lumbar spine is unchanged compared to the previous exam. This is a nonspecific finding consistent with hyperactive marrow as seen in anemia or marrow infiltrative process due to hematologic or marrow malignancy. Alignment is normal. Images at each interspace reveal the following: T12/L1 There is normal alignment and vertebral body height. The disc space is normal. There is no evidence of canal or foraminal narrowing. There is no evidence of bulging or herniated disc. L1/L2 Pedicle screws and plates are present.  There is normal alignment and vertebral body height. The disc space is normal. There is no evidence of canal or foraminal narrowing. There is no evidence of bulging or herniated disc. L2/L3 Previous laminectomy unchanged from the previous exam. Pedicle screws and plates are present. Bulging intervertebral disc without canal stenosis. Mild bilateral foraminal narrowing. There is replacement of normal marrow signal within the L3 vertebral body with epidural mass as previously demonstrated and described. Marked narrowing of the thecal sac at this location without identified cerebral spinal fluid unchanged from the previous exam. Nerve root compression unchanged from the previous exam. No evidence of  fluid collection or hemorrhage. L3/L4 Circumferential bulging intervertebral disc asymmetrical to the left. Marginal osteophyte formation and facet hypertrophy. No measurable canal stenosis. Left worse than right foraminal narrowing. L4/L5 Left paracentral herniated nucleus polyposis measuring a proximally 1 cm in size including annular tear best appreciated on axial T2 weighted image 18/30. Bilateral facet hypertrophy. Pedicle screws and plates are present. No measurable canal stenosis L5/S1 Circumferential bulging intervertebral disc and facet hypertrophy without canal stenosis. Mild bilateral foraminal narrowing.   The visualized sacrum is normal         * L3 compression fracture with retropulsion of the posterior cortex and severe stenosis of the central spinal canal with nerve root compression unchanged from the previous exam *No new foci of marrow replacement or compression fracture *There is no measurable change compared to the previous exam.   MACRO: Critical Finding:  See findings. Notification was initiated on 8/14/2024 at 3:50 pm by  Elroy Uribe.  (**-OCF-**)   Signed by: Elroy Uribe 8/14/2024 3:50 PM Dictation workstation:   [id number]    MR lumbar spine w and wo IV contrast    Result Date: 7/26/2024  Interpreted By:  Jose Campbell, STUDY: MRI of the lumbar spine without IV contrast;  7/26/2024 9:30 pm   INDICATION: Signs/Symptoms:new pathologic fracture, recent surgery, tumor.   COMPARISON: Correlation made to MRI lumbar spine of 07/05/2024, lumbar spine radiography of 07/06/2024, CT lumbar spine of 07/26/2024.   ACCESSION NUMBER(S): [id number]   ORDERING CLINICIAN: SHERMAN CRYSTAL   TECHNIQUE: Sagittal and axial STIR and T1-weighted MRI images of the lumbar spine were acquired using a spondylolysis protocol.  No contrast was administered. Postcontrast T1 weighted images were also obtained following IV administration of 16 cc Dotarem gadolinium based IV contrast.   FINDINGS:  Vertebrae/Intervertebral Discs: There is no evidence of spondylolysis or spondylolisthesis. Vertebral alignment is maintained.   There is interval partial laminectomy at L3 and posterior metallic fusion with bilateral pedicle screws and intervening laminar rods at L1, L2, L4 and L5. There is redemonstration of abnormal STIR hyperintense T1 hypointense signal in the L3 vertebral body compatible with diffuse metastatic involvement, with ventral epidural extension of tumor.  Mild the degree of pathologic compression is similar compared to lumbar spine radiography of 07/06/2024, the degree of ventral epidural tumor extension is worse than on the prior MRI of 07/05/2024, and at the level of the inferior L3 endplate results in complete effacement of subarachnoid CSF and cauda equina compression; this is along the superior margin of where the canal is decompressed from partial laminectomy changes at the L3 level (see series' 7 and 12, images 23 and 24; series 4, image 14). There is also compromise of the bilateral L3-L4 neural foramina.   No other marrow replacing lesion is seen.   Vertebral stature is otherwise maintained. There is no other level of significant canal narrowing. Mild bilateral foraminal narrowing at L5-S1 secondary to endplate and facet osteophytes.     Cord: The lower thoracic cord appears unremarkable. The conus terminates at L1-L2.   Soft tissues: Prevertebral soft tissues are unremarkable. There is edema and enhancement in the posterior paraspinal soft tissues and subcutaneous fat, likely postsurgical in etiology.   Incidental exophytic left lower pole renal cortical cyst.       There is interval partial laminectomy at L3 and posterior metallic fusion with bilateral pedicle screws and intervening laminar rods at L1, L2, L4 and L5. There is redemonstration of abnormal STIR hyperintense T1 hypointense signal in the L3 vertebral body compatible with diffuse metastatic involvement, with ventral epidural  extension of tumor.  Mild the degree of pathologic compression is similar compared to lumbar spine radiography of 07/06/2024, the degree of ventral epidural tumor extension is worse than on the prior MRI of 07/05/2024, and at the level of the inferior L3 endplate results in complete effacement of subarachnoid CSF and cauda equina compression; this is along the superior margin of where the canal is decompressed from partial laminectomy changes at the L3 level (see series' 7 and 12, images 23 and 24; series 4, image 14). There is also compromise of the bilateral L3-L4 neural foramina.   No other marrow replacing lesion is seen.   Vertebral stature is otherwise maintained. There is no other level of significant canal narrowing.   There is edema and enhancement in the posterior paraspinal soft tissues and subcutaneous fat, likely postsurgical in etiology.   I personally reviewed the images/study and I agree with the findings as stated. This study was interpreted at [place], [place].   MACRO: None   Signed by: Jose Campbell 7/26/2024 10:41 PM Dictation workstation:   [id number]    Lower extremity venous duplex left    Result Date: 7/26/2024  Interpreted By:  Cuba Goodson, STUDY: [facility] LOWER EXTREMITY VENOUS DUPLEX LEFT;  7/26/2024 3:37 pm   INDICATION: Signs/Symptoms:left leg lumbness tingling, recent l spine surgery.   COMPARISON: None.   ACCESSION NUMBER(S): [id number]   ORDERING CLINICIAN: SHERMAN CRYSTAL   TECHNIQUE: Grayscale, color and spectral Doppler ultrasound evaluation of the left lower extremity deep venous system.   FINDINGS:     Left: There is normal compressibility of the common femoral (including saphenofemoral junction), deep femoral, femoral (including proximal, mid, and distal aspects), popliteal, posterior tibial and peroneal veins.  There is a normal, spontaneous and phasic venous spectral Doppler waveform throughout the lower extremity.   Right: There is  normal, symmetric venous spectral Doppler waveform in the common femoral vein.       No deep venous thrombosis identified in the left lower extremity.   Signed by: Cuba Goodson 7/26/2024 3:44 PM Dictation workstation:   [id number]    CT lumbar spine wo IV contrast    Result Date: 7/26/2024  Interpreted By:  Cuba Goodson, STUDY: CT LUMBAR SPINE WO IV CONTRAST  7/26/2024 3:17 pm   INDICATION: Signs/Symptoms:back pain, radiculopathy L leg, recent surgery   COMPARISON: CT 07/05/2024   ACCESSION NUMBER(S): [id number]   ORDERING CLINICIAN: SHERMAN CRYSTAL   TECHNIQUE: Axial CT images of the lumbar spine are obtained. Axial, coronal and sagittal reconstructions are provided for review.   FINDINGS: There is a new acute nondisplaced fracture involving the right transverse process at L3. Lytic lesion involving nearly the entire L3 vertebral body is again noted. There is new mild compression of the L3 vertebral body with approximately 20% height loss centrally. There is slight osseous retropulsion which causes severe canal stenosis. Pedicle screw tracts are now present. There has been a left facetectomy as well as partial resection of the spinous process.   No additional lumbar spine fractures are identified. There has been interval placement of posterior metallic fusion hardware from L1-L5. Pedicle screws at L1, L2, L4, and L5 appear well seated and intact without periprosthetic lucency. Interconnecting rods with adjacent bone graft material appear intact. There is a small amount of residual postoperative gas adjacent to the fusion hardware.   Extensive hardware beam hardening artifact limits evaluation.   L1-2: Patent canal and foramina. L2-3: Disc bulge and thickening of the ligamentum flavum cause mild canal stenosis. Osteophytes cause mild left and disc bulge causes moderate right foraminal stenosis. Osseous retropulsion at L3 causes moderate to severe canal stenosis. L3-4: Thecal sac partially decompressed by facetectomy  defect. The left neural foramen is partially decompressed. Osteophytes cause moderate right foraminal stenosis. L4-5: Disc bulge causes mild canal stenosis. Osteophytes cause moderate left and moderate right foraminal stenosis. L5-S1: Endplate osteophyte causes mild canal stenosis. Osteophytes cause moderate left and moderate to severe right foraminal stenosis.   Partially imaged left renal mass. Abdominal aortic atherosclerosis without visualized aneurysm.         1.  Interval posterior fusion L1-L5. 2. Interval mild pathologic compression fracture related to diffuse osteolytic lesion of the L3 vertebral body. Osseous retropulsion at this level causes new severe canal stenosis. 3. L3 left facetectomy defect may partially decompressed the exiting nerve root. 4. New nondisplaced fracture right transverse process at L3.   MACRO: None   Signed by: Cuba Goodson 7/26/2024 3:41 PM Dictation workstation:   [id number]        Assessment and plan:  Christophe Ambriz is a 74-year-old male with a history of CAD, HTN, HLD, and RCC with spinal mets, status post L1-5 fusion, L2-4 decompression, and tumor debulking on 07/24/24. The patient initially presented to the ED on 08/14/24 with severe back and hip pain radiating to bilateral legs. Hospitalization complicated by AHRF 2/2 to PNA requiring MICU transfer from 8/19 - 8/22. Now on HFNC and vanc/zosyn     Updates 8/24:  -Continue antibiotics for 1 week duration end date 8/28  -MBS patient cleared for puréed diet  -Continue to wean O2  -Missed radiation treatment on 8/23, first RT treatment scheduled for 8/26     #AHRF 2/2 pneumonia of unknown cause  ::CXR 8/21/24:: New finding of the left chest hazy density and retrocardiac density suspect atelectasis/consolidation. Suspect left chest pleural effusion.  ::CTAC for PE 8/19/24: negative for PE, possible multifocal pneumonia, possible aspiration, +ve for right lower chest wall mass within the right 9th rib consistent with progression  of metastitic disease  ::8/20/24: both sets of cultures grew GPCC, COVID -ve, MRSA nares +  Management:  -Procalc 0.34  -Urine culture, Respiratory Viral Panel, Respirtatory Culture and  Smear Legionela, Strep Pneumo negative  -Modified barium swallow pending   -Continue: Zosyn   -Repeat cultures/CXR        #Metastatic renal cell carcinoma   #Bilateral Radicular Pain   ::L1-5 fusion w/ L2-4 decompression and tumor debulking (7/24)  at [facility] with NSGY  ::MRI 8/14: L3 compression fracture with retropulsion of the posterior cortex and severe stenosis of the central spinal canal with nerve root compression unchanged from the previous exam *No new foci of marrow replacement or compression fracture *There is no measurable change compared to the previous exam.  Management:  -Dexamethasone 4mg BID - continue while RT pending   -CT simulation 8/21  -Plan for EBRT in 10 fractions starting 8/23  -Tylenol and oxycodone prn pain         #Hyperglycemia  ::Receiving dexamathasone 4mg BID  Management:  -Goal glucose between 140-180  -POCT glucose q4     #CAD s/p PCI (unknown year)   #HTN, #HLD  -continue home atorvastatin, ASA (held plavix as no indication for DAPT and in case of any procedures)  -HOLD: home amlodipine 10mg daily, home metoprolol succinate 50mg daily, home hydrochlorothiazide 25mg daily        F: PRN  E: PRN  N: Purée diet  DVT PPx: Lovenox  GI: Pantoprazole  Code Status: DNR/DNI  Trent Montiel MD    Patient and plan discussed with the attending Physician Dr. Saravia.    Sharon Montemayor MD  PGY-2 Internal Medicine     I saw and evaluated the patient. I personally obtained the key and critical portions of the history and physical exam or was physically present for key and critical portions performed by the resident/fellow. I reviewed the resident/fellow's documentation and discussed the patient with the resident/fellow. I agree with the resident/fellow's medical decision making as documented in the note.      Alondra Saravia MD MS

## 2024-08-24 NOTE — CARE PLAN
The patient's goals for the shift include rest    The clinical goals for the shift include remain stable and free from injury

## 2024-08-25 ENCOUNTER — APPOINTMENT (OUTPATIENT)
Dept: RADIOLOGY | Facility: HOSPITAL | Age: 74
DRG: 947 | End: 2024-08-25
Payer: MEDICARE

## 2024-08-25 LAB
ALBUMIN SERPL BCP-MCNC: 2.8 G/DL (ref 3.4–5)
ANION GAP SERPL CALC-SCNC: 15 MMOL/L (ref 10–20)
BASOPHILS # BLD AUTO: 0.08 X10*3/UL (ref 0–0.1)
BASOPHILS NFR BLD AUTO: 0.3 %
BUN SERPL-MCNC: 13 MG/DL (ref 6–23)
CALCIUM SERPL-MCNC: 8.5 MG/DL (ref 8.6–10.6)
CHLORIDE SERPL-SCNC: 99 MMOL/L (ref 98–107)
CO2 SERPL-SCNC: 25 MMOL/L (ref 21–32)
CREAT SERPL-MCNC: 0.51 MG/DL (ref 0.5–1.3)
EGFRCR SERPLBLD CKD-EPI 2021: >90 ML/MIN/1.73M*2
EOSINOPHIL # BLD AUTO: 0.01 X10*3/UL (ref 0–0.4)
EOSINOPHIL NFR BLD AUTO: 0 %
ERYTHROCYTE [DISTWIDTH] IN BLOOD BY AUTOMATED COUNT: 15.6 % (ref 11.5–14.5)
GLUCOSE BLD MANUAL STRIP-MCNC: 153 MG/DL (ref 74–99)
GLUCOSE BLD MANUAL STRIP-MCNC: 158 MG/DL (ref 74–99)
GLUCOSE BLD MANUAL STRIP-MCNC: 202 MG/DL (ref 74–99)
GLUCOSE BLD MANUAL STRIP-MCNC: 229 MG/DL (ref 74–99)
GLUCOSE BLD MANUAL STRIP-MCNC: 276 MG/DL (ref 74–99)
GLUCOSE SERPL-MCNC: 241 MG/DL (ref 74–99)
HCT VFR BLD AUTO: 29.1 % (ref 41–52)
HGB BLD-MCNC: 8.9 G/DL (ref 13.5–17.5)
IMM GRANULOCYTES # BLD AUTO: 0.91 X10*3/UL (ref 0–0.5)
IMM GRANULOCYTES NFR BLD AUTO: 3.6 % (ref 0–0.9)
LYMPHOCYTES # BLD AUTO: 5.04 X10*3/UL (ref 0.8–3)
LYMPHOCYTES NFR BLD AUTO: 19.9 %
MAGNESIUM SERPL-MCNC: 1.96 MG/DL (ref 1.6–2.4)
MCH RBC QN AUTO: 25.2 PG (ref 26–34)
MCHC RBC AUTO-ENTMCNC: 30.6 G/DL (ref 32–36)
MCV RBC AUTO: 82 FL (ref 80–100)
MONOCYTES # BLD AUTO: 1.09 X10*3/UL (ref 0.05–0.8)
MONOCYTES NFR BLD AUTO: 4.3 %
NEUTROPHILS # BLD AUTO: 18.15 X10*3/UL (ref 1.6–5.5)
NEUTROPHILS NFR BLD AUTO: 71.9 %
NRBC BLD-RTO: 0 /100 WBCS (ref 0–0)
PHOSPHATE SERPL-MCNC: 1.9 MG/DL (ref 2.5–4.9)
PLATELET # BLD AUTO: 446 X10*3/UL (ref 150–450)
POTASSIUM SERPL-SCNC: 3.8 MMOL/L (ref 3.5–5.3)
RBC # BLD AUTO: 3.53 X10*6/UL (ref 4.5–5.9)
SODIUM SERPL-SCNC: 135 MMOL/L (ref 136–145)
WBC # BLD AUTO: 25.3 X10*3/UL (ref 4.4–11.3)

## 2024-08-25 PROCEDURE — 82947 ASSAY GLUCOSE BLOOD QUANT: CPT

## 2024-08-25 PROCEDURE — 80069 RENAL FUNCTION PANEL: CPT

## 2024-08-25 PROCEDURE — 85025 COMPLETE CBC W/AUTO DIFF WBC: CPT

## 2024-08-25 PROCEDURE — 2500000004 HC RX 250 GENERAL PHARMACY W/ HCPCS (ALT 636 FOR OP/ED)

## 2024-08-25 PROCEDURE — 2500000001 HC RX 250 WO HCPCS SELF ADMINISTERED DRUGS (ALT 637 FOR MEDICARE OP)

## 2024-08-25 PROCEDURE — 36415 COLL VENOUS BLD VENIPUNCTURE: CPT

## 2024-08-25 PROCEDURE — 2500000002 HC RX 250 W HCPCS SELF ADMINISTERED DRUGS (ALT 637 FOR MEDICARE OP, ALT 636 FOR OP/ED)

## 2024-08-25 PROCEDURE — 71045 X-RAY EXAM CHEST 1 VIEW: CPT

## 2024-08-25 PROCEDURE — 1200000003 HC ONCOLOGY  ROOM WITH TELEMETRY DAILY

## 2024-08-25 PROCEDURE — 99233 SBSQ HOSP IP/OBS HIGH 50: CPT | Performed by: INTERNAL MEDICINE

## 2024-08-25 PROCEDURE — 71045 X-RAY EXAM CHEST 1 VIEW: CPT | Performed by: RADIOLOGY

## 2024-08-25 PROCEDURE — 83735 ASSAY OF MAGNESIUM: CPT

## 2024-08-25 ASSESSMENT — COGNITIVE AND FUNCTIONAL STATUS - GENERAL
STANDING UP FROM CHAIR USING ARMS: A LITTLE
PERSONAL GROOMING: A LOT
HELP NEEDED FOR BATHING: A LOT
WALKING IN HOSPITAL ROOM: A LOT
MOVING FROM LYING ON BACK TO SITTING ON SIDE OF FLAT BED WITH BEDRAILS: A LITTLE
CLIMB 3 TO 5 STEPS WITH RAILING: TOTAL
HELP NEEDED FOR BATHING: A LOT
STANDING UP FROM CHAIR USING ARMS: A LITTLE
WALKING IN HOSPITAL ROOM: TOTAL
PERSONAL GROOMING: A LOT
TOILETING: A LOT
CLIMB 3 TO 5 STEPS WITH RAILING: TOTAL
EATING MEALS: A LITTLE
MOBILITY SCORE: 14
EATING MEALS: A LITTLE
MOVING TO AND FROM BED TO CHAIR: A LITTLE
DRESSING REGULAR UPPER BODY CLOTHING: A LOT
MOVING FROM LYING ON BACK TO SITTING ON SIDE OF FLAT BED WITH BEDRAILS: A LITTLE
DRESSING REGULAR LOWER BODY CLOTHING: A LOT
DRESSING REGULAR UPPER BODY CLOTHING: A LITTLE
DAILY ACTIVITIY SCORE: 14
DAILY ACTIVITIY SCORE: 13
DRESSING REGULAR LOWER BODY CLOTHING: A LOT
TOILETING: A LOT
TURNING FROM BACK TO SIDE WHILE IN FLAT BAD: A LITTLE
TURNING FROM BACK TO SIDE WHILE IN FLAT BAD: A LITTLE
MOVING TO AND FROM BED TO CHAIR: A LITTLE
MOBILITY SCORE: 15

## 2024-08-25 ASSESSMENT — PAIN SCALES - GENERAL
PAINLEVEL_OUTOF10: 0 - NO PAIN
PAINLEVEL_OUTOF10: 0 - NO PAIN

## 2024-08-25 NOTE — PROGRESS NOTES
Subjective   No significant events overnight. Patient states he does not feel as if he is improving significantly. Also expressing frustrations with puree diet due to concerns of aspiration.  Objective     Vitals:  Vitals:    08/25/24 0644   BP:    Pulse:    Resp:    Temp:    SpO2: 94%       I/O last 3 completed shifts:  In: 990 (13.3 mL/kg) [P.O.:690; IV Piggyback:300]  Out: 1000 (13.5 mL/kg) [Urine:1000 (0.4 mL/kg/hr)]  Weight: 74.3 kg   No intake/output data recorded.    Physical exam:  Constitutional: thin, no acute distress.  HEENT: Normocephalic, atraumatic. PERRL.  Respiratory: Coarse breath sounds bilaterally. Normal respiratory effort.  Cardiovascular: RRR. No murmurs, gallops, or rubs  Abdominal: Soft, nondistended, nontender to palpation. Bowel sounds present. No hepatosplenomegaly or masses. No CVA tenderness.  Neuro: CN II-XII intact. UE and LE strength 5/5 bilaterally and sensation intact. Normal FTN testing.  MSK: No LE edema bilaterally.  Skin: Warm, dry. No rashes or wounds.  Psych: Appropriate mood and affect.    Medications:  aspirin, 81 mg, oral, Daily  atorvastatin, 20 mg, oral, Nightly  [Held by provider] clopidogrel, 75 mg, oral, Daily  dexAMETHasone, 4 mg, intravenous, q12h  enoxaparin, 40 mg, subcutaneous, q24h  gabapentin, 300 mg, oral, Nightly  insulin lispro, 0-10 Units, subcutaneous, q4h  pantoprazole, 40 mg, intravenous, Daily  piperacillin-tazobactam, 3.375 g, intravenous, q6h  polyethylene glycol, 17 g, oral, Daily  sennosides, 2 tablet, oral, BID         PRN medications: acetaminophen **OR** [DISCONTINUED] acetaminophen **OR** [DISCONTINUED] acetaminophen, albuterol, bisacodyl, dextrose, dextrose, glucagon, glucagon, oxyCODONE, oxygen    Labs:  Results for orders placed or performed during the hospital encounter of 08/16/24 (from the past 24 hour(s))   CBC and Auto Differential   Result Value Ref Range    WBC 26.0 (H) 4.4 - 11.3 x10*3/uL    nRBC 0.0 0.0 - 0.0 /100 WBCs    RBC 3.87  (L) 4.50 - 5.90 x10*6/uL    Hemoglobin 9.7 (L) 13.5 - 17.5 g/dL    Hematocrit 31.8 (L) 41.0 - 52.0 %    MCV 82 80 - 100 fL    MCH 25.1 (L) 26.0 - 34.0 pg    MCHC 30.5 (L) 32.0 - 36.0 g/dL    RDW 15.3 (H) 11.5 - 14.5 %    Platelets 502 (H) 150 - 450 x10*3/uL    Neutrophils % 68.6 40.0 - 80.0 %    Immature Granulocytes %, Automated 3.4 (H) 0.0 - 0.9 %    Lymphocytes % 20.1 13.0 - 44.0 %    Monocytes % 7.5 2.0 - 10.0 %    Eosinophils % 0.1 0.0 - 6.0 %    Basophils % 0.3 0.0 - 2.0 %    Neutrophils Absolute 17.81 (H) 1.60 - 5.50 x10*3/uL    Immature Granulocytes Absolute, Automated 0.89 (H) 0.00 - 0.50 x10*3/uL    Lymphocytes Absolute 5.23 (H) 0.80 - 3.00 x10*3/uL    Monocytes Absolute 1.96 (H) 0.05 - 0.80 x10*3/uL    Eosinophils Absolute 0.02 0.00 - 0.40 x10*3/uL    Basophils Absolute 0.09 0.00 - 0.10 x10*3/uL   Renal function panel   Result Value Ref Range    Glucose 160 (H) 74 - 99 mg/dL    Sodium 137 136 - 145 mmol/L    Potassium 3.9 3.5 - 5.3 mmol/L    Chloride 99 98 - 107 mmol/L    Bicarbonate 26 21 - 32 mmol/L    Anion Gap 16 10 - 20 mmol/L    Urea Nitrogen 15 6 - 23 mg/dL    Creatinine 0.51 0.50 - 1.30 mg/dL    eGFR >90 >60 mL/min/1.73m*2    Calcium 8.6 8.6 - 10.6 mg/dL    Phosphorus 3.1 2.5 - 4.9 mg/dL    Albumin 3.0 (L) 3.4 - 5.0 g/dL   Magnesium   Result Value Ref Range    Magnesium 2.13 1.60 - 2.40 mg/dL   POCT GLUCOSE   Result Value Ref Range    POCT Glucose 195 (H) 74 - 99 mg/dL   POCT GLUCOSE   Result Value Ref Range    POCT Glucose 223 (H) 74 - 99 mg/dL   POCT GLUCOSE   Result Value Ref Range    POCT Glucose 226 (H) 74 - 99 mg/dL   POCT GLUCOSE   Result Value Ref Range    POCT Glucose 231 (H) 74 - 99 mg/dL   POCT GLUCOSE   Result Value Ref Range    POCT Glucose 158 (H) 74 - 99 mg/dL   POCT GLUCOSE   Result Value Ref Range    POCT Glucose 202 (H) 74 - 99 mg/dL       Imaging:      Assessment and plan:  Christophe Ambriz is a 74-year-old male with a history of CAD, HTN, HLD, and RCC with spinal mets, status  post L1-5 fusion, L2-4 decompression, and tumor debulking on 07/24/24. The patient initially presented to the ED on 08/14/24 with severe back and hip pain radiating to bilateral legs. Hospitalization complicated by AHRF 2/2 to PNA requiring MICU transfer from 8/19 - 8/22. Now on HFNC and vanc/zosyn     Updates:  -Continue antibiotics for 1 week duration end date 8/28  -MBS patient cleared for puréed diet  -Continue to wean O2  -Missed radiation treatment on 8/23, first RT treatment scheduled for 8/26     #AHRF 2/2 pneumonia of unknown cause  ::CXR 8/21/24:: New finding of the left chest hazy density and retrocardiac density suspect atelectasis/consolidation. Suspect left chest pleural effusion.  ::CTAC for PE 8/19/24: negative for PE, possible multifocal pneumonia, possible aspiration, +ve for right lower chest wall mass within the right 9th rib consistent with progression of metastitic disease  ::8/20/24: both sets of cultures grew GPCC, COVID -ve, MRSA nares +  Management:  -Procalc 0.34  -Urine culture, Respiratory Viral Panel, Respirtatory Culture and  Smear Legionela, Strep Pneumo negative  -Modified barium swallow - cleared for pureed diet   -Continue: Zosyn until 8/28  -Repeat cultures/CXR        #Metastatic renal cell carcinoma   #Bilateral Radicular Pain   ::L1-5 fusion w/ L2-4 decompression and tumor debulking (7/24)  at [facility] with NSGY  ::MRI 8/14: L3 compression fracture with retropulsion of the posterior cortex and severe stenosis of the central spinal canal with nerve root compression unchanged from the previous exam *No new foci of marrow replacement or compression fracture *There is no measurable change compared to the previous exam.  Management:  -Dexamethasone 4mg BID - continue while RT pending   -CT simulation 8/21  -Plan for EBRT in 10 fractions starting 8/26  -Discussed systemic treatment with Dr. Chavarria  -Tylenol and oxycodone prn pain         #Hyperglycemia  ::Receiving dexamathasone 4mg  BID  Management:  -Goal glucose between 140-180  -POCT glucose q4     #CAD s/p PCI (unknown year)   #HTN, #HLD  -continue home atorvastatin, ASA (held plavix as no indication for DAPT and in case of any procedures)  -HOLD: home amlodipine 10mg daily, home metoprolol succinate 50mg daily, home hydrochlorothiazide 25mg daily        F: PRN  E: PRN  N: Purée diet  DVT PPx: Lovenox  GI: Pantoprazole  Code Status: DNR/DNI    Trent Montiel MD    I saw and evaluated the patient. I personally obtained the key and critical portions of the history and physical exam or was physically present for key and critical portions performed by the resident/fellow. I reviewed the resident/fellow's documentation and discussed the patient with the resident/fellow. I agree with the resident/fellow's medical decision making as documented in the note.     Alondra Saravia MD MS

## 2024-08-25 NOTE — CARE PLAN
The patient's goals for the shift include rest    The clinical goals for the shift include Pt thomas be safe, comfortable, and HD stable overnight      Problem: Pain - Adult  Goal: Verbalizes/displays adequate comfort level or baseline comfort level  Outcome: Progressing     Problem: Safety - Adult  Goal: Free from fall injury  Outcome: Progressing     Problem: Skin  Goal: Decreased wound size/increased tissue granulation at next dressing change  Outcome: Progressing  Flowsheets (Taken 8/24/2024 2054)  Decreased wound size/increased tissue granulation at next dressing change: Promote sleep for wound healing  Goal: Participates in plan/prevention/treatment measures  Outcome: Progressing  Flowsheets (Taken 8/24/2024 2054)  Participates in plan/prevention/treatment measures:   Discuss with provider PT/OT consult   Elevate heels  Goal: Prevent/manage excess moisture  Outcome: Progressing  Flowsheets (Taken 8/24/2024 2054)  Prevent/manage excess moisture:   Monitor for/manage infection if present   Use wicking fabric (obtain order)   Moisturize dry skin  Goal: Prevent/minimize sheer/friction injuries  Outcome: Progressing  Flowsheets (Taken 8/24/2024 2054)  Prevent/minimize sheer/friction injuries:   Use pull sheet   Utilize specialty bed per algorithm   Turn/reposition every 2 hours/use positioning/transfer devices  Goal: Promote/optimize nutrition  Outcome: Progressing  Flowsheets (Taken 8/24/2024 2054)  Promote/optimize nutrition:   Monitor/record intake including meals   Consume > 50% meals/supplements   Offer water/supplements/favorite foods   Assist with feeding  Goal: Promote skin healing  Outcome: Progressing  Flowsheets (Taken 8/24/2024 2054)  Promote skin healing:   Turn/reposition every 2 hours/use positioning/transfer devices   Protective dressings over bony prominences   Assess skin/pad under line(s)/device(s)   Rotate device position/do not position patient on device   Ensure correct size (line/device) and  apply per  instructions

## 2024-08-25 NOTE — SIGNIFICANT EVENT
08/25/24 1500   Onset Documentation   Rapid Response Initiated By Radar auto page   Location/Room [facility]  ([facility] 6292)   Pager Time [phone]   Arrival Time 1500   Event End Time 1505   Level II Called No   Primary Reason for Call Radar auto page     Rapid Response Note    Radar auto-page received for a radar score of 6 with the following vital signs:  36.2. 77, 24, 119/69, 92%.  Vital signs were confirmed and reviewed with primary RN.  He is at his current baseline and RN has no immediate concerns.  There are no indications for interventions by Rapid Response at this time.  RN to contact Rapid Response with any future concerns or signs of clinical decompensation.

## 2024-08-26 LAB
ALBUMIN SERPL BCP-MCNC: 2.9 G/DL (ref 3.4–5)
ANION GAP SERPL CALC-SCNC: 17 MMOL/L (ref 10–20)
BASOPHILS # BLD MANUAL: 0 X10*3/UL (ref 0–0.1)
BASOPHILS NFR BLD MANUAL: 0 %
BUN SERPL-MCNC: 13 MG/DL (ref 6–23)
CALCIUM SERPL-MCNC: 8.5 MG/DL (ref 8.6–10.6)
CHLORIDE SERPL-SCNC: 100 MMOL/L (ref 98–107)
CO2 SERPL-SCNC: 24 MMOL/L (ref 21–32)
CREAT SERPL-MCNC: 0.61 MG/DL (ref 0.5–1.3)
EGFRCR SERPLBLD CKD-EPI 2021: >90 ML/MIN/1.73M*2
EOSINOPHIL # BLD MANUAL: 0 X10*3/UL (ref 0–0.4)
EOSINOPHIL NFR BLD MANUAL: 0 %
ERYTHROCYTE [DISTWIDTH] IN BLOOD BY AUTOMATED COUNT: 15.5 % (ref 11.5–14.5)
GLUCOSE BLD MANUAL STRIP-MCNC: 130 MG/DL (ref 74–99)
GLUCOSE BLD MANUAL STRIP-MCNC: 190 MG/DL (ref 74–99)
GLUCOSE BLD MANUAL STRIP-MCNC: 201 MG/DL (ref 74–99)
GLUCOSE BLD MANUAL STRIP-MCNC: 207 MG/DL (ref 74–99)
GLUCOSE BLD MANUAL STRIP-MCNC: 208 MG/DL (ref 74–99)
GLUCOSE SERPL-MCNC: 178 MG/DL (ref 74–99)
HCT VFR BLD AUTO: 28.7 % (ref 41–52)
HGB BLD-MCNC: 8.8 G/DL (ref 13.5–17.5)
IMM GRANULOCYTES # BLD AUTO: 1.03 X10*3/UL (ref 0–0.5)
IMM GRANULOCYTES NFR BLD AUTO: 3.4 % (ref 0–0.9)
LYMPHOCYTES # BLD MANUAL: 4.77 X10*3/UL (ref 0.8–3)
LYMPHOCYTES NFR BLD MANUAL: 15.6 %
MAGNESIUM SERPL-MCNC: 2.05 MG/DL (ref 1.6–2.4)
MCH RBC QN AUTO: 25 PG (ref 26–34)
MCHC RBC AUTO-ENTMCNC: 30.7 G/DL (ref 32–36)
MCV RBC AUTO: 82 FL (ref 80–100)
MONOCYTES # BLD MANUAL: 0.28 X10*3/UL (ref 0.05–0.8)
MONOCYTES NFR BLD MANUAL: 0.9 %
NEUTS SEG # BLD MANUAL: 23.96 X10*3/UL (ref 1.6–5)
NEUTS SEG NFR BLD MANUAL: 78.3 %
NRBC BLD-RTO: 0 /100 WBCS (ref 0–0)
PHOSPHATE SERPL-MCNC: 2.3 MG/DL (ref 2.5–4.9)
PLATELET # BLD AUTO: 418 X10*3/UL (ref 150–450)
POTASSIUM SERPL-SCNC: 3.8 MMOL/L (ref 3.5–5.3)
RBC # BLD AUTO: 3.52 X10*6/UL (ref 4.5–5.9)
RBC MORPH BLD: ABNORMAL
SODIUM SERPL-SCNC: 137 MMOL/L (ref 136–145)
TOTAL CELLS COUNTED BLD: 115
VARIANT LYMPHS # BLD MANUAL: 1.59 X10*3/UL (ref 0–0.3)
VARIANT LYMPHS NFR BLD: 5.2 %
WBC # BLD AUTO: 30.6 X10*3/UL (ref 4.4–11.3)

## 2024-08-26 PROCEDURE — 99232 SBSQ HOSP IP/OBS MODERATE 35: CPT

## 2024-08-26 PROCEDURE — 2500000004 HC RX 250 GENERAL PHARMACY W/ HCPCS (ALT 636 FOR OP/ED)

## 2024-08-26 PROCEDURE — 2500000002 HC RX 250 W HCPCS SELF ADMINISTERED DRUGS (ALT 637 FOR MEDICARE OP, ALT 636 FOR OP/ED)

## 2024-08-26 PROCEDURE — 85007 BL SMEAR W/DIFF WBC COUNT: CPT

## 2024-08-26 PROCEDURE — 82947 ASSAY GLUCOSE BLOOD QUANT: CPT

## 2024-08-26 PROCEDURE — 85027 COMPLETE CBC AUTOMATED: CPT

## 2024-08-26 PROCEDURE — 2500000001 HC RX 250 WO HCPCS SELF ADMINISTERED DRUGS (ALT 637 FOR MEDICARE OP)

## 2024-08-26 PROCEDURE — 1200000003 HC ONCOLOGY  ROOM WITH TELEMETRY DAILY

## 2024-08-26 PROCEDURE — 36415 COLL VENOUS BLD VENIPUNCTURE: CPT

## 2024-08-26 PROCEDURE — 83735 ASSAY OF MAGNESIUM: CPT

## 2024-08-26 PROCEDURE — 80069 RENAL FUNCTION PANEL: CPT

## 2024-08-26 RX ORDER — PANTOPRAZOLE SODIUM 40 MG/1
40 TABLET, DELAYED RELEASE ORAL
Status: DISCONTINUED | OUTPATIENT
Start: 2024-08-27 | End: 2024-08-28 | Stop reason: HOSPADM

## 2024-08-26 ASSESSMENT — COGNITIVE AND FUNCTIONAL STATUS - GENERAL
WALKING IN HOSPITAL ROOM: A LOT
DRESSING REGULAR LOWER BODY CLOTHING: A LOT
CLIMB 3 TO 5 STEPS WITH RAILING: TOTAL
MOBILITY SCORE: 15
MOVING FROM LYING ON BACK TO SITTING ON SIDE OF FLAT BED WITH BEDRAILS: A LITTLE
TOILETING: A LOT
MOVING TO AND FROM BED TO CHAIR: A LITTLE
DRESSING REGULAR UPPER BODY CLOTHING: A LITTLE
DAILY ACTIVITIY SCORE: 14
TURNING FROM BACK TO SIDE WHILE IN FLAT BAD: A LITTLE
HELP NEEDED FOR BATHING: A LOT
STANDING UP FROM CHAIR USING ARMS: A LITTLE
PERSONAL GROOMING: A LOT
EATING MEALS: A LITTLE

## 2024-08-26 ASSESSMENT — PAIN SCALES - GENERAL: PAINLEVEL_OUTOF10: 0 - NO PAIN

## 2024-08-26 NOTE — PROGRESS NOTES
Subjective   No significant events overnight. Patient states he does not feel as if he is improving significantly. Also expressing frustrations with puree diet due to concerns of aspiration.  Objective     Vitals:  Vitals:    08/26/24 1248   BP: 108/63   Pulse: 72   Resp: 16   Temp: 36.1 °C (97 °F)   SpO2: 96%       I/O last 3 completed shifts:  In: 410 (5.5 mL/kg) [P.O.:260; IV Piggyback:150]  Out: 1550 (20.9 mL/kg) [Urine:1550 (0.6 mL/kg/hr)]  Weight: 74.3 kg   I/O this shift:  In: 120 [P.O.:120]  Out: 50 [Urine:50]    Physical exam:  Constitutional: thin, no acute distress.  HEENT: Normocephalic, atraumatic. PERRL.  Respiratory: Coarse breath sounds bilaterally. Normal respiratory effort.  Cardiovascular: RRR. No murmurs, gallops, or rubs  Abdominal: Soft, nondistended, nontender to palpation. Bowel sounds present. No hepatosplenomegaly or masses. No CVA tenderness.  Neuro: CN II-XII intact. UE and LE strength 5/5 bilaterally and sensation intact. Normal FTN testing.  MSK: No LE edema bilaterally.  Skin: Warm, dry. No rashes or wounds.  Psych: Appropriate mood and affect.    Medications:  aspirin, 81 mg, oral, Daily  atorvastatin, 20 mg, oral, Nightly  dexAMETHasone, 4 mg, intravenous, q12h  enoxaparin, 40 mg, subcutaneous, q24h  gabapentin, 300 mg, oral, Nightly  insulin lispro, 0-10 Units, subcutaneous, q4h  [START ON 8/27/2024] pantoprazole, 40 mg, oral, Daily before breakfast  polyethylene glycol, 17 g, oral, Daily  sennosides, 2 tablet, oral, BID         PRN medications: acetaminophen **OR** [DISCONTINUED] acetaminophen **OR** [DISCONTINUED] acetaminophen, albuterol, bisacodyl, dextrose, dextrose, glucagon, glucagon, oxyCODONE, oxygen    Labs:  Results for orders placed or performed during the hospital encounter of 08/16/24 (from the past 24 hour(s))   POCT GLUCOSE   Result Value Ref Range    POCT Glucose 276 (H) 74 - 99 mg/dL   POCT GLUCOSE   Result Value Ref Range    POCT Glucose 130 (H) 74 - 99 mg/dL    POCT GLUCOSE   Result Value Ref Range    POCT Glucose 207 (H) 74 - 99 mg/dL   CBC and Auto Differential   Result Value Ref Range    WBC 30.6 (H) 4.4 - 11.3 x10*3/uL    nRBC 0.0 0.0 - 0.0 /100 WBCs    RBC 3.52 (L) 4.50 - 5.90 x10*6/uL    Hemoglobin 8.8 (L) 13.5 - 17.5 g/dL    Hematocrit 28.7 (L) 41.0 - 52.0 %    MCV 82 80 - 100 fL    MCH 25.0 (L) 26.0 - 34.0 pg    MCHC 30.7 (L) 32.0 - 36.0 g/dL    RDW 15.5 (H) 11.5 - 14.5 %    Platelets 418 150 - 450 x10*3/uL    Immature Granulocytes %, Automated 3.4 (H) 0.0 - 0.9 %    Immature Granulocytes Absolute, Automated 1.03 (H) 0.00 - 0.50 x10*3/uL   Renal function panel   Result Value Ref Range    Glucose 178 (H) 74 - 99 mg/dL    Sodium 137 136 - 145 mmol/L    Potassium 3.8 3.5 - 5.3 mmol/L    Chloride 100 98 - 107 mmol/L    Bicarbonate 24 21 - 32 mmol/L    Anion Gap 17 10 - 20 mmol/L    Urea Nitrogen 13 6 - 23 mg/dL    Creatinine 0.61 0.50 - 1.30 mg/dL    eGFR >90 >60 mL/min/1.73m*2    Calcium 8.5 (L) 8.6 - 10.6 mg/dL    Phosphorus 2.3 (L) 2.5 - 4.9 mg/dL    Albumin 2.9 (L) 3.4 - 5.0 g/dL   Magnesium   Result Value Ref Range    Magnesium 2.05 1.60 - 2.40 mg/dL   Manual Differential   Result Value Ref Range    Neutrophils %, Manual 78.3 40.0 - 80.0 %    Lymphocytes %, Manual 15.6 13.0 - 44.0 %    Monocytes %, Manual 0.9 2.0 - 10.0 %    Eosinophils %, Manual 0.0 0.0 - 6.0 %    Basophils %, Manual 0.0 0.0 - 2.0 %    Atypical Lymphocytes %, Manual 5.2 0.0 - 2.0 %    Seg Neutrophils Absolute, Manual 23.96 (H) 1.60 - 5.00 x10*3/uL    Lymphocytes Absolute, Manual 4.77 (H) 0.80 - 3.00 x10*3/uL    Monocytes Absolute, Manual 0.28 0.05 - 0.80 x10*3/uL    Eosinophils Absolute, Manual 0.00 0.00 - 0.40 x10*3/uL    Basophils Absolute, Manual 0.00 0.00 - 0.10 x10*3/uL    Atypical Lymphs Absolute, Manual 1.59 (H) 0.00 - 0.30 x10*3/uL    Total Cells Counted 115     RBC Morphology See Below    POCT GLUCOSE   Result Value Ref Range    POCT Glucose 201 (H) 74 - 99 mg/dL        Imaging:      Assessment and plan:  Christophe Ambriz is a 74-year-old male with a history of CAD, HTN, HLD, and RCC with spinal mets, status post L1-5 fusion, L2-4 decompression, and tumor debulking on 07/24/24. The patient initially presented to the ED on 08/14/24 with severe back and hip pain radiating to bilateral legs. Hospitalization complicated by AHRF 2/2 to PNA requiring MICU transfer from 8/19 - 8/22. Now on HFNC and vanc/zosyn     Updates:  -Continue antibiotics for 1 week duration end date 8/28  -MBS patient cleared for puréed diet  -Continue to wean O2  -Missed radiation treatment on 8/23, first RT treatment scheduled for 8/27  -To consider discharge if weaned off O2, and can continue radiation as an out- patient.        #AHRF 2/2 pneumonia of unknown cause  ::CXR 8/21/24:: New finding of the left chest hazy density and retrocardiac density suspect atelectasis/consolidation. Suspect left chest pleural effusion.  ::CTAC for PE 8/19/24: negative for PE, possible multifocal pneumonia, possible aspiration, +ve for right lower chest wall mass within the right 9th rib consistent with progression of metastitic disease  ::8/20/24: both sets of cultures grew GPCC, COVID -ve, MRSA nares +  Management:  -Procalc 0.34  -Urine culture, Respiratory Viral Panel, Respirtatory Culture and  Smear Legionela, Strep Pneumo negative  -Modified barium swallow - cleared for pureed diet   -Continue: Zosyn until 8/28  -Repeat cultures/CXR        #Metastatic renal cell carcinoma   #Bilateral Radicular Pain   ::L1-5 fusion w/ L2-4 decompression and tumor debulking (7/24)  at [facility] with NSGY  ::MRI 8/14: L3 compression fracture with retropulsion of the posterior cortex and severe stenosis of the central spinal canal with nerve root compression unchanged from the previous exam *No new foci of marrow replacement or compression fracture *There is no measurable change compared to the previous exam.  Management:  -Dexamethasone  4mg BID - continue while RT pending   -CT simulation 8/21  -Plan for EBRT in 10 fractions starting 8/26  -Discussed systemic treatment with Dr. Chavarria  -Tylenol and oxycodone prn pain         #Hyperglycemia  ::Receiving dexamathasone 4mg BID  Management:  -Goal glucose between 140-180  -POCT glucose q4     #CAD s/p PCI (unknown year)   #HTN, #HLD  -continue home atorvastatin, ASA (held plavix as no indication for DAPT and in case of any procedures)  -HOLD: home amlodipine 10mg daily, home metoprolol succinate 50mg daily, home hydrochlorothiazide 25mg daily        F: PRN  E: PRN  N: Purée diet  DVT PPx: Lovenox  GI: Pantoprazole  Code Status: DNR/DNI    Ted Burt MD

## 2024-08-26 NOTE — PROGRESS NOTES
Physical Therapy                 Therapy Communication Note    Patient Name: Christophe Ambriz  MRN: [id number]  Today's Date: 8/26/2024     Discipline: Physical Therapy    Missed Visit Reason: Missed Visit Reason: Parent refused (Pt refused PT despite encouragement/education. Will re attempt as available.)    Missed Time: Attempt    Comment:

## 2024-08-26 NOTE — CARE PLAN
The patient's goals for the shift include rest    The clinical goals for the shift include pt will not fall today    O  Problem: Pain - Adult  Goal: Verbalizes/displays adequate comfort level or baseline comfort level  Outcome: Progressing     Problem: Safety - Adult  Goal: Free from fall injury  Outcome: Progressing     Problem: Discharge Planning  Goal: Discharge to home or other facility with appropriate resources  Outcome: Progressing     Problem: Chronic Conditions and Co-morbidities  Goal: Patient's chronic conditions and co-morbidity symptoms are monitored and maintained or improved  Outcome: Progressing     Problem: Skin  Goal: Decreased wound size/increased tissue granulation at next dressing change  Outcome: Progressing  Goal: Participates in plan/prevention/treatment measures  Outcome: Progressing  Goal: Prevent/manage excess moisture  Outcome: Progressing  Goal: Prevent/minimize sheer/friction injuries  Outcome: Progressing  Goal: Promote/optimize nutrition  Outcome: Progressing  Goal: Promote skin healing  Outcome: Progressing

## 2024-08-27 ENCOUNTER — HOSPITAL ENCOUNTER (OUTPATIENT)
Dept: RADIATION ONCOLOGY | Facility: HOSPITAL | Age: 74
Setting detail: RADIATION/ONCOLOGY SERIES
Discharge: HOME | End: 2024-08-27
Payer: MEDICARE

## 2024-08-27 ENCOUNTER — PHARMACY VISIT (OUTPATIENT)
Dept: PHARMACY | Facility: CLINIC | Age: 74
End: 2024-08-27
Payer: COMMERCIAL

## 2024-08-27 ENCOUNTER — HOME HEALTH ADMISSION (OUTPATIENT)
Dept: HOME HEALTH SERVICES | Facility: HOME HEALTH | Age: 74
End: 2024-08-27
Payer: MEDICARE

## 2024-08-27 ENCOUNTER — RADIATION ONCOLOGY OTV (OUTPATIENT)
Dept: RADIATION ONCOLOGY | Facility: HOSPITAL | Age: 74
End: 2024-08-27
Payer: MEDICARE

## 2024-08-27 ENCOUNTER — APPOINTMENT (OUTPATIENT)
Dept: RADIOLOGY | Facility: HOSPITAL | Age: 74
DRG: 947 | End: 2024-08-27
Payer: MEDICARE

## 2024-08-27 ENCOUNTER — DOCUMENTATION (OUTPATIENT)
Dept: HOME HEALTH SERVICES | Facility: HOME HEALTH | Age: 74
End: 2024-08-27
Payer: MEDICARE

## 2024-08-27 VITALS
TEMPERATURE: 98.4 F | OXYGEN SATURATION: 93 % | HEART RATE: 77 BPM | SYSTOLIC BLOOD PRESSURE: 116 MMHG | DIASTOLIC BLOOD PRESSURE: 78 MMHG | RESPIRATION RATE: 16 BRPM

## 2024-08-27 VITALS
WEIGHT: 163.8 LBS | BODY MASS INDEX: 21.71 KG/M2 | HEART RATE: 65 BPM | SYSTOLIC BLOOD PRESSURE: 106 MMHG | HEIGHT: 73 IN | RESPIRATION RATE: 16 BRPM | DIASTOLIC BLOOD PRESSURE: 57 MMHG | TEMPERATURE: 97.3 F | OXYGEN SATURATION: 96 %

## 2024-08-27 DIAGNOSIS — C64: ICD-10-CM

## 2024-08-27 DIAGNOSIS — Z51.0 ENCOUNTER FOR ANTINEOPLASTIC RADIATION THERAPY: ICD-10-CM

## 2024-08-27 DIAGNOSIS — C79.51 SECONDARY MALIGNANT NEOPLASM OF BONE (MULTI): ICD-10-CM

## 2024-08-27 LAB
BASOPHILS # BLD AUTO: 0.06 X10*3/UL (ref 0–0.1)
BASOPHILS NFR BLD AUTO: 0.2 %
BURR CELLS BLD QL SMEAR: NORMAL
EOSINOPHIL # BLD AUTO: 0.02 X10*3/UL (ref 0–0.4)
EOSINOPHIL NFR BLD AUTO: 0.1 %
ERYTHROCYTE [DISTWIDTH] IN BLOOD BY AUTOMATED COUNT: 15.4 % (ref 11.5–14.5)
GLUCOSE BLD MANUAL STRIP-MCNC: 182 MG/DL (ref 74–99)
GLUCOSE BLD MANUAL STRIP-MCNC: 198 MG/DL (ref 74–99)
HCT VFR BLD AUTO: 28.7 % (ref 41–52)
HGB BLD-MCNC: 8.7 G/DL (ref 13.5–17.5)
IMM GRANULOCYTES # BLD AUTO: 0.81 X10*3/UL (ref 0–0.5)
IMM GRANULOCYTES NFR BLD AUTO: 2.7 % (ref 0–0.9)
LYMPHOCYTES # BLD AUTO: 5.56 X10*3/UL (ref 0.8–3)
LYMPHOCYTES NFR BLD AUTO: 18.4 %
MCH RBC QN AUTO: 25.3 PG (ref 26–34)
MCHC RBC AUTO-ENTMCNC: 30.3 G/DL (ref 32–36)
MCV RBC AUTO: 83 FL (ref 80–100)
MONOCYTES # BLD AUTO: 0.92 X10*3/UL (ref 0.05–0.8)
MONOCYTES NFR BLD AUTO: 3 %
NEUTROPHILS # BLD AUTO: 22.8 X10*3/UL (ref 1.6–5.5)
NEUTROPHILS NFR BLD AUTO: 75.6 %
NRBC BLD-RTO: 0 /100 WBCS (ref 0–0)
PLATELET # BLD AUTO: 380 X10*3/UL (ref 150–450)
RAD ONC MSQ ACTUAL FRACTIONS DELIVERED: 1
RAD ONC MSQ ACTUAL FRACTIONS DELIVERED: 1
RAD ONC MSQ ACTUAL SESSION DELIVERED DOSE: 300 CGRAY
RAD ONC MSQ ACTUAL SESSION DELIVERED DOSE: 300 CGRAY
RAD ONC MSQ ACTUAL TOTAL DOSE: 300 CGRAY
RAD ONC MSQ ACTUAL TOTAL DOSE: 300 CGRAY
RAD ONC MSQ ELAPSED DAYS: 0
RAD ONC MSQ ELAPSED DAYS: 0
RAD ONC MSQ LAST DATE: NORMAL
RAD ONC MSQ LAST DATE: NORMAL
RAD ONC MSQ PRESCRIBED FRACTIONAL DOSE: 300 CGRAY
RAD ONC MSQ PRESCRIBED FRACTIONAL DOSE: 300 CGRAY
RAD ONC MSQ PRESCRIBED NUMBER OF FRACTIONS: 10
RAD ONC MSQ PRESCRIBED NUMBER OF FRACTIONS: 10
RAD ONC MSQ PRESCRIBED TECHNIQUE: NORMAL
RAD ONC MSQ PRESCRIBED TECHNIQUE: NORMAL
RAD ONC MSQ PRESCRIBED TOTAL DOSE: 3000 CGRAY
RAD ONC MSQ PRESCRIBED TOTAL DOSE: 3000 CGRAY
RAD ONC MSQ START DATE: NORMAL
RAD ONC MSQ START DATE: NORMAL
RAD ONC MSQ TREATMENT COURSE NUMBER: 1
RAD ONC MSQ TREATMENT COURSE NUMBER: 1
RAD ONC MSQ TREATMENT SITE: NORMAL
RAD ONC MSQ TREATMENT SITE: NORMAL
RBC # BLD AUTO: 3.44 X10*6/UL (ref 4.5–5.9)
RBC MORPH BLD: NORMAL
WBC # BLD AUTO: 30.2 X10*3/UL (ref 4.4–11.3)

## 2024-08-27 PROCEDURE — 2500000004 HC RX 250 GENERAL PHARMACY W/ HCPCS (ALT 636 FOR OP/ED)

## 2024-08-27 PROCEDURE — 36415 COLL VENOUS BLD VENIPUNCTURE: CPT

## 2024-08-27 PROCEDURE — 77412 RADIATION TX DELIVERY LVL 3: CPT | Performed by: STUDENT IN AN ORGANIZED HEALTH CARE EDUCATION/TRAINING PROGRAM

## 2024-08-27 PROCEDURE — 2500000001 HC RX 250 WO HCPCS SELF ADMINISTERED DRUGS (ALT 637 FOR MEDICARE OP)

## 2024-08-27 PROCEDURE — DP0C1ZZ BEAM RADIATION OF OTHER BONE USING PHOTONS 1 - 10 MEV: ICD-10-PCS

## 2024-08-27 PROCEDURE — 71045 X-RAY EXAM CHEST 1 VIEW: CPT | Performed by: RADIOLOGY

## 2024-08-27 PROCEDURE — 71045 X-RAY EXAM CHEST 1 VIEW: CPT

## 2024-08-27 PROCEDURE — 2500000002 HC RX 250 W HCPCS SELF ADMINISTERED DRUGS (ALT 637 FOR MEDICARE OP, ALT 636 FOR OP/ED)

## 2024-08-27 PROCEDURE — 85025 COMPLETE CBC W/AUTO DIFF WBC: CPT

## 2024-08-27 PROCEDURE — 99238 HOSP IP/OBS DSCHRG MGMT 30/<: CPT

## 2024-08-27 PROCEDURE — RXMED WILLOW AMBULATORY MEDICATION CHARGE

## 2024-08-27 PROCEDURE — 77280 THER RAD SIMULAJ FIELD SMPL: CPT | Performed by: RADIOLOGY

## 2024-08-27 PROCEDURE — 82947 ASSAY GLUCOSE BLOOD QUANT: CPT

## 2024-08-27 RX ORDER — DEXAMETHASONE 4 MG/1
TABLET ORAL
Qty: 27 TABLET | Refills: 0 | Status: SHIPPED | OUTPATIENT
Start: 2024-08-27 | End: 2024-10-04

## 2024-08-27 RX ORDER — ASPIRIN 81 MG/1
81 TABLET ORAL DAILY
Qty: 30 TABLET | Refills: 1 | Status: SHIPPED | OUTPATIENT
Start: 2024-08-28 | End: 2024-10-27

## 2024-08-27 RX ORDER — BISACODYL 10 MG/1
10 SUPPOSITORY RECTAL DAILY PRN
Qty: 12 SUPPOSITORY | Refills: 0 | Status: SHIPPED | OUTPATIENT
Start: 2024-08-27 | End: 2024-09-26

## 2024-08-27 RX ORDER — OXYCODONE HYDROCHLORIDE 5 MG/1
5 TABLET ORAL EVERY 4 HOURS PRN
Qty: 15 TABLET | Refills: 0 | Status: SHIPPED | OUTPATIENT
Start: 2024-08-27

## 2024-08-27 RX ORDER — GABAPENTIN 300 MG/1
300 CAPSULE ORAL NIGHTLY
Qty: 30 CAPSULE | Refills: 0 | Status: SHIPPED | OUTPATIENT
Start: 2024-08-27 | End: 2024-09-26

## 2024-08-27 RX ORDER — SENNOSIDES 8.6 MG/1
2 TABLET ORAL 2 TIMES DAILY
Qty: 120 TABLET | Refills: 1 | Status: SHIPPED | OUTPATIENT
Start: 2024-08-27 | End: 2024-10-26

## 2024-08-27 ASSESSMENT — ACTIVITIES OF DAILY LIVING (ADL): LACK_OF_TRANSPORTATION: NO

## 2024-08-27 ASSESSMENT — COGNITIVE AND FUNCTIONAL STATUS - GENERAL
MOVING TO AND FROM BED TO CHAIR: A LITTLE
MOBILITY SCORE: 15
TOILETING: A LOT
EATING MEALS: A LITTLE
STANDING UP FROM CHAIR USING ARMS: A LITTLE
PERSONAL GROOMING: A LOT
DRESSING REGULAR LOWER BODY CLOTHING: A LOT
TURNING FROM BACK TO SIDE WHILE IN FLAT BAD: A LITTLE
DAILY ACTIVITIY SCORE: 13
HELP NEEDED FOR BATHING: A LOT
MOVING FROM LYING ON BACK TO SITTING ON SIDE OF FLAT BED WITH BEDRAILS: A LITTLE
DRESSING REGULAR UPPER BODY CLOTHING: A LOT
WALKING IN HOSPITAL ROOM: A LOT
CLIMB 3 TO 5 STEPS WITH RAILING: TOTAL

## 2024-08-27 ASSESSMENT — PAIN - FUNCTIONAL ASSESSMENT: PAIN_FUNCTIONAL_ASSESSMENT: 0-10

## 2024-08-27 ASSESSMENT — PAIN SCALES - GENERAL
PAINLEVEL: 3
PAINLEVEL_OUTOF10: 0 - NO PAIN

## 2024-08-27 NOTE — CONSULTS
"Nutrition Initial Assessment:   Nutrition Assessment    Reason for Assessment: Admission nursing screening    Patient is a 74 y.o. male with h/o RCC with spinal mets, presenting for back & hip pain radiating to BLE preventing ambulation x several weeks.     MRI with L3 compression fracture with severe spinal canal stenosis and nerve root compression. NSGY advised against further surgical intervention. Radiation oncology engaged to start SBRT in five fractions.     Pt required ICU admission for hypoxic respiratory failure requiring AirVo. Imaging c/f PNA of unknown origin, antibiotics started. Pt transferred to [facility] on (8/21).    SLP consulted, took pt for MBSS (8/23) & recommended a pureed diet w/ mildly thickened liquids.     Pt w/ tentative plan for discharge later today.     Nutrition History:  Energy Intake: Poor < 50 %  Food and Nutrient History: Pt off floor at time of attempted visit x2 to radiation. Unable to obtain nutritional history. Per chart review, pt upset with current pureed diet w/ mild thickened liquid restriction. Noted to have 0-50% recorded intake at meals over the past couple of days.  Food Allergies/Intolerances:  None  GI Symptoms:  Unable to assess  Oral Problems: Swallowing difficulty     Anthropometrics:  Height: 185.4 cm (6' 1\")   Weight: 74.3 kg (163 lb 12.8 oz)   BMI (Calculated): 21.62  IBW/kg (Dietitian Calculated): 83.6 kg  Percent of IBW: 89 %     Weight History:   Wt Readings from Last 30 Encounters:   08/21/24 74.3 kg (163 lb 12.8 oz) --> Current wt   08/14/24 73.7 kg (162 lb 7.7 oz) --> Preadmit wt   07/26/24 80.7 kg (177 lb 14.4 oz)   05/11/24 95.3 kg (210 lb)     Weight Change %:  Weight History / % Weight Change: 8% x 1 month, 22% x 3 months  Significant Weight Loss: Yes  Interpretation of Weight Loss: >7.5% in 3 months    Nutrition Focused Physical Exam Findings:  defer: Pt off floor to radiation at attempted visit x2    Edema:  Edema: none    Physical Findings:  Skin:  " (Medial lower back incision)    Nutrition Significant Labs:  BMP Trend:   Results from last 7 days   Lab Units 08/26/24  0703 08/25/24  0725 08/24/24  0753 08/23/24  0640   GLUCOSE mg/dL 178* 241* 160* 139*   CALCIUM mg/dL 8.5* 8.5* 8.6 8.8   SODIUM mmol/L 137 135* 137 134*   POTASSIUM mmol/L 3.8 3.8 3.9 4.3   CO2 mmol/L 24 25 26 25   CHLORIDE mmol/L 100 99 99 97*   BUN mg/dL 13 13 15 18   CREATININE mg/dL 0.61 0.51 0.51 0.51      Nutrition Specific Medications:  Scheduled medications  dexAMETHasone, 4 mg, intravenous, q12h  insulin lispro, 0-10 Units, subcutaneous, q4h  pantoprazole, 40 mg, oral, Daily before breakfast  polyethylene glycol, 17 g, oral, Daily  sennosides, 2 tablet, oral, BID    I/O:   Last BM Date: 08/25/24; Stool Appearance: Loose (08/26/24 2003)    Dietary Orders (From admission, onward)       Start     Ordered    08/23/24 1627  Adult diet Regular; Pureed 4; Mild thick 2  Diet effective now        Comments: Alternate bites/sips  Straws ok   Question Answer Comment   Diet type Regular    Texture Pureed 4    Fluid consistency Mild thick 2        08/23/24 1631             Estimated Needs:   Total Energy Estimated Needs (kCal):  (2200+)  Method for Estimating Needs: ~30 kcals/kg x admit wt (73.7 kg)  Total Protein Estimated Needs (g):  ()  Method for Estimating Needs: 1.2-1.4 g/kg x admit wt (73.7 kg)  Total Fluid Estimated Needs (mL):  (2200+)  Method for Estimating Needs: 1mL/kcal or per team        Nutrition Diagnosis   Malnutrition Diagnosis  Patient has Malnutrition Diagnosis: Yes  Diagnosis Status: New  Malnutrition Diagnosis: Severe malnutrition related to chronic disease or condition  As Evidenced by: suspected <75% EENs for >1 month resulting in significant weight loss (8% x 1 month) (22% x 3 months)      Nutrition Interventions/Recommendations         Nutrition Prescription:  Per speech therapy's recommendation, continue a pureed diet w/ mildly thickened liquids  Will provide Magic Cup  w/ meals for added nutrition    Given pt w/ chronically down trending weight, consider addition of an appetite stimulant     Check vitamin D level & replete PRN            Nutrition Interventions:   Interventions: Medical food supplement  Medical Food Supplement: Commercial food  Goal: Magic Cup TID    Nutrition Monitoring and Evaluation   Food/Nutrient Related History Monitoring  Monitoring and Evaluation Plan: Energy intake  Energy Intake: Estimated energy intake  Criteria: Meet >50% EENs    Time Spent (min): 45 minutes

## 2024-08-27 NOTE — DISCHARGE SUMMARY
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     Discharge Meds     Your medication list        START taking these medications        Instructions Last Dose Given Next Dose Due   aspirin 81 mg EC tablet  Start taking on: August 28, 2024      Take 1 tablet (81 mg) by mouth once daily.       bisacodyl 10 mg suppository  Commonly known as: Dulcolax      Insert 1 suppository (10 mg) into the rectum once daily as needed for constipation.       dexAMETHasone 4 mg tablet  Commonly known as: Decadron  Start taking on: August 27, 2024      Take 1 tablet (4 mg) by mouth 2 times daily (morning and late afternoon) for 4 days, THEN 0.5 tablets (2 mg) 2 times daily (morning and late afternoon) for 4 days, THEN 0.5 tablets (2 mg) once daily.       oxygen gas therapy  Commonly known as: O2      Inhale 1 each continuously.              CHANGE how you take these medications        Instructions Last Dose Given Next Dose Due   oxyCODONE 5 mg immediate release tablet  Commonly known as: Roxicodone  What changed:   medication strength  how much to take  when to take this  reasons to take this      Take 1 tablet (5 mg) by mouth every 4 hours if needed for moderate pain (4 - 6).       traZODone 50 mg tablet  Commonly known as: Desyrel  What changed:   when to take this  reasons to take this      Take 1 tablet (50 mg) by mouth once daily at bedtime.              CONTINUE taking these medications        Instructions Last Dose Given Next Dose Due   atorvastatin 20 mg tablet  Commonly known as: Lipitor      Take 1 tablet (20 mg) by mouth once daily at bedtime.       clopidogrel 75 mg tablet  Commonly known as: Plavix           gabapentin 300 mg capsule  Commonly known as: Neurontin      Take 1 capsule (300 mg) by mouth once daily at bedtime.       sennosides 8.6 mg tablet  Commonly  known as: Senokot      Take 2 tablets (17.2 mg) by mouth 2 times a day.              STOP taking these medications      acetaminophen 500 mg tablet  Commonly known as: Tylenol        amLODIPine 10 mg tablet  Commonly known as: Norvasc        hydroCHLOROthiazide 25 mg tablet  Commonly known as: HYDRODiuril        lactulose 20 gram/30 mL oral solution        metoprolol succinate XL 50 mg 24 hr tablet  Commonly known as: Toprol-XL        polyethylene glycol 17 gram packet  Commonly known as: Glycolax, Miralax                  Where to Get Your Medications        These medications were sent to [facility]  [address], [address], [facility] [address]      Hours: 8AM to 6PM Mon-Fri, 8AM to 4PM Sat, 9AM to 1PM Sun Phone: [phone]   aspirin 81 mg EC tablet  bisacodyl 10 mg suppository  dexAMETHasone 4 mg tablet  gabapentin 300 mg capsule  oxyCODONE 5 mg immediate release tablet  sennosides 8.6 mg tablet       Information about where to get these medications is not yet available    Ask your nurse or doctor about these medications  oxygen gas therapy         Test Results Pending At Discharge  Pending Labs       Order Current Status    Extra Urine Gray Tube Collected (08/19/24 1220)    Respiratory Culture/Smear Collected (08/25/24 1204)    Respiratory Culture/Smear Collected (08/25/24 1204)    Urinalysis with Reflex Culture and Microscopic In process            Hospital Course  Christophe Ambriz is a 74 y.o. male with a hx of CAD, HTN, HLD and RCC with spinal mets s/p L1-5 fusion w/ L2-4 decompression  and tumor debulking (7/24) presented initially with back pain, transferred from [facility] for further management.      Pt stated that following the surgery, he continues to have pain with any movement that radiates into bilateral legs. Pain is burning in quality and starts primarily in his hips and shoots down both legs into his toes. The pain in his legs is worse than in his lower back. Pain also  occasionally radiates into his buttocks. He denies any bowel/bladder changes or any weakness/numbness/paresthesias. He is chronically constipated with infrequent bowel movements. He has not been on a pain regimen since  his surgery and missed his follow up appointments as he has not been able to ambulate 2/2 pain. He has not walked in several weeks as the pain is too great.     Patient was started on decadron 4mg q8h. MRI spine showed L3 compression fracture with retropulsion of the posterior cortex and severe stenosis of the central spinal canal with nerve root compression unchanged from the previous exam. Neurosurgery consulted and determined no need for surgical intervention. Radiation oncology planning EBRT in 10 fractions beginning 8/27.    The patient was transferred to the MICU (8/19) following desaturation to 70% on room air, unresponsive to a non-rebreather mask (15 L), with saturation only improving to 84%. Rapid response was called due to his somnolent state and hypotension, though he was afebrile. Physical exam was unremarkable for significant respiratory findings. ABG revealed hypoxemia with an oxygen level of 60. The patient was placed on CPAP (10 cm H2O, 45% FiO2), with improved oxygen saturation to 88-92%. Chest X-ray showed LLL consolidation. CT scan ruled out PE but showed increased multifocal nodular consolidations, suggesting multifocal pneumonia with a possible aspiration component. Treated for pneumonia with Vanc/Zosyn. Transferred back to regular floor 8/22. Weaned O2 as tolerated.     Seen by radiation oncology team who decided to book him for radiation sessions as an out patient.     As patient is hemodynamically stable, off oxygen, has a clear plan for radiation, we made the decision to discharge him on follow up with radiation oncology, neurosurgery and his primary oncologist.     Pertinent Physical Exam At Time of Discharge  Constitutional: thin, no acute distress.  HEENT: Normocephalic,  atraumatic. PERRL.  Respiratory: Equal breath sounds bilaterally. Normal respiratory effort.  Cardiovascular: RRR. No murmurs, gallops, or rubs  Abdominal: Soft, nondistended, nontender to palpation. Bowel sounds present. No hepatosplenomegaly or masses. No CVA tenderness.  Neuro: CN II-XII intact. UE and LE strength 5/5 bilaterally and sensation intact. Normal FTN testing.  MSK: No LE edema bilaterally.  Skin: Warm, dry. No rashes or wounds.  Psych: Appropriate mood and affect.       Outpatient Follow-Up  Future Appointments   Date Time Provider Department Center   8/27/2024  1:30 PM [email or web address] [id number] [facility]   8/28/2024  9:00 AM [email or web address] [id number] [facility]   8/29/2024  8:15 AM [email or web address] [id number] [facility]   8/30/2024  7:45 AM [email or web address] [id number] [facility]   9/3/2024  7:45 AM [email or web address] [id number] [facility]   9/4/2024  7:45 AM [email or web address] [id number] Academic   9/5/2024  7:45 AM [email or web address] [id number] Academic   9/6/2024  7:45 AM [email or web address] [id number] Academic   9/9/2024  7:45 AM [email or web address] [id number] [facility]   9/10/2024  7:45 AM [email or web address] [id number] [facility]         Ted Burt MD

## 2024-08-27 NOTE — PROGRESS NOTES
08/23/24 1100   Discharge Planning   Living Arrangements Spouse/significant other   Support Systems Spouse/significant other   Assistance Needed mobility   Type of Residence Private residence   Number of Stairs to Enter Residence 0   Number of Stairs Within Residence 0   Do you have animals or pets at home? No   Who is requesting discharge planning? Provider   Home or Post Acute Services Post acute facilities (Rehab/SNF/etc);In home services   Type of Post Acute Facility Services Skilled nursing   Type of Home Care Services Home nursing visits;Home OT;Home PT   Expected Discharge Disposition SNF   Financial Resource Strain   How hard is it for you to pay for the very basics like food, housing, medical care, and heating? Not hard   Housing Stability   In the last 12 months, was there a time when you were not able to pay the mortgage or rent on time? N   At any time in the past 12 months, were you homeless or living in a shelter (including now)? N   Transportation Needs   In the past 12 months, has lack of transportation kept you from medical appointments or from getting medications? no   In the past 12 months, has lack of transportation kept you from meetings, work, or from getting things needed for daily living? No     SW met with pt to discuss PT and OT rec for SNF. Pt declines SNF at this time and reported that he has other things to think about.   Pt agreed that SW can return in a day or so to follow up.   SW wrote contact details on the whiteboard and offered to return if there was anything pt or spouse Iram wanted to talk about.  Other discharge needs are pending updates from the care team.   SW will follow. Serjio [facility]don [facility], [facility].     08/27/2024 2946  SW was called to pt room this morning. Pt reported that he is supposed to receive RT this morning and after, he wants transportation arranged home. Pt reported he will leave AMA if that means he's leaving today. Pt reported that he will arrange his  own oxygen. YAYA explained that if he leaves AMA, no homegoing services can be set up for him.  YAYA notified the care team.  YAYA will follow. HOA Resendiz.     08/27/2024 1115  Care team reports that pt will have RT this afternoon and then will discharge home. Pt will discharge home with [facility] PT/OT.  SW will arrange pt's transport home.  YAYA will follow. HOA Resendiz.     08/27/2024 1310  Pt is being taken down for RT.  YAYA requested transport for 1800 and it was confirmed with [facility].  Care team notified.   SW will follow. HOA Resendiz.

## 2024-08-27 NOTE — CARE PLAN
The patient's goals for the shift include rest    The clinical goals for the shift include pt will not fall throughout shift      Problem: Pain - Adult  Goal: Verbalizes/displays adequate comfort level or baseline comfort level  Outcome: Progressing     Problem: Safety - Adult  Goal: Free from fall injury  Outcome: Progressing     Problem: Discharge Planning  Goal: Discharge to home or other facility with appropriate resources  Outcome: Progressing     Problem: Chronic Conditions and Co-morbidities  Goal: Patient's chronic conditions and co-morbidity symptoms are monitored and maintained or improved  Outcome: Progressing     Problem: Skin  Goal: Decreased wound size/increased tissue granulation at next dressing change  Outcome: Progressing  Goal: Participates in plan/prevention/treatment measures  Outcome: Progressing  Goal: Prevent/manage excess moisture  Outcome: Progressing  Goal: Prevent/minimize sheer/friction injuries  Outcome: Progressing  Goal: Promote/optimize nutrition  Outcome: Progressing  Goal: Promote skin healing  Outcome: Progressing

## 2024-08-27 NOTE — HH CARE COORDINATION
Home Care received a Referral for Physical Therapy and Occupational Therapy. We have processed the referral for a Start of Care on 08/29.     If you have any questions or concerns, please feel free to contact us at [phone]. Follow the prompts, enter your five digit zip code, and you will be directed to your care team on E[facility] 1.

## 2024-08-27 NOTE — PROGRESS NOTES
Radiation Oncology On Treatment Visit    Patient Name:  Christophe Ambriz  MRN:  [id number]  :  1950    Referring Provider: No ref. 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      OBJECTIVE:   Vital Signs:  /78   Pulse 77   Temp 36.9 °C (98.4 °F)   Resp 16   SpO2 93%    Pain Scale: The patient's current pain level was assessed.  They report currently having a pain of 3 out of 10.    Other Pertinent Findings:     Toxicity Assessment          2024    14:21   Toxicity Assessment   Adverse Events Reviewed (WDL) Yes (Within Defined Limits)   Treatment Site Bone   Anorexia Grade 0   Anxiety Grade 0   Dehydration Grade 0   Depression Grade 0   Dermatitis Radiation Grade 0   Diarrhea Grade 0   Fatigue Grade 0   Fibrosis Deep Connective Tissue Grade 0   Fracture Grade 0   Nausea Grade 0   Pain Grade 1   Treatment Related Secondary Malignancy Grade 0   Tumor Pain Grade 1   Vomiting Grade 0   Joint Range of Motion Decreased Grade 0   Bone Pain Grade 0   Edema Limbs Grade 0   Alopecia Grade 0   Erythroderma Grade 0   Pain of Skin Grade 0   Pruritus Grade 0   Rash Acneiform Grade 0   Skin Hyperpigmentation Grade 0   Skin Hypopigmentation Grade 0   Skin Induration Grade 0    Skin Ulceration Grade 0   Telangiectasia Grade 0        Assessment / Plan:  The patient is tolerating radiation therapy as anticipated.  Continue per current treatment plan. He will continue rest of his RT at [facility].

## 2024-08-28 ENCOUNTER — APPOINTMENT (OUTPATIENT)
Dept: RADIATION ONCOLOGY | Facility: HOSPITAL | Age: 74
End: 2024-08-28
Payer: MEDICARE

## 2024-08-29 ENCOUNTER — APPOINTMENT (OUTPATIENT)
Dept: RADIATION ONCOLOGY | Facility: CLINIC | Age: 74
End: 2024-08-29
Payer: MEDICARE

## 2024-08-29 ENCOUNTER — APPOINTMENT (OUTPATIENT)
Dept: RADIATION ONCOLOGY | Facility: HOSPITAL | Age: 74
End: 2024-08-29
Payer: MEDICARE

## 2024-08-29 ENCOUNTER — SOCIAL WORK (OUTPATIENT)
Dept: CASE MANAGEMENT | Facility: HOSPITAL | Age: 74
End: 2024-08-29
Payer: MEDICARE

## 2024-08-29 NOTE — PROGRESS NOTES
SW received message that Pt needs help setting up transport to his radiation appts. SW called Pt's friend, Iram. She reports that they did not want to set up an account with [facility] and she said that they could not bring him every day. SW advised that Pt would have to set up an account but also could qualify for free rides as he is medicaid and they would be able to bring him with the correct advanced notice. SW would assist with this to ensure. Pt's friend then stated that Pt is bed bound and can't walk. He is not able to get out of [place]. SW is unsure how or if this would work, will discuss with team.

## 2024-08-30 ENCOUNTER — APPOINTMENT (OUTPATIENT)
Dept: RADIATION ONCOLOGY | Facility: HOSPITAL | Age: 74
End: 2024-08-30
Payer: MEDICARE

## 2024-08-30 ENCOUNTER — DOCUMENTATION (OUTPATIENT)
Dept: RADIATION ONCOLOGY | Facility: HOSPITAL | Age: 74
End: 2024-08-30
Payer: MEDICARE

## 2024-08-30 ENCOUNTER — APPOINTMENT (OUTPATIENT)
Dept: RADIATION ONCOLOGY | Facility: CLINIC | Age: 74
End: 2024-08-30
Payer: MEDICARE

## 2024-08-30 NOTE — PROGRESS NOTES
Spoke to patient's Iram dixon, and the patient regarding the patient's current radiation treatment plan. The patient was discharged on 8/27 and has not been able to get out of bed to get transportation to [facility] to complete radiation treatment. The family is adamant about not wanting to call an ambulance to come to [facility] to get admitted to complete radiation treatment. Reached out to , Monica Mnedoza to assist with transportation options.     Discussed with the patient and family regarding further delays in radiation treatment can worsen outcomes and may prevent full efficacy of the intended treatment. The patient and family expressed understanding, but continue to be adamant about not coming to [facility] for treatment.     Patient was provided a call back number if they had further questions. Will continue to engage with social work to assist with transportation to complete RT treatments.     Ryan Sanderson MD  PGY-4 Radiation Oncology

## 2024-09-02 ENCOUNTER — APPOINTMENT (OUTPATIENT)
Dept: CARDIOLOGY | Facility: HOSPITAL | Age: 74
End: 2024-09-02
Payer: MEDICARE

## 2024-09-02 ENCOUNTER — APPOINTMENT (OUTPATIENT)
Dept: RADIOLOGY | Facility: HOSPITAL | Age: 74
End: 2024-09-02
Payer: MEDICARE

## 2024-09-02 ENCOUNTER — HOSPITAL ENCOUNTER (EMERGENCY)
Facility: HOSPITAL | Age: 74
Discharge: OTHER INSTITUTION | End: 2024-09-02
Payer: MEDICARE

## 2024-09-02 VITALS
HEART RATE: 67 BPM | HEIGHT: 73 IN | BODY MASS INDEX: 20.37 KG/M2 | TEMPERATURE: 98.4 F | WEIGHT: 153.66 LBS | DIASTOLIC BLOOD PRESSURE: 64 MMHG | SYSTOLIC BLOOD PRESSURE: 116 MMHG | OXYGEN SATURATION: 95 % | RESPIRATION RATE: 16 BRPM

## 2024-09-02 DIAGNOSIS — J96.01 ACUTE RESPIRATORY FAILURE WITH HYPOXIA (MULTI): ICD-10-CM

## 2024-09-02 DIAGNOSIS — M54.50 CHRONIC MIDLINE LOW BACK PAIN, UNSPECIFIED WHETHER SCIATICA PRESENT: Primary | ICD-10-CM

## 2024-09-02 DIAGNOSIS — J69.0 ASPIRATION PNEUMONIA OF LEFT LOWER LOBE, UNSPECIFIED ASPIRATION PNEUMONIA TYPE (MULTI): ICD-10-CM

## 2024-09-02 DIAGNOSIS — R09.02 HYPOXIA: ICD-10-CM

## 2024-09-02 DIAGNOSIS — G89.29 CHRONIC MIDLINE LOW BACK PAIN, UNSPECIFIED WHETHER SCIATICA PRESENT: Primary | ICD-10-CM

## 2024-09-02 LAB
ALBUMIN SERPL-MCNC: 2.9 G/DL (ref 3.5–5)
ALP BLD-CCNC: 136 U/L (ref 35–125)
ALT SERPL-CCNC: 16 U/L (ref 5–40)
ANION GAP SERPL CALC-SCNC: 13 MMOL/L
APPEARANCE UR: CLEAR
AST SERPL-CCNC: 16 U/L (ref 5–40)
BASOPHILS # BLD AUTO: 0.03 X10*3/UL (ref 0–0.1)
BASOPHILS NFR BLD AUTO: 0.1 %
BILIRUB SERPL-MCNC: 1.1 MG/DL (ref 0.1–1.2)
BILIRUB UR STRIP.AUTO-MCNC: NEGATIVE MG/DL
BUN SERPL-MCNC: 16 MG/DL (ref 8–25)
CALCIUM SERPL-MCNC: 9.3 MG/DL (ref 8.5–10.4)
CHLORIDE SERPL-SCNC: 94 MMOL/L (ref 97–107)
CO2 SERPL-SCNC: 24 MMOL/L (ref 24–31)
COLOR UR: YELLOW
CREAT SERPL-MCNC: 0.5 MG/DL (ref 0.4–1.6)
EGFRCR SERPLBLD CKD-EPI 2021: >90 ML/MIN/1.73M*2
EOSINOPHIL # BLD AUTO: 0.02 X10*3/UL (ref 0–0.4)
EOSINOPHIL NFR BLD AUTO: 0.1 %
ERYTHROCYTE [DISTWIDTH] IN BLOOD BY AUTOMATED COUNT: 16.1 % (ref 11.5–14.5)
GLUCOSE SERPL-MCNC: 218 MG/DL (ref 65–99)
GLUCOSE UR STRIP.AUTO-MCNC: NORMAL MG/DL
HCT VFR BLD AUTO: 32.3 % (ref 41–52)
HGB BLD-MCNC: 9.8 G/DL (ref 13.5–17.5)
IMM GRANULOCYTES # BLD AUTO: 0.23 X10*3/UL (ref 0–0.5)
IMM GRANULOCYTES NFR BLD AUTO: 0.9 % (ref 0–0.9)
KETONES UR STRIP.AUTO-MCNC: NEGATIVE MG/DL
LEUKOCYTE ESTERASE UR QL STRIP.AUTO: NEGATIVE
LYMPHOCYTES # BLD AUTO: 4.64 X10*3/UL (ref 0.8–3)
LYMPHOCYTES NFR BLD AUTO: 18.2 %
MAGNESIUM SERPL-MCNC: 1.9 MG/DL (ref 1.6–3.1)
MCH RBC QN AUTO: 25.1 PG (ref 26–34)
MCHC RBC AUTO-ENTMCNC: 30.3 G/DL (ref 32–36)
MCV RBC AUTO: 83 FL (ref 80–100)
MONOCYTES # BLD AUTO: 0.98 X10*3/UL (ref 0.05–0.8)
MONOCYTES NFR BLD AUTO: 3.8 %
MUCOUS THREADS #/AREA URNS AUTO: NORMAL /LPF
NEUTROPHILS # BLD AUTO: 19.64 X10*3/UL (ref 1.6–5.5)
NEUTROPHILS NFR BLD AUTO: 76.9 %
NITRITE UR QL STRIP.AUTO: NEGATIVE
NRBC BLD-RTO: 0 /100 WBCS (ref 0–0)
NT-PROBNP SERPL-MCNC: 150 PG/ML (ref 0–852)
PH UR STRIP.AUTO: 6 [PH]
PHOSPHATE SERPL-MCNC: 2.3 MG/DL (ref 2.5–4.5)
PLATELET # BLD AUTO: 305 X10*3/UL (ref 150–450)
POTASSIUM SERPL-SCNC: 4.1 MMOL/L (ref 3.4–5.1)
PROT SERPL-MCNC: 7.1 G/DL (ref 5.9–7.9)
PROT UR STRIP.AUTO-MCNC: ABNORMAL MG/DL
RBC # BLD AUTO: 3.9 X10*6/UL (ref 4.5–5.9)
RBC # UR STRIP.AUTO: ABNORMAL /UL
RBC #/AREA URNS AUTO: NORMAL /HPF
SODIUM SERPL-SCNC: 131 MMOL/L (ref 133–145)
SP GR UR STRIP.AUTO: >1.05
TROPONIN T SERPL-MCNC: 16 NG/L
TROPONIN T SERPL-MCNC: 19 NG/L
UROBILINOGEN UR STRIP.AUTO-MCNC: ABNORMAL MG/DL
WBC # BLD AUTO: 25.5 X10*3/UL (ref 4.4–11.3)
WBC #/AREA URNS AUTO: NORMAL /HPF

## 2024-09-02 PROCEDURE — 99285 EMERGENCY DEPT VISIT HI MDM: CPT | Mod: 25 | Performed by: EMERGENCY MEDICINE

## 2024-09-02 PROCEDURE — 93005 ELECTROCARDIOGRAM TRACING: CPT

## 2024-09-02 PROCEDURE — 83880 ASSAY OF NATRIURETIC PEPTIDE: CPT

## 2024-09-02 PROCEDURE — 71275 CT ANGIOGRAPHY CHEST: CPT

## 2024-09-02 PROCEDURE — 96365 THER/PROPH/DIAG IV INF INIT: CPT | Mod: 59 | Performed by: EMERGENCY MEDICINE

## 2024-09-02 PROCEDURE — 74177 CT ABD & PELVIS W/CONTRAST: CPT | Performed by: RADIOLOGY

## 2024-09-02 PROCEDURE — 2550000001 HC RX 255 CONTRASTS

## 2024-09-02 PROCEDURE — 96376 TX/PRO/DX INJ SAME DRUG ADON: CPT | Mod: 59 | Performed by: EMERGENCY MEDICINE

## 2024-09-02 PROCEDURE — 80053 COMPREHEN METABOLIC PANEL: CPT

## 2024-09-02 PROCEDURE — 85025 COMPLETE CBC W/AUTO DIFF WBC: CPT

## 2024-09-02 PROCEDURE — 96361 HYDRATE IV INFUSION ADD-ON: CPT | Performed by: EMERGENCY MEDICINE

## 2024-09-02 PROCEDURE — 2500000004 HC RX 250 GENERAL PHARMACY W/ HCPCS (ALT 636 FOR OP/ED)

## 2024-09-02 PROCEDURE — 36415 COLL VENOUS BLD VENIPUNCTURE: CPT

## 2024-09-02 PROCEDURE — 84484 ASSAY OF TROPONIN QUANT: CPT | Performed by: EMERGENCY MEDICINE

## 2024-09-02 PROCEDURE — 84484 ASSAY OF TROPONIN QUANT: CPT

## 2024-09-02 PROCEDURE — 71275 CT ANGIOGRAPHY CHEST: CPT | Performed by: RADIOLOGY

## 2024-09-02 PROCEDURE — 96375 TX/PRO/DX INJ NEW DRUG ADDON: CPT | Mod: 59 | Performed by: EMERGENCY MEDICINE

## 2024-09-02 PROCEDURE — 81001 URINALYSIS AUTO W/SCOPE: CPT

## 2024-09-02 PROCEDURE — 84100 ASSAY OF PHOSPHORUS: CPT

## 2024-09-02 PROCEDURE — 36415 COLL VENOUS BLD VENIPUNCTURE: CPT | Performed by: EMERGENCY MEDICINE

## 2024-09-02 PROCEDURE — 74177 CT ABD & PELVIS W/CONTRAST: CPT

## 2024-09-02 PROCEDURE — 83735 ASSAY OF MAGNESIUM: CPT

## 2024-09-02 RX ORDER — HYDROMORPHONE HYDROCHLORIDE 1 MG/ML
1 INJECTION, SOLUTION INTRAMUSCULAR; INTRAVENOUS; SUBCUTANEOUS ONCE
Status: COMPLETED | OUTPATIENT
Start: 2024-09-02 | End: 2024-09-02

## 2024-09-02 RX ORDER — ONDANSETRON HYDROCHLORIDE 2 MG/ML
4 INJECTION, SOLUTION INTRAVENOUS ONCE
Status: COMPLETED | OUTPATIENT
Start: 2024-09-02 | End: 2024-09-02

## 2024-09-02 RX ORDER — HYDROMORPHONE HYDROCHLORIDE 1 MG/ML
1 INJECTION, SOLUTION INTRAMUSCULAR; INTRAVENOUS; SUBCUTANEOUS ONCE
Status: DISCONTINUED | OUTPATIENT
Start: 2024-09-02 | End: 2024-09-03 | Stop reason: HOSPADM

## 2024-09-02 ASSESSMENT — PAIN - FUNCTIONAL ASSESSMENT: PAIN_FUNCTIONAL_ASSESSMENT: 0-10

## 2024-09-02 ASSESSMENT — PAIN DESCRIPTION - PAIN TYPE: TYPE: ACUTE PAIN;CHRONIC PAIN

## 2024-09-02 ASSESSMENT — PAIN SCALES - GENERAL
PAINLEVEL_OUTOF10: 3
PAINLEVEL_OUTOF10: 10 - WORST POSSIBLE PAIN
PAINLEVEL_OUTOF10: 10 - WORST POSSIBLE PAIN

## 2024-09-02 ASSESSMENT — PAIN DESCRIPTION - ORIENTATION: ORIENTATION: LOWER

## 2024-09-02 ASSESSMENT — PAIN DESCRIPTION - LOCATION: LOCATION: BACK

## 2024-09-02 ASSESSMENT — PAIN DESCRIPTION - DESCRIPTORS: DESCRIPTORS: THROBBING

## 2024-09-02 ASSESSMENT — PAIN DESCRIPTION - PROGRESSION: CLINICAL_PROGRESSION: NOT CHANGED

## 2024-09-02 NOTE — ED PROVIDER NOTES
HPI   Chief Complaint   Patient presents with   • Back Pain     Patient brought in by EMS, with worsening lower back pain. Patient with cancer to spine with mets to lung and right kidney. Patient states normally not on oxygen. 87% ra, placed on 2L O2 via NC per EMS. VSS for EMS HR 80's, 's. States pain 10/10.        Patient is a 74-year-old male who presents with a chief complaint of back pain.  He states he had worsening back pain.  Patient has metastatic cancer with mets to his lung, kidney, spine and ribs.  Patient is normally not on oxygen, was 87% room air, patient was placed on 2 L of oxygen via nasal cannula via EMS.  He states his pain is 10 out of 10.  Patient has no fevers or chills, no chest pain or shortness of breath, no dizziness or blurred vision.  Patient has no nausea or vomiting or abdominal pain.      History provided by:  Patient          Patient History   Past Medical History:   Diagnosis Date   • HLD (hyperlipidemia)    • HTN (hypertension)    • Metastasis (Multi)    • Renal cell adenocarcinoma (Multi)      Past Surgical History:   Procedure Laterality Date   • LUMBAR FUSION       No family history on file.  Social History     Tobacco Use   • Smoking status: Never     Passive exposure: Never   • Smokeless tobacco: Never   Vaping Use   • Vaping status: Never Used   Substance Use Topics   • Alcohol use: Not on file   • Drug use: Never       Physical Exam   ED Triage Vitals [09/02/24 1330]   Temperature Heart Rate Respirations BP   36.9 °C (98.4 °F) 89 18 104/66      Pulse Ox Temp Source Heart Rate Source Patient Position   (!) 88 % Temporal Monitor Sitting      BP Location FiO2 (%)     Left arm --       Physical Exam  Vitals and nursing note reviewed. Exam conducted with a chaperone present.   Constitutional:       General: He is not in acute distress.     Appearance: He is ill-appearing and diaphoretic. He is not toxic-appearing.   HENT:      Head: Normocephalic and atraumatic.      Nose:  Nose normal.      Mouth/Throat:      Mouth: Mucous membranes are moist.      Pharynx: Oropharynx is clear.   Eyes:      Extraocular Movements: Extraocular movements intact.      Conjunctiva/sclera: Conjunctivae normal.      Pupils: Pupils are equal, round, and reactive to light.   Cardiovascular:      Rate and Rhythm: Normal rate and regular rhythm.      Pulses: Normal pulses.      Heart sounds: Normal heart sounds.   Pulmonary:      Effort: Pulmonary effort is normal.      Breath sounds: Normal breath sounds.   Abdominal:      General: Abdomen is flat. Bowel sounds are normal.      Palpations: Abdomen is soft.   Musculoskeletal:         General: Normal range of motion.   Skin:     General: Skin is warm.      Capillary Refill: Capillary refill takes less than 2 seconds.   Neurological:      General: No focal deficit present.      Mental Status: He is alert and oriented to person, place, and time. Mental status is at baseline.   Psychiatric:         Mood and Affect: Mood normal.         Behavior: Behavior normal.         Thought Content: Thought content normal.         Judgment: Judgment normal.         ED Course & MDM   ED Course as of 09/17/24 1441   Mon Sep 02, 2024   1412 EKG interpreted by myself independently, EKG shows a normal sinus rhythm, rate of 79 bpm, parable 150, QRS 96, , QTc 463, patient has no ST elevations or depressions, negative for acute MI. [SALLIE]   2331 Additional Dilaudid ordered prior to patient transport. [AR]      ED Course User Index  [AR] Josue Sampson PA-C  [SALLIE] Efren Phan, DO         Diagnoses as of 09/17/24 1441   Chronic midline low back pain, unspecified whether sciatica present   Aspiration pneumonia of left lower lobe, unspecified aspiration pneumonia type (Multi)   Hypoxia   Acute respiratory failure with hypoxia (Multi)                 No data recorded     Jelena Coma Scale Score: 15 (09/02/24 1328 : Abbey Fishman RN)                           Medical Decision  Making  Patient seen and evaluated at bedside, patient is in no acute distress.  I will order a I will order CBC, CMP, magnesium, BMP, phosphorus, troponin, urinalysis, CTA chest PE study, CT abdomen pelvis, Zofran, normal saline, Dilaudid, . Differential diagnosis includes but is not limited to metastatic pain, acute respiratory failure, sepsis, ACS electrolyte abnormality, pneumonia  Patient CBC shows leukocytosis of 25.5, hemoglobin 9.8, medic at 32.3, patient's CMP shows glucose 218, sodium 131, chloride 94, alk phos 136, phosphorus 2.3, troponin 19, , magnesium 190, CT results as below, patient was given Unasyn for antibiotic coverage. .    Patient be admitted to the general medicine service for further evaluation and treatment.    Diagnosis: Aspiration pneumonia, acute respiratory failure, hypoxia        Procedure  Critical Care    Performed by: Efren Phan DO  Authorized by: Dwayne Zapata DO    Critical care provider statement:     Critical care time (minutes):  35    Critical care was necessary to treat or prevent imminent or life-threatening deterioration of the following conditions:  Respiratory failure    Critical care was time spent personally by me on the following activities:  Discussions with consultants, evaluation of patient's response to treatment, examination of patient, ordering and performing treatments and interventions, ordering and review of laboratory studies, ordering and review of radiographic studies, pulse oximetry, re-evaluation of patient's condition and review of old charts      Sections of this report were created using voice-to-text technology and may contain errors in translation    Efren Phan DO  Emergency Medicine         Efren Phan DO  09/02/24 1753       Efren Phan DO  09/17/24 1442       Efren Phan DO  09/17/24 1445

## 2024-09-03 ENCOUNTER — TELEPHONE (OUTPATIENT)
Dept: HOME HEALTH SERVICES | Facility: HOME HEALTH | Age: 74
End: 2024-09-03

## 2024-09-03 ENCOUNTER — APPOINTMENT (OUTPATIENT)
Dept: RADIATION ONCOLOGY | Facility: CLINIC | Age: 74
End: 2024-09-03
Payer: MEDICARE

## 2024-09-03 ENCOUNTER — HOSPITAL ENCOUNTER (OUTPATIENT)
Dept: RADIATION ONCOLOGY | Facility: HOSPITAL | Age: 74
Setting detail: RADIATION/ONCOLOGY SERIES
Discharge: HOME | End: 2024-09-03
Payer: MEDICARE

## 2024-09-03 ENCOUNTER — APPOINTMENT (OUTPATIENT)
Dept: RADIATION ONCOLOGY | Facility: HOSPITAL | Age: 74
End: 2024-09-03
Payer: MEDICARE

## 2024-09-03 ENCOUNTER — HOSPITAL ENCOUNTER (OUTPATIENT)
Facility: HOSPITAL | Age: 74
Setting detail: OBSERVATION
End: 2024-09-03
Attending: STUDENT IN AN ORGANIZED HEALTH CARE EDUCATION/TRAINING PROGRAM | Admitting: HOSPITALIST
Payer: MEDICARE

## 2024-09-03 DIAGNOSIS — G89.3 CANCER ASSOCIATED PAIN: ICD-10-CM

## 2024-09-03 DIAGNOSIS — Z51.0 ENCOUNTER FOR ANTINEOPLASTIC RADIATION THERAPY: ICD-10-CM

## 2024-09-03 DIAGNOSIS — Z91.89 AT RISK FOR CONSTIPATION: ICD-10-CM

## 2024-09-03 DIAGNOSIS — Z51.5 PALLIATIVE CARE ENCOUNTER: ICD-10-CM

## 2024-09-03 DIAGNOSIS — C64.9: ICD-10-CM

## 2024-09-03 DIAGNOSIS — G47.00 INSOMNIA, UNSPECIFIED TYPE: ICD-10-CM

## 2024-09-03 DIAGNOSIS — C64: ICD-10-CM

## 2024-09-03 DIAGNOSIS — M84.48XA PATHOLOGIC LUMBAR VERTEBRAL FRACTURE, INITIAL ENCOUNTER: ICD-10-CM

## 2024-09-03 DIAGNOSIS — G89.3 PAIN FROM BONE METASTASES (MULTI): ICD-10-CM

## 2024-09-03 DIAGNOSIS — C79.51 PAIN FROM BONE METASTASES (MULTI): ICD-10-CM

## 2024-09-03 DIAGNOSIS — C79.51 SECONDARY MALIGNANT NEOPLASM OF BONE (MULTI): ICD-10-CM

## 2024-09-03 DIAGNOSIS — J69.0 ASPIRATION PNEUMONIA, UNSPECIFIED ASPIRATION PNEUMONIA TYPE, UNSPECIFIED LATERALITY, UNSPECIFIED PART OF LUNG (MULTI): Primary | ICD-10-CM

## 2024-09-03 LAB
ALBUMIN SERPL BCP-MCNC: 2.9 G/DL (ref 3.4–5)
ALP SERPL-CCNC: 110 U/L (ref 33–136)
ALT SERPL W P-5'-P-CCNC: 13 U/L (ref 10–52)
ANION GAP SERPL CALC-SCNC: 12 MMOL/L (ref 10–20)
AST SERPL W P-5'-P-CCNC: 18 U/L (ref 9–39)
BASOPHILS # BLD AUTO: 0.03 X10*3/UL (ref 0–0.1)
BASOPHILS NFR BLD AUTO: 0.1 %
BILIRUB SERPL-MCNC: 0.8 MG/DL (ref 0–1.2)
BUN SERPL-MCNC: 13 MG/DL (ref 6–23)
CALCIUM SERPL-MCNC: 8.5 MG/DL (ref 8.6–10.6)
CHLORIDE SERPL-SCNC: 102 MMOL/L (ref 98–107)
CO2 SERPL-SCNC: 26 MMOL/L (ref 21–32)
CREAT SERPL-MCNC: 0.42 MG/DL (ref 0.5–1.3)
EGFRCR SERPLBLD CKD-EPI 2021: >90 ML/MIN/1.73M*2
EOSINOPHIL # BLD AUTO: 0.11 X10*3/UL (ref 0–0.4)
EOSINOPHIL NFR BLD AUTO: 0.5 %
ERYTHROCYTE [DISTWIDTH] IN BLOOD BY AUTOMATED COUNT: 16.1 % (ref 11.5–14.5)
GLUCOSE SERPL-MCNC: 134 MG/DL (ref 74–99)
HCT VFR BLD AUTO: 30.6 % (ref 41–52)
HGB BLD-MCNC: 8.9 G/DL (ref 13.5–17.5)
IMM GRANULOCYTES # BLD AUTO: 0.16 X10*3/UL (ref 0–0.5)
IMM GRANULOCYTES NFR BLD AUTO: 0.8 % (ref 0–0.9)
LYMPHOCYTES # BLD AUTO: 4.64 X10*3/UL (ref 0.8–3)
LYMPHOCYTES NFR BLD AUTO: 22.4 %
MCH RBC QN AUTO: 25.4 PG (ref 26–34)
MCHC RBC AUTO-ENTMCNC: 29.1 G/DL (ref 32–36)
MCV RBC AUTO: 87 FL (ref 80–100)
MONOCYTES # BLD AUTO: 0.91 X10*3/UL (ref 0.05–0.8)
MONOCYTES NFR BLD AUTO: 4.4 %
NEUTROPHILS # BLD AUTO: 14.87 X10*3/UL (ref 1.6–5.5)
NEUTROPHILS NFR BLD AUTO: 71.8 %
NRBC BLD-RTO: 0 /100 WBCS (ref 0–0)
PLATELET # BLD AUTO: 299 X10*3/UL (ref 150–450)
POTASSIUM SERPL-SCNC: 4.3 MMOL/L (ref 3.5–5.3)
PROT SERPL-MCNC: 6.3 G/DL (ref 6.4–8.2)
RAD ONC MSQ ACTUAL FRACTIONS DELIVERED: 2
RAD ONC MSQ ACTUAL FRACTIONS DELIVERED: 2
RAD ONC MSQ ACTUAL SESSION DELIVERED DOSE: 300 CGRAY
RAD ONC MSQ ACTUAL SESSION DELIVERED DOSE: 300 CGRAY
RAD ONC MSQ ACTUAL TOTAL DOSE: 600 CGRAY
RAD ONC MSQ ACTUAL TOTAL DOSE: 600 CGRAY
RAD ONC MSQ ELAPSED DAYS: 7
RAD ONC MSQ ELAPSED DAYS: 7
RAD ONC MSQ LAST DATE: NORMAL
RAD ONC MSQ LAST DATE: NORMAL
RAD ONC MSQ PRESCRIBED FRACTIONAL DOSE: 300 CGRAY
RAD ONC MSQ PRESCRIBED FRACTIONAL DOSE: 300 CGRAY
RAD ONC MSQ PRESCRIBED NUMBER OF FRACTIONS: 10
RAD ONC MSQ PRESCRIBED NUMBER OF FRACTIONS: 10
RAD ONC MSQ PRESCRIBED TECHNIQUE: NORMAL
RAD ONC MSQ PRESCRIBED TECHNIQUE: NORMAL
RAD ONC MSQ PRESCRIBED TOTAL DOSE: 3000 CGRAY
RAD ONC MSQ PRESCRIBED TOTAL DOSE: 3000 CGRAY
RAD ONC MSQ START DATE: NORMAL
RAD ONC MSQ START DATE: NORMAL
RAD ONC MSQ TREATMENT COURSE NUMBER: 1
RAD ONC MSQ TREATMENT COURSE NUMBER: 1
RAD ONC MSQ TREATMENT SITE: NORMAL
RAD ONC MSQ TREATMENT SITE: NORMAL
RBC # BLD AUTO: 3.51 X10*6/UL (ref 4.5–5.9)
SODIUM SERPL-SCNC: 136 MMOL/L (ref 136–145)
WBC # BLD AUTO: 20.7 X10*3/UL (ref 4.4–11.3)

## 2024-09-03 PROCEDURE — 36415 COLL VENOUS BLD VENIPUNCTURE: CPT

## 2024-09-03 PROCEDURE — 99223 1ST HOSP IP/OBS HIGH 75: CPT

## 2024-09-03 PROCEDURE — 96366 THER/PROPH/DIAG IV INF ADDON: CPT

## 2024-09-03 PROCEDURE — 99233 SBSQ HOSP IP/OBS HIGH 50: CPT | Performed by: HOSPITALIST

## 2024-09-03 PROCEDURE — 96375 TX/PRO/DX INJ NEW DRUG ADDON: CPT

## 2024-09-03 PROCEDURE — 96365 THER/PROPH/DIAG IV INF INIT: CPT

## 2024-09-03 PROCEDURE — 77412 RADIATION TX DELIVERY LVL 3: CPT | Performed by: STUDENT IN AN ORGANIZED HEALTH CARE EDUCATION/TRAINING PROGRAM

## 2024-09-03 PROCEDURE — 2500000004 HC RX 250 GENERAL PHARMACY W/ HCPCS (ALT 636 FOR OP/ED)

## 2024-09-03 PROCEDURE — 77387 GUIDANCE FOR RADJ TX DLVR: CPT | Performed by: RADIOLOGY

## 2024-09-03 PROCEDURE — 85025 COMPLETE CBC W/AUTO DIFF WBC: CPT

## 2024-09-03 PROCEDURE — 92610 EVALUATE SWALLOWING FUNCTION: CPT | Mod: GN | Performed by: SPEECH-LANGUAGE PATHOLOGIST

## 2024-09-03 PROCEDURE — 2500000001 HC RX 250 WO HCPCS SELF ADMINISTERED DRUGS (ALT 637 FOR MEDICARE OP)

## 2024-09-03 PROCEDURE — 99232 SBSQ HOSP IP/OBS MODERATE 35: CPT | Performed by: PHYSICIAN ASSISTANT

## 2024-09-03 PROCEDURE — 84075 ASSAY ALKALINE PHOSPHATASE: CPT

## 2024-09-03 PROCEDURE — 2500000001 HC RX 250 WO HCPCS SELF ADMINISTERED DRUGS (ALT 637 FOR MEDICARE OP): Performed by: HOME HEALTH AIDE

## 2024-09-03 PROCEDURE — 1170000001 HC PRIVATE ONCOLOGY ROOM DAILY

## 2024-09-03 RX ORDER — ATORVASTATIN CALCIUM 20 MG/1
20 TABLET, FILM COATED ORAL NIGHTLY
Status: DISPENSED | OUTPATIENT
Start: 2024-09-03

## 2024-09-03 RX ORDER — ASPIRIN 81 MG/1
81 TABLET ORAL DAILY
Status: DISPENSED | OUTPATIENT
Start: 2024-09-03

## 2024-09-03 RX ORDER — OXYCODONE HYDROCHLORIDE 10 MG/1
10 TABLET ORAL EVERY 6 HOURS PRN
Status: DISCONTINUED | OUTPATIENT
Start: 2024-09-03 | End: 2024-09-08

## 2024-09-03 RX ORDER — PANTOPRAZOLE SODIUM 40 MG/1
40 TABLET, DELAYED RELEASE ORAL
Status: DISPENSED | OUTPATIENT
Start: 2024-09-03

## 2024-09-03 RX ORDER — BISACODYL 10 MG/1
10 SUPPOSITORY RECTAL DAILY PRN
Status: ACTIVE | OUTPATIENT
Start: 2024-09-03

## 2024-09-03 RX ORDER — SENNOSIDES 8.6 MG/1
2 TABLET ORAL 2 TIMES DAILY
Status: DISPENSED | OUTPATIENT
Start: 2024-09-03

## 2024-09-03 RX ORDER — CLOPIDOGREL BISULFATE 75 MG/1
75 TABLET ORAL DAILY
Status: DISPENSED | OUTPATIENT
Start: 2024-09-03

## 2024-09-03 RX ORDER — GABAPENTIN 300 MG/1
300 CAPSULE ORAL 3 TIMES DAILY
Status: DISPENSED | OUTPATIENT
Start: 2024-09-03

## 2024-09-03 RX ORDER — OXYCODONE HYDROCHLORIDE 5 MG/1
5 TABLET ORAL EVERY 4 HOURS PRN
Status: DISPENSED | OUTPATIENT
Start: 2024-09-03

## 2024-09-03 RX ORDER — DEXAMETHASONE 2 MG/1
2 TABLET ORAL
Status: COMPLETED | OUTPATIENT
Start: 2024-09-03 | End: 2024-09-03

## 2024-09-03 RX ORDER — HYDROMORPHONE HYDROCHLORIDE 1 MG/ML
1 INJECTION, SOLUTION INTRAMUSCULAR; INTRAVENOUS; SUBCUTANEOUS ONCE
Status: COMPLETED | OUTPATIENT
Start: 2024-09-03 | End: 2024-09-03

## 2024-09-03 RX ORDER — GABAPENTIN 300 MG/1
300 CAPSULE ORAL NIGHTLY
Status: DISCONTINUED | OUTPATIENT
Start: 2024-09-03 | End: 2024-09-03

## 2024-09-03 RX ORDER — ENOXAPARIN SODIUM 100 MG/ML
40 INJECTION SUBCUTANEOUS EVERY 24 HOURS
Status: DISPENSED | OUTPATIENT
Start: 2024-09-03

## 2024-09-03 RX ORDER — DEXAMETHASONE 2 MG/1
2 TABLET ORAL DAILY
Status: DISPENSED | OUTPATIENT
Start: 2024-09-04 | End: 2024-10-04

## 2024-09-03 RX ORDER — TRAZODONE HYDROCHLORIDE 50 MG/1
50 TABLET ORAL DAILY PRN
Status: DISPENSED | OUTPATIENT
Start: 2024-09-03

## 2024-09-03 SDOH — SOCIAL STABILITY: SOCIAL INSECURITY: ABUSE: ADULT

## 2024-09-03 SDOH — SOCIAL STABILITY: SOCIAL INSECURITY: ARE YOU OR HAVE YOU BEEN THREATENED OR ABUSED PHYSICALLY, EMOTIONALLY, OR SEXUALLY BY ANYONE?: NO

## 2024-09-03 SDOH — SOCIAL STABILITY: SOCIAL INSECURITY: HAS ANYONE EVER THREATENED TO HURT YOUR FAMILY OR YOUR PETS?: NO

## 2024-09-03 SDOH — SOCIAL STABILITY: SOCIAL INSECURITY: ARE THERE ANY APPARENT SIGNS OF INJURIES/BEHAVIORS THAT COULD BE RELATED TO ABUSE/NEGLECT?: NO

## 2024-09-03 SDOH — SOCIAL STABILITY: SOCIAL INSECURITY: DOES ANYONE TRY TO KEEP YOU FROM HAVING/CONTACTING OTHER FRIENDS OR DOING THINGS OUTSIDE YOUR HOME?: NO

## 2024-09-03 SDOH — SOCIAL STABILITY: SOCIAL INSECURITY: DO YOU FEEL ANYONE HAS EXPLOITED OR TAKEN ADVANTAGE OF YOU FINANCIALLY OR OF YOUR PERSONAL PROPERTY?: NO

## 2024-09-03 SDOH — SOCIAL STABILITY: SOCIAL INSECURITY: HAVE YOU HAD THOUGHTS OF HARMING ANYONE ELSE?: NO

## 2024-09-03 SDOH — SOCIAL STABILITY: SOCIAL INSECURITY: HAVE YOU HAD ANY THOUGHTS OF HARMING ANYONE ELSE?: NO

## 2024-09-03 SDOH — SOCIAL STABILITY: SOCIAL INSECURITY: DO YOU FEEL UNSAFE GOING BACK TO THE PLACE WHERE YOU ARE LIVING?: NO

## 2024-09-03 SDOH — SOCIAL STABILITY: SOCIAL INSECURITY: WERE YOU ABLE TO COMPLETE ALL THE BEHAVIORAL HEALTH SCREENINGS?: YES

## 2024-09-03 ASSESSMENT — ACTIVITIES OF DAILY LIVING (ADL)
GROOMING: NEEDS ASSISTANCE
TOILETING: NEEDS ASSISTANCE
PATIENT'S MEMORY ADEQUATE TO SAFELY COMPLETE DAILY ACTIVITIES?: YES
HEARING - LEFT EAR: FUNCTIONAL
JUDGMENT_ADEQUATE_SAFELY_COMPLETE_DAILY_ACTIVITIES: YES
WALKS IN HOME: NEEDS ASSISTANCE
HEARING - RIGHT EAR: FUNCTIONAL
ADEQUATE_TO_COMPLETE_ADL: YES
LACK_OF_TRANSPORTATION: PATIENT DECLINED
BATHING: NEEDS ASSISTANCE
FEEDING YOURSELF: NEEDS ASSISTANCE
DRESSING YOURSELF: NEEDS ASSISTANCE

## 2024-09-03 ASSESSMENT — ENCOUNTER SYMPTOMS
DIARRHEA: 1
PSYCHIATRIC NEGATIVE: 1
CHOKING: 0
COUGH: 0
CONSTIPATION: 0
NAUSEA: 0
VOMITING: 0
WHEEZING: 0
FREQUENCY: 0
DYSURIA: 0
DIZZINESS: 0
SHORTNESS OF BREATH: 0
LIGHT-HEADEDNESS: 0
DIFFICULTY URINATING: 0
CHILLS: 0
BLOOD IN STOOL: 0
ABDOMINAL PAIN: 0
SORE THROAT: 0
FEVER: 0
HEMATOLOGIC/LYMPHATIC NEGATIVE: 1
WEAKNESS: 0
ALLERGIC/IMMUNOLOGIC NEGATIVE: 1
ENDOCRINE NEGATIVE: 1
PALPITATIONS: 0
BACK PAIN: 1
HEMATURIA: 0
HEADACHES: 0

## 2024-09-03 ASSESSMENT — COGNITIVE AND FUNCTIONAL STATUS - GENERAL
MOVING FROM LYING ON BACK TO SITTING ON SIDE OF FLAT BED WITH BEDRAILS: A LITTLE
WALKING IN HOSPITAL ROOM: TOTAL
DRESSING REGULAR LOWER BODY CLOTHING: TOTAL
MOBILITY SCORE: 12
PERSONAL GROOMING: A LITTLE
TURNING FROM BACK TO SIDE WHILE IN FLAT BAD: A LITTLE
MOBILITY SCORE: 6
PERSONAL GROOMING: A LOT
STANDING UP FROM CHAIR USING ARMS: A LOT
HELP NEEDED FOR BATHING: A LOT
DRESSING REGULAR LOWER BODY CLOTHING: A LOT
STANDING UP FROM CHAIR USING ARMS: TOTAL
DAILY ACTIVITIY SCORE: 12
CLIMB 3 TO 5 STEPS WITH RAILING: TOTAL
DRESSING REGULAR UPPER BODY CLOTHING: A LITTLE
WALKING IN HOSPITAL ROOM: TOTAL
DRESSING REGULAR UPPER BODY CLOTHING: TOTAL
HELP NEEDED FOR BATHING: TOTAL
TOILETING: A LITTLE
PERSONAL GROOMING: A LITTLE
TURNING FROM BACK TO SIDE WHILE IN FLAT BAD: TOTAL
MOVING FROM LYING ON BACK TO SITTING ON SIDE OF FLAT BED WITH BEDRAILS: TOTAL
TURNING FROM BACK TO SIDE WHILE IN FLAT BAD: A LITTLE
DAILY ACTIVITIY SCORE: 17
PATIENT BASELINE BEDBOUND: YES
TOILETING: A LITTLE
DRESSING REGULAR UPPER BODY CLOTHING: A LITTLE
HELP NEEDED FOR BATHING: A LOT
MOVING TO AND FROM BED TO CHAIR: TOTAL
DAILY ACTIVITIY SCORE: 17
TOILETING: A LITTLE
DRESSING REGULAR LOWER BODY CLOTHING: A LOT
WALKING IN HOSPITAL ROOM: TOTAL
CLIMB 3 TO 5 STEPS WITH RAILING: TOTAL
CLIMB 3 TO 5 STEPS WITH RAILING: TOTAL
MOVING TO AND FROM BED TO CHAIR: A LOT
MOBILITY SCORE: 12
MOVING FROM LYING ON BACK TO SITTING ON SIDE OF FLAT BED WITH BEDRAILS: A LITTLE
MOVING TO AND FROM BED TO CHAIR: A LOT
STANDING UP FROM CHAIR USING ARMS: A LOT

## 2024-09-03 ASSESSMENT — LIFESTYLE VARIABLES
HOW OFTEN DO YOU HAVE 6 OR MORE DRINKS ON ONE OCCASION: NEVER
HOW OFTEN DO YOU HAVE A DRINK CONTAINING ALCOHOL: NEVER
SKIP TO QUESTIONS 9-10: 1
SUBSTANCE_ABUSE_PAST_12_MONTHS: NO
PRESCIPTION_ABUSE_PAST_12_MONTHS: NO
HOW MANY STANDARD DRINKS CONTAINING ALCOHOL DO YOU HAVE ON A TYPICAL DAY: PATIENT DOES NOT DRINK
AUDIT-C TOTAL SCORE: 0
AUDIT-C TOTAL SCORE: 0

## 2024-09-03 ASSESSMENT — COLUMBIA-SUICIDE SEVERITY RATING SCALE - C-SSRS
6. HAVE YOU EVER DONE ANYTHING, STARTED TO DO ANYTHING, OR PREPARED TO DO ANYTHING TO END YOUR LIFE?: NO
1. IN THE PAST MONTH, HAVE YOU WISHED YOU WERE DEAD OR WISHED YOU COULD GO TO SLEEP AND NOT WAKE UP?: NO
2. HAVE YOU ACTUALLY HAD ANY THOUGHTS OF KILLING YOURSELF?: NO

## 2024-09-03 ASSESSMENT — PAIN SCALES - GENERAL
PAINLEVEL_OUTOF10: 6
PAINLEVEL_OUTOF10: 0 - NO PAIN
PAINLEVEL_OUTOF10: 9
PAINLEVEL_OUTOF10: 9
PAINLEVEL_OUTOF10: 4
PAINLEVEL_OUTOF10: 5 - MODERATE PAIN
PAINLEVEL_OUTOF10: 9

## 2024-09-03 ASSESSMENT — PAIN - FUNCTIONAL ASSESSMENT
PAIN_FUNCTIONAL_ASSESSMENT: 0-10

## 2024-09-03 NOTE — NURSING NOTE
9/3/24  Pt has blancheable redness on sacrum and buttocks, photo in media section of chart. Patient refusing mepilex at this time. Patient educated on importance of off loading pressure from sacrum and using mepilex as a preventative measure. Pt understands and still does not want a mepilex. Pt turned to right side at this time. LAVON Mesa RN

## 2024-09-03 NOTE — CARE PLAN
Patient afebrile. Vitals stable. Minimal pain. No nausea vomiting or diarrhea. No issues. Slept all night. Will continue to monitor.

## 2024-09-03 NOTE — CONSULTS
SUPPORTIVE AND PALLIATIVE ONCOLOGY CONSULT    Inpatient consult to [facility] Adult Supportive Oncology  Consult performed by: Gailna Durant, APRN-CNP  Consult ordered by: Hilda Pacheco PA-C        SERVICE DATE: 9/3/2024      PALLIATIVE MEDICINE OUTPATIENT PROVIDER:  None-had new patient supp onc appt on 9/9/24 with Cathy Mcneill but now cancelled by pt due to admission  CURRENT ATTENDING PROVIDER: Jeff Bennett MD     Medical Oncologist: Sahil Chavarria MD   Radiation Oncologist: No care team member to display  Primary Physician: Dejuan Dhaliwal  [phone]    REASON FOR CONSULT/CHIEF CONSULT COMPLAINT: pain management    Subjective   HISTORY OF PRESENT ILLNESS: Christophe Ambriz is a 74 y.o. male diagnosed with renal cell carcinoma w/spinal mets s/p L1-L5 spinal fusion w/L2-L4 decompression and tumor debulking (7/24), worsening back pain, and aspiration PNA. PMH significant for CAD, HTN, HLD . Admitted 9/3/2024 for further evaluation and management of PNA and back pain. Supportive and Palliative Oncology is consulted for pain management.     Recently admitted here at [facility] 8/16/24-8/27/24 for radiating back pain (down bilateral legs); MICU admission 8/29-8/22 for AHRF; CXR of chest showed LLL consideation and CT negative for PE but did show increased multifocal modular consolidations, suggestive of multifocal PNA w/ a possible aspiration component. Rad onc was consulted during this admission who recommended outpatient treatment. However, pt has missed radiation appts as outpatient due to immobility.     8/27/24: Began palliative RT to L3 and right chest wall mass while inpatient; he was then discharged to home; This treatment was 1st of 10 fractions.     Pt received 2nd fraction today (9/3/24); plan is to remain inpatient and complete course of RT.     Pain Assessment:  Onset: 3 Months  Location:  lower back (lumbar area); also sacral/coccyx area (this pain began shortly after starting RT  Duration:  Constant  Characteristics:   Ratin   Descriptors:  back pain: sharp, stabbing, ache that radiates down bilateral legs (usually stops at knees but sometimes goes down to toes; sacral/coccyx pain: constant burning   Aggravating: movement, walking, standing, and lying in one position for too long     Relieving: Analgesics, Positioning, and Modifying activity   Intolerances:Christophe Ambriz is allergic to oxycodone-acetaminophen.   Personal Pain Goal: 2    Interference with Function: Very Much   Coping Strategies: none   Emotional Response: None   Barriers to Pain Management: None    Opioid Use  Past 24 h opioid use:   none  Total 24h OME use:  0    Note: OME calculations based on equianalgesic table below. Please note this table is based on best available evidence but conversions are still approximate. These are NOT opioid DOSES for individual patient use; this is equivalency information.  Drug Parenteral Enteral   Morphine 10 25   Oxycodone N/A 20   Hydromorphone 2 5   Fentanyl 0.15 N/A   Tramadol N/A 120   Citation: Rafaela MARX. Demystifying opioid conversion calculations: A guide for effective dosing, Second edition. MD Reyna: American Society of Health-System Pharmacists, 2018.    OARRS/PDMP reviewed ; Unintentional Overdose Risk Score=90 (low risk);  no aberrant behavior noted.    Symptom Assessment:  Pain:very much   Headache: none  Dizziness:none  Lack of energy: a little  Difficulty sleeping: none  Worrying: none  Anxiety: none  Depression: none  Pain in mouth/swallowing: none  Dry mouth: none  Taste changes: none  Shortness of breath: none  Lack of appetite: a little   Nausea: none  Vomiting: none  Constipation: none  Diarrhea: none  Sore muscles: none  Numbness or tingling in hands/feet/other: none  Weight loss: very much  Other: none  Wt Readings from Last 5 Encounters:   24 61.8 kg (136 lb 3.9 oz)   24 69.7 kg (153 lb 10.6 oz)   24 74.3 kg (163 lb 12.8 oz)   24 73.7 kg (162  lb 7.7 oz)   07/26/24 80.7 kg (177 lb 14.4 oz)           Information obtained from: chart review, interview of patient, and discussion with primary team  ______________________________________________________________________     Oncology History    No history exists.       Past Medical History:   Diagnosis Date    HLD (hyperlipidemia)     HTN (hypertension)     Metastasis (Multi)     Renal cell adenocarcinoma (Multi)      Past Surgical History:   Procedure Laterality Date    LUMBAR FUSION       No family history on file.     SOCIAL HISTORY:  Marital Status single; in relationship with significant other Iram Armstrong-states ok to discuss anything regarding his care with her; no children   Social History:  Rare ETOH use  Smoked 3 packs/day for at least 50 years for 150-pack-year history  Denies illicit drug use     Congregational and Importance of Congregational:  None      REVIEW OF SYSTEMS:  Review of systems negative unless noted in HPI.       Objective       Lab Results   Component Value Date    WBC 20.7 (H) 09/03/2024    HGB 8.9 (L) 09/03/2024    HCT 30.6 (L) 09/03/2024    MCV 87 09/03/2024     09/03/2024      Lab Results   Component Value Date    GLUCOSE 134 (H) 09/03/2024    CALCIUM 8.5 (L) 09/03/2024     09/03/2024    K 4.3 09/03/2024    CO2 26 09/03/2024     09/03/2024    BUN 13 09/03/2024    CREATININE 0.42 (L) 09/03/2024     Lab Results   Component Value Date    ALT 13 09/03/2024    AST 18 09/03/2024    ALKPHOS 110 09/03/2024    BILITOT 0.8 09/03/2024     Estimated Creatinine Clearance: 125 mL/min (A) (by C-G formula based on SCr of 0.42 mg/dL (L)).     Encounter Date: 07/06/24   ECG 12 lead   Result Value    Ventricular Rate 78    Atrial Rate 78    TN Interval 142    QRS Duration 94    QT Interval 400    QTC Calculation(Bazett) 456    P Axis 24    R Axis -27    T Axis 43    QRS Count 13    Q Onset 224    P Onset 153    P Offset 187    T Offset 424    QTC Fredericia 436    Narrative    Normal  sinus rhythm  Normal ECG  When compared with ECG of 06-JUL-2024 06:13, (unconfirmed)  Sinus rhythm has replaced Junctional rhythm  Confirmed by Froylan Rodriguez ([id number]) on 7/9/2024 8:07:10 AM     Wt Readings from Last 5 Encounters:   09/03/24 61.8 kg (136 lb 3.9 oz)   09/02/24 69.7 kg (153 lb 10.6 oz)   08/21/24 74.3 kg (163 lb 12.8 oz)   08/14/24 73.7 kg (162 lb 7.7 oz)   07/26/24 80.7 kg (177 lb 14.4 oz)       Current Outpatient Medications   Medication Instructions    aspirin 81 mg, oral, Daily    atorvastatin (LIPITOR) 20 mg, oral, Nightly    bisacodyl (DULCOLAX) 10 mg, rectal, Daily PRN    clopidogrel (PLAVIX) 75 mg, oral, Daily    dexAMETHasone (Decadron) 4 mg tablet Take 1 tablet (4 mg) by mouth 2 times daily (morning and late afternoon) for 4 days, THEN 0.5 tablets (2 mg) 2 times daily (morning and late afternoon) for 4 days, THEN 0.5 tablets (2 mg) once daily.    gabapentin (NEURONTIN) 300 mg, oral, Nightly    oxyCODONE (ROXICODONE) 5 mg, oral, Every 4 hours PRN    oxygen (O2) gas therapy 1 each, inhalation, Continuous    senna 17.2 mg, oral, 2 times daily    traZODone (DESYREL) 50 mg, oral, Nightly     Scheduled medications   aspirin, 81 mg, oral, Daily  atorvastatin, 20 mg, oral, Nightly  clopidogrel, 75 mg, oral, Daily  dexAMETHasone, 2 mg, oral, BID   Followed by  [START ON 9/4/2024] dexAMETHasone, 2 mg, oral, Daily  enoxaparin, 40 mg, subcutaneous, q24h  gabapentin, 300 mg, oral, Nightly  pantoprazole, 40 mg, oral, Daily before breakfast  sennosides, 2 tablet, oral, BID      Continuous medications     PRN medications  alteplase, 2 mg, PRN  bisacodyl, 10 mg, Daily PRN  oxyCODONE, 5 mg, q4h PRN  traZODone, 50 mg, Daily PRN         Allergies:   Allergies   Allergen Reactions    Oxycodone-Acetaminophen Nausea And Vomiting and GI Upset                PHYSICAL EXAMINATION:  Vital Signs:   Vital signs reviewed  Vitals:    09/03/24 1032   BP: 115/70   Pulse: 84   Resp: 16   Temp: 36 °C (96.8 °F)   SpO2: 92%      Pain Score: 0 - No pain     Physical Exam  Vitals reviewed.   Constitutional:       General: He is not in acute distress.     Appearance: He is ill-appearing.      Comments: A&O x 3; cachectic; engaged and bedside interview; pleasant and cooperative   HENT:      Head: Normocephalic and atraumatic.      Mouth/Throat:      Mouth: Mucous membranes are moist.   Pulmonary:      Effort: Pulmonary effort is normal. No respiratory distress.   Abdominal:      General: Bowel sounds are normal.      Palpations: Abdomen is soft.   Musculoskeletal:      Right lower leg: No edema.      Left lower leg: No edema.   Skin:     General: Skin is warm and dry.      Capillary Refill: Capillary refill takes less than 2 seconds.      Coloration: Skin is pale.   Neurological:      General: No focal deficit present.      Mental Status: He is alert and oriented to person, place, and time.   Psychiatric:         Mood and Affect: Mood normal.         Behavior: Behavior normal.         Thought Content: Thought content normal.         Judgment: Judgment normal.         ASSESSMENT/PLAN:  Christophe Ambriz is a 74 y.o. male diagnosed with renal cell carcinoma w/spinal mets s/p L1-L5 spinal fusion w/L2-L4 decompression and tumor debulking (7/24), worsening back pain, and aspiration PNA. PMH significant for CAD, HTN, HLD . Admitted 9/3/2024 for further evaluation and management of PNA and back pain. Supportive and Palliative Oncology is consulted for pain management.       Pain:  Lumbar back pain related to metastatic disease; Sacral/coccyx pain related to radiation treatment.  Pain is: cancer related pain  Type: somatic and neuropathic  Pain control: sub-optimally controlled  Home regimen:  Oxycodone IR 5 mg po q 4 hrs prn and Gabapentin 300 mg po q hs  Intolerances/previously tried: NKDA  Personalized pain goal: 2  Total OME usage for the past 24 hours:  0  Change Oxycodone 5 mg po q 4 hrs prn to indication of MODERATE pain  Recommend adding  Oxycodone 10 mg po q 4 hrs prn for SEVERE pain  Continue Gabapentin 300 mg po tid (was increased by primary team today from 300 mg q hs; ok to increase to tid dosing which is not standard titration due to uncontrolled neuropathic pain s/p radiation and CrCl [id number])  Can consider adding Hydromorphone 0.2 mg IV q 3 hrs prn for breakthrough pain if needed  Continue to monitor pain scores and administer PRN medications as appropriate  Continue/initiate nonpharmacologic pain management strategies including ice/heat therapy, distraction techniques, deep breathing/relaxation techniques, calming music, and repositioning  Continue to monitor for signs of opioid efficacy (pain scores, improved functionality) and toxicity (pinpoint pupils, excess sedation/drowsiness/confusion, respiratory depression, etc.)    Nausea:  At risk for nausea without vomiting related to opioids and pain    Home regimen:  none  Well-controlled and Denies  EKG: Qtc on 9/2/24=463  Consider adding Ondansetron 4 mg PO/IV q 6 hrs prn     Constipation  At risk for constipation related to opioids, currently not constipated  Usual bowel pattern: every other day  Home regimen: Senna 2 tablets po bid and Miralax 17 gm daily   LBM 9/3/24  Monitor BM frequency, adjust regimen as needed  Goal to have BM without straining q48-72h  Continue Senna  2 tabs po bid  Continue Miralax 17 g po daily  Continue Bisacodyl rectal suppository daily prn    Sleeping Difficulty:  Impaired sleep related to pain, hospital environment, and chronic sleep issues  Home regimen:  trazodone  Continue Trazodone 50 mg po q hs    Decreased appetite:  Appetite loss related to disease process and possible immobility, pureed diet (pt reports he does not like)  Nutrition consult  Weight loss 41 lbs since 7/26/24  Home regimen:  none  Nutrition consult  Consider re-evaluating swallowing ability to advance diet to food textures that are safe and enjoyable to patient  May improve with pain  management    Medical Decision Making/Goals of Care/Advance Care Planning:  Patient's current clinical condition, including diagnosis, prognosis, and management plan, and goals of care were discussed.   Life limiting disease: metastatic malignancy  Family:  Supportive significant other Iram Armstrong   Performance status: Major  limitations due to pain, disease process, and immobility  Joys/meaning/strength: [place]  Understanding of health: Demonstrates good understanding of disease process, understands need to remain inpatient to complete remaining radiation treatments; aware that options are limited as far as treatment  Information:Wants full disclosure  Goals: symptom control and cancer directed therapy  Worries and fears now and future: ongoing symptoms and inability to receive cancer treatment   Minimum acceptable outcome/QOL:  to complete RT and ideally gain strength and some weight to be able to care for himself and gain some independence back; to have minimal pain  Code status discussion:  DNR/DNI    Advance Directives  Existence of Advance Directives:No - not interested  Decision maker: Surrogate decision maker is significant other Iram Armstrong ([phone])  Code Status: DNR DNI    Introduction to Supportive and Palliative Oncology:  Spoke with patient at bedside  Introduced the role and philosophy of Supportive and Palliative oncology in the evaluation and management of symptoms during cancer treatment  Palliative care was introduced as a service for patients with serious illness to help with symptoms, assist with goals of care conversations, navigate complex decision making, improve quality of life for patients, and provide support both patients and families.  Patient seemed to appreciate the extra layer of support.     Supportive Interventions: Interventions: Music Therapy: declined, Art Therapy: declined, SPO Spiritual Care: declined    Disposition:  Please  start the process of having  prior authorization with meds to beds deliver medications to patient prior to discharge via [facility] pharmacy. Prescriptions will need to be sent 48-72 hours prior to discharge so that a prior authorization can be completed.     Discharge date: unknown pending acute issues, pain control, and symptom control  Will request an appointment with Outpatient Supportive Oncology RENETTA Ordonez (was scheduled for new patient visit on 9/9/24; will need to be rescheduled)      Signature and billing:  Thank you for allowing us to participate in the care of this patient. Recommendations will be communicated back to the consulting service by way of shared electronic medical record or face-to-face.    Medical complexity was high level due to due to complexity of problems, extensive data review, and high risk of management/treatment.    I spent 75 minutes in the care of this patient which included chart review, interviewing patient/family, discussion with primary team, coordination of care, and documentation.      DATA   Diagnostic tests and information reviewed for today's visit:  Conversation with primary team, Most recent labs and imaging results, Most recent EKG, Medications       Some elements copied from H&P note on 9/3/24, the elements have been updated and all reflect current decision making from today, 9/3/2024.    Plan of Care discussed with: Provider, RN, Patient    Thank you for asking Supportive and Palliative Oncology to assist with care of this patient.  Recommendations will be communicated back to the consulting service by way of shared electronic medical record/secure chat/email or face-to-face.   We will continue to follow.  Please contact us for additional questions or concerns.      SIGNATURE: RENETTA Riddle  PAGER/CONTACT:  Contact information:  Supportive and Palliative Oncology  Monday-Friday 8 AM-5 PM  Epic Secure chat or pager [phone].  After hours and weekends:  pager [phone]

## 2024-09-03 NOTE — H&P
History Of Present Illness  Christophe Ambriz is a 74 y.o. male with a hx of CAD, HTN, HLD and RCC with spinal mets s/p L1-5 fusion w/ L2-4 decompression and tumor debulking (7/24) presented initially to OSH with worsening back pain. He was noted to be hypoxic (87% on RA) and placed on 2L O2 (has since been weaned off oxygen). CT PE was negative for PE, showed interval improvement in bilateral pulmonary opacities, persistent but improved tree-in-bud opacities, and reveals probable aspiration. He is now transferred to [facility] for further management.    Of note, he was recently admitted 8/16/24-8/27/24 with back pain and pain with any movement which radiates to his bilateral legs. During this admission, on 8/19, he was transferred to MICU following desaturation to 70% on room air, unresponsive to a non-rebreather mask (15 L), with saturation only improving to 84%. Rapid response was called due to his somnolent state and hypotension, though he was afebrile. Physical exam was unremarkable for significant respiratory findings. ABG revealed hypoxemia with an oxygen level of 60. The patient was placed on CPAP (10 cm H2O, 45% FiO2), with improved oxygen saturation to 88-92%. Chest X-ray showed LLL consolidation. CT scan ruled out PE but showed increased multifocal nodular consolidations, suggesting multifocal pneumonia with a possible aspiration component. Treated for pneumonia with Vanc/Zosyn. Transferred back to regular floor 8/22. Weaned O2 as tolerated. Seen by radiation oncology team who decided to book him for radiation sessions as an outpatient.   *He has missed these radiation sessions as outpatient due to immobility*      Upon admission, patient reports that his back pain is a 4/10 and has been since arrival to [facility]. It is no worse than usual per patient. He states that he was unable to go to his outpatient radiation appointments so he decided to just go to [facility] to get admitted here for inpatient radiation. He  was unable to get out of bed to attend these sessions. Patient denies recent sick contacts, fever, chills, HA, dizziness, visual disturbances, nasal congestion, sore throat, dysphagia, CP, palpitations, wheezing, dyspnea, hemoptysis, N/V/C/D, abdominal pain, melena, hematochezia, urinary s/s, hematuria, weakness, bleeding, bruising, rash, pruritus. All other ROS otherwise negative.    OSH COURSE:  Vitals: 36.9, 67, 18, 101/57, 95% on 2L  Labs: CBC-WBC 25.5, H&H 9.8/32.3, plt 305. CMP-, Phos 2.3, alk phos 136 otherwise unremarkable. Mag 1.90. UA trace blood, 2+ urobilinogen. Trop 19à16.   Imaging: CT angio- No evidence of pulmonary embolism to the level of the segmental arteries. Small subsegmental filling defects cannot be entirely excluded. Interval improvement in bilateral pulmonary opacities likely reflecting resolving infectious or inflammatory process. Persistent but improved tree-in-bud opacities bilaterally, most pronounced in the left lower lobe. Small consolidation in the medial left lung base with filling defects of the associated bronchi and debris within the distal  trachea. This likely represents volume loss and probable aspiration. Superimposed infectious process or underlying lesion is not excluded. Slight interval progression in size of destructive soft tissue  lesion centered within the lateral right 9th rib. Partially visualized left renal mass, concerning for renal cell carcinoma. Asymmetric enlargement of the right lobe of the thyroid  CT A/P-Complex lesion arising from the superior aspect of the left kidney  is again noted most concerning for a renal cell carcinoma.  2. 1 cm partially exophytic lesion arising from the posterior aspect  of the right kidney which is not definitively cystic. This could  represent an additional focus of malignancy. 3. Interval progression of destructive lesion centered within the  lateral right 9th rib with the soft tissue component. The lesion  abuts  and indents the lateral margin of the right hepatic lobe. This  is most concerning for metastasis.  4. Posterior fusion of L1-L5 and partial laminectomy of L3.  Infiltrative destructive lesion again noted within the L3 vertebral  body which appears to abut if not extend into the psoas musculature,  left-greater-than-right. There is also posterior extension of the  soft tissue component of the mass with severe spinal canal stenosis  at L3. The appearance is similar to recent MRI from 08/14/2024. 5. Subcutaneous air locules within the midline soft tissues of the  lower back at laminectomy/fusion site. These are presumably  postsurgical in nature, correlation for evidence of infection is  recommended.  6. Consolidation in the medial left lung base   Interventions: Unasyn 3gm x 1, Dilaudid 1mg IV x 2, NS x 2L, Zofran 4mg IV x1       Past Medical History  He has a past medical history of HLD (hyperlipidemia), HTN (hypertension), Metastasis (Multi), and Renal cell adenocarcinoma (Multi).    Surgical History  He has a past surgical history that includes Lumbar fusion.    Oncology History    No history exists.        Social History  He reports that he has never smoked. He has never been exposed to tobacco smoke. He has never used smokeless tobacco. He reports that he does not use drugs. No history on file for alcohol use.     Allergies  Oxycodone-acetaminophen    Review of Systems   Constitutional:  Negative for chills and fever.   HENT:  Negative for nosebleeds and sore throat.    Eyes:  Negative for visual disturbance.   Respiratory:  Negative for cough, choking, shortness of breath and wheezing.    Cardiovascular:  Negative for chest pain, palpitations and leg swelling.   Gastrointestinal:  Positive for diarrhea. Negative for abdominal pain, blood in stool, constipation, nausea and vomiting.   Endocrine: Negative.    Genitourinary:  Negative for difficulty urinating, dysuria, frequency, hematuria and urgency.  "  Musculoskeletal:  Positive for back pain.        Described as \"shooting, stabbing\" 4/10. No change in chronic rating or characteristics.    Skin: Negative.    Allergic/Immunologic: Negative.    Neurological:  Negative for dizziness, weakness, light-headedness and headaches.   Hematological: Negative.    Psychiatric/Behavioral: Negative.          Physical Exam  Constitutional:       General: He is not in acute distress.     Appearance: He is cachectic.   Eyes:      Pupils: Pupils are equal, round, and reactive to light.   Cardiovascular:      Rate and Rhythm: Normal rate and regular rhythm.   Pulmonary:      Effort: Pulmonary effort is normal.      Breath sounds: Wheezing present.   Abdominal:      General: Abdomen is flat. Bowel sounds are normal. There is no distension.      Palpations: Abdomen is soft.      Tenderness: There is no abdominal tenderness.   Musculoskeletal:         General: Normal range of motion.   Skin:     General: Skin is warm and dry.      Findings: Bruising present.   Neurological:      General: No focal deficit present.      Mental Status: He is oriented to person, place, and time.          Last Recorded Vitals  Blood pressure 104/51, pulse 73, temperature 37 °C (98.6 °F), temperature source Temporal, resp. rate 18, height 1.85 m (6' 0.84\"), weight 61.8 kg (136 lb 3.9 oz), SpO2 96%.    Relevant Results        Scheduled medications  aspirin, 81 mg, oral, Daily  atorvastatin, 20 mg, oral, Nightly  clopidogrel, 75 mg, oral, Daily  dexAMETHasone, 2 mg, oral, BID   Followed by  [START ON 9/4/2024] dexAMETHasone, 2 mg, oral, Daily  enoxaparin, 40 mg, subcutaneous, q24h  gabapentin, 300 mg, oral, Nightly  sennosides, 2 tablet, oral, BID      Continuous medications     PRN medications  PRN medications: alteplase, bisacodyl, oxyCODONE, traZODone  Results for orders placed or performed during the hospital encounter of 09/02/24 (from the past 24 hour(s))   Comprehensive metabolic panel   Result Value " Ref Range    Glucose 218 (H) 65 - 99 mg/dL    Sodium 131 (L) 133 - 145 mmol/L    Potassium 4.1 3.4 - 5.1 mmol/L    Chloride 94 (L) 97 - 107 mmol/L    Bicarbonate 24 24 - 31 mmol/L    Urea Nitrogen 16 8 - 25 mg/dL    Creatinine 0.50 0.40 - 1.60 mg/dL    eGFR >90 >60 mL/min/1.73m*2    Calcium 9.3 8.5 - 10.4 mg/dL    Albumin 2.9 (L) 3.5 - 5.0 g/dL    Alkaline Phosphatase 136 (H) 35 - 125 U/L    Total Protein 7.1 5.9 - 7.9 g/dL    AST 16 5 - 40 U/L    Bilirubin, Total 1.1 0.1 - 1.2 mg/dL    ALT 16 5 - 40 U/L    Anion Gap 13 <=19 mmol/L   CBC and Auto Differential   Result Value Ref Range    WBC 25.5 (H) 4.4 - 11.3 x10*3/uL    nRBC 0.0 0.0 - 0.0 /100 WBCs    RBC 3.90 (L) 4.50 - 5.90 x10*6/uL    Hemoglobin 9.8 (L) 13.5 - 17.5 g/dL    Hematocrit 32.3 (L) 41.0 - 52.0 %    MCV 83 80 - 100 fL    MCH 25.1 (L) 26.0 - 34.0 pg    MCHC 30.3 (L) 32.0 - 36.0 g/dL    RDW 16.1 (H) 11.5 - 14.5 %    Platelets 305 150 - 450 x10*3/uL    Neutrophils % 76.9 40.0 - 80.0 %    Immature Granulocytes %, Automated 0.9 0.0 - 0.9 %    Lymphocytes % 18.2 13.0 - 44.0 %    Monocytes % 3.8 2.0 - 10.0 %    Eosinophils % 0.1 0.0 - 6.0 %    Basophils % 0.1 0.0 - 2.0 %    Neutrophils Absolute 19.64 (H) 1.60 - 5.50 x10*3/uL    Immature Granulocytes Absolute, Automated 0.23 0.00 - 0.50 x10*3/uL    Lymphocytes Absolute 4.64 (H) 0.80 - 3.00 x10*3/uL    Monocytes Absolute 0.98 (H) 0.05 - 0.80 x10*3/uL    Eosinophils Absolute 0.02 0.00 - 0.40 x10*3/uL    Basophils Absolute 0.03 0.00 - 0.10 x10*3/uL   Magnesium   Result Value Ref Range    Magnesium 1.90 1.60 - 3.10 mg/dL   Phosphorus   Result Value Ref Range    Phosphorus 2.3 (L) 2.5 - 4.5 mg/dL   NT Pro-BNP   Result Value Ref Range    PROBNP 150 0 - 852 pg/mL   Serial Troponin, Initial ([facility])   Result Value Ref Range    Troponin T, High Sensitivity 19 (HH) <=14 ng/L   Urinalysis with Reflex Culture and Microscopic   Result Value Ref Range    Color, Urine Yellow Light-Yellow, Yellow, Dark-Yellow    Appearance,  Urine Clear Clear    Specific Gravity, Urine >1.050 (N) 1.005 - 1.035    pH, Urine 6.0 5.0, 5.5, 6.0, 6.5, 7.0, 7.5, 8.0    Protein, Urine 30 (1+) (A) NEGATIVE, 10 (TRACE), 20 (TRACE) mg/dL    Glucose, Urine Normal Normal mg/dL    Blood, Urine 0.03 (TRACE) (A) NEGATIVE    Ketones, Urine NEGATIVE NEGATIVE mg/dL    Bilirubin, Urine NEGATIVE NEGATIVE    Urobilinogen, Urine 4 (2+) (A) Normal mg/dL    Nitrite, Urine NEGATIVE NEGATIVE    Leukocyte Esterase, Urine NEGATIVE NEGATIVE   Urinalysis Microscopic   Result Value Ref Range    WBC, Urine 1-5 1-5, NONE /HPF    RBC, Urine 1-2 NONE, 1-2, 3-5 /HPF    Mucus, Urine 3+ Reference range not established. /LPF   Serial Troponin, Initial ([facility])   Result Value Ref Range    Troponin T, High Sensitivity 16 (HH) <=14 ng/L    CT angio chest for pulmonary embolism    Result Date: 9/2/2024  Interpreted By:  Nathaniel Rich, STUDY: CT ANGIO CHEST FOR PULMONARY EMBOLISM;  9/2/2024 3:27 pm   INDICATION: Signs/Symptoms:hypoxic, cancer.   COMPARISON: CTA chest for PE 08/19/2024   ACCESSION NUMBER(S): [id number]   ORDERING CLINICIAN: SHERMAN CRYSTAL   TECHNIQUE: Contiguous axial images of the chest were obtained after the intravenous administration of 75 mL Omnipaque 350 contrast using angiographic PE protocol. Coronal and sagittal reformatted images were reconstructed from the axial data. MIP images were created on an independent workstation and reviewed.   FINDINGS: PULMONARY ARTERIES: Adequate opacification to the level of the segmental arteries. Assessment of the subsegmental arteries limited by respiratory motion and mixing artifact. No filling defect to suggest pulmonary embolus in the visualized pulmonary arteries. The main pulmonary artery is normal in diameter. No CT evidence of right heart strain.   HEART: Normal in size. Triple-vessel coronary vascular calcifications. No significant pericardial effusion.   VESSELS: Normal caliber aorta without dissection. No significant aortic  atherosclerosis.   MEDIASTINUM AND LYMPH NODES: Asymmetric enlargement of the right lobe of the thyroid. This may be further assessed with ultrasound on a nonemergent basis. No enlarged intrathoracic or axillary lymph nodes by imaging criteria. No pneumomediastinum. The esophagus appears within normal limits.   LUNG, AIRWAYS, AND PLEURA: Secretions and debris are noted within the distal trachea and proximal right mainstem bronchus. Filling defects within multiple left lower lobe bronchi as well as a consolidation within the medial left lower lobe. Overall interval improvement in numerous patchy bilateral pulmonary opacities likely representing a resolving infectious or inflammatory process. There are persistent tree-in-bud opacities noted throughout the bilateral lungs, most pronounced in the left lower lobe. No pleural effusion or pneumothorax.   OSSEOUS STRUCTURES: The expansile soft tissue lesion is again noted within the right 9th rib measuring a proximally 6.0 x 4.4 cm, slightly increased from prior chest CT.   CHEST WALL SOFT TISSUES: No discernible abnormality.   UPPER ABDOMEN/OTHER: Partially visualized left renal mass. Please see dedicated CT of the abdomen and pelvis.       1. No evidence of pulmonary embolism to the level of the segmental arteries. Small subsegmental filling defects can not be entirely excluded. 2. Interval improvement in bilateral pulmonary opacities likely reflecting resolving infectious or inflammatory process. 3. Persistent but improved tree-in-bud opacities bilaterally, most pronounced in the left lower lobe. 4. Small consolidation in the medial left lung base with filling defects of the associated bronchi and debris within the distal trachea. This likely represents volume loss and probable aspiration. Superimposed infectious process or underlying lesion is not excluded. Follow-up to resolution is recommended. 5. Slight interval progression in size of destructive soft tissue lesion  centered within the lateral right 9th rib. 6. Partially visualized left renal mass, concerning for renal cell carcinoma. 7. Asymmetric enlargement of the right lobe of the thyroid, this can be further assessed with dedicated ultrasound on a nonemergent basis.   MACRO: None   Signed by: Nathaniel Rich 9/2/2024 4:36 PM Dictation workstation:   [id number]    CT abdomen pelvis w IV contrast    Result Date: 9/2/2024  Interpreted By:  Nathaniel Rich, STUDY: CT ABDOMEN PELVIS W IV CONTRAST;  9/2/2024 3:27 pm   INDICATION: Signs/Symptoms:worsening pain, cancer.   COMPARISON: CT chest abdomen and pelvis 07/05/2024.   ACCESSION NUMBER(S): [id number]   ORDERING CLINICIAN: SHERMAN CRYSTAL   TECHNIQUE: Contiguous axial images of the abdomen and pelvis were obtained after the intravenous administration of 75 mL Omnipaque 350 contrast. Coronal and sagittal reformatted images were reconstructed from the axial data.   FINDINGS: LOWER CHEST: Consolidation in the medial aspect of the left lower lobe. Please see dedicated CTA of the chest.   LIVER: Hepatic steatosis. Expansile destructive lesion arising from the lateral right 9th rib abuts and deforms the lateral margin of the right hepatic lobe.   BILE DUCTS: No significant intrahepatic or extrahepatic dilatation.   GALLBLADDER: No significant abnormality.   PANCREAS: No significant abnormality.   SPLEEN: No significant abnormality.   ADRENALS: No significant abnormality.   KIDNEYS, URETERS, BLADDER: Heterogeneous mass is again noted arising from the left upper pole. The mass measures 8.0 x 5.3 by 8.1 cm similar to previous examination. The appearance is most concerning for a renal cell carcinoma. 1 cm exophytic lesion arising from the posterior aspect of the right kidney which is not definitively cystic and could represent an additional malignant focus. MRI is recommended for further assessment. There is no hydronephrosis or hydroureter. No appreciable renal or ureteral calculus. The  bladder is unremarkable.   REPRODUCTIVE ORGANS: The prostate is prominent in size measuring up to 6 cm, correlate with PSA.   GI: No obstruction. No appreciable bowel inflammation. Normal appendix.   VESSELS: No significant abnormality. The portal veins and IVC are patent.   PERITONEUM/RETROPERITONEUM: No ascites, free air, or fluid collection.   LYMPH NODES: No enlarged lymph nodes.   ABDOMINAL WALL: Locules of air noted within the subcutaneous soft tissues of the midline lower back.   OSSEOUS STRUCTURES: Interval progression of an expansile destructive soft tissue lesion centered within the lateral right 9th rib. The lesion now measures 5.9 x 4.2 cm. Posterior fusion of the L1-L5 with bilateral transpedicular screws at L1, L2, L4, and L5 with bilateral interconnecting rods. Expansile destructive lesion is again noted centered within the L3 vertebral body. The soft tissue component of the mass appears to abut if not invade the psoas musculature, particularly on the left. There is posterior extension of the soft tissue component of the mass into the epidural space if not the spinal canal. There is severe canal stenosis at L3. This appears similar to recent MRI from 08/14/2024 given within the limitations of different techniques. There is likely a component of pathologic fracture of the L3 vertebral body with vertebral body height loss.       1. Complex lesion arising from the superior aspect of the left kidney is again noted most concerning for a renal cell carcinoma. 2. 1 cm partially exophytic lesion arising from the posterior aspect of the right kidney which is not definitively cystic. This could represent an additional focus of malignancy. Further assessment with MRI is recommended. 3. Interval progression of destructive lesion centered within the lateral right 9th rib with the soft tissue component. The lesion abuts and indents the lateral margin of the right hepatic lobe. This is most concerning for  metastasis. 4. Posterior fusion of L1-L5 and partial laminectomy of L3. Infiltrative destructive lesion again noted within the L3 vertebral body which appears to abut if not extend into the psoas musculature, left-greater-than-right. There is also posterior extension of the soft tissue component of the mass with severe spinal canal stenosis at L3. The appearance is similar to recent MRI from 08/14/2024. The degree of involvement of the structures surrounding the spine would be better characterized with MRI. 5. Subcutaneous air locules within the midline soft tissues of the lower back at laminectomy/fusion site. These are presumably postsurgical in nature, correlation for evidence of infection is recommended. 6. Consolidation in the medial left lung base, please see separately dictated CTA of the chest. 7. Please note PET-CT may be considered in further staging for presumed renal cell carcinoma, particularly in the assessment of additional osseous lesions.   MACRO: None   Signed by: Nathaniel Rich 9/2/2024 4:23 PM Dictation workstation:   [id number]       Assessment/Plan   Assessment & Plan  Aspiration pneumonia, unspecified aspiration pneumonia type, unspecified laterality, unspecified part of lung (Multi)      Christophe Ambriz is a 74 y.o. male with a hx of CAD, HTN, HLD and RCC with spinal mets s/p L1-5 fusion w/ L2-4 decompression and tumor debulking (7/24) presented initially to OSH with worsening back pain. He was noted to be hypoxic (87% on RA) and placed on 2L O2. CT PE was negative for PE, showed interval improvement in bilateral pulmonary opacities, persistent but improved tree-in-bud opacities, and reveals probable aspiration. Per patient, he is here for radiation sessions that he cannot attend outpatient. He is now transferred to [facility] for further management.      #Metastatic renal cell carcinoma  #Back and leg pain  -L1-5 fusion w/ L2-4 decompression and tumor debulking (7/24)  at [facility] with NSGY  -MRI  8/14: L3 compression fracture with retropulsion of the posterior cortex and severe stenosis of the central spinal canal with nerve root compression unchanged from the previous exam *No new foci of marrow replacement or compression fracture *There is no measurable change  compared to the previous exam.  -Recently DC 8/27/24 with plans to complete radiation outpatient  -Has not attended radiation sessions due to immobility-Last EBRT 8/27/24  -Presented to OSH for continued back pain, no worse from prior admit; here for inpatient radiation therapy  -Consider rad onc consult in AM for inpatient radiation   -Continue Gabapentin 300mg at HS  -Continue home oxycodone 5mg Q4 prn moderate pain   -Consider palliative consult for further pain management recs  -Follows with Dr. Chavarria    #Pneumonia  -Recently discharged 8/27/24.  -Last inpatient stay pertinent for MICU transfer for AHRF 2/2 multifocal pneumonia. Blood cultures positive for GPCC, and MRSA nares positive  -Completed inpatient course of Vanco/Zosyn  -CT PE at OSH-possible aspiration with improving tree-in-bud opacities and improvement in bilateral pulmonary opacities  -S/p Unasyn 3gm IV x 1  -Continue Unasyn 3gm Q6 on admit  -Consider SLP eval if warranted       #CAD s/p PCI (unknown year)  #HTN  #HLD  - continue home Atorvastatin, ASA, Plavix  - No current BP medications    #Leukocytosis  -Admit WBC 25.5, down trending from previous discharge (30.2, 30.6)  -CT Angio reveals probable aspiration pneumonia  -Currently on Decadron taper   -No evidence of infection   -Trend CBC     #Dispo  -PT/OT ordered     F: PRN  E: PRN  N: Puree 4 as per last MBS recommendations   GI: None indicated  DVT prophylaxis: SubQ lovenox  Code status: DNR/DNI (CONFIRMED ON ADMISSION)                                      Muna Ibrahim, APRN-CNP

## 2024-09-03 NOTE — PROGRESS NOTES
Pharmacy Medication History Review    Christophe Ambriz is a 74 y.o. male admitted for Aspiration pneumonia, unspecified aspiration pneumonia type, unspecified laterality, unspecified part of lung (Multi). Pharmacy reviewed the patient's [id number] medications and allergies for accuracy.    Medications ADDED:  None  Medications CHANGED:  None  Medications REMOVED:   None    The list below reflects the updated PTA list.   Prior to Admission Medications   Prescriptions Last Dose Informant   aspirin 81 mg EC tablet  Self   Sig: Take 1 tablet (81 mg) by mouth once daily.   atorvastatin (Lipitor) 20 mg tablet  Self   Sig: Take 1 tablet (20 mg) by mouth once daily at bedtime.   bisacodyl (Dulcolax) 10 mg suppository Not Taking Self   Sig: Insert 1 suppository (10 mg) into the rectum once daily as needed for constipation.   Patient not taking: Reported on 9/3/2024   clopidogrel (Plavix) 75 mg tablet  Self   Sig: Take 1 tablet (75 mg) by mouth once daily.   dexAMETHasone (Decadron) 4 mg tablet  Self   Sig: Take 1 tablet (4 mg) by mouth 2 times daily (morning and late afternoon) for 4 days, THEN 0.5 tablets (2 mg) 2 times daily (morning and late afternoon) for 4 days, THEN 0.5 tablets (2 mg) once daily.   gabapentin (Neurontin) 300 mg capsule  Self   Sig: Take 1 capsule (300 mg) by mouth once daily at bedtime.   oxyCODONE (Roxicodone) 5 mg immediate release tablet  Self   Sig: Take 1 tablet (5 mg) by mouth every 4 hours if needed for moderate pain (4 - 6).   oxygen (O2) gas therapy  Self   Sig: Inhale 1 each continuously.   sennosides (Senokot) 8.6 mg tablet Not Taking Self   Sig: Take 2 tablets (17.2 mg) by mouth 2 times a day.   Patient not taking: Reported on 9/3/2024   traZODone (Desyrel) 50 mg tablet  Self   Sig: Take 1 tablet (50 mg) by mouth once daily at bedtime.   Patient taking differently: Take 1 tablet (50 mg) by mouth once daily as needed for sleep.      Facility-Administered Medications: None        The  "list below reflects the updated allergy list. Please review each documented allergy for additional clarification and justification.  Allergies  Reviewed by Diana Alvarado RN on 9/3/2024        Severity Reactions Comments    Oxycodone-acetaminophen Medium Nausea And Vomiting, GI Upset             Patient accepts M2B at discharge.     Sources:   Patient  Discharge summary from 8/27/24  Dispense history  [facility]    Additional Comments:  Patient states he does not have any allergies or severe side effects to any medications.  Patient cannot recall if he was taking dexamethasone, gabapentin and trazodone prior to hospital admission, confirmed with patient he was adherent to other medications in the list.  Patient reports not taking bisacodyl and sennosides prior to admission because they make him go to bathroom in the middle of sleep.        Fidel Li nunu  PGY-1 Pharmacy Resident  09/03/24     Secure Chat preferred   If no response call [phone] or [facility] \"Med Rec\"    "

## 2024-09-03 NOTE — PROGRESS NOTES
SLP Adult Inpatient Speech-Language Pathology Clinical Swallow Evaluation    Patient Name: Christophe Ambriz  MRN: [id number]  Today's Date: 9/3/2024   Time Calculation  Start Time: 1045  Stop Time: 1105  Time Calculation (min): 20 min           Assessment:  Clinical swallow exam w/ pt previously evaluated by SLP during last admission including MBS study on 8/23, recommending puree textures and mildly thick liquids.  Bedside exam utilizing the Aidee protocol and additional presentations of puree textures. The pt safely tolerated all PO trials w/o overt s/s of aspiration. Should note that small volume of silent aspiration was noted during the last MBS however, the volume was minimal, nor was it tobi, and pt was s/p acute ICU illness. Additionally, The pt, at the time,  demonstrated overt coughing during Aidee protocol, prior to the MBS study. Furthermore, he has refused to use mildly thickened liquids and did not adhere to these at  [facility]. Pt is currently on room air and primary has discontinued anti-biotics. At this time, will continue w/ puree solids (pt refusing regular textures d/t difficult chewing) and thin liquids. SLP will continue to assess to ensure diet tolerance w/ additional goals/recs to follow.        Recommendations:  puree & thin liquids  Upright for all PO intake  Small bites/sips  Straws ok  Slow rate of consumption  Pills per pt preference  SLP to continue to follow to ensure diet tolerance/determine readiness for repeat assessment as pt appropriate/schedule permits  If pt presents with any changes to mental, medical, or respiratory status, please make NPO and alert SLP              Plan:  SLP Plan: Skilled SLP  SLP Frequency: 2x per week  Duration: 2 weeks        Goals:  Encounter Problems       Encounter Problems (Active)       Swallowing       LTG - Patient will tolerate the least restrictive diet consistency to allow for safe consumption of daily meals       Start:  09/03/24    Expected End:   "09/17/24            STG - Patient will tolerate recommended food and liquid consistencies without clinical signs and symptoms of aspirations on 100% of trials       Start:  09/03/24    Expected End:  09/17/24                       Subjective     Baseline Assessment:  Respiratory Status: Room air  Behavior/Cognition: Alert, Cooperative, Pleasant mood  Hearing: Within Functional Limits  Patient Positioning: Upright in Bed    History and Physical:    Per H&P:  \" Christophe Ambriz is a 74 y.o. male with a hx of CAD, HTN, HLD and RCC with spinal mets s/p L1-5 fusion w/ L2-4 decompression and tumor debulking (7/24) presented initially to OSH with worsening back pain. He was noted to be hypoxic (87% on RA) and placed on 2L O2. CT PE was negative for PE, showed interval improvement in bilateral pulmonary opacities, persistent but improved tree-in-bud opacities, and reveals probable aspiration. Per patient, he is here for radiation sessions that he cannot attend outpatient. He is now transferred to [facility] for further management.  \"    Past Medical History:   Diagnosis Date    HLD (hyperlipidemia)     HTN (hypertension)     Metastasis (Multi)     Renal cell adenocarcinoma (Multi)      No family history on file.    Allergies   Allergen Reactions    Oxycodone-Acetaminophen Nausea And Vomiting and GI Upset         Relevant Results  CT angio chest for pulmonary embolism 09/02/2024    Narrative  Interpreted By:  Nathaniel Rich,  STUDY:  CT ANGIO CHEST FOR PULMONARY EMBOLISM;  9/2/2024 3:27 pm    INDICATION:  Signs/Symptoms:hypoxic, cancer.    COMPARISON:  CTA chest for PE 08/19/2024    ACCESSION NUMBER(S):  [id number]    ORDERING CLINICIAN:  SHERMAN CRYSTAL    TECHNIQUE:  Contiguous axial images of the chest were obtained after the  intravenous administration of 75 mL Omnipaque 350 contrast using  angiographic PE protocol. Coronal and sagittal reformatted images  were reconstructed from the axial data. MIP images were created on " an  independent workstation and reviewed.    FINDINGS:  PULMONARY ARTERIES: Adequate opacification to the level of the  segmental arteries. Assessment of the subsegmental arteries limited  by respiratory motion and mixing artifact. No filling defect to  suggest pulmonary embolus in the visualized pulmonary arteries. The  main pulmonary artery is normal in diameter. No CT evidence of right  heart strain.    HEART: Normal in size. Triple-vessel coronary vascular  calcifications. No significant pericardial effusion.    VESSELS: Normal caliber aorta without dissection. No significant  aortic atherosclerosis.    MEDIASTINUM AND LYMPH NODES: Asymmetric enlargement of the right lobe  of the thyroid. This may be further assessed with ultrasound on a  nonemergent basis. No enlarged intrathoracic or axillary lymph nodes  by imaging criteria. No pneumomediastinum. The esophagus appears  within normal limits.    LUNG, AIRWAYS, AND PLEURA: Secretions and debris are noted within the  distal trachea and proximal right mainstem bronchus. Filling defects  within multiple left lower lobe bronchi as well as a consolidation  within the medial left lower lobe. Overall interval improvement in  numerous patchy bilateral pulmonary opacities likely representing a  resolving infectious or inflammatory process. There are persistent  tree-in-bud opacities noted throughout the bilateral lungs, most  pronounced in the left lower lobe. No pleural effusion or  pneumothorax.    OSSEOUS STRUCTURES: The expansile soft tissue lesion is again noted  within the right 9th rib measuring a proximally 6.0 x 4.4 cm,  slightly increased from prior chest CT.    CHEST WALL SOFT TISSUES: No discernible abnormality.    UPPER ABDOMEN/OTHER: Partially visualized left renal mass. Please see  dedicated CT of the abdomen and pelvis.    Impression  1. No evidence of pulmonary embolism to the level of the segmental  arteries. Small subsegmental filling defects can not  be entirely  excluded.  2. Interval improvement in bilateral pulmonary opacities likely  reflecting resolving infectious or inflammatory process.  3. Persistent but improved tree-in-bud opacities bilaterally, most  pronounced in the left lower lobe.  4. Small consolidation in the medial left lung base with filling  defects of the associated bronchi and debris within the distal  trachea. This likely represents volume loss and probable aspiration.  Superimposed infectious process or underlying lesion is not excluded.  Follow-up to resolution is recommended.  5. Slight interval progression in size of destructive soft tissue  lesion centered within the lateral right 9th rib.  6. Partially visualized left renal mass, concerning for renal cell  carcinoma.  7. Asymmetric enlargement of the right lobe of the thyroid, this can  be further assessed with dedicated ultrasound on a nonemergent basis.    MACRO:  None    Signed by: Nathaniel Rich 9/2/2024 4:36 PM  Dictation workstation:   [id number]            Objective     Oral/Motor Assessment:  Oral Hygiene: WFL  Dentition: Adequate/Natural (missing majority of dentition)  Oral Motor: Within Functional Limits  Intelligibility: Intelligible  Hearing: Within Functional Limits      Clinical Observations:  Patient Positioning: Upright in Bed  Was The 3 oz Swallow Protocol Completed: Yes  Clinical Observation Comment: after successfully completing the [place] protocol, the pt then consumed 4 oz of puree and additional 9 oz of water. Pt fed self independently, demonstrate WFL oral phase and subjectively timely/efficient pharyngeal swallow, w/o overt s/sx of aspiration.    Consistencies Trialed: Yes  Consistencies Trialed: Thin (IDDSI Level 0) - Straw, Thin (IDDSI Level 0) - Cup, Pureed/extremely thick (IDDSI Level 4), Regular (IDDSI Level 7)  -pt refused solid textures d/t missing dentition.   The 3 oz sequential drinks of thin liquid water was utilized as a reliable, evidence based  test to rule out silent aspiration and determine need for additional testing, such as the MBS or FEES (fiberoptic endoscopic evaluation of swallow), if the test is equivocal, incomplete or pt shows s/sx of aspiration,  prior to recommending a oral diet      Inpatient Education:  Adult Outpatient Education  Individual(s) Educated: Patient  Verbal Education : yes  Risk and Benefits Discussed with Patient/Caregiver/Other: yes  Patient/Caregiver Demonstrated Understanding: yes  Plan of Care Discussed and Agreed Upon: yes  Patient Response to Education: Patient/Caregiver Verbalized Understanding of Information

## 2024-09-03 NOTE — PROGRESS NOTES
Christophe Ambriz is a 74 y.o. male on day 0 of admission presenting with Aspiration pneumonia, unspecified aspiration pneumonia type, unspecified laterality, unspecified part of lung (Multi).    Subjective   Patient reports progressive back pain over the last 3 months, now painful when transferring in and out of his wheelchair, making mobility difficult. Does notes decreased strength in legs bilaterally since his surgery, though states this is not recently exacerbated. Notes neuropathy in right leg. Sensation otherwise intact. No bladder or bowel incontinence- low concern for spinal cord compression. LBM yesterday, semi-formed. Denies HA, dizziness, CP, SOB, N/V/D. All other ROS otherwise negative.        Objective     Physical Exam  Constitutional:       General: He is not in acute distress.     Appearance: He is not ill-appearing.      Comments: cachexia   HENT:      Head: Normocephalic and atraumatic.      Nose: Nose normal.      Mouth/Throat:      Mouth: Mucous membranes are moist.   Eyes:      General: No scleral icterus.     Extraocular Movements: Extraocular movements intact.      Pupils: Pupils are equal, round, and reactive to light.   Cardiovascular:      Rate and Rhythm: Normal rate and regular rhythm.      Pulses: Normal pulses.      Heart sounds: Normal heart sounds.   Pulmonary:      Effort: Pulmonary effort is normal.      Breath sounds: Normal breath sounds.   Abdominal:      General: Abdomen is flat. Bowel sounds are normal. There is no distension.      Palpations: Abdomen is soft.      Tenderness: There is no abdominal tenderness. There is no guarding.   Musculoskeletal:         General: No swelling, deformity or signs of injury. Normal range of motion.      Cervical back: Normal range of motion and neck supple.   Skin:     General: Skin is warm and dry.      Findings: Bruising present. No erythema or rash.   Neurological:      General: No focal deficit present.      Mental Status: He is alert and  "oriented to person, place, and time.      Cranial Nerves: No cranial nerve deficit.      Sensory: No sensory deficit.      Motor: Weakness (generalized; 2/5 strength bilateral lower extremity) present.   Psychiatric:         Mood and Affect: Mood normal.         Behavior: Behavior normal.      Comments: Fluent speech, cooperative         Last Recorded Vitals  Blood pressure 115/70, pulse 84, temperature 36 °C (96.8 °F), temperature source Temporal, resp. rate 16, height 1.85 m (6' 0.84\"), weight 61.8 kg (136 lb 3.9 oz), SpO2 92%.  Intake/Output last 3 Shifts:  I/O last 3 completed shifts:  In: - (0 mL/kg)   Out: 200 (3.2 mL/kg) [Urine:200 (0.1 mL/kg/hr)]  Weight: 61.8 kg     Relevant Results  Results for orders placed or performed during the hospital encounter of 09/03/24 (from the past 24 hour(s))   CBC and Auto Differential   Result Value Ref Range    WBC 20.7 (H) 4.4 - 11.3 x10*3/uL    nRBC 0.0 0.0 - 0.0 /100 WBCs    RBC 3.51 (L) 4.50 - 5.90 x10*6/uL    Hemoglobin 8.9 (L) 13.5 - 17.5 g/dL    Hematocrit 30.6 (L) 41.0 - 52.0 %    MCV 87 80 - 100 fL    MCH 25.4 (L) 26.0 - 34.0 pg    MCHC 29.1 (L) 32.0 - 36.0 g/dL    RDW 16.1 (H) 11.5 - 14.5 %    Platelets 299 150 - 450 x10*3/uL    Neutrophils % 71.8 40.0 - 80.0 %    Immature Granulocytes %, Automated 0.8 0.0 - 0.9 %    Lymphocytes % 22.4 13.0 - 44.0 %    Monocytes % 4.4 2.0 - 10.0 %    Eosinophils % 0.5 0.0 - 6.0 %    Basophils % 0.1 0.0 - 2.0 %    Neutrophils Absolute 14.87 (H) 1.60 - 5.50 x10*3/uL    Immature Granulocytes Absolute, Automated 0.16 0.00 - 0.50 x10*3/uL    Lymphocytes Absolute 4.64 (H) 0.80 - 3.00 x10*3/uL    Monocytes Absolute 0.91 (H) 0.05 - 0.80 x10*3/uL    Eosinophils Absolute 0.11 0.00 - 0.40 x10*3/uL    Basophils Absolute 0.03 0.00 - 0.10 x10*3/uL   Comprehensive Metabolic Panel   Result Value Ref Range    Glucose 134 (H) 74 - 99 mg/dL    Sodium 136 136 - 145 mmol/L    Potassium 4.3 3.5 - 5.3 mmol/L    Chloride 102 98 - 107 mmol/L    " Bicarbonate 26 21 - 32 mmol/L    Anion Gap 12 10 - 20 mmol/L    Urea Nitrogen 13 6 - 23 mg/dL    Creatinine 0.42 (L) 0.50 - 1.30 mg/dL    eGFR >90 >60 mL/min/1.73m*2    Calcium 8.5 (L) 8.6 - 10.6 mg/dL    Albumin 2.9 (L) 3.4 - 5.0 g/dL    Alkaline Phosphatase 110 33 - 136 U/L    Total Protein 6.3 (L) 6.4 - 8.2 g/dL    AST 18 9 - 39 U/L    Bilirubin, Total 0.8 0.0 - 1.2 mg/dL    ALT 13 10 - 52 U/L       CT angio chest for pulmonary embolism    Result Date: 9/2/2024  Interpreted By:  Nathaniel Rich, STUDY: CT ANGIO CHEST FOR PULMONARY EMBOLISM;  9/2/2024 3:27 pm   INDICATION: Signs/Symptoms:hypoxic, cancer.   COMPARISON: CTA chest for PE 08/19/2024   ACCESSION NUMBER(S): [id number]   ORDERING CLINICIAN: SHERMAN CRYSTAL   TECHNIQUE: Contiguous axial images of the chest were obtained after the intravenous administration of 75 mL Omnipaque 350 contrast using angiographic PE protocol. Coronal and sagittal reformatted images were reconstructed from the axial data. MIP images were created on an independent workstation and reviewed.   FINDINGS: PULMONARY ARTERIES: Adequate opacification to the level of the segmental arteries. Assessment of the subsegmental arteries limited by respiratory motion and mixing artifact. No filling defect to suggest pulmonary embolus in the visualized pulmonary arteries. The main pulmonary artery is normal in diameter. No CT evidence of right heart strain.   HEART: Normal in size. Triple-vessel coronary vascular calcifications. No significant pericardial effusion.   VESSELS: Normal caliber aorta without dissection. No significant aortic atherosclerosis.   MEDIASTINUM AND LYMPH NODES: Asymmetric enlargement of the right lobe of the thyroid. This may be further assessed with ultrasound on a nonemergent basis. No enlarged intrathoracic or axillary lymph nodes by imaging criteria. No pneumomediastinum. The esophagus appears within normal limits.   LUNG, AIRWAYS, AND PLEURA: Secretions and debris are noted  within the distal trachea and proximal right mainstem bronchus. Filling defects within multiple left lower lobe bronchi as well as a consolidation within the medial left lower lobe. Overall interval improvement in numerous patchy bilateral pulmonary opacities likely representing a resolving infectious or inflammatory process. There are persistent tree-in-bud opacities noted throughout the bilateral lungs, most pronounced in the left lower lobe. No pleural effusion or pneumothorax.   OSSEOUS STRUCTURES: The expansile soft tissue lesion is again noted within the right 9th rib measuring a proximally 6.0 x 4.4 cm, slightly increased from prior chest CT.   CHEST WALL SOFT TISSUES: No discernible abnormality.   UPPER ABDOMEN/OTHER: Partially visualized left renal mass. Please see dedicated CT of the abdomen and pelvis.       1. No evidence of pulmonary embolism to the level of the segmental arteries. Small subsegmental filling defects can not be entirely excluded. 2. Interval improvement in bilateral pulmonary opacities likely reflecting resolving infectious or inflammatory process. 3. Persistent but improved tree-in-bud opacities bilaterally, most pronounced in the left lower lobe. 4. Small consolidation in the medial left lung base with filling defects of the associated bronchi and debris within the distal trachea. This likely represents volume loss and probable aspiration. Superimposed infectious process or underlying lesion is not excluded. Follow-up to resolution is recommended. 5. Slight interval progression in size of destructive soft tissue lesion centered within the lateral right 9th rib. 6. Partially visualized left renal mass, concerning for renal cell carcinoma. 7. Asymmetric enlargement of the right lobe of the thyroid, this can be further assessed with dedicated ultrasound on a nonemergent basis.   MACRO: None   Signed by: Nathaniel Rich 9/2/2024 4:36 PM Dictation workstation:   [id number]    CT abdomen  pelvis w IV contrast    Result Date: 9/2/2024  Interpreted By:  Nathaniel Rich, STUDY: CT ABDOMEN PELVIS W IV CONTRAST;  9/2/2024 3:27 pm   INDICATION: Signs/Symptoms:worsening pain, cancer.   COMPARISON: CT chest abdomen and pelvis 07/05/2024.   ACCESSION NUMBER(S): [id number]   ORDERING CLINICIAN: SHERMAN CRYSTAL   TECHNIQUE: Contiguous axial images of the abdomen and pelvis were obtained after the intravenous administration of 75 mL Omnipaque 350 contrast. Coronal and sagittal reformatted images were reconstructed from the axial data.   FINDINGS: LOWER CHEST: Consolidation in the medial aspect of the left lower lobe. Please see dedicated CTA of the chest.   LIVER: Hepatic steatosis. Expansile destructive lesion arising from the lateral right 9th rib abuts and deforms the lateral margin of the right hepatic lobe.   BILE DUCTS: No significant intrahepatic or extrahepatic dilatation.   GALLBLADDER: No significant abnormality.   PANCREAS: No significant abnormality.   SPLEEN: No significant abnormality.   ADRENALS: No significant abnormality.   KIDNEYS, URETERS, BLADDER: Heterogeneous mass is again noted arising from the left upper pole. The mass measures 8.0 x 5.3 by 8.1 cm similar to previous examination. The appearance is most concerning for a renal cell carcinoma. 1 cm exophytic lesion arising from the posterior aspect of the right kidney which is not definitively cystic and could represent an additional malignant focus. MRI is recommended for further assessment. There is no hydronephrosis or hydroureter. No appreciable renal or ureteral calculus. The bladder is unremarkable.   REPRODUCTIVE ORGANS: The prostate is prominent in size measuring up to 6 cm, correlate with PSA.   GI: No obstruction. No appreciable bowel inflammation. Normal appendix.   VESSELS: No significant abnormality. The portal veins and IVC are patent.   PERITONEUM/RETROPERITONEUM: No ascites, free air, or fluid collection.   LYMPH NODES: No  enlarged lymph nodes.   ABDOMINAL WALL: Locules of air noted within the subcutaneous soft tissues of the midline lower back.   OSSEOUS STRUCTURES: Interval progression of an expansile destructive soft tissue lesion centered within the lateral right 9th rib. The lesion now measures 5.9 x 4.2 cm. Posterior fusion of the L1-L5 with bilateral transpedicular screws at L1, L2, L4, and L5 with bilateral interconnecting rods. Expansile destructive lesion is again noted centered within the L3 vertebral body. The soft tissue component of the mass appears to abut if not invade the psoas musculature, particularly on the left. There is posterior extension of the soft tissue component of the mass into the epidural space if not the spinal canal. There is severe canal stenosis at L3. This appears similar to recent MRI from 08/14/2024 given within the limitations of different techniques. There is likely a component of pathologic fracture of the L3 vertebral body with vertebral body height loss.       1. Complex lesion arising from the superior aspect of the left kidney is again noted most concerning for a renal cell carcinoma. 2. 1 cm partially exophytic lesion arising from the posterior aspect of the right kidney which is not definitively cystic. This could represent an additional focus of malignancy. Further assessment with MRI is recommended. 3. Interval progression of destructive lesion centered within the lateral right 9th rib with the soft tissue component. The lesion abuts and indents the lateral margin of the right hepatic lobe. This is most concerning for metastasis. 4. Posterior fusion of L1-L5 and partial laminectomy of L3. Infiltrative destructive lesion again noted within the L3 vertebral body which appears to abut if not extend into the psoas musculature, left-greater-than-right. There is also posterior extension of the soft tissue component of the mass with severe spinal canal stenosis at L3. The appearance is similar  to recent MRI from 08/14/2024. The degree of involvement of the structures surrounding the spine would be better characterized with MRI. 5. Subcutaneous air locules within the midline soft tissues of the lower back at laminectomy/fusion site. These are presumably postsurgical in nature, correlation for evidence of infection is recommended. 6. Consolidation in the medial left lung base, please see separately dictated CTA of the chest. 7. Please note PET-CT may be considered in further staging for presumed renal cell carcinoma, particularly in the assessment of additional osseous lesions.   MACRO: None   Signed by: Nathaniel Rich 9/2/2024 4:23 PM Dictation workstation:   [id number]         Assessment/Plan   Assessment & Plan  Aspiration pneumonia, unspecified aspiration pneumonia type, unspecified laterality, unspecified part of lung (Multi)      Christophe Ambriz is a 74 y.o. male with a hx of CAD, HTN, HLD and RCC with spinal mets s/p L1-5 fusion w/ L2-4 decompression and tumor debulking (7/24) presented initially to OSH with worsening back pain. He was noted to be hypoxic (87% on RA) and placed on 2L O2. CT PE was negative for PE, showed interval improvement in bilateral pulmonary opacities, persistent but improved tree-in-bud opacities, and reveals probable aspiration. Per patient, he is here for radiation sessions that he cannot attend outpatient. He is now transferred to [facility] for further management.            #Metastatic renal cell carcinoma  #Back and leg pain  -L1-5 fusion w/ L2-4 decompression and tumor debulking (7/24)  at [facility] with NSGY  -MRI 8/14: L3 compression fracture with retropulsion of the posterior cortex and severe stenosis of the central spinal canal with nerve root compression unchanged from the previous exam *No new foci of marrow replacement or compression fracture *There is no measurable change compared to the previous exam.  -Recently DC 8/27/24 with plans to complete radiation  outpatient  -Has not attended radiation sessions due to immobility-Last EBRT 8/27/24  -Presented to OSH for continued back pain, no worse from prior admit; here for inpatient radiation therapy  -Follows with Dr. Chavarria; FUV requested (9/3)   -Rad onc consulted 9/3    #Pain control  -Increased Gabapentin 300mg TID  -Continue home oxycodone 5mg Q4 prn moderate pain   -Supportive oncology consulted, recs pending      #Hx Pneumonia  #Aspiration  -Recently discharged 8/27/24.  -Last inpatient stay pertinent for MICU transfer for AHRF 2/2 multifocal pneumonia. Blood cultures positive for GPCC, and MRSA nares positive  -Completed inpatient course of Vanco/Zosyn  -CT PE at OSH-possible aspiration with improving tree-in-bud opacities and improvement in bilateral pulmonary opacities  -S/p Unasyn 3gm IV in ED/admit; will discontinue (low suspicion active infection given no systemic s/s, improving CT scan)    -Procal pending   -SLP consulted 9/3; c/w pureed textures and thin liquids      #CAD s/p PCI (unknown year)  #HTN  #HLD  - continue home Atorvastatin, ASA, Plavix  - No current BP medications     #Leukocytosis, improving   -Admit WBC 25.5, down trending from previous discharge (30.2, 30.6)  -CT Angio reveals probable aspiration pneumonia  -Currently on Decadron taper   -No evidence of infection   -Trend CBC     #Dispo  -PT/OT ordered, will likely need SNF dispo      F: PRN  E: PRN  N: Puree 4 as per last MBS recommendations; nutrition consulted for cachexia   GI: None indicated  DVT prophylaxis: SubQ lovenox  Code status: DNR/DNI (CONFIRMED ON ADMISSION)              Hilda Pacheco PA-C

## 2024-09-03 NOTE — PROGRESS NOTES
"Occupational Therapy    Communication Note    Missed Visit: Yes  Missed Visit Reason: Patient refused (pt stating he can do \"one thing at a time\" wants to start radiation before attempting therapy)      09/03/24 at 11:44 AM   Deb Fairchild, OT   Rehab Office: [phone]        "

## 2024-09-03 NOTE — PROGRESS NOTES
Physical Therapy                 Therapy Communication Note    Patient Name: Christophe Ambriz  MRN: [id number]  Today's Date: 9/3/2024     Discipline: Physical Therapy    Missed Visit Reason: Missed Visit Reason: Patient refused (stating he can only do one thing at a time and needs to start radiation before doing therapy. adamant that he does not want to do PT until radiation is complete)    Missed Time: Attempt    Comment:

## 2024-09-03 NOTE — TELEPHONE ENCOUNTER
Hello,    Your recent home care referral for Christophe Ambriz has been made a Non Admit with  Home Care due to Inability to Contact Patient. If you have further questions, feel free to reach out to our office at [phone].     Thank you,   [facility]

## 2024-09-03 NOTE — PROGRESS NOTES
74-year-old male with metastatic RCC, recently admitted for pneumonia, recurrent L3 metastasis with epidural extension.  The patient began palliative RT to R CW / rib mass and L3 on 8/27.  Patient was then discharged, with plans to complete treatment at  [facility].  He was unable to follow-up for treatment due to profound weakness.  He elected for admission to continue radiotherapy.       Subjective   Currently complains of mild to moderate lower back pain, with subacute lower extremity weak.  He denies numbness or paresthesia, no bowel or urinary incontinence, positive constipation.    Objective     Last Recorded Vitals  /70 (BP Location: Right arm, Patient Position: Lying)   Pulse 84   Temp 36 °C (96.8 °F) (Temporal)   Resp 16   Wt 61.8 kg (136 lb 3.9 oz)   SpO2 92%     Image Results  ECG 12 lead  Normal sinus rhythm  Normal ECG  When compared with ECG of 26-JUL-2024 14:10,  No significant change was found      Physical Exam  General: awake, alert, resting comfortably, frail, malnourished in appearance  HEENT: pupils equal and round, no scleral icterus  Pulmonary: Breathing comfortably on room air.  Cardiac: regular rate  Abdomen: soft, nondistended, non tender to palpation   EXT: no peripheral edema, no asymmetry noted  MSK: no focal joint swelling noted  Neuro: A&O x 3, cranial nerves II through XII grossly intact, sensation intact, B/L weakness - primarily in the hips, L>R  Psych: Normal affect     Relevant Results  Lab Results   Component Value Date    WBC 20.7 (H) 09/03/2024    HGB 8.9 (L) 09/03/2024    HCT 30.6 (L) 09/03/2024    MCV 87 09/03/2024     09/03/2024     Lab Results   Component Value Date    GLUCOSE 134 (H) 09/03/2024    CALCIUM 8.5 (L) 09/03/2024     09/03/2024    K 4.3 09/03/2024    CO2 26 09/03/2024     09/03/2024    BUN 13 09/03/2024    CREATININE 0.42 (L) 09/03/2024                Assessment/Plan   This is a 74-year-old male who initially presented in July with  severe lumbar back pain radiating to the lower extremity and unintentional weight loss. Imaging revealed a lytic lesions at L 9th rib and in the L3, and a 6x8 cm mass in the left kidney.  He underwent L1-5 fusion with L2-4 decompression and tumor debulking on July 9th. Pathology confirmed clear cell carcinoma.      The patient has been unable to follow up with medical oncology, as scheduled.   He presented to the ED on 7/26 with worsening back pain.  MRI showed increased epidural extension at L3, with severe canal stenosis.   The patient was discharged with  NSGY follow up. He returned to the ED 8/14 with worsening pain.  MRI was repeated 8/14, and continued to show extensive disease at L3 with severe canal stenosis.  The patient was also noted to have a large, painful, R chest wall / rib metastasis.       On 8/27 the patient began palliative RT to L3 and right chest wall mass.  The patient was then discharged to home to complete treatment at [facility].  He has been unable to follow-up due to lower extremity weakness and deconditioning.  He elected for readmission to continue radiotherapy.     PLAN:  The patient was discussed with my attending provide, Dr. Macias.      -Plan will be to continue palliative RT to R CW and  L3 metastases, 9 remaining fractions  -Treatment can be transitioned to [facility] for completion pending dispo  -Continue Dexamethasone 2mg daily with GI Ppx           I spent ___35__ minutes with this patient. Greater than 50% of this time was spent in the counseling and/or coordination of care of this patient.              Landon Mark PA-C

## 2024-09-04 ENCOUNTER — HOSPITAL ENCOUNTER (OUTPATIENT)
Dept: RADIATION ONCOLOGY | Facility: HOSPITAL | Age: 74
Setting detail: RADIATION/ONCOLOGY SERIES
Discharge: HOME | End: 2024-09-04
Payer: MEDICARE

## 2024-09-04 ENCOUNTER — APPOINTMENT (OUTPATIENT)
Dept: RADIATION ONCOLOGY | Facility: CLINIC | Age: 74
End: 2024-09-04
Payer: MEDICARE

## 2024-09-04 ENCOUNTER — APPOINTMENT (OUTPATIENT)
Dept: RADIATION ONCOLOGY | Facility: HOSPITAL | Age: 74
End: 2024-09-04
Payer: MEDICARE

## 2024-09-04 DIAGNOSIS — C64: ICD-10-CM

## 2024-09-04 DIAGNOSIS — Z51.0 ENCOUNTER FOR ANTINEOPLASTIC RADIATION THERAPY: ICD-10-CM

## 2024-09-04 DIAGNOSIS — C79.51 SECONDARY MALIGNANT NEOPLASM OF BONE (MULTI): ICD-10-CM

## 2024-09-04 LAB
ALBUMIN SERPL BCP-MCNC: 2.8 G/DL (ref 3.4–5)
ALP SERPL-CCNC: 109 U/L (ref 33–136)
ALT SERPL W P-5'-P-CCNC: 14 U/L (ref 10–52)
ANION GAP SERPL CALC-SCNC: 11 MMOL/L (ref 10–20)
AST SERPL W P-5'-P-CCNC: 17 U/L (ref 9–39)
BASOPHILS # BLD AUTO: 0.02 X10*3/UL (ref 0–0.1)
BASOPHILS NFR BLD AUTO: 0.1 %
BILIRUB SERPL-MCNC: 0.9 MG/DL (ref 0–1.2)
BUN SERPL-MCNC: 10 MG/DL (ref 6–23)
CALCIUM SERPL-MCNC: 8.5 MG/DL (ref 8.6–10.6)
CHLORIDE SERPL-SCNC: 99 MMOL/L (ref 98–107)
CO2 SERPL-SCNC: 28 MMOL/L (ref 21–32)
CREAT SERPL-MCNC: 0.47 MG/DL (ref 0.5–1.3)
EGFRCR SERPLBLD CKD-EPI 2021: >90 ML/MIN/1.73M*2
EOSINOPHIL # BLD AUTO: 0.06 X10*3/UL (ref 0–0.4)
EOSINOPHIL NFR BLD AUTO: 0.3 %
ERYTHROCYTE [DISTWIDTH] IN BLOOD BY AUTOMATED COUNT: 15.9 % (ref 11.5–14.5)
GLUCOSE SERPL-MCNC: 120 MG/DL (ref 74–99)
HCT VFR BLD AUTO: 28 % (ref 41–52)
HGB BLD-MCNC: 8.5 G/DL (ref 13.5–17.5)
IMM GRANULOCYTES # BLD AUTO: 0.11 X10*3/UL (ref 0–0.5)
IMM GRANULOCYTES NFR BLD AUTO: 0.6 % (ref 0–0.9)
LYMPHOCYTES # BLD AUTO: 4.55 X10*3/UL (ref 0.8–3)
LYMPHOCYTES NFR BLD AUTO: 24.4 %
MCH RBC QN AUTO: 25.9 PG (ref 26–34)
MCHC RBC AUTO-ENTMCNC: 30.4 G/DL (ref 32–36)
MCV RBC AUTO: 85 FL (ref 80–100)
MONOCYTES # BLD AUTO: 0.93 X10*3/UL (ref 0.05–0.8)
MONOCYTES NFR BLD AUTO: 5 %
NEUTROPHILS # BLD AUTO: 12.98 X10*3/UL (ref 1.6–5.5)
NEUTROPHILS NFR BLD AUTO: 69.6 %
NRBC BLD-RTO: 0 /100 WBCS (ref 0–0)
PLATELET # BLD AUTO: 288 X10*3/UL (ref 150–450)
POTASSIUM SERPL-SCNC: 4.3 MMOL/L (ref 3.5–5.3)
PROCALCITONIN SERPL-MCNC: 0.05 NG/ML
PROT SERPL-MCNC: 6.3 G/DL (ref 6.4–8.2)
RAD ONC MSQ ACTUAL FRACTIONS DELIVERED: 3
RAD ONC MSQ ACTUAL FRACTIONS DELIVERED: 3
RAD ONC MSQ ACTUAL SESSION DELIVERED DOSE: 300 CGRAY
RAD ONC MSQ ACTUAL SESSION DELIVERED DOSE: 300 CGRAY
RAD ONC MSQ ACTUAL TOTAL DOSE: 900 CGRAY
RAD ONC MSQ ACTUAL TOTAL DOSE: 900 CGRAY
RAD ONC MSQ ELAPSED DAYS: 8
RAD ONC MSQ ELAPSED DAYS: 8
RAD ONC MSQ LAST DATE: NORMAL
RAD ONC MSQ LAST DATE: NORMAL
RAD ONC MSQ PRESCRIBED FRACTIONAL DOSE: 300 CGRAY
RAD ONC MSQ PRESCRIBED FRACTIONAL DOSE: 300 CGRAY
RAD ONC MSQ PRESCRIBED NUMBER OF FRACTIONS: 10
RAD ONC MSQ PRESCRIBED NUMBER OF FRACTIONS: 10
RAD ONC MSQ PRESCRIBED TECHNIQUE: NORMAL
RAD ONC MSQ PRESCRIBED TECHNIQUE: NORMAL
RAD ONC MSQ PRESCRIBED TOTAL DOSE: 3000 CGRAY
RAD ONC MSQ PRESCRIBED TOTAL DOSE: 3000 CGRAY
RAD ONC MSQ START DATE: NORMAL
RAD ONC MSQ START DATE: NORMAL
RAD ONC MSQ TREATMENT COURSE NUMBER: 1
RAD ONC MSQ TREATMENT COURSE NUMBER: 1
RAD ONC MSQ TREATMENT SITE: NORMAL
RAD ONC MSQ TREATMENT SITE: NORMAL
RBC # BLD AUTO: 3.28 X10*6/UL (ref 4.5–5.9)
SODIUM SERPL-SCNC: 134 MMOL/L (ref 136–145)
WBC # BLD AUTO: 18.7 X10*3/UL (ref 4.4–11.3)

## 2024-09-04 PROCEDURE — 2500000001 HC RX 250 WO HCPCS SELF ADMINISTERED DRUGS (ALT 637 FOR MEDICARE OP)

## 2024-09-04 PROCEDURE — 84145 PROCALCITONIN (PCT): CPT | Performed by: HOME HEALTH AIDE

## 2024-09-04 PROCEDURE — 2500000004 HC RX 250 GENERAL PHARMACY W/ HCPCS (ALT 636 FOR OP/ED)

## 2024-09-04 PROCEDURE — 36415 COLL VENOUS BLD VENIPUNCTURE: CPT

## 2024-09-04 PROCEDURE — 80053 COMPREHEN METABOLIC PANEL: CPT

## 2024-09-04 PROCEDURE — 2500000001 HC RX 250 WO HCPCS SELF ADMINISTERED DRUGS (ALT 637 FOR MEDICARE OP): Performed by: HOME HEALTH AIDE

## 2024-09-04 PROCEDURE — 1170000001 HC PRIVATE ONCOLOGY ROOM DAILY

## 2024-09-04 PROCEDURE — 85025 COMPLETE CBC W/AUTO DIFF WBC: CPT

## 2024-09-04 PROCEDURE — 77336 RADIATION PHYSICS CONSULT: CPT | Performed by: STUDENT IN AN ORGANIZED HEALTH CARE EDUCATION/TRAINING PROGRAM

## 2024-09-04 PROCEDURE — 77412 RADIATION TX DELIVERY LVL 3: CPT | Performed by: STUDENT IN AN ORGANIZED HEALTH CARE EDUCATION/TRAINING PROGRAM

## 2024-09-04 PROCEDURE — 77387 GUIDANCE FOR RADJ TX DLVR: CPT | Performed by: STUDENT IN AN ORGANIZED HEALTH CARE EDUCATION/TRAINING PROGRAM

## 2024-09-04 PROCEDURE — 99233 SBSQ HOSP IP/OBS HIGH 50: CPT

## 2024-09-04 PROCEDURE — 92526 ORAL FUNCTION THERAPY: CPT | Mod: GN | Performed by: SPEECH-LANGUAGE PATHOLOGIST

## 2024-09-04 PROCEDURE — 99233 SBSQ HOSP IP/OBS HIGH 50: CPT | Performed by: HOME HEALTH AIDE

## 2024-09-04 RX ORDER — ZINC OXIDE 20 G/100G
1 OINTMENT TOPICAL
Status: DISPENSED | OUTPATIENT
Start: 2024-09-04

## 2024-09-04 ASSESSMENT — COGNITIVE AND FUNCTIONAL STATUS - GENERAL
TOILETING: A LOT
MOBILITY SCORE: 12
CLIMB 3 TO 5 STEPS WITH RAILING: TOTAL
HELP NEEDED FOR BATHING: A LOT
DAILY ACTIVITIY SCORE: 12
DRESSING REGULAR LOWER BODY CLOTHING: A LOT
PERSONAL GROOMING: A LOT
MOVING TO AND FROM BED TO CHAIR: A LOT
STANDING UP FROM CHAIR USING ARMS: A LOT
TURNING FROM BACK TO SIDE WHILE IN FLAT BAD: A LITTLE
WALKING IN HOSPITAL ROOM: TOTAL
EATING MEALS: A LOT
MOVING FROM LYING ON BACK TO SITTING ON SIDE OF FLAT BED WITH BEDRAILS: A LITTLE
DRESSING REGULAR UPPER BODY CLOTHING: A LOT

## 2024-09-04 ASSESSMENT — PAIN SCALES - GENERAL
PAINLEVEL_OUTOF10: 8
PAINLEVEL_OUTOF10: 8
PAINLEVEL_OUTOF10: 5 - MODERATE PAIN
PAINLEVEL_OUTOF10: 8
PAINLEVEL_OUTOF10: 8

## 2024-09-04 NOTE — PROGRESS NOTES
Physical Therapy                 Therapy Communication Note    Patient Name: Christophe Ambriz  MRN: [id number]  Today's Date: 9/4/2024     Discipline: Physical Therapy    Missed Visit Reason: Missed Visit Reason: Patient refused    Missed Time: Attempt    Comment:

## 2024-09-04 NOTE — PROGRESS NOTES
SUPPORTIVE AND PALLIATIVE ONCOLOGY INPATIENT FOLLOW-UP      SERVICE DATE: 24     SUBJECTIVE:  Interval Events:  Pt refusing to work with PT until after all of RT is completed; primary team encouraging PT and SNF upon discharge with plan for SNF to transport to remaining treatments; pt has completed 3rd RT/10 today  Pt reporting that pain is improved since yesterday and feels it is currently well controlled    Pain Assessment:  Location:  lower back (lumbar area); also sacral/coccyx area (this pain began shortly after starting RT   Duration: Intermittent  Characteristics:   Ratin   Descriptors:  back pain: sharp, stabbing, ache that radiates down bilateral legs (usually stops at knees but sometimes goes down to toes; sacral/coccyx pain: constant burning    Aggravating: movement and lying in one position for too long     Relieving: Analgesics Oxycodone and Modifying activity   Interference with Function: Very Much    Opioid Use  Past 24 h prn opioid use: Hydromorphone 1 mg IV x 1 doses = 1 mg = 12 OME  Oxycodone IR 5 mg PO x 2 doses = 10 mg = 12 OME  Oxycodone IR 10 mg PO x 1 dose=10 mg=12 OME  Total 24h OME use:  36 OME    Note: OME calculations based on equianalgesic table below. Please note this table is based on best available evidence but conversions are still approximate. These are NOT opioid DOSES for individual patient use; this is equivalency information.  Drug Parenteral Enteral   Morphine 10 25   Oxycodone N/A 20   Hydromorphone 2 5   Fentanyl 0.15 N/A   Tramadol N/A 120   Citation: Rafaela MARX. Demystifying opioid conversion calculations: A guide for effective dosing, Second edition. MD Reyna: [employer]System Pharmacists, 2018.    Symptom Assessment:  Nausea none  Constipation none  Lack of appetite a little    Information obtained from: chart review, interview of patient, and discussion with primary  team  ______________________________________________________________________        OBJECTIVE:    Lab Results   Component Value Date    WBC 20.7 (H) 09/03/2024    HGB 8.9 (L) 09/03/2024    HCT 30.6 (L) 09/03/2024    MCV 87 09/03/2024     09/03/2024      Lab Results   Component Value Date    GLUCOSE 134 (H) 09/03/2024    CALCIUM 8.5 (L) 09/03/2024     09/03/2024    K 4.3 09/03/2024    CO2 26 09/03/2024     09/03/2024    BUN 13 09/03/2024    CREATININE 0.42 (L) 09/03/2024     Lab Results   Component Value Date    ALT 13 09/03/2024    AST 18 09/03/2024    ALKPHOS 110 09/03/2024    BILITOT 0.8 09/03/2024     Estimated Creatinine Clearance: 125 mL/min (A) (by C-G formula based on SCr of 0.42 mg/dL (L)).     Scheduled medications  aspirin, 81 mg, oral, Daily  atorvastatin, 20 mg, oral, Nightly  clopidogrel, 75 mg, oral, Daily  dexAMETHasone, 2 mg, oral, Daily  enoxaparin, 40 mg, subcutaneous, q24h  gabapentin, 300 mg, oral, TID  pantoprazole, 40 mg, oral, Daily before breakfast  sennosides, 2 tablet, oral, BID      Continuous medications     PRN medications  alteplase, 2 mg, PRN  bisacodyl, 10 mg, Daily PRN  oxyCODONE, 10 mg, q6h PRN  oxyCODONE, 5 mg, q4h PRN  traZODone, 50 mg, Daily PRN      }     PHYSICAL EXAMINATION:    Vital Signs:   Vital signs reviewed  Visit Vitals  /62 (BP Location: Right arm, Patient Position: Lying)   Pulse 81   Temp 36.3 °C (97.3 °F) (Temporal)   Resp 16        0-10 (Numeric) Pain Score: 5 - Moderate pain       Physical Exam  Vitals reviewed.   Constitutional:       General: He is not in acute distress.     Appearance: He is ill-appearing.   HENT:      Head: Normocephalic and atraumatic.      Mouth/Throat:      Mouth: Mucous membranes are moist.   Eyes:      Pupils: Pupils are equal, round, and reactive to light.   Cardiovascular:      Pulses: Normal pulses.   Pulmonary:      Effort: Pulmonary effort is normal. No respiratory distress.      Comments: On room air;  respirations even and unlabored.   Abdominal:      General: There is no distension.      Palpations: Abdomen is soft.   Musculoskeletal:      Right lower leg: No edema.      Left lower leg: No edema.   Skin:     General: Skin is warm and dry.      Capillary Refill: Capillary refill takes less than 2 seconds.      Coloration: Skin is pale.   Neurological:      General: No focal deficit present.      Mental Status: He is alert and oriented to person, place, and time.   Psychiatric:         Mood and Affect: Mood normal.         Behavior: Behavior normal.         Thought Content: Thought content normal.         Judgment: Judgment normal.          ASSESSMENT/PLAN:  Christophe Ambriz is a 74 y.o. male diagnosed with renal cell carcinoma w/spinal mets s/p L1-L5 spinal fusion w/L2-L4 decompression and tumor debulking (7/24), worsening back pain, and aspiration PNA. PMH significant for CAD, HTN, HLD . Admitted 9/3/2024 for further evaluation and management of PNA and back pain. Supportive and Palliative Oncology is consulted for pain management.       Pain:  Lumbar back pain related to metastatic disease; Sacral/coccyx pain related to radiation treatment.  Pain is: cancer related pain  Type: somatic and neuropathic  Pain control: sub-optimally controlled  Home regimen:  Oxycodone IR 5 mg po q 4 hrs prn and Gabapentin 300 mg po q hs  Intolerances/previously tried: NKDA  Personalized pain goal: 2  Total OME usage for the past 24 hours:  36  Continue Oxycodone 5 mg po q 4 hrs prn for moderate pain  Continue Oxycodone 10 mg po q 4 hrs prn for SEVERE pain  Continue Gabapentin 300 mg po tid (was increased by primary team 9/3/24 from 300 mg q hs; ok to increase to tid dosing which is not standard titration due to uncontrolled neuropathic pain s/p radiation and CrCl 9/3/42=628)  Can consider adding Hydromorphone 0.2 mg IV q 3 hrs prn for breakthrough pain if needed  Continue to monitor pain scores and administer PRN medications as  appropriate  Continue/initiate nonpharmacologic pain management strategies including ice/heat therapy, distraction techniques, deep breathing/relaxation techniques, calming music, and repositioning  Continue to monitor for signs of opioid efficacy (pain scores, improved functionality) and toxicity (pinpoint pupils, excess sedation/drowsiness/confusion, respiratory depression, etc.)     Nausea:  At risk for nausea without vomiting related to opioids and pain    Home regimen:  none  Well-controlled and Denies  EKG: Qtc on 9/2/24=463  Consider adding Ondansetron 4 mg PO/IV q 6 hrs prn      Constipation  At risk for constipation related to opioids, currently not constipated  Usual bowel pattern: every other day  Home regimen: Senna 2 tablets po bid and Miralax 17 gm daily   LBM 9/3/24  Monitor BM frequency, adjust regimen as needed  Goal to have BM without straining q48-72h  Continue Senna  2 tabs po bid  Continue Miralax 17 g po daily  Continue Bisacodyl rectal suppository daily prn     Sleeping Difficulty:  Impaired sleep related to pain, hospital environment, and chronic sleep issues  Home regimen:  trazodone  Continue Trazodone 50 mg po q hs     Decreased appetite:  Appetite loss related to disease process and possible immobility, pureed diet (pt reports he does not like)  Nutrition consult  Weight loss 41 lbs since 7/26/24  Home regimen:  none  Repeat swallow evaluation failed; pt to remain on pureed diet  Nutrition consult  May improve with pain management     Medical Decision Making/Goals of Care/Advance Care Planning:  Patient's current clinical condition, including diagnosis, prognosis, and management plan, and goals of care were discussed.   Life limiting disease: metastatic malignancy  Family:  Supportive significant other Iram Armstrong   Performance status: Major  limitations due to pain, disease process, and immobility  Joys/meaning/strength: [place]  Understanding of health: Demonstrates good  understanding of disease process, understands need to remain inpatient to complete remaining radiation treatments; aware that options are limited as far as treatment  Information:Wants full disclosure  Goals: symptom control and cancer directed therapy  Worries and fears now and future: ongoing symptoms and inability to receive cancer treatment   Minimum acceptable outcome/QOL:  to complete RT and ideally gain strength and some weight to be able to care for himself and gain some independence back; to have minimal pain  Code status discussion:  DNR/DNI     Advance Directives  Existence of Advance Directives:No - not interested  Decision maker: Surrogate decision maker is significant other Iram Armstrong ([phone])  Code Status: DNR DNI     Supportive Interventions: Interventions: Music Therapy: declined, Art Therapy: declined, SPO Spiritual Care: declined     Disposition:  Please  start the process of having prior authorization with meds to beds deliver medications to patient prior to discharge via [facility] pharmacy. Prescriptions will need to be sent 48-72 hours prior to discharge so that a prior authorization can be completed.      Discharge date: unknown pending acute issues, pain control, and symptom control  Will request an appointment with Outpatient Supportive Oncology RENETTA Ordonez (was scheduled for new patient visit on 9/9/24; will need to be rescheduled)     Supportive and Palliative Oncology encounter:  Spoke with patient at bedside  Emotional support provided  Coordination of care     Signature and billing:  Thank you for allowing us to participate in the care of this patient. Recommendations will be communicated back to the consulting service by way of shared electronic medical record or face-to-face.    Medical complexity was high level due to due to complexity of problems, extensive data review, and high risk of management/treatment.    I spent 50 minutes in the care of this patient  which included chart review, interviewing patient/family, discussion with primary team, coordination of care, and documentation.    Data:   Diagnostic tests and information reviewed for today's visit:  Conversation with primary team, Most recent labs and imaging results, Most recent EKG, Medications       Some elements copied from my consult note on 9/3/24, the elements have been updated and all reflect current decision making from today, 09/04/24       Plan of Care discussed with: Provider, RN, Patient    Thank you for asking Supportive and Palliative Oncology to assist with care of this patient.  Recommendations will be communicated back to the consulting service by way of shared electronic medical record/secure chat/email or face-to-face.   We will continue to follow  Please contact us for additional questions or concerns.      SIGNATURE: RENETTA Riddle   PAGER/CONTACT:  Contact information:  Supportive and Palliative Oncology  Monday-Friday 8 AM-5 PM  Epic Secure chat or pager [phone].  After hours and weekends:  pager [phone]

## 2024-09-04 NOTE — PROGRESS NOTES
Physical Therapy                 Therapy Communication Note    Patient Name: Christophe Ambriz  MRN: [id number]  Today's Date: 9/4/2024     Discipline: Physical Therapy    Missed Visit Reason: Missed Visit Reason: Patient refused (pt requesting therapy re-attempt after radiation. Team aware, will follow up as schedule allows.)    Missed Time: Attempt    Comment:

## 2024-09-04 NOTE — NURSING NOTE
"The patient presents with notable redness on the sacrum. Despite efforts to address the issue, the patient is consistently refusing all proposed interventions, including the application of Mepilex barrier dressings and barrier cream. I discussed the situation with the MD, who recommended trying zinc oxide cream as an alternative. The patient indicated a willingness to \"try it,\" but continues to refuse other forms of care, including repositioning and turning, which are crucial for preventing further skin damage. The patient's refusal of treatment poses a significant challenge to managing the skin breakdown effectively.   "

## 2024-09-04 NOTE — PROGRESS NOTES
Occupational Therapy    Communication Note    Missed Visit: Yes  Missed Visit Reason: Patient refused (pt stating he can only focus on one thing at a time and needs to complete radiation prior to working with therapy)      09/04/24 at 11:51 AM   Deb Fairchild OT   Rehab Office: [phone]

## 2024-09-04 NOTE — PROGRESS NOTES
Speech-Language Pathology    Inpatient  Speech-Language Pathology Treatment     Patient Name: Christophe Ambriz  MRN: [id number]  Today's Date: 9/4/2024  Time Calculation  Start Time: 1045  Stop Time: 1055  Time Calculation (min): 10 min           Assessment/Plan:  Ongoing assess and diet analysis this AM. Pt not pleased w/ puree textures. Provide upgrade trials of regular textures, that he independently fed self and did not demonstrate inefficiencies at the oral phase or overt s/s of aspiration.   SLP will continue to follow during IP stay.       Recommendations:  Regular Solids & thin liquids  Upright for all PO intake  Small bites/sips  Pills per pt preference  Ensure daily oral care  SLP to continue to follow to ensure diet tolerance/determine readiness for repeat assessment as pt appropriate/schedule permits  If pt presents with any changes to mental, medical, or respiratory status, please make NPO and alert SLP           Plan:  SLP Frequency: 2x per week  Duration: 2 weeks      Subjective   Pt appropriate during exam. Resting in bed. Very upset re: puree foods. On 2 l/nc O2   Arrival: Independent  Prior to Session Communication: Bedside nurse        Objective       Therapeutic Swallow:  Therapeutic Swallow Intervention : PO Trials (Pt tested w/ upgrade trial of solids textures. Pt independently fed self regular solid textures and several ounces of thin liquids. Despite missing dentition, pt showed WFL mastication and oral clearance benefitting from small bites. overt s/sx of aspiration were not appreciated during course of liquid and solid texture challenges.            Encounter Problems       Encounter Problems (Active)       Swallowing       LTG - Patient will tolerate the least restrictive diet consistency to allow for safe consumption of daily meals (Progressing)       Start:  09/03/24    Expected End:  09/17/24            STG - Patient will tolerate recommended food and liquid consistencies without clinical  signs and symptoms of aspirations on 100% of trials (Progressing)       Start:  09/03/24    Expected End:  09/17/24

## 2024-09-04 NOTE — PROGRESS NOTES
"Christophe Ambriz is a 74 y.o. male on day 1 of admission presenting with Aspiration pneumonia, unspecified aspiration pneumonia type, unspecified laterality, unspecified part of lung (Multi).    Subjective   Patient reports persistent back pain, currently 4/10 severity, worse with movement. Notes the burning sensation in his leg has resolved. LBM yesterday, soft and formed. Denies HA, dizziness, CP, SOB, N/V/D/C. All other ROS otherwise negative.     Patient declined to work with PT. Upon further questioning, patient reports he cannot take care of himself at home. Provider encouraged physical therapy and rehabilitation upon discharge. Patient subsequently got angry, stating, \"Get out, I can't be bothered.\"      Multidisciplinary team aware, will revisit subject with patient.        Objective     Physical Exam  Constitutional:       General: He is not in acute distress.     Appearance: He is not ill-appearing.      Comments: cachexia   HENT:      Head: Normocephalic and atraumatic.      Nose: Nose normal.      Mouth/Throat:      Mouth: Mucous membranes are moist.   Eyes:      General: No scleral icterus.     Extraocular Movements: Extraocular movements intact.      Pupils: Pupils are equal, round, and reactive to light.   Cardiovascular:      Rate and Rhythm: Normal rate and regular rhythm.      Pulses: Normal pulses.      Heart sounds: Normal heart sounds.   Pulmonary:      Effort: Pulmonary effort is normal.      Breath sounds: Normal breath sounds.   Abdominal:      General: Abdomen is flat. Bowel sounds are normal. There is no distension.      Palpations: Abdomen is soft.      Tenderness: There is no abdominal tenderness. There is no guarding.   Musculoskeletal:         General: No swelling, deformity or signs of injury. Normal range of motion.      Cervical back: Normal range of motion and neck supple.   Skin:     General: Skin is warm and dry.      Findings: Bruising present. No erythema or rash.   Neurological: " "     General: No focal deficit present.      Mental Status: He is alert and oriented to person, place, and time.      Cranial Nerves: No cranial nerve deficit.      Sensory: No sensory deficit.      Motor: Weakness (generalized; 2/5 strength bilateral lower extremity) present.   Psychiatric:         Mood and Affect: Mood normal.         Behavior: Behavior normal.      Comments: Fluent speech, intermittently cooperative         Last Recorded Vitals  Blood pressure 108/62, pulse 81, temperature 36.3 °C (97.3 °F), temperature source Temporal, resp. rate 16, height 1.85 m (6' 0.84\"), weight 61.8 kg (136 lb 3.9 oz), SpO2 93%.  Intake/Output last 3 Shifts:  I/O last 3 completed shifts:  In: - (0 mL/kg)   Out: 400 (6.5 mL/kg) [Urine:400 (0.2 mL/kg/hr)]  Weight: 61.8 kg     Relevant Results  Results for orders placed or performed during the hospital encounter of 09/03/24 (from the past 24 hour(s))   Rad Onc Msq Treatment Summary   Result Value Ref Range    Treatment Site L3 Spine     Course Number 1     Prescribed Fractional Dose 300 cGray    Prescribed Total Dose 3,000 cGray    Actual Fractions Delivered 2     Actual Session Delivered Dose 300 cGray    Actual Total Dose 600 cGray    Prescribed Technique AP/PA     Elapsed Days 7     Start Date 8/27/2024     Last Date 9/3/2024     Prescribed Number of Fractions 10     Treatment Site Chestwall_R     Course Number 1     Prescribed Fractional Dose 300 cGray    Prescribed Total Dose 3,000 cGray    Actual Fractions Delivered 2     Actual Session Delivered Dose 300 cGray    Actual Total Dose 600 cGray    Prescribed Technique 3D     Elapsed Days 7     Start Date 8/27/2024     Last Date 9/3/2024     Prescribed Number of Fractions 10        ECG 12 lead    Result Date: 9/3/2024  Normal sinus rhythm Normal ECG When compared with ECG of 26-JUL-2024 14:10, No significant change was found    CT angio chest for pulmonary embolism    Result Date: 9/2/2024  Interpreted By:  Nathaniel Rich, " STUDY: CT ANGIO CHEST FOR PULMONARY EMBOLISM;  9/2/2024 3:27 pm   INDICATION: Signs/Symptoms:hypoxic, cancer.   COMPARISON: CTA chest for PE 08/19/2024   ACCESSION NUMBER(S): [id number]   ORDERING CLINICIAN: SHERMAN CRYSTAL   TECHNIQUE: Contiguous axial images of the chest were obtained after the intravenous administration of 75 mL Omnipaque 350 contrast using angiographic PE protocol. Coronal and sagittal reformatted images were reconstructed from the axial data. MIP images were created on an independent workstation and reviewed.   FINDINGS: PULMONARY ARTERIES: Adequate opacification to the level of the segmental arteries. Assessment of the subsegmental arteries limited by respiratory motion and mixing artifact. No filling defect to suggest pulmonary embolus in the visualized pulmonary arteries. The main pulmonary artery is normal in diameter. No CT evidence of right heart strain.   HEART: Normal in size. Triple-vessel coronary vascular calcifications. No significant pericardial effusion.   VESSELS: Normal caliber aorta without dissection. No significant aortic atherosclerosis.   MEDIASTINUM AND LYMPH NODES: Asymmetric enlargement of the right lobe of the thyroid. This may be further assessed with ultrasound on a nonemergent basis. No enlarged intrathoracic or axillary lymph nodes by imaging criteria. No pneumomediastinum. The esophagus appears within normal limits.   LUNG, AIRWAYS, AND PLEURA: Secretions and debris are noted within the distal trachea and proximal right mainstem bronchus. Filling defects within multiple left lower lobe bronchi as well as a consolidation within the medial left lower lobe. Overall interval improvement in numerous patchy bilateral pulmonary opacities likely representing a resolving infectious or inflammatory process. There are persistent tree-in-bud opacities noted throughout the bilateral lungs, most pronounced in the left lower lobe. No pleural effusion or pneumothorax.   OSSEOUS  STRUCTURES: The expansile soft tissue lesion is again noted within the right 9th rib measuring a proximally 6.0 x 4.4 cm, slightly increased from prior chest CT.   CHEST WALL SOFT TISSUES: No discernible abnormality.   UPPER ABDOMEN/OTHER: Partially visualized left renal mass. Please see dedicated CT of the abdomen and pelvis.       1. No evidence of pulmonary embolism to the level of the segmental arteries. Small subsegmental filling defects can not be entirely excluded. 2. Interval improvement in bilateral pulmonary opacities likely reflecting resolving infectious or inflammatory process. 3. Persistent but improved tree-in-bud opacities bilaterally, most pronounced in the left lower lobe. 4. Small consolidation in the medial left lung base with filling defects of the associated bronchi and debris within the distal trachea. This likely represents volume loss and probable aspiration. Superimposed infectious process or underlying lesion is not excluded. Follow-up to resolution is recommended. 5. Slight interval progression in size of destructive soft tissue lesion centered within the lateral right 9th rib. 6. Partially visualized left renal mass, concerning for renal cell carcinoma. 7. Asymmetric enlargement of the right lobe of the thyroid, this can be further assessed with dedicated ultrasound on a nonemergent basis.   MACRO: None   Signed by: Nathaniel Rich 9/2/2024 4:36 PM Dictation workstation:   [id number]    CT abdomen pelvis w IV contrast    Result Date: 9/2/2024  Interpreted By:  Nathaniel Rich, STUDY: CT ABDOMEN PELVIS W IV CONTRAST;  9/2/2024 3:27 pm   INDICATION: Signs/Symptoms:worsening pain, cancer.   COMPARISON: CT chest abdomen and pelvis 07/05/2024.   ACCESSION NUMBER(S): [id number]   ORDERING CLINICIAN: SHERMAN CRYSTAL   TECHNIQUE: Contiguous axial images of the abdomen and pelvis were obtained after the intravenous administration of 75 mL Omnipaque 350 contrast. Coronal and sagittal reformatted images  were reconstructed from the axial data.   FINDINGS: LOWER CHEST: Consolidation in the medial aspect of the left lower lobe. Please see dedicated CTA of the chest.   LIVER: Hepatic steatosis. Expansile destructive lesion arising from the lateral right 9th rib abuts and deforms the lateral margin of the right hepatic lobe.   BILE DUCTS: No significant intrahepatic or extrahepatic dilatation.   GALLBLADDER: No significant abnormality.   PANCREAS: No significant abnormality.   SPLEEN: No significant abnormality.   ADRENALS: No significant abnormality.   KIDNEYS, URETERS, BLADDER: Heterogeneous mass is again noted arising from the left upper pole. The mass measures 8.0 x 5.3 by 8.1 cm similar to previous examination. The appearance is most concerning for a renal cell carcinoma. 1 cm exophytic lesion arising from the posterior aspect of the right kidney which is not definitively cystic and could represent an additional malignant focus. MRI is recommended for further assessment. There is no hydronephrosis or hydroureter. No appreciable renal or ureteral calculus. The bladder is unremarkable.   REPRODUCTIVE ORGANS: The prostate is prominent in size measuring up to 6 cm, correlate with PSA.   GI: No obstruction. No appreciable bowel inflammation. Normal appendix.   VESSELS: No significant abnormality. The portal veins and IVC are patent.   PERITONEUM/RETROPERITONEUM: No ascites, free air, or fluid collection.   LYMPH NODES: No enlarged lymph nodes.   ABDOMINAL WALL: Locules of air noted within the subcutaneous soft tissues of the midline lower back.   OSSEOUS STRUCTURES: Interval progression of an expansile destructive soft tissue lesion centered within the lateral right 9th rib. The lesion now measures 5.9 x 4.2 cm. Posterior fusion of the L1-L5 with bilateral transpedicular screws at L1, L2, L4, and L5 with bilateral interconnecting rods. Expansile destructive lesion is again noted centered within the L3 vertebral body.  The soft tissue component of the mass appears to abut if not invade the psoas musculature, particularly on the left. There is posterior extension of the soft tissue component of the mass into the epidural space if not the spinal canal. There is severe canal stenosis at L3. This appears similar to recent MRI from 08/14/2024 given within the limitations of different techniques. There is likely a component of pathologic fracture of the L3 vertebral body with vertebral body height loss.       1. Complex lesion arising from the superior aspect of the left kidney is again noted most concerning for a renal cell carcinoma. 2. 1 cm partially exophytic lesion arising from the posterior aspect of the right kidney which is not definitively cystic. This could represent an additional focus of malignancy. Further assessment with MRI is recommended. 3. Interval progression of destructive lesion centered within the lateral right 9th rib with the soft tissue component. The lesion abuts and indents the lateral margin of the right hepatic lobe. This is most concerning for metastasis. 4. Posterior fusion of L1-L5 and partial laminectomy of L3. Infiltrative destructive lesion again noted within the L3 vertebral body which appears to abut if not extend into the psoas musculature, left-greater-than-right. There is also posterior extension of the soft tissue component of the mass with severe spinal canal stenosis at L3. The appearance is similar to recent MRI from 08/14/2024. The degree of involvement of the structures surrounding the spine would be better characterized with MRI. 5. Subcutaneous air locules within the midline soft tissues of the lower back at laminectomy/fusion site. These are presumably postsurgical in nature, correlation for evidence of infection is recommended. 6. Consolidation in the medial left lung base, please see separately dictated CTA of the chest. 7. Please note PET-CT may be considered in further staging for  presumed renal cell carcinoma, particularly in the assessment of additional osseous lesions.   MACRO: None   Signed by: Nathaniel Rich 9/2/2024 4:23 PM Dictation workstation:   [id number]         Assessment/Plan   Assessment & Plan  Aspiration pneumonia, unspecified aspiration pneumonia type, unspecified laterality, unspecified part of lung (Multi)      Christophe Ambriz is a 74 y.o. male with a hx of CAD, HTN, HLD and RCC with spinal mets s/p L1-5 fusion w/ L2-4 decompression and tumor debulking (7/24) presented initially to OSH with worsening back pain. He was noted to be hypoxic (87% on RA) and placed on 2L O2. CT PE was negative for PE, showed interval improvement in bilateral pulmonary opacities, persistent but improved tree-in-bud opacities, and reveals probable aspiration. Per patient, he is here for radiation sessions that he cannot attend outpatient. He is now transferred to [facility] for further management.            #Metastatic renal cell carcinoma  #Back and leg pain  -L1-5 fusion w/ L2-4 decompression and tumor debulking (7/24)  at [facility] with NSGY  -MRI 8/14: L3 compression fracture with retropulsion of the posterior cortex and severe stenosis of the central spinal canal with nerve root compression unchanged from the previous exam *No new foci of marrow replacement or compression fracture *There is no measurable change compared to the previous exam.  -Recently DC 8/27/24 with plans to complete radiation outpatient  -Has not attended radiation sessions due to immobility-Last EBRT 8/27/24  -Presented to OSH for continued back pain, no worse from prior admit; here for inpatient radiation therapy  -Follows with Dr. Chavarria; FUV requested (9/3)   -Rad onc consulted 9/3, plan daily radiation for remaining fractions       -Received 8/27, 9/3, 9/4      -Can transition outpatient once patient agreeable    #Pain control  -Increased Gabapentin 300mg TID  -Continue home oxycodone 5mg Q4 prn moderate pain, 10mg q4h  severe pain  -Supportive oncology consulted, appreciate recs     #Hx Pneumonia  #Aspiration  -Recently discharged 8/27/24.  -Last inpatient stay pertinent for MICU transfer for AHRF 2/2 multifocal pneumonia. Blood cultures positive for GPCC, and MRSA nares positive  -Completed inpatient course of Vanco/Zosyn  -CT PE at OSH-possible aspiration with improving tree-in-bud opacities and improvement in bilateral pulmonary opacities  -S/p Unasyn 3gm IV in ED/admit; will discontinue (low suspicion active infection given no systemic s/s, improving CT scan)    -Procal pending   -SLP consulted 9/3; c/w pureed textures and thin liquids; okay for regular diet (9/4)       #CAD s/p PCI (unknown year)  #HTN  #HLD  - continue home Atorvastatin, ASA, Plavix  - No current BP medications     #Leukocytosis, improving   -Admit WBC 25.5, down trending from previous discharge (30.2, 30.6)  -CT Angio reveals probable aspiration pneumonia  -Currently on Decadron taper   -No evidence of infection   -Trend CBC     #Dispo  -PT/OT ordered, will likely need SNF dispo   -Patient declined PT 9/3; discussed importance with pt      F: PRN  E: PRN  N: Puree 4 as per last MBS recommendations; nutrition consulted for cachexia   GI: None indicated  DVT prophylaxis: SubQ lovenox  Code status: DNR/DNI (CONFIRMED ON ADMISSION)              Hilda Pacheco PA-C

## 2024-09-04 NOTE — CARE PLAN
The patient's goals for the shift include      The clinical goals for the shift include pt will remain HDS    Problem: Pain  Goal: Takes deep breaths with improved pain control throughout the shift  Outcome: Progressing  Goal: Turns in bed with improved pain control throughout the shift  Outcome: Progressing  Goal: Walks with improved pain control throughout the shift  Outcome: Progressing  Goal: Performs ADL's with improved pain control throughout shift  Outcome: Progressing  Goal: Participates in PT with improved pain control throughout the shift  Outcome: Progressing  Goal: Free from opioid side effects throughout the shift  Outcome: Progressing  Goal: Free from acute confusion related to pain meds throughout the shift  Outcome: Progressing     Problem: Skin  Goal: Decreased wound size/increased tissue granulation at next dressing change  Outcome: Progressing  Flowsheets (Taken 9/4/2024 0742)  Decreased wound size/increased tissue granulation at next dressing change: Promote sleep for wound healing  Goal: Participates in plan/prevention/treatment measures  Outcome: Progressing  Flowsheets (Taken 9/4/2024 0742)  Participates in plan/prevention/treatment measures: Increase activity/out of bed for meals  Goal: Prevent/manage excess moisture  Outcome: Progressing  Flowsheets (Taken 9/4/2024 0742)  Prevent/manage excess moisture: Moisturize dry skin  Goal: Prevent/minimize sheer/friction injuries  Outcome: Progressing  Flowsheets (Taken 9/4/2024 0742)  Prevent/minimize sheer/friction injuries: Increase activity/out of bed for meals  Goal: Promote/optimize nutrition  Outcome: Progressing  Flowsheets (Taken 9/4/2024 0742)  Promote/optimize nutrition: Offer water/supplements/favorite foods  Goal: Promote skin healing  Outcome: Progressing  Flowsheets (Taken 9/4/2024 0742)  Promote skin healing: Assess skin/pad under line(s)/device(s)     Problem: Pain - Adult  Goal: Verbalizes/displays adequate comfort level or baseline  comfort level  Outcome: Progressing     Problem: Safety - Adult  Goal: Free from fall injury  Outcome: Progressing     Problem: Discharge Planning  Goal: Discharge to home or other facility with appropriate resources  Outcome: Progressing     Problem: Chronic Conditions and Co-morbidities  Goal: Patient's chronic conditions and co-morbidity symptoms are monitored and maintained or improved  Outcome: Progressing     Problem: Nutrition  Goal: Less than 5 days NPO/clear liquids  Outcome: Progressing  Goal: Oral intake greater than 50%  Outcome: Progressing  Goal: Oral intake greater 75%  Outcome: Progressing  Goal: Consume prescribed supplement  Outcome: Progressing  Goal: Adequate PO fluid intake  Outcome: Progressing  Goal: Nutrition support goals are met within 48 hrs  Outcome: Progressing  Goal: Nutrition support is meeting 75% of nutrient needs  Outcome: Progressing  Goal: Tube feed tolerance  Outcome: Progressing  Goal: BG  mg/dL  Outcome: Progressing  Goal: Lab values WNL  Outcome: Progressing  Goal: Electrolytes WNL  Outcome: Progressing  Goal: Promote healing  Outcome: Progressing  Goal: Maintain stable weight  Outcome: Progressing  Goal: Reduce weight from edema/fluid  Outcome: Progressing  Goal: Gradual weight gain  Outcome: Progressing  Goal: Improve ostomy output  Outcome: Progressing     Problem: Fall/Injury  Goal: Not fall by end of shift  Outcome: Progressing  Goal: Be free from injury by end of the shift  Outcome: Progressing  Goal: Verbalize understanding of personal risk factors for fall in the hospital  Outcome: Progressing  Goal: Verbalize understanding of risk factor reduction measures to prevent injury from fall in the home  Outcome: Progressing  Goal: Use assistive devices by end of the shift  Outcome: Progressing  Goal: Pace activities to prevent fatigue by end of the shift  Outcome: Progressing     Problem: Swallowing  Goal: LTG - Patient will tolerate the least restrictive diet  consistency to allow for safe consumption of daily meals  Outcome: Progressing  Goal: STG - Patient will tolerate recommended food and liquid consistencies without clinical signs and symptoms of aspirations on 100% of trials  Outcome: Progressing

## 2024-09-04 NOTE — PROGRESS NOTES
09/04/24 1000   Discharge Planning   Living Arrangements Spouse/significant other   Support Systems Spouse/significant other   Type of Residence Private residence   Home or Post Acute Services Post acute facilities (Rehab/SNF/etc)   Type of Post Acute Facility Services Skilled nursing   Expected Discharge Disposition SNF   Does the patient need discharge transport arranged? Yes   RoundTrip coordination needed? Yes   Has discharge transport been arranged? No     9/4/24 @ 1030  [facility] Note    Plan per Medical/Surgical Team: hx of RCC with spinal mets s/p L1-5 fusion w/ L2-4 decompression and tumor debulking (7/24) presented initially to OSH with worsening back pain. Per patient, he is here for radiation sessions that he cannot attend outpatient.   Status: Inpatient   Payor Source: [employer]  Discharge disposition: PT/OT pending  Expected date of discharge: 9/7  Barriers: transportation to radiation  Primary Oncologist: Dr. Chavarria  Preferred home care agency: [facility]    Patient was active with [facility] prior to admission. PT/OT still pending. Patient to get radiation until 9/16. Per rad/onc, he is able to get his radiation at  [place].  If PT rec SNF, will work with patient to find a SNF that can transport him to radiation and appointments. Will continue to follow patient for any discharge needs.   Evelyn Grant RN TCC

## 2024-09-05 ENCOUNTER — HOSPITAL ENCOUNTER (OUTPATIENT)
Dept: RADIATION ONCOLOGY | Facility: HOSPITAL | Age: 74
Setting detail: RADIATION/ONCOLOGY SERIES
Discharge: HOME | End: 2024-09-05
Payer: MEDICARE

## 2024-09-05 ENCOUNTER — APPOINTMENT (OUTPATIENT)
Dept: RADIATION ONCOLOGY | Facility: HOSPITAL | Age: 74
End: 2024-09-05
Payer: MEDICARE

## 2024-09-05 ENCOUNTER — APPOINTMENT (OUTPATIENT)
Dept: RADIATION ONCOLOGY | Facility: CLINIC | Age: 74
End: 2024-09-05
Payer: MEDICARE

## 2024-09-05 DIAGNOSIS — C64: ICD-10-CM

## 2024-09-05 DIAGNOSIS — C79.51 SECONDARY MALIGNANT NEOPLASM OF BONE (MULTI): ICD-10-CM

## 2024-09-05 DIAGNOSIS — Z51.0 ENCOUNTER FOR ANTINEOPLASTIC RADIATION THERAPY: ICD-10-CM

## 2024-09-05 PROBLEM — F17.200 CURRENT SMOKER: Status: ACTIVE | Noted: 2024-09-05

## 2024-09-05 PROBLEM — Z95.5 H/O HEART ARTERY STENT: Status: ACTIVE | Noted: 2024-09-05

## 2024-09-05 PROBLEM — G47.00 INSOMNIA: Status: ACTIVE | Noted: 2024-09-05

## 2024-09-05 PROBLEM — R06.00 DYSPNEA: Status: ACTIVE | Noted: 2024-09-05

## 2024-09-05 PROBLEM — N48.1 BALANITIS: Status: ACTIVE | Noted: 2024-09-05

## 2024-09-05 PROBLEM — B37.42 CANDIDAL BALANITIS: Status: ACTIVE | Noted: 2024-09-05

## 2024-09-05 PROBLEM — L73.9 FOLLICULITIS: Status: ACTIVE | Noted: 2024-09-05

## 2024-09-05 PROBLEM — R53.1 ASTHENIA: Status: ACTIVE | Noted: 2024-09-05

## 2024-09-05 PROBLEM — R21 RASH: Status: ACTIVE | Noted: 2024-09-05

## 2024-09-05 PROBLEM — F41.8 ANXIETY WITH DEPRESSION: Status: ACTIVE | Noted: 2024-09-05

## 2024-09-05 PROBLEM — L30.9 DERMATITIS: Status: ACTIVE | Noted: 2024-09-05

## 2024-09-05 PROBLEM — R23.3 PETECHIAE: Status: ACTIVE | Noted: 2024-09-05

## 2024-09-05 PROBLEM — K08.89 TOOTHACHE: Status: ACTIVE | Noted: 2024-09-05

## 2024-09-05 PROBLEM — I20.9 ANGINA PECTORIS (CMS-HCC): Status: ACTIVE | Noted: 2024-09-05

## 2024-09-05 PROBLEM — E78.5 DYSLIPIDEMIA: Status: ACTIVE | Noted: 2024-09-05

## 2024-09-05 LAB
ALBUMIN SERPL BCP-MCNC: 2.7 G/DL (ref 3.4–5)
ALP SERPL-CCNC: 106 U/L (ref 33–136)
ALT SERPL W P-5'-P-CCNC: 13 U/L (ref 10–52)
ANION GAP SERPL CALC-SCNC: 12 MMOL/L (ref 10–20)
AST SERPL W P-5'-P-CCNC: 15 U/L (ref 9–39)
BASOPHILS # BLD AUTO: 0.02 X10*3/UL (ref 0–0.1)
BASOPHILS NFR BLD AUTO: 0.1 %
BILIRUB SERPL-MCNC: 0.8 MG/DL (ref 0–1.2)
BUN SERPL-MCNC: 10 MG/DL (ref 6–23)
CALCIUM SERPL-MCNC: 8.3 MG/DL (ref 8.6–10.6)
CHLORIDE SERPL-SCNC: 98 MMOL/L (ref 98–107)
CO2 SERPL-SCNC: 28 MMOL/L (ref 21–32)
CREAT SERPL-MCNC: 0.5 MG/DL (ref 0.5–1.3)
EGFRCR SERPLBLD CKD-EPI 2021: >90 ML/MIN/1.73M*2
EOSINOPHIL # BLD AUTO: 0.07 X10*3/UL (ref 0–0.4)
EOSINOPHIL NFR BLD AUTO: 0.4 %
ERYTHROCYTE [DISTWIDTH] IN BLOOD BY AUTOMATED COUNT: 16.1 % (ref 11.5–14.5)
GLUCOSE SERPL-MCNC: 163 MG/DL (ref 74–99)
HCT VFR BLD AUTO: 28 % (ref 41–52)
HGB BLD-MCNC: 8.3 G/DL (ref 13.5–17.5)
IMM GRANULOCYTES # BLD AUTO: 0.11 X10*3/UL (ref 0–0.5)
IMM GRANULOCYTES NFR BLD AUTO: 0.7 % (ref 0–0.9)
LYMPHOCYTES # BLD AUTO: 4.32 X10*3/UL (ref 0.8–3)
LYMPHOCYTES NFR BLD AUTO: 26.7 %
MCH RBC QN AUTO: 24.9 PG (ref 26–34)
MCHC RBC AUTO-ENTMCNC: 29.6 G/DL (ref 32–36)
MCV RBC AUTO: 84 FL (ref 80–100)
MONOCYTES # BLD AUTO: 0.86 X10*3/UL (ref 0.05–0.8)
MONOCYTES NFR BLD AUTO: 5.3 %
NEUTROPHILS # BLD AUTO: 10.77 X10*3/UL (ref 1.6–5.5)
NEUTROPHILS NFR BLD AUTO: 66.8 %
NRBC BLD-RTO: 0 /100 WBCS (ref 0–0)
PLATELET # BLD AUTO: 260 X10*3/UL (ref 150–450)
POTASSIUM SERPL-SCNC: 3.6 MMOL/L (ref 3.5–5.3)
PROT SERPL-MCNC: 6.2 G/DL (ref 6.4–8.2)
RAD ONC MSQ ACTUAL FRACTIONS DELIVERED: 4
RAD ONC MSQ ACTUAL FRACTIONS DELIVERED: 4
RAD ONC MSQ ACTUAL SESSION DELIVERED DOSE: 300 CGRAY
RAD ONC MSQ ACTUAL SESSION DELIVERED DOSE: 300 CGRAY
RAD ONC MSQ ACTUAL TOTAL DOSE: 1200 CGRAY
RAD ONC MSQ ACTUAL TOTAL DOSE: 1200 CGRAY
RAD ONC MSQ ELAPSED DAYS: 9
RAD ONC MSQ ELAPSED DAYS: 9
RAD ONC MSQ LAST DATE: NORMAL
RAD ONC MSQ LAST DATE: NORMAL
RAD ONC MSQ PRESCRIBED FRACTIONAL DOSE: 300 CGRAY
RAD ONC MSQ PRESCRIBED FRACTIONAL DOSE: 300 CGRAY
RAD ONC MSQ PRESCRIBED NUMBER OF FRACTIONS: 10
RAD ONC MSQ PRESCRIBED NUMBER OF FRACTIONS: 10
RAD ONC MSQ PRESCRIBED TECHNIQUE: NORMAL
RAD ONC MSQ PRESCRIBED TECHNIQUE: NORMAL
RAD ONC MSQ PRESCRIBED TOTAL DOSE: 3000 CGRAY
RAD ONC MSQ PRESCRIBED TOTAL DOSE: 3000 CGRAY
RAD ONC MSQ START DATE: NORMAL
RAD ONC MSQ START DATE: NORMAL
RAD ONC MSQ TREATMENT COURSE NUMBER: 1
RAD ONC MSQ TREATMENT COURSE NUMBER: 1
RAD ONC MSQ TREATMENT SITE: NORMAL
RAD ONC MSQ TREATMENT SITE: NORMAL
RBC # BLD AUTO: 3.34 X10*6/UL (ref 4.5–5.9)
SODIUM SERPL-SCNC: 134 MMOL/L (ref 136–145)
WBC # BLD AUTO: 16.2 X10*3/UL (ref 4.4–11.3)

## 2024-09-05 PROCEDURE — 1170000001 HC PRIVATE ONCOLOGY ROOM DAILY

## 2024-09-05 PROCEDURE — 77387 GUIDANCE FOR RADJ TX DLVR: CPT | Performed by: STUDENT IN AN ORGANIZED HEALTH CARE EDUCATION/TRAINING PROGRAM

## 2024-09-05 PROCEDURE — 2500000004 HC RX 250 GENERAL PHARMACY W/ HCPCS (ALT 636 FOR OP/ED)

## 2024-09-05 PROCEDURE — 80053 COMPREHEN METABOLIC PANEL: CPT

## 2024-09-05 PROCEDURE — 2500000001 HC RX 250 WO HCPCS SELF ADMINISTERED DRUGS (ALT 637 FOR MEDICARE OP)

## 2024-09-05 PROCEDURE — 36415 COLL VENOUS BLD VENIPUNCTURE: CPT

## 2024-09-05 PROCEDURE — 2500000001 HC RX 250 WO HCPCS SELF ADMINISTERED DRUGS (ALT 637 FOR MEDICARE OP): Performed by: HOME HEALTH AIDE

## 2024-09-05 PROCEDURE — 85025 COMPLETE CBC W/AUTO DIFF WBC: CPT

## 2024-09-05 PROCEDURE — 99233 SBSQ HOSP IP/OBS HIGH 50: CPT

## 2024-09-05 PROCEDURE — 77412 RADIATION TX DELIVERY LVL 3: CPT | Performed by: STUDENT IN AN ORGANIZED HEALTH CARE EDUCATION/TRAINING PROGRAM

## 2024-09-05 RX ORDER — AMLODIPINE BESYLATE 10 MG/1
10 TABLET ORAL DAILY
Status: ON HOLD | COMMUNITY
Start: 2024-09-03

## 2024-09-05 ASSESSMENT — COGNITIVE AND FUNCTIONAL STATUS - GENERAL
WALKING IN HOSPITAL ROOM: TOTAL
PERSONAL GROOMING: A LOT
MOVING TO AND FROM BED TO CHAIR: A LOT
MOVING FROM LYING ON BACK TO SITTING ON SIDE OF FLAT BED WITH BEDRAILS: A LITTLE
DRESSING REGULAR LOWER BODY CLOTHING: A LOT
CLIMB 3 TO 5 STEPS WITH RAILING: TOTAL
TOILETING: A LOT
EATING MEALS: A LOT
DRESSING REGULAR UPPER BODY CLOTHING: A LOT
MOVING TO AND FROM BED TO CHAIR: A LOT
STANDING UP FROM CHAIR USING ARMS: TOTAL
MOBILITY SCORE: 11
TOILETING: A LOT
TURNING FROM BACK TO SIDE WHILE IN FLAT BAD: A LITTLE
MOVING TO AND FROM BED TO CHAIR: A LOT
WALKING IN HOSPITAL ROOM: TOTAL
PERSONAL GROOMING: A LOT
TOILETING: A LOT
EATING MEALS: A LOT
MOBILITY SCORE: 11
CLIMB 3 TO 5 STEPS WITH RAILING: TOTAL
DRESSING REGULAR LOWER BODY CLOTHING: A LOT
DAILY ACTIVITIY SCORE: 12
HELP NEEDED FOR BATHING: A LOT
HELP NEEDED FOR BATHING: A LOT
MOBILITY SCORE: 12
STANDING UP FROM CHAIR USING ARMS: TOTAL
STANDING UP FROM CHAIR USING ARMS: A LOT
DAILY ACTIVITIY SCORE: 12
EATING MEALS: A LOT
DAILY ACTIVITIY SCORE: 12
HELP NEEDED FOR BATHING: A LOT
TURNING FROM BACK TO SIDE WHILE IN FLAT BAD: A LITTLE
MOVING FROM LYING ON BACK TO SITTING ON SIDE OF FLAT BED WITH BEDRAILS: A LITTLE
PERSONAL GROOMING: A LOT
DRESSING REGULAR UPPER BODY CLOTHING: A LOT
MOVING FROM LYING ON BACK TO SITTING ON SIDE OF FLAT BED WITH BEDRAILS: A LITTLE
CLIMB 3 TO 5 STEPS WITH RAILING: TOTAL
WALKING IN HOSPITAL ROOM: TOTAL
TURNING FROM BACK TO SIDE WHILE IN FLAT BAD: A LITTLE
DRESSING REGULAR LOWER BODY CLOTHING: A LOT
DRESSING REGULAR UPPER BODY CLOTHING: A LOT

## 2024-09-05 ASSESSMENT — PAIN SCALES - GENERAL
PAINLEVEL_OUTOF10: 0 - NO PAIN
PAINLEVEL_OUTOF10: 4
PAINLEVEL_OUTOF10: 6
PAINLEVEL_OUTOF10: 7

## 2024-09-05 ASSESSMENT — PAIN - FUNCTIONAL ASSESSMENT: PAIN_FUNCTIONAL_ASSESSMENT: 0-10

## 2024-09-05 NOTE — PROGRESS NOTES
Physical Therapy Attempt                 Therapy Communication Note    Patient Name: Christophe Ambriz  MRN: [id number]  Today's Date: 9/5/2024     Discipline: Physical Therapy    Missed Visit Reason: Missed Visit Reason: Patient refused (Pt. and wife adamatly refusing working with PT/OT until radiation therapy complete. Unwilling to participate despite max encouragement and explanation of negative effects of prolonged bedrest including weakness, skin breakdown, DVT, and respiratory complications. Wife states she is POA and pt. Will remain IP until radiation sessions complete. PT orders Dc'd secondary to multiple refusals. Please re-order as indicated)    Missed Time: Attempt

## 2024-09-05 NOTE — CONSULTS
"Nutrition Initial Assessment:   Nutrition Assessment     Reason for assessment: Nursing admission screening   Malnutrition Screening Tool (MST)  Have you recently lost weight without trying?: Yes  If yes, how much weight have you lost?: Lost 24 - 33 pounds  Weight Loss Score: 3  Have you been eating poorly because of a decreased appetite?: Yes  Malnutrition Score: 4     Patient is a 74 y.o. male hospital day #2 admitted for inpatient radiation, also found to have pneumonia. Seen by SLP on 9/4 and advanced diet from pureed to regular solids and thin liquids.     PMHx: CAD, HTN, HLD and RCC with spinal mets s/p L1-5 fusion w/ L2-4 decompression and tumor debulking.       Nutrition History:  Food and Nutrient History: Information provided by pt and wife. Pt states that at home, he eats good. Pt does not follow a pureed diet at home. He states that he eats whenever he is hungry. Pt eats things like cheeseburgers, pizza, and fast food such as [employer], [employer], and [employer]. Pt states that he usually eats a lot at night, after 9PM. Pt states that he does not like pureed food and will not eat it. During hospital stay, pt has had things like salami sandwhiches and other things from home since being transitioned to a regular diet. Pt had ordered breakfast from [employer] this morning and had not eaten any of it. Pt does not drink protein supplements and is not agreeable to trying them. Pt was not agreeable to trying uncrustables. Pt was willing to try Magic Cup BID (vanilla). Pt repeatedly stated that his eating was okay and that he will eat what he wants. Pt not interested in nutrition education or interventions.  Vitamin/Herbal Supplement Use: None  Food Allergies/Intolerances:  None  GI Symptoms: None  Oral Problems: None       Anthropometrics:  Height: 185 cm (6' 0.84\")   Weight: 61.8 kg (136 lb 3.9 oz)   BMI (Calculated): 18.06  IBW/kg (Dietitian Calculated): 83.2 kg  Percent of IBW: 75 %       Weight History:   Wt " Readings from Last 60 Encounters:   09/03/24 61.8 kg (136 lb 3.9 oz)   09/02/24 69.7 kg (153 lb 10.6 oz)   08/21/24 74.3 kg (163 lb 12.8 oz)   08/14/24 73.7 kg (162 lb 7.7 oz)   07/26/24 80.7 kg (177 lb 14.4 oz)   07/06/24 91 kg (200 lb 9.9 oz)   07/04/24 95.3 kg (210 lb)   05/11/24 95.3 kg (210 lb)      Weight Change %:  Weight History / % Weight Change: Per pt and wife, pt has undergone a signficant wt loss. Pt's UBW is around 172# and now wife believes pt's wt to be around 136#. EMR wt hx shows significant wt loss of > 5% in 1 mo and > 7.5% in 3 mo  Significant Weight Loss: Yes    Nutrition Focused Physical Exam Findings:    Subcutaneous Fat Loss:   Orbital Fat Pads: Severe (dark circles, hollowing and loose skin)  Buccal Fat Pads: Severe (hollow, sunken and narrow face)  Triceps: Severe (negligible fat tissue)  Muscle Wasting:  Temporalis: Severe (hollowed scooping depression)  Pectoralis (Clavicular Region): Severe (protruding prominent clavicle)  Deltoid/Trapezius: Severe (squared shoulders, acromion process prominent)  Interosseous: Severe (depressed area between thumb and forefinger)  Quadriceps: Severe (depressions on inner and outer thigh)  Gastrocnemius: Severe (minimal muscle definition)  Edema:  Edema: none  Physical Findings:  Hair: Negative  Eyes: Negative  Mouth: Negative  Nails: Negative  Skin: Positive (General bruising)    Nutrition Significant Labs:  CBC Trend:   Results from last 7 days   Lab Units 09/05/24  0905 09/04/24  0846 09/03/24  0821 09/02/24  1337   WBC AUTO x10*3/uL 16.2* 18.7* 20.7* 25.5*   RBC AUTO x10*6/uL 3.34* 3.28* 3.51* 3.90*   HEMOGLOBIN g/dL 8.3* 8.5* 8.9* 9.8*   HEMATOCRIT % 28.0* 28.0* 30.6* 32.3*   MCV fL 84 85 87 83   PLATELETS AUTO x10*3/uL 260 288 299 305    BMP Trend:   Results from last 7 days   Lab Units 09/05/24  0905 09/04/24  0846 09/03/24  0821 09/02/24  1337   GLUCOSE mg/dL 163* 120* 134* 218*   CALCIUM mg/dL 8.3* 8.5* 8.5* 9.3   SODIUM mmol/L 134* 134* 136 131*    POTASSIUM mmol/L 3.6 4.3 4.3 4.1   CO2 mmol/L 28 28 26 24   CHLORIDE mmol/L 98 99 102 94*   BUN mg/dL 10 10 13 16   CREATININE mg/dL 0.50 0.47* 0.42* 0.50   Liver Function Trend:   Results from last 7 days   Lab Units 09/05/24 0905 09/04/24  0846 09/03/24  0821 09/02/24  1337   ALK PHOS U/L 106 109 110 136*   AST U/L 15 17 18 16   ALT U/L 13 14 13 16   BILIRUBIN TOTAL mg/dL 0.8 0.9 0.8 1.1    Renal Lab Trend:   Results from last 7 days   Lab Units 09/05/24 0905 09/04/24 0846 09/03/24  0821 09/02/24  1337   POTASSIUM mmol/L 3.6 4.3 4.3 4.1   PHOSPHORUS mg/dL  --   --   --  2.3*   SODIUM mmol/L 134* 134* 136 131*   MAGNESIUM mg/dL  --   --   --  1.90   EGFR mL/min/1.73m*2 >90 >90 >90 >90   BUN mg/dL 10 10 13 16   CREATININE mg/dL 0.50 0.47* 0.42* 0.50       Nutrition Specific Medications:  atorvastatin, 20 mg, oral, Nightly  dexAMETHasone, 2 mg, oral, Daily  gabapentin, 300 mg, oral, TID  pantoprazole, 40 mg, oral, Daily before breakfast  sennosides, 2 tablet, oral, BID        I/O:   Last BM Date: 09/03/24;      Dietary Orders (From admission, onward)       Start     Ordered    09/04/24 1254  Adult diet Regular  Diet effective now        Comments: · Upright for all PO intake  · Small bites/sips  · Straws ok  · Slow rate of consumption  · Pills per pt preference  · SLP to continue to follow to ensure diet tolerance/determine readiness for repeat assessment as pt appropriate/schedule permits   Question:  Diet type  Answer:  Regular    09/04/24 1253                     Estimated Needs:   Total Energy Estimated Needs (kCal): 2100 kCal  Method for Estimating Needs: 35 kcal/kg * 61.8 kg  Total Protein Estimated Needs (g): 90 g  Method for Estimating Needs: 1.5 g/kg * 61.8 kg  Total Fluid Estimated Needs (mL): 2100 mL  Method for Estimating Needs: 1 ml/kcal        Nutrition Diagnosis   Malnutrition Diagnosis  Patient has Malnutrition Diagnosis: Yes  Diagnosis Status: New  Malnutrition Diagnosis: Severe malnutrition  related to chronic disease or condition  As Evidenced by: > 5% wt loss in 1 mo, > 7.5% wt loss in 3 mo, severe muscle and fat losses noted in NFPE  Additional Assessment Information: Question if pt's PO intake was adequate pior to admission d/t significant wt loss and severe losses noted in NFPE            Nutrition Interventions/Recommendations   Individualized Nutrition Prescription Provided for : 2100 kcal and 90 g pro diet via oral nutrition    Medical Food Supplement: Modified food  Goal: Magic Cup (290 kcals, 9 g pro) BID (Vanilla)         Nutrition Monitoring and Evaluation   Food/Nutrient Related History Monitoring  Monitoring and Evaluation Plan: Energy intake  Energy Intake: Estimated energy intake  Criteria: > 75% estimated energy needs    Body Composition/Growth/Weight History  Monitoring and Evaluation Plan: Weight  Weight: Weight change  Criteria: No wt changes                   Time Spent (min): 90 minutes

## 2024-09-05 NOTE — PROGRESS NOTES
Occupational Therapy    Communication Note    Missed Visit: Yes  Missed Visit Reason: Patient and POA refusing therapy, stating pt can and will only do one thing at a time and wants to complete all radiation session before participating in therapy. Provided education on importance of participating in therapy to avoid other medical issues. Pt and S.O. adamantly refusing. Will complete OT orders, Please re-order when pt ready to participate in therapy.       09/05/24 at 9:49 AM   Deb Fairchild, OT   Rehab Office: [phone]

## 2024-09-05 NOTE — PROGRESS NOTES
"Christophe Ambriz is a 74 y.o. male on day 2 of admission presenting with Aspiration pneumonia, unspecified aspiration pneumonia type, unspecified laterality, unspecified part of lung (Multi).    Subjective   Seen this AM at bedside. Pt feeling lethergic, when asked how he was doing he replied, \"how does it look like I am doing\". Pt rating pain moderate, located in back, states that the pain regimen he is on is providing relief. Pt denies issues with voiding or issues with bowel movements, last BM 9/4. Discussed with pt plans to involve wound care to look at his sacrum area, along with reiterated importance of working with PT. Pt stated that he only wanted to do \"one thing at a time\" and does not want to work with PT until after radiation completed. Once again, stressed importance of working with PT. Denies HA, dizziness, CP, SOB, N/V/D/C. All other ROS otherwise negative.        Objective     Physical Exam  Constitutional:       General: He is not in acute distress.     Appearance: He is not ill-appearing.      Comments: cachexia   HENT:      Head: Normocephalic and atraumatic.      Nose: Nose normal.      Mouth/Throat:      Mouth: Mucous membranes are moist.   Eyes:      General: No scleral icterus.     Extraocular Movements: Extraocular movements intact.      Pupils: Pupils are equal, round, and reactive to light.   Cardiovascular:      Rate and Rhythm: Normal rate and regular rhythm.      Pulses: Normal pulses.      Heart sounds: Normal heart sounds.   Pulmonary:      Effort: Pulmonary effort is normal.      Breath sounds: Normal breath sounds.   Abdominal:      General: Abdomen is flat. Bowel sounds are normal. There is no distension.      Palpations: Abdomen is soft.      Tenderness: There is no abdominal tenderness. There is no guarding.   Musculoskeletal:         General: No swelling, deformity or signs of injury. Normal range of motion.      Cervical back: Normal range of motion and neck supple.   Skin:     " "General: Skin is warm and dry.      Findings: Bruising present. No erythema or rash.   Neurological:      General: No focal deficit present.      Mental Status: He is alert and oriented to person, place, and time.      Cranial Nerves: No cranial nerve deficit.      Sensory: No sensory deficit.      Motor: Weakness (generalized; 2/5 strength bilateral lower extremity) present.   Psychiatric:         Mood and Affect: Mood normal.         Behavior: Behavior normal.      Comments: Fluent speech, intermittently cooperative         Last Recorded Vitals  Blood pressure 107/62, pulse 82, temperature 37.5 °C (99.5 °F), temperature source Temporal, resp. rate 16, height 1.85 m (6' 0.84\"), weight 61.8 kg (136 lb 3.9 oz), SpO2 90%.  Intake/Output last 3 Shifts:  No intake/output data recorded.    Relevant Results  Results for orders placed or performed during the hospital encounter of 09/03/24 (from the past 24 hour(s))   CBC and Auto Differential   Result Value Ref Range    WBC 16.2 (H) 4.4 - 11.3 x10*3/uL    nRBC 0.0 0.0 - 0.0 /100 WBCs    RBC 3.34 (L) 4.50 - 5.90 x10*6/uL    Hemoglobin 8.3 (L) 13.5 - 17.5 g/dL    Hematocrit 28.0 (L) 41.0 - 52.0 %    MCV 84 80 - 100 fL    MCH 24.9 (L) 26.0 - 34.0 pg    MCHC 29.6 (L) 32.0 - 36.0 g/dL    RDW 16.1 (H) 11.5 - 14.5 %    Platelets 260 150 - 450 x10*3/uL    Neutrophils % 66.8 40.0 - 80.0 %    Immature Granulocytes %, Automated 0.7 0.0 - 0.9 %    Lymphocytes % 26.7 13.0 - 44.0 %    Monocytes % 5.3 2.0 - 10.0 %    Eosinophils % 0.4 0.0 - 6.0 %    Basophils % 0.1 0.0 - 2.0 %    Neutrophils Absolute 10.77 (H) 1.60 - 5.50 x10*3/uL    Immature Granulocytes Absolute, Automated 0.11 0.00 - 0.50 x10*3/uL    Lymphocytes Absolute 4.32 (H) 0.80 - 3.00 x10*3/uL    Monocytes Absolute 0.86 (H) 0.05 - 0.80 x10*3/uL    Eosinophils Absolute 0.07 0.00 - 0.40 x10*3/uL    Basophils Absolute 0.02 0.00 - 0.10 x10*3/uL   Comprehensive Metabolic Panel   Result Value Ref Range    Glucose 163 (H) 74 - 99 " mg/dL    Sodium 134 (L) 136 - 145 mmol/L    Potassium 3.6 3.5 - 5.3 mmol/L    Chloride 98 98 - 107 mmol/L    Bicarbonate 28 21 - 32 mmol/L    Anion Gap 12 10 - 20 mmol/L    Urea Nitrogen 10 6 - 23 mg/dL    Creatinine 0.50 0.50 - 1.30 mg/dL    eGFR >90 >60 mL/min/1.73m*2    Calcium 8.3 (L) 8.6 - 10.6 mg/dL    Albumin 2.7 (L) 3.4 - 5.0 g/dL    Alkaline Phosphatase 106 33 - 136 U/L    Total Protein 6.2 (L) 6.4 - 8.2 g/dL    AST 15 9 - 39 U/L    Bilirubin, Total 0.8 0.0 - 1.2 mg/dL    ALT 13 10 - 52 U/L       No results found.        Assessment/Plan   Assessment & Plan  Aspiration pneumonia, unspecified aspiration pneumonia type, unspecified laterality, unspecified part of lung (Multi)    Christophe Ambriz is a 74 y.o. male with a hx of CAD, HTN, HLD and RCC with spinal mets s/p L1-5 fusion w/ L2-4 decompression and tumor debulking (7/24) presented initially to OSH with worsening back pain. He was noted to be hypoxic (87% on RA) and placed on 2L O2. CT PE was negative for PE, showed interval improvement in bilateral pulmonary opacities, persistent but improved tree-in-bud opacities, and reveals probable aspiration. Per patient, he is here for radiation sessions that he cannot attend outpatient. Speech therapy consulted on admit, rec pureed solids with thin liquids (pt refusing thickened liquids at [facility]) (9/3). On 9/4, pt cleared for regular solids and thin liquids. As of 9/3, pt refusing to work with PT/OT; team reiterating importance of participation while inpatient. Wound care consulted on 9/5 for pressure wound on sacrum. DC to finish remaining fractions outpatient pending pt consent and pain control.     #Metastatic renal cell carcinoma  #Back and leg pain  - L1-5 fusion w/ L2-4 decompression and tumor debulking (7/24)  at [facility] with NSGY  - MRI 8/14: L3 compression fracture with retropulsion of the posterior cortex and severe stenosis of the central spinal canal with nerve root compression unchanged from the  previous exam *No new foci of marrow replacement or compression fracture *There is no measurable change compared to the previous exam.  - Recently DC 8/27/24 with plans to complete radiation outpatient  - Has not attended radiation sessions due to immobility-Last EBRT 8/27/24  - Presented to OSH for continued back pain, no worse from prior admit; here for inpatient radiation therapy  - Follows with Dr. Chavarria; FUV requested (9/3)   - Rad onc consulted 9/3, plan daily radiation for remaining fractions       - Received 8/27, 9/3, 9/4, 9/5      - Can transition outpatient once patient agreeable    #Pain control  - Increased Gabapentin 300mg TID  - Continue home oxycodone 5mg Q4 prn moderate pain, 10mg q4h severe pain  - Supportive oncology consulted, recs appreciated     #Hx Pneumonia  #Aspiration  - Recently discharged 8/27/24  - Last inpatient stay pertinent for MICU transfer for AHRF 2/2 multifocal pneumonia. Blood cultures positive for GPCC, and MRSA nares positive  - Completed inpatient course of Vanco/Zosyn  - CT PE at OSH-possible aspiration with improving tree-in-bud opacities and improvement in bilateral pulmonary opacities  - S/p Unasyn 3gm IV in ED/admit; will discontinue (low suspicion active infection given no systemic s/s, improving CT scan)   - Procal 0.05  - SLP consulted 9/3; c/w pureed textures and thin liquids; okay for regular diet (9/4)       #CAD s/p PCI (unknown year)  #HTN  #HLD  - continue home Atorvastatin, ASA, Plavix  - No current BP medications     #Leukocytosis, improving   - Admit WBC 25.5, down trending from previous discharge (30.2, 30.6)--> 16.2 (9/5)  - CT Angio reveals probable aspiration pneumonia  - Currently on Decadron taper   - No evidence of infection   - Trend CBC    #Prophy  - cont w/ Lovenox for prophylaxis  - cont w/ Protonix 40mg daily     DISPO:  - DNR/DNI - confirmed on admission  - PT/OT ordered, will likely need SNF dispo   - Patient declined PT 9/3,9/4 and 9/5  discussed importance of working with PT while admitted  - FUV 9/6 and 10/1 with Onc (requested reschedule for mid September)      I spent >60 minutes in the professional and overall care of this patient.     Assessment and plan discussed with attending physician Dr. Bennett.    Kenisha Durant, VINNY-CNP

## 2024-09-05 NOTE — CARE PLAN
The clinical goals for the shift include patient will remain HDS and VSS throughout shift, patient will remain safe and free from injury throughout shift at 1900      Problem: Pain  Goal: Takes deep breaths with improved pain control throughout the shift  Outcome: Progressing     Problem: Pain  Goal: Turns in bed with improved pain control throughout the shift  Outcome: Progressing     Problem: Pain  Goal: Free from opioid side effects throughout the shift  Outcome: Progressing     Problem: Skin  Goal: Promote/optimize nutrition  Outcome: Progressing     Problem: Skin  Goal: Promote skin healing  Outcome: Progressing     Problem: Pain - Adult  Goal: Verbalizes/displays adequate comfort level or baseline comfort level  Outcome: Progressing     Problem: Safety - Adult  Goal: Free from fall injury  Outcome: Progressing

## 2024-09-06 ENCOUNTER — APPOINTMENT (OUTPATIENT)
Dept: RADIATION ONCOLOGY | Facility: HOSPITAL | Age: 74
End: 2024-09-06
Payer: MEDICARE

## 2024-09-06 ENCOUNTER — HOSPITAL ENCOUNTER (OUTPATIENT)
Dept: RADIATION ONCOLOGY | Facility: HOSPITAL | Age: 74
Setting detail: RADIATION/ONCOLOGY SERIES
Discharge: HOME | End: 2024-09-06
Payer: MEDICARE

## 2024-09-06 ENCOUNTER — APPOINTMENT (OUTPATIENT)
Dept: RADIATION ONCOLOGY | Facility: CLINIC | Age: 74
End: 2024-09-06
Payer: MEDICARE

## 2024-09-06 DIAGNOSIS — C79.51 SECONDARY MALIGNANT NEOPLASM OF BONE (MULTI): ICD-10-CM

## 2024-09-06 DIAGNOSIS — Z51.0 ENCOUNTER FOR ANTINEOPLASTIC RADIATION THERAPY: ICD-10-CM

## 2024-09-06 DIAGNOSIS — C64: ICD-10-CM

## 2024-09-06 LAB
ATRIAL RATE: 79 BPM
P AXIS: 74 DEGREES
P OFFSET: 209 MS
P ONSET: 149 MS
PR INTERVAL: 150 MS
Q ONSET: 224 MS
QRS COUNT: 12 BEATS
QRS DURATION: 96 MS
QT INTERVAL: 404 MS
QTC CALCULATION(BAZETT): 463 MS
QTC FREDERICIA: 443 MS
R AXIS: -5 DEGREES
RAD ONC MSQ ACTUAL FRACTIONS DELIVERED: 5
RAD ONC MSQ ACTUAL FRACTIONS DELIVERED: 5
RAD ONC MSQ ACTUAL SESSION DELIVERED DOSE: 300 CGRAY
RAD ONC MSQ ACTUAL SESSION DELIVERED DOSE: 300 CGRAY
RAD ONC MSQ ACTUAL TOTAL DOSE: 1500 CGRAY
RAD ONC MSQ ACTUAL TOTAL DOSE: 1500 CGRAY
RAD ONC MSQ ELAPSED DAYS: 10
RAD ONC MSQ ELAPSED DAYS: 10
RAD ONC MSQ LAST DATE: NORMAL
RAD ONC MSQ LAST DATE: NORMAL
RAD ONC MSQ PRESCRIBED FRACTIONAL DOSE: 300 CGRAY
RAD ONC MSQ PRESCRIBED FRACTIONAL DOSE: 300 CGRAY
RAD ONC MSQ PRESCRIBED NUMBER OF FRACTIONS: 10
RAD ONC MSQ PRESCRIBED NUMBER OF FRACTIONS: 10
RAD ONC MSQ PRESCRIBED TECHNIQUE: NORMAL
RAD ONC MSQ PRESCRIBED TECHNIQUE: NORMAL
RAD ONC MSQ PRESCRIBED TOTAL DOSE: 3000 CGRAY
RAD ONC MSQ PRESCRIBED TOTAL DOSE: 3000 CGRAY
RAD ONC MSQ START DATE: NORMAL
RAD ONC MSQ START DATE: NORMAL
RAD ONC MSQ TREATMENT COURSE NUMBER: 1
RAD ONC MSQ TREATMENT COURSE NUMBER: 1
RAD ONC MSQ TREATMENT SITE: NORMAL
RAD ONC MSQ TREATMENT SITE: NORMAL
T AXIS: 66 DEGREES
T OFFSET: 426 MS
VENTRICULAR RATE: 79 BPM

## 2024-09-06 PROCEDURE — G0378 HOSPITAL OBSERVATION PER HR: HCPCS

## 2024-09-06 PROCEDURE — 99233 SBSQ HOSP IP/OBS HIGH 50: CPT

## 2024-09-06 PROCEDURE — 77387 GUIDANCE FOR RADJ TX DLVR: CPT | Performed by: RADIOLOGY

## 2024-09-06 PROCEDURE — 96361 HYDRATE IV INFUSION ADD-ON: CPT

## 2024-09-06 PROCEDURE — 2500000001 HC RX 250 WO HCPCS SELF ADMINISTERED DRUGS (ALT 637 FOR MEDICARE OP): Performed by: HOME HEALTH AIDE

## 2024-09-06 PROCEDURE — 77412 RADIATION TX DELIVERY LVL 3: CPT | Performed by: STUDENT IN AN ORGANIZED HEALTH CARE EDUCATION/TRAINING PROGRAM

## 2024-09-06 PROCEDURE — 2500000001 HC RX 250 WO HCPCS SELF ADMINISTERED DRUGS (ALT 637 FOR MEDICARE OP)

## 2024-09-06 PROCEDURE — 2500000004 HC RX 250 GENERAL PHARMACY W/ HCPCS (ALT 636 FOR OP/ED)

## 2024-09-06 RX ORDER — HYDROXYZINE HYDROCHLORIDE 25 MG/1
25 TABLET, FILM COATED ORAL ONCE
Status: COMPLETED | OUTPATIENT
Start: 2024-09-06 | End: 2024-09-07

## 2024-09-06 RX ORDER — HYDROMORPHONE HYDROCHLORIDE 1 MG/ML
0.4 INJECTION, SOLUTION INTRAMUSCULAR; INTRAVENOUS; SUBCUTANEOUS ONCE
Status: DISCONTINUED | OUTPATIENT
Start: 2024-09-06 | End: 2024-09-06

## 2024-09-06 ASSESSMENT — COGNITIVE AND FUNCTIONAL STATUS - GENERAL
STANDING UP FROM CHAIR USING ARMS: TOTAL
PERSONAL GROOMING: A LOT
DRESSING REGULAR UPPER BODY CLOTHING: A LOT
TOILETING: A LOT
TURNING FROM BACK TO SIDE WHILE IN FLAT BAD: A LITTLE
MOVING TO AND FROM BED TO CHAIR: A LOT
EATING MEALS: A LOT
CLIMB 3 TO 5 STEPS WITH RAILING: TOTAL
HELP NEEDED FOR BATHING: A LOT
DAILY ACTIVITIY SCORE: 12
MOVING FROM LYING ON BACK TO SITTING ON SIDE OF FLAT BED WITH BEDRAILS: A LITTLE
MOBILITY SCORE: 11
WALKING IN HOSPITAL ROOM: TOTAL
DRESSING REGULAR LOWER BODY CLOTHING: A LOT

## 2024-09-06 ASSESSMENT — PAIN SCALES - GENERAL
PAINLEVEL_OUTOF10: 8
PAINLEVEL_OUTOF10: 7
PAINLEVEL_OUTOF10: 6

## 2024-09-06 ASSESSMENT — PAIN - FUNCTIONAL ASSESSMENT
PAIN_FUNCTIONAL_ASSESSMENT: 0-10
PAIN_FUNCTIONAL_ASSESSMENT: 0-10

## 2024-09-06 NOTE — PROGRESS NOTES
"Occupational Therapy    Communication Note    Patient Name: Christophe Ambriz  MRN: [id number]  Today's Date: 9/6/2024   Room: [address] [place] [address]    Discipline: Occupational Therapy      Missed Visit: Yes  Missed Visit Reason: Patient refused (OT eval attempted again per MD request. Pt still adamant about not participating, educated on importance of mobility, pt stating \"I do move, I could give a...holding a middle finger up in the air\". Please notify when pt willing to participate in therapy.)      09/06/24 at 1:23 PM   Deb Fairchild OT   Rehab Office: [phone]     "

## 2024-09-06 NOTE — CONSULTS
Wound Care Consult     Visit Date: 9/6/2024      Patient Name: Christophe Ambriz         MRN: [id number]           YOB: 1950     Reason for Consult: sacrum      Wound History: Pt admitted on 9/3 for aspiration PNA, undergoing workup/treatment for RCC    Wound Assessment:  Wound 07/09/24 Incision Back Medial;Lower (Active)   Site Assessment Clean;Dry 09/06/24 1309   Alivia-Wound Assessment Clean;Dry 09/06/24 1309   Drainage Amount Small 09/06/24 1309   Dressing Other (Comment) 09/06/24 1309   Dressing Changed New 09/06/24 1309   Dressing Status Clean;Dry;Occlusive 09/06/24 1309       Wound 09/06/24 Pressure Injury Sacrum Mid (Active)   Wound Image   09/06/24 1350   Site Assessment Pink;Red;Denuded 09/06/24 1350   Pressure Injury Stage 1 09/06/24 1350   Wound Length (cm) 7 cm 09/06/24 1350   Wound Width (cm) 3 cm 09/06/24 1350   Wound Surface Area (cm^2) 21 cm^2 09/06/24 1350   Drainage Amount None 09/06/24 1350   Dressing Barrier film;Silicone border dressing 09/06/24 1350   Dressing Changed Changed 09/06/24 1350   Dressing Status Clean;Dry 09/06/24 1350     Wound Team Summary Assessment: Pt seen resting in bed with wife at bedside. Pt noticeably cachectic and deconditioned but alert, oriented and able to turn self. Per primary RN and notes, pt has been refusing therapy and other intervention. Pt agreeable to wound assessment. Turned to reveal 7cm x 3cm area of pink, denuded skin at midline VERY bony sacrum. First layer of skin peeling, but technically not an open wound. No drainage, purple discoloration, blistering, etc. Cleansed, prepped with barrier film, and redressed with Mepilex bordered foam. EHOB waffle mattress placed under pt's fitted sheet and inflated to 42 pumps for improved pressire redistribution. D/w pt and wife etiology pressure injury and importance of turning and optimizing nutrition. Pt remained far L-side lying at conclusion of visit.      Wound Team Recs: Continue EHOB waffle mattress  under fitted sheet. Please ensure it remains inflated at all times. Aggressive turning q2 hours side-to-side to offload sacrum. OOB if/when possible. Mepilex foam to sacral breakdown. Change q3 days and PRN when soiled. Optimize nutrition.     Provider, please review.      Verena Cruz RN, CWON  9/6/2024  1:51 PM

## 2024-09-06 NOTE — PROGRESS NOTES
Christophe Ambriz is a 74 y.o. male on day 3 of admission presenting with Aspiration pneumonia, unspecified aspiration pneumonia type, unspecified laterality, unspecified part of lung (Multi).    Subjective   Seen this AM at bedside. Pt feeling average, wife stating that he slept all night. Pt having some generalized pain, but states that pain medications provide relief. Stressed to pt and pt wife importance of working with PT today, pt and family agreeable. Denies HA, dizziness, CP, SOB, N/V/D/C. All other ROS otherwise negative.        Objective     Physical Exam  Constitutional:       General: He is not in acute distress.     Appearance: He is not ill-appearing.      Comments: cachexia   HENT:      Head: Normocephalic and atraumatic.      Nose: Nose normal.      Mouth/Throat:      Mouth: Mucous membranes are moist.   Eyes:      General: No scleral icterus.     Extraocular Movements: Extraocular movements intact.      Pupils: Pupils are equal, round, and reactive to light.   Cardiovascular:      Rate and Rhythm: Normal rate and regular rhythm.      Pulses: Normal pulses.      Heart sounds: Normal heart sounds.   Pulmonary:      Effort: Pulmonary effort is normal.      Breath sounds: Normal breath sounds.   Abdominal:      General: Abdomen is flat. Bowel sounds are normal. There is no distension.      Palpations: Abdomen is soft.      Tenderness: There is no abdominal tenderness. There is no guarding.   Musculoskeletal:         General: No swelling, deformity or signs of injury. Normal range of motion.      Cervical back: Normal range of motion and neck supple.   Skin:     General: Skin is warm and dry.      Findings: Bruising present. No erythema or rash.   Neurological:      General: No focal deficit present.      Mental Status: He is alert and oriented to person, place, and time.      Cranial Nerves: No cranial nerve deficit.      Sensory: No sensory deficit.      Motor: Weakness (generalized; 2/5 strength  "bilateral lower extremity) present.   Psychiatric:         Mood and Affect: Mood normal.         Behavior: Behavior normal.      Comments: Fluent speech, intermittently cooperative       Last Recorded Vitals  Blood pressure (!) 86/41, pulse 78, temperature 36.8 °C (98.2 °F), temperature source Temporal, resp. rate 16, height 1.85 m (6' 0.84\"), weight 61.8 kg (136 lb 3.9 oz), SpO2 92%.  Intake/Output last 3 Shifts:  I/O last 3 completed shifts:  In: - (0 mL/kg)   Out: 150 (2.4 mL/kg) [Urine:150 (0.1 mL/kg/hr)]  Weight: 61.8 kg     Relevant Results  Results for orders placed or performed during the hospital encounter of 09/06/24 (from the past 24 hour(s))   Rad Onc Msq Treatment Summary   Result Value Ref Range    Treatment Site L3 Spine     Course Number 1     Prescribed Fractional Dose 300 cGray    Prescribed Total Dose 3,000 cGray    Actual Fractions Delivered 5     Actual Session Delivered Dose 300 cGray    Actual Total Dose 1,500 cGray    Prescribed Technique AP/PA     Elapsed Days 10     Start Date 8/27/2024     Last Date 9/6/2024     Prescribed Number of Fractions 10     Treatment Site Chestwall_R     Course Number 1     Prescribed Fractional Dose 300 cGray    Prescribed Total Dose 3,000 cGray    Actual Fractions Delivered 5     Actual Session Delivered Dose 300 cGray    Actual Total Dose 1,500 cGray    Prescribed Technique 3D     Elapsed Days 10     Start Date 8/27/2024     Last Date 9/6/2024     Prescribed Number of Fractions 10        No results found.        Assessment/Plan   Assessment & Plan  Aspiration pneumonia, unspecified aspiration pneumonia type, unspecified laterality, unspecified part of lung (Multi)    Christophe Ambriz is a 74 y.o. male with a hx of CAD, HTN, HLD and RCC with spinal mets s/p L1-5 fusion w/ L2-4 decompression and tumor debulking (7/24) presented initially to OSH with worsening back pain. He was noted to be hypoxic (87% on RA) and placed on 2L O2. CT PE was negative for PE, showed " interval improvement in bilateral pulmonary opacities, persistent but improved tree-in-bud opacities, and reveals probable aspiration. Per patient, he is here for radiation sessions that he cannot attend outpatient. Speech therapy consulted on admit, rec pureed solids with thin liquids (pt refusing thickened liquids at [facility]) (9/3). On 9/4, pt cleared for regular solids and thin liquids. As of 9/3, pt refusing to work with PT/OT; team reiterating importance of participation while inpatient to improve performance status for for future treatment. Wound care consulted on 9/5 for pressure wound on sacrum. DC to finish remaining fractions outpatient pending pt consent and pain control.     #Metastatic renal cell carcinoma  #Back and leg pain  - L1-5 fusion w/ L2-4 decompression and tumor debulking (7/24)  at [facility] with NSGY  - MRI 8/14: L3 compression fracture with retropulsion of the posterior cortex and severe stenosis of the central spinal canal with nerve root compression unchanged from the previous exam *No new foci of marrow replacement or compression fracture *There is no measurable change compared to the previous exam  - Recently DC 8/27/24 with plans to complete radiation outpatient  - Has not attended radiation sessions due to immobility-Last EBRT 8/27/24  - Presented to OSH for continued back pain, no worse from prior admit; here for inpatient radiation therapy  - Follows with Dr. Chavarria; FUV requested (9/3)   - Rad onc consulted 9/3, plan daily radiation for remaining fractions       - Received 8/27, 9/3, 9/4, 9/5, 9/6      - Can transition outpatient once patient agreeable  - 9/6 Dr. Chavarria at bedside- discussed with pt and family importance/need for pt to be at a performance status to receive systemic treatment, otherwise recommending supportive measures and hospice- Pt not agreeable. Pt agreeable to work with PT today (9/6)    #Pain control  - Increased Gabapentin 300mg TID  - Continue home  oxycodone 5mg Q4 prn moderate pain, 10mg q4h severe pain  - Supportive oncology consulted, recs appreciated     #Hx Pneumonia  #Aspiration  - Recently discharged 8/27/24  - Last inpatient stay pertinent for MICU transfer for AHRF 2/2 multifocal pneumonia. Blood cultures positive for GPCC, and MRSA nares positive  - Completed inpatient course of Vanco/Zosyn  - CT PE at OSH-possible aspiration with improving tree-in-bud opacities and improvement in bilateral pulmonary opacities  - S/p Unasyn 3gm IV in ED/admit; will discontinue (low suspicion active infection given no systemic s/s, improving CT scan)   - Procal 0.05  - SLP consulted 9/3; c/w pureed textures and thin liquids; okay for regular diet (9/4)       #CAD s/p PCI (unknown year)  #HTN  #HLD  - continue home Atorvastatin, ASA, Plavix  - No current BP medications     #Leukocytosis, improving   - Admit WBC 25.5, down trending from previous discharge (30.2, 30.6)--> 16.2 (9/5)  - CT Angio reveals probable aspiration pneumonia  - Currently on Decadron taper   - No evidence of infection   - Trend CBC    #Prophy  - cont w/ Lovenox for prophylaxis  - cont w/ Protonix 40mg daily     DISPO:  - DNR/DNI - confirmed on admission  - PT/OT ordered, will likely need SNF dispo   - Patient declined PT 9/3,9/4 and 9/5 discussed importance of working with PT while admitted  - FUV: 10/1 with Onc (requested for sooner date 9/6)    I spent >60 minutes in the professional and overall care of this patient.     Assessment and plan discussed with attending physician Dr. Bennett.    Kenisha Durant, APRN-CNP

## 2024-09-06 NOTE — SIGNIFICANT EVENT
Discussed in detail with the patient and family.  Discussed in detail the need for him to be at a performance status where he can come and make it into the office to get systemic treatment for his metastatic renal cell cancer.  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  I discussed in detail options for him which include combination immunotherapy including ipilimumab and nivolumab versus oral TKI plus immunotherapy.  Oral agent by itself probably would not be very effective and my concerns are his performance status is very poor and his prognosis unless he start systemic treatment is also poor and I do not see a pathway forward where he would be able to successfully come into the office unless he improves his performance status.  If it remains as is the recommendation would strongly be on supportive measures and hospice which they were not agreeable to moving forward.  I discussed with the primary team to keep me updated regarding his discharge and future plan and if his performance status does improve more than happy to see the patient ongoing to discuss systemic therapy.

## 2024-09-06 NOTE — PROGRESS NOTES
"Physical Therapy                 Therapy Communication Note    Patient Name: Christophe Ambriz  MRN: [id number]  Today's Date: 9/6/2024     Discipline: Physical Therapy    Missed Visit Reason: Missed Visit Reason: Patient refused (Pt continues to refuse to participate in therapy despite education and motivation. Pt stating \"I do move, I could give a...holding a middle finger up in the air\". Will re attempt when appropriate.)    Missed Time: Attempt    Comment:  "

## 2024-09-07 LAB
ALBUMIN SERPL BCP-MCNC: 2.6 G/DL (ref 3.4–5)
ALP SERPL-CCNC: 134 U/L (ref 33–136)
ALT SERPL W P-5'-P-CCNC: 20 U/L (ref 10–52)
ANION GAP SERPL CALC-SCNC: 14 MMOL/L (ref 10–20)
AST SERPL W P-5'-P-CCNC: 19 U/L (ref 9–39)
BILIRUB SERPL-MCNC: 0.5 MG/DL (ref 0–1.2)
BUN SERPL-MCNC: 9 MG/DL (ref 6–23)
CALCIUM SERPL-MCNC: 8.8 MG/DL (ref 8.6–10.6)
CHLORIDE SERPL-SCNC: 95 MMOL/L (ref 98–107)
CO2 SERPL-SCNC: 27 MMOL/L (ref 21–32)
CREAT SERPL-MCNC: 0.53 MG/DL (ref 0.5–1.3)
EGFRCR SERPLBLD CKD-EPI 2021: >90 ML/MIN/1.73M*2
GLUCOSE SERPL-MCNC: 276 MG/DL (ref 74–99)
POTASSIUM SERPL-SCNC: 4.3 MMOL/L (ref 3.5–5.3)
PROT SERPL-MCNC: 6.5 G/DL (ref 6.4–8.2)
SODIUM SERPL-SCNC: 132 MMOL/L (ref 136–145)

## 2024-09-07 PROCEDURE — 2500000004 HC RX 250 GENERAL PHARMACY W/ HCPCS (ALT 636 FOR OP/ED)

## 2024-09-07 PROCEDURE — 96361 HYDRATE IV INFUSION ADD-ON: CPT

## 2024-09-07 PROCEDURE — 99233 SBSQ HOSP IP/OBS HIGH 50: CPT

## 2024-09-07 PROCEDURE — 80053 COMPREHEN METABOLIC PANEL: CPT

## 2024-09-07 PROCEDURE — G0378 HOSPITAL OBSERVATION PER HR: HCPCS

## 2024-09-07 PROCEDURE — 2500000001 HC RX 250 WO HCPCS SELF ADMINISTERED DRUGS (ALT 637 FOR MEDICARE OP): Performed by: HOME HEALTH AIDE

## 2024-09-07 PROCEDURE — 96372 THER/PROPH/DIAG INJ SC/IM: CPT

## 2024-09-07 PROCEDURE — 2500000001 HC RX 250 WO HCPCS SELF ADMINISTERED DRUGS (ALT 637 FOR MEDICARE OP)

## 2024-09-07 PROCEDURE — 36415 COLL VENOUS BLD VENIPUNCTURE: CPT

## 2024-09-07 ASSESSMENT — COGNITIVE AND FUNCTIONAL STATUS - GENERAL
WALKING IN HOSPITAL ROOM: TOTAL
MOVING FROM LYING ON BACK TO SITTING ON SIDE OF FLAT BED WITH BEDRAILS: A LITTLE
TOILETING: A LOT
EATING MEALS: A LITTLE
HELP NEEDED FOR BATHING: A LOT
MOBILITY SCORE: 11
EATING MEALS: A LITTLE
MOVING FROM LYING ON BACK TO SITTING ON SIDE OF FLAT BED WITH BEDRAILS: A LITTLE
DRESSING REGULAR UPPER BODY CLOTHING: A LOT
MOBILITY SCORE: 11
DRESSING REGULAR UPPER BODY CLOTHING: A LOT
PERSONAL GROOMING: A LITTLE
TURNING FROM BACK TO SIDE WHILE IN FLAT BAD: A LITTLE
HELP NEEDED FOR BATHING: A LOT
DRESSING REGULAR LOWER BODY CLOTHING: A LOT
WALKING IN HOSPITAL ROOM: TOTAL
TOILETING: A LOT
MOVING TO AND FROM BED TO CHAIR: A LOT
PERSONAL GROOMING: A LITTLE
DAILY ACTIVITIY SCORE: 14
DRESSING REGULAR LOWER BODY CLOTHING: A LOT
MOVING TO AND FROM BED TO CHAIR: A LOT
DAILY ACTIVITIY SCORE: 14
STANDING UP FROM CHAIR USING ARMS: TOTAL
CLIMB 3 TO 5 STEPS WITH RAILING: TOTAL
STANDING UP FROM CHAIR USING ARMS: TOTAL
TURNING FROM BACK TO SIDE WHILE IN FLAT BAD: A LITTLE
CLIMB 3 TO 5 STEPS WITH RAILING: TOTAL

## 2024-09-07 ASSESSMENT — PAIN - FUNCTIONAL ASSESSMENT
PAIN_FUNCTIONAL_ASSESSMENT: 0-10

## 2024-09-07 ASSESSMENT — PAIN SCALES - GENERAL
PAINLEVEL_OUTOF10: 8
PAINLEVEL_OUTOF10: 9
PAINLEVEL_OUTOF10: 7

## 2024-09-07 NOTE — PROGRESS NOTES
Christophe Ambriz is a 74 y.o. male on day 3 of admission presenting with Aspiration pneumonia, unspecified aspiration pneumonia type, unspecified laterality, unspecified part of lung (Multi).    Subjective   Seen this AM at bedside. Pt feeling okay. Pt states as long as he is taking his pain meds his pain is manageable. Stressed to pt and pt wife importance of working with PT especially if patient wants systemic treatment, pt and family agreeable. Denies HA, dizziness, CP, SOB, N/V/D/C.   All other ROS otherwise negative.        Objective     Physical Exam  Constitutional:       General: He is not in acute distress.     Appearance: He is not ill-appearing.      Comments: cachexia   HENT:      Head: Normocephalic and atraumatic.      Nose: Nose normal.      Mouth/Throat:      Mouth: Mucous membranes are moist.   Eyes:      General: No scleral icterus.     Extraocular Movements: Extraocular movements intact.      Pupils: Pupils are equal, round, and reactive to light.   Cardiovascular:      Rate and Rhythm: Normal rate and regular rhythm.      Pulses: Normal pulses.      Heart sounds: Normal heart sounds.   Pulmonary:      Effort: Pulmonary effort is normal.      Breath sounds: Normal breath sounds.   Abdominal:      General: Abdomen is flat. Bowel sounds are normal. There is no distension.      Palpations: Abdomen is soft.      Tenderness: There is no abdominal tenderness. There is no guarding.   Musculoskeletal:         General: No swelling, deformity or signs of injury. Normal range of motion.      Cervical back: Normal range of motion and neck supple.   Skin:     General: Skin is warm and dry.      Findings: Bruising present. No erythema or rash.   Neurological:      General: No focal deficit present.      Mental Status: He is alert and oriented to person, place, and time.      Cranial Nerves: No cranial nerve deficit.      Sensory: No sensory deficit.      Motor: Weakness (generalized) present.   Psychiatric:     "     Mood and Affect: Mood normal.         Behavior: Behavior normal.      Comments: Fluent speech, intermittently cooperative       Last Recorded Vitals  Blood pressure 98/59, pulse 82, temperature 37.7 °C (99.9 °F), temperature source Temporal, resp. rate 16, height 1.85 m (6' 0.84\"), weight 61.8 kg (136 lb 3.9 oz), SpO2 90%.  Intake/Output last 3 Shifts:  I/O last 3 completed shifts:  In: 500 (8.1 mL/kg) [IV Piggyback:500]  Out: 400 (6.5 mL/kg) [Urine:400 (0.2 mL/kg/hr)]  Weight: 61.8 kg     Relevant Results  No results found for this or any previous visit (from the past 24 hour(s)).      No results found.        Assessment/Plan   Assessment & Plan  Aspiration pneumonia, unspecified aspiration pneumonia type, unspecified laterality, unspecified part of lung (Multi)    Christophe Ambriz is a 74 y.o. male with a hx of CAD, HTN, HLD and RCC with spinal mets s/p L1-5 fusion w/ L2-4 decompression and tumor debulking (7/24) presented initially to OSH with worsening back pain. He was noted to be hypoxic (87% on RA) and placed on 2L O2. CT PE was negative for PE, showed interval improvement in bilateral pulmonary opacities, persistent but improved tree-in-bud opacities, and reveals probable aspiration. Per patient, he is here for radiation sessions that he cannot attend outpatient. Speech therapy consulted on admit, rec pureed solids with thin liquids (pt refusing thickened liquids at [facility]) (9/3). On 9/4, pt cleared for regular solids and thin liquids. As of 9/3, pt refusing to work with PT/OT; team reiterating importance of participation while inpatient to improve performance status for for future treatment. Wound care consulted on 9/5 for pressure wound on sacrum. DC to finish remaining fractions outpatient pending pt consent and pain control.     #Metastatic renal cell carcinoma  #Back and leg pain  - L1-5 fusion w/ L2-4 decompression and tumor debulking (7/24)  at [facility] with NSGY  - MRI 8/14: L3 compression " fracture with retropulsion of the posterior cortex and severe stenosis of the central spinal canal with nerve root compression unchanged from the previous exam *No new foci of marrow replacement or compression fracture *There is no measurable change compared to the previous exam  - Recently DC 8/27/24 with plans to complete radiation outpatient  - Has not attended radiation sessions due to immobility-Last EBRT 8/27/24  - Presented to OSH for continued back pain, no worse from prior admit; here for inpatient radiation therapy  - Follows with Dr. Chavarria; FUV requested (9/3)   - Rad onc consulted 9/3, plan daily radiation for remaining fractions       - Received 8/27, 9/3, 9/4, 9/5, 9/6      - Can transition outpatient once patient agreeable  - 9/6 Dr. Chavarria at bedside- discussed with pt and family importance/need for pt to be at a performance status to receive systemic treatment, otherwise recommending supportive measures and hospice- Pt not agreeable. Pt agreeable to work with PT today (9/6) - pt did decline PT 9/6  - 9/7 patient states he is agreeable to work with PT    #Pain control  - Increased Gabapentin 300mg TID  - Continue home oxycodone 5mg Q4 prn moderate pain, 10mg q4h severe pain  - Supportive oncology consulted, recs appreciated     #Hx Pneumonia  #Aspiration  - Recently discharged 8/27/24  - Last inpatient stay pertinent for MICU transfer for AHRF 2/2 multifocal pneumonia. Blood cultures positive for GPCC, and MRSA nares positive  - Completed inpatient course of Vanco/Zosyn  - CT PE at OSH-possible aspiration with improving tree-in-bud opacities and improvement in bilateral pulmonary opacities  - S/p Unasyn 3gm IV in ED/admit; will discontinue (low suspicion active infection given no systemic s/s, improving CT scan)   - Procal 0.05  - SLP consulted 9/3; c/w pureed textures and thin liquids; okay for regular diet (9/4)       #CAD s/p PCI (unknown year)  #HTN  #HLD  - continue home Atorvastatin,  ASA, Plavix  - No current BP medications     #Leukocytosis, improving   - Admit WBC 25.5, down trending from previous discharge (30.2, 30.6)--> 16.2 (9/5)  - CT Angio reveals probable aspiration pneumonia  - Currently on Decadron taper   - No evidence of infection   - Trend CBC    #Prophy  - cont w/ Lovenox for prophylaxis  - cont w/ Protonix 40mg daily     DISPO:  - DNR/DNI - confirmed on admission  - PT/OT ordered, will likely need SNF dispo   - Patient declined PT 9/3,9/4, 9/5, 9/6 discussed importance of working with PT while admitted  - FUV: 10/1 with Onc (requested for sooner date 9/6)    I spent >60 minutes in the professional and overall care of this patient.     Assessment and plan discussed with attending physician Dr. Bennett.    Brandi Giron PA-C

## 2024-09-07 NOTE — SIGNIFICANT EVENT
09/07/24 1020   Onset Documentation   Rapid Response Initiated By Radar auto page   Location/Room [facility]  ([facility])   Pager Time [phone]   Arrival Time 1020   Event End Time 1025   Level II Called No   Primary Reason for Call Radar auto page     Rapid Response Note    Radar auto-page received for a radar score of 7 with the following vital signs:  37.7, 82, 16, 98/59, 90%.  Vital signs were confirmed and reviewed with primary RN.  SpO2 goal is 88-92%.  He is at his current baseline and RN has no concerns.  There are no indications for interventions by Rapid Response at this time.  RN to contact Rapid Response with any future concerns or signs of clinical decompensation.

## 2024-09-07 NOTE — SIGNIFICANT EVENT
09/07/24 1652   Onset Documentation   Rapid Response Initiated By Radar auto page   Location/Room [facility]  ([facility] 4023)   Pager Time [phone]   Arrival Time 1652   Event End Time 1655   Level II Called No   Primary Reason for Call Radar auto page     Rapid Response Note    Radar auto-page received for a radar score of 6 with the following vital signs:  36.6, 82, 16, 109/63, 91%.   Vital signs were confirmed and reviewed with primary RN.  He is at her current baseline and RN has no concerns. There are no indications for interventions by Rapid Response at this time. RN to contact Rapid Response with any future concerns or signs of clinical decompensation

## 2024-09-07 NOTE — CARE PLAN
The patient's goals for the shift include      The clinical goals for the shift include patient will remain safe and free from injury throughout shift, will remain free of pain at 1900      Problem: Pain  Goal: Takes deep breaths with improved pain control throughout the shift  Outcome: Progressing  Goal: Turns in bed with improved pain control throughout the shift  Outcome: Progressing  Goal: Walks with improved pain control throughout the shift  Outcome: Progressing  Goal: Performs ADL's with improved pain control throughout shift  Outcome: Progressing  Goal: Participates in PT with improved pain control throughout the shift  Outcome: Progressing  Goal: Free from opioid side effects throughout the shift  Outcome: Progressing  Goal: Free from acute confusion related to pain meds throughout the shift  Outcome: Progressing     Problem: Skin  Goal: Decreased wound size/increased tissue granulation at next dressing change  Outcome: Progressing  Goal: Participates in plan/prevention/treatment measures  Outcome: Progressing  Goal: Prevent/manage excess moisture  Outcome: Progressing  Goal: Prevent/minimize sheer/friction injuries  Outcome: Progressing  Goal: Promote/optimize nutrition  Outcome: Progressing  Goal: Promote skin healing  Outcome: Progressing     Problem: Pain - Adult  Goal: Verbalizes/displays adequate comfort level or baseline comfort level  Outcome: Progressing     Problem: Safety - Adult  Goal: Free from fall injury  Outcome: Progressing     Problem: Discharge Planning  Goal: Discharge to home or other facility with appropriate resources  Outcome: Progressing     Problem: Chronic Conditions and Co-morbidities  Goal: Patient's chronic conditions and co-morbidity symptoms are monitored and maintained or improved  Outcome: Progressing     Problem: Nutrition  Goal: Less than 5 days NPO/clear liquids  Outcome: Progressing  Goal: Oral intake greater than 50%  Outcome: Progressing  Goal: Oral intake greater  75%  Outcome: Progressing  Goal: Consume prescribed supplement  Outcome: Progressing  Goal: Adequate PO fluid intake  Outcome: Progressing  Goal: Nutrition support goals are met within 48 hrs  Outcome: Progressing  Goal: Nutrition support is meeting 75% of nutrient needs  Outcome: Progressing  Goal: Tube feed tolerance  Outcome: Progressing  Goal: BG  mg/dL  Outcome: Progressing  Goal: Lab values WNL  Outcome: Progressing  Goal: Electrolytes WNL  Outcome: Progressing  Goal: Promote healing  Outcome: Progressing  Goal: Maintain stable weight  Outcome: Progressing  Goal: Reduce weight from edema/fluid  Outcome: Progressing  Goal: Gradual weight gain  Outcome: Progressing  Goal: Improve ostomy output  Outcome: Progressing     Problem: Fall/Injury  Goal: Not fall by end of shift  Outcome: Progressing  Goal: Be free from injury by end of the shift  Outcome: Progressing  Goal: Verbalize understanding of personal risk factors for fall in the hospital  Outcome: Progressing  Goal: Verbalize understanding of risk factor reduction measures to prevent injury from fall in the home  Outcome: Progressing  Goal: Use assistive devices by end of the shift  Outcome: Progressing  Goal: Pace activities to prevent fatigue by end of the shift  Outcome: Progressing     Problem: Swallowing  Goal: LTG - Patient will tolerate the least restrictive diet consistency to allow for safe consumption of daily meals  Outcome: Progressing  Goal: STG - Patient will tolerate recommended food and liquid consistencies without clinical signs and symptoms of aspirations on 100% of trials  Outcome: Progressing

## 2024-09-08 VITALS
DIASTOLIC BLOOD PRESSURE: 63 MMHG | TEMPERATURE: 99 F | SYSTOLIC BLOOD PRESSURE: 107 MMHG | BODY MASS INDEX: 18.06 KG/M2 | RESPIRATION RATE: 18 BRPM | OXYGEN SATURATION: 95 % | WEIGHT: 136.24 LBS | HEART RATE: 83 BPM | HEIGHT: 73 IN

## 2024-09-08 LAB
BASOPHILS # BLD AUTO: 0.02 X10*3/UL (ref 0–0.1)
BASOPHILS NFR BLD AUTO: 0.1 %
EOSINOPHIL # BLD AUTO: 0.16 X10*3/UL (ref 0–0.4)
EOSINOPHIL NFR BLD AUTO: 1.2 %
ERYTHROCYTE [DISTWIDTH] IN BLOOD BY AUTOMATED COUNT: 15.9 % (ref 11.5–14.5)
HCT VFR BLD AUTO: 26.6 % (ref 41–52)
HGB BLD-MCNC: 8.1 G/DL (ref 13.5–17.5)
IMM GRANULOCYTES # BLD AUTO: 0.09 X10*3/UL (ref 0–0.5)
IMM GRANULOCYTES NFR BLD AUTO: 0.7 % (ref 0–0.9)
LYMPHOCYTES # BLD AUTO: 3.57 X10*3/UL (ref 0.8–3)
LYMPHOCYTES NFR BLD AUTO: 25.9 %
MCH RBC QN AUTO: 25.4 PG (ref 26–34)
MCHC RBC AUTO-ENTMCNC: 30.5 G/DL (ref 32–36)
MCV RBC AUTO: 83 FL (ref 80–100)
MONOCYTES # BLD AUTO: 0.82 X10*3/UL (ref 0.05–0.8)
MONOCYTES NFR BLD AUTO: 5.9 %
NEUTROPHILS # BLD AUTO: 9.13 X10*3/UL (ref 1.6–5.5)
NEUTROPHILS NFR BLD AUTO: 66.2 %
NRBC BLD-RTO: 0 /100 WBCS (ref 0–0)
PLATELET # BLD AUTO: 305 X10*3/UL (ref 150–450)
RBC # BLD AUTO: 3.19 X10*6/UL (ref 4.5–5.9)
WBC # BLD AUTO: 13.8 X10*3/UL (ref 4.4–11.3)

## 2024-09-08 PROCEDURE — 99233 SBSQ HOSP IP/OBS HIGH 50: CPT

## 2024-09-08 PROCEDURE — G0378 HOSPITAL OBSERVATION PER HR: HCPCS

## 2024-09-08 PROCEDURE — 2500000001 HC RX 250 WO HCPCS SELF ADMINISTERED DRUGS (ALT 637 FOR MEDICARE OP): Performed by: HOME HEALTH AIDE

## 2024-09-08 PROCEDURE — 85025 COMPLETE CBC W/AUTO DIFF WBC: CPT

## 2024-09-08 PROCEDURE — 2500000004 HC RX 250 GENERAL PHARMACY W/ HCPCS (ALT 636 FOR OP/ED)

## 2024-09-08 PROCEDURE — 96361 HYDRATE IV INFUSION ADD-ON: CPT

## 2024-09-08 PROCEDURE — 2500000001 HC RX 250 WO HCPCS SELF ADMINISTERED DRUGS (ALT 637 FOR MEDICARE OP)

## 2024-09-08 PROCEDURE — 36415 COLL VENOUS BLD VENIPUNCTURE: CPT

## 2024-09-08 PROCEDURE — 96372 THER/PROPH/DIAG INJ SC/IM: CPT

## 2024-09-08 RX ORDER — OXYCODONE HYDROCHLORIDE 10 MG/1
10 TABLET ORAL EVERY 4 HOURS PRN
Status: ACTIVE | OUTPATIENT
Start: 2024-09-08

## 2024-09-08 RX ORDER — POLYETHYLENE GLYCOL 3350 17 G/17G
17 POWDER, FOR SOLUTION ORAL DAILY
Status: ACTIVE | OUTPATIENT
Start: 2024-09-08

## 2024-09-08 RX ORDER — GUAIFENESIN 600 MG/1
600 TABLET, EXTENDED RELEASE ORAL 2 TIMES DAILY PRN
Status: ACTIVE | OUTPATIENT
Start: 2024-09-08

## 2024-09-08 RX ORDER — HYDROMORPHONE HYDROCHLORIDE 1 MG/ML
0.2 INJECTION, SOLUTION INTRAMUSCULAR; INTRAVENOUS; SUBCUTANEOUS ONCE
Status: ACTIVE | OUTPATIENT
Start: 2024-09-08

## 2024-09-08 ASSESSMENT — COGNITIVE AND FUNCTIONAL STATUS - GENERAL
WALKING IN HOSPITAL ROOM: TOTAL
DRESSING REGULAR LOWER BODY CLOTHING: TOTAL
CLIMB 3 TO 5 STEPS WITH RAILING: TOTAL
STANDING UP FROM CHAIR USING ARMS: TOTAL
TOILETING: TOTAL
MOBILITY SCORE: 11
MOVING FROM LYING ON BACK TO SITTING ON SIDE OF FLAT BED WITH BEDRAILS: A LITTLE
TURNING FROM BACK TO SIDE WHILE IN FLAT BAD: A LITTLE
DRESSING REGULAR LOWER BODY CLOTHING: TOTAL
DRESSING REGULAR UPPER BODY CLOTHING: TOTAL
HELP NEEDED FOR BATHING: TOTAL
STANDING UP FROM CHAIR USING ARMS: TOTAL
PERSONAL GROOMING: TOTAL
TURNING FROM BACK TO SIDE WHILE IN FLAT BAD: A LITTLE
CLIMB 3 TO 5 STEPS WITH RAILING: TOTAL
DRESSING REGULAR UPPER BODY CLOTHING: TOTAL
TOILETING: TOTAL
MOBILITY SCORE: 11
PERSONAL GROOMING: TOTAL
MOVING FROM LYING ON BACK TO SITTING ON SIDE OF FLAT BED WITH BEDRAILS: A LITTLE
EATING MEALS: TOTAL
MOVING TO AND FROM BED TO CHAIR: A LOT
DAILY ACTIVITIY SCORE: 6
HELP NEEDED FOR BATHING: TOTAL
DAILY ACTIVITIY SCORE: 6
MOVING TO AND FROM BED TO CHAIR: A LOT
EATING MEALS: TOTAL
WALKING IN HOSPITAL ROOM: TOTAL

## 2024-09-08 ASSESSMENT — PAIN SCALES - GENERAL
PAINLEVEL_OUTOF10: 10 - WORST POSSIBLE PAIN
PAINLEVEL_OUTOF10: 10 - WORST POSSIBLE PAIN
PAINLEVEL_OUTOF10: 8

## 2024-09-08 NOTE — PROGRESS NOTES
Christophe Ambriz is a 74 y.o. male on day 3 of admission presenting with Aspiration pneumonia, unspecified aspiration pneumonia type, unspecified laterality, unspecified part of lung (Multi).    Subjective   Patient laying comfortably in bed with no acute events overnight. Ongoing conversation concerning goals of care and if patient's wishes are to pursue systemic therapy vs comfort care. Continuing to have pain in his hips that he reports is generally controlled with current regimen however he did require additional spot dose of dilaudid this morning for 10/10 pain which improved to 3/10.        Objective     Physical Exam  Constitutional:       General: He is not in acute distress.     Appearance: He is not ill-appearing.      Comments: cachexia   HENT:      Head: Normocephalic and atraumatic.      Nose: Nose normal.      Mouth/Throat:      Mouth: Mucous membranes are moist.   Eyes:      General: No scleral icterus.     Extraocular Movements: Extraocular movements intact.      Pupils: Pupils are equal, round, and reactive to light.   Cardiovascular:      Rate and Rhythm: Normal rate and regular rhythm.      Pulses: Normal pulses.      Heart sounds: Normal heart sounds.   Pulmonary:      Effort: Pulmonary effort is normal.      Breath sounds: Rhonchi present.      Comments: On 2.5 L NC  Abdominal:      General: Abdomen is flat. Bowel sounds are normal. There is no distension.      Palpations: Abdomen is soft.      Tenderness: There is no abdominal tenderness. There is no guarding.   Musculoskeletal:         General: No swelling, deformity or signs of injury. Normal range of motion.      Cervical back: Normal range of motion and neck supple.   Skin:     General: Skin is warm and dry.      Findings: Bruising present. No erythema or rash.   Neurological:      General: No focal deficit present.      Mental Status: He is alert and oriented to person, place, and time.      Cranial Nerves: No cranial nerve deficit.       "Sensory: No sensory deficit.      Motor: Weakness (generalized) present.   Psychiatric:         Mood and Affect: Mood normal.         Behavior: Behavior normal.      Comments: Fluent speech, intermittently cooperative       Last Recorded Vitals  Blood pressure 100/61, pulse 74, temperature 36.9 °C (98.4 °F), temperature source Temporal, resp. rate 18, height 1.85 m (6' 0.84\"), weight 61.8 kg (136 lb 3.9 oz), SpO2 91%.  Intake/Output last 3 Shifts:  I/O last 3 completed shifts:  In: 500 (8.1 mL/kg) [IV Piggyback:500]  Out: 0 (0 mL/kg)   Weight: 61.8 kg     Relevant Results  Results for orders placed or performed during the hospital encounter of 09/03/24 (from the past 24 hour(s))   Comprehensive metabolic panel   Result Value Ref Range    Glucose 276 (H) 74 - 99 mg/dL    Sodium 132 (L) 136 - 145 mmol/L    Potassium 4.3 3.5 - 5.3 mmol/L    Chloride 95 (L) 98 - 107 mmol/L    Bicarbonate 27 21 - 32 mmol/L    Anion Gap 14 10 - 20 mmol/L    Urea Nitrogen 9 6 - 23 mg/dL    Creatinine 0.53 0.50 - 1.30 mg/dL    eGFR >90 >60 mL/min/1.73m*2    Calcium 8.8 8.6 - 10.6 mg/dL    Albumin 2.6 (L) 3.4 - 5.0 g/dL    Alkaline Phosphatase 134 33 - 136 U/L    Total Protein 6.5 6.4 - 8.2 g/dL    AST 19 9 - 39 U/L    Bilirubin, Total 0.5 0.0 - 1.2 mg/dL    ALT 20 10 - 52 U/L   CBC and Auto Differential   Result Value Ref Range    WBC 13.8 (H) 4.4 - 11.3 x10*3/uL    nRBC 0.0 0.0 - 0.0 /100 WBCs    RBC 3.19 (L) 4.50 - 5.90 x10*6/uL    Hemoglobin 8.1 (L) 13.5 - 17.5 g/dL    Hematocrit 26.6 (L) 41.0 - 52.0 %    MCV 83 80 - 100 fL    MCH 25.4 (L) 26.0 - 34.0 pg    MCHC 30.5 (L) 32.0 - 36.0 g/dL    RDW 15.9 (H) 11.5 - 14.5 %    Platelets 305 150 - 450 x10*3/uL    Neutrophils % 66.2 40.0 - 80.0 %    Immature Granulocytes %, Automated 0.7 0.0 - 0.9 %    Lymphocytes % 25.9 13.0 - 44.0 %    Monocytes % 5.9 2.0 - 10.0 %    Eosinophils % 1.2 0.0 - 6.0 %    Basophils % 0.1 0.0 - 2.0 %    Neutrophils Absolute 9.13 (H) 1.60 - 5.50 x10*3/uL    Immature " Granulocytes Absolute, Automated 0.09 0.00 - 0.50 x10*3/uL    Lymphocytes Absolute 3.57 (H) 0.80 - 3.00 x10*3/uL    Monocytes Absolute 0.82 (H) 0.05 - 0.80 x10*3/uL    Eosinophils Absolute 0.16 0.00 - 0.40 x10*3/uL    Basophils Absolute 0.02 0.00 - 0.10 x10*3/uL         No results found.        Assessment/Plan   Assessment & Plan  Aspiration pneumonia, unspecified aspiration pneumonia type, unspecified laterality, unspecified part of lung (Multi)    Christophe Ambriz is a 74 y.o. male with a hx of CAD, HTN, HLD and RCC with spinal mets s/p L1-5 fusion w/ L2-4 decompression and tumor debulking (7/24) presented initially to OSH with worsening back pain. He was noted to be hypoxic (87% on RA) and placed on 2L O2. CT PE was negative for PE, showed interval improvement in bilateral pulmonary opacities, persistent but improved tree-in-bud opacities, and reveals probable aspiration. Per patient, he is here for radiation sessions that he cannot attend outpatient. Speech therapy consulted on admit, rec pureed solids with thin liquids (pt refusing thickened liquids at [facility]) (9/3). On 9/4, pt cleared for regular solids and thin liquids. As of 9/3, pt refusing to work with PT/OT; team reiterating importance of participation while inpatient to improve performance status for for future treatment. Wound care consulted on 9/5 for pressure wound on sacrum. DC to finish remaining fractions outpatient pending pt consent and pain control. Ongoing GOC conversation concerning whether patient's wishes are to pursue systemic therapy vs comfort care.     TODAY  - GOC conversation patient's wishes are to pursue systemic therapy vs comfort care; patient continues to defer working with PT at this time  - 1 L NS due to poor oral intake and soft Bps  - RT consult for airway clearance and Mucinex   - Miralax daily with ongoing opioid use  - Dilaudid 0.2 x1 for 10/10 LBP which improved to 3/10    #Metastatic renal cell carcinoma  #Back and leg  pain  - L1-5 fusion w/ L2-4 decompression and tumor debulking (7/24)  at [facility] with NSGY  - MRI 8/14: L3 compression fracture with retropulsion of the posterior cortex and severe stenosis of the central spinal canal with nerve root compression unchanged from the previous exam *No new foci of marrow replacement or compression fracture *There is no measurable change compared to the previous exam  - Recently DC 8/27/24 with plans to complete radiation outpatient  - Has not attended radiation sessions due to immobility-Last EBRT 8/27/24  - Presented to OSH for continued back pain, no worse from prior admit; here for inpatient radiation therapy  - Follows with Dr. Chavarria; FUV requested (9/3)   - 9/6 Dr. Chavarria at bedside- discussed with pt and family importance/need for pt to be at a performance status to receive systemic treatment, otherwise recommending supportive measures and hospice- Pt not agreeable. Pt agreeable to work with PT today (9/6) - pt did decline PT 9/6  - 9/7 patient states he is agreeable to work with PT  Plan:  - Ongoing GOC conversation concerning patient's wishes are to pursue systemic therapy vs comfort care  - Rad onc consulted 9/3, plan daily radiation for remaining fractions       - Received 8/27, 9/3, 9/4, 9/5, 9/6      - Can transition outpatient once patient agreeable    #Pain control  - Continue Gabapentin 300mg TID  - Continue home oxycodone 5mg Q4 prn moderate pain, 10mg q4h severe pain  - Supportive oncology consulted, recs appreciated     #Hx Pneumonia  #Aspiration  - Recently discharged 8/27/24  - Last inpatient stay pertinent for MICU transfer for AHRF 2/2 multifocal pneumonia. Blood cultures positive for GPCC, and MRSA nares positive  - Completed inpatient course of Vanco/Zosyn  - CT PE at OSH-possible aspiration with improving tree-in-bud opacities and improvement in bilateral pulmonary opacities  - S/p Unasyn 3gm IV in ED/admit; will discontinue (low suspicion active  infection given no systemic s/s, improving CT scan)   - Procal 0.05  - SLP consulted 9/3; c/w pureed textures and thin liquids; okay for regular diet (9/4)       #CAD s/p PCI (unknown year)  #HTN  #HLD  - continue home Atorvastatin, ASA, Plavix  - No current BP medications     #Leukocytosis, improving   - Admit WBC 25.5, down trending from previous discharge (30.2, 30.6)--> 16.2 (9/5)  - CT Angio reveals probable aspiration pneumonia  - Currently on Decadron taper   - No evidence of infection   - Trend CBC    #Prophy  - cont w/ Lovenox for prophylaxis  - cont w/ Protonix 40mg daily     DISPO:  - DNR/DNI - confirmed on admission  - PT/OT ordered, will likely need SNF dispo   - Patient declined PT 9/3,9/4, 9/5, 9/6 discussed importance of working with PT while admitted  - FUV: 10/1 with Onc (requested for sooner date 9/6)    I spent >60 minutes in the professional and overall care of this patient.     Assessment and plan discussed with attending physician Dr. Bennett.    Deb Russo MD

## 2024-09-08 NOTE — CARE PLAN
The patient's goals for the shift include      The clinical goals for the shift include Patient will remain safe and free of injury throughout the shift 9/8/24 @ 700      Problem: Skin  Goal: Decreased wound size/increased tissue granulation at next dressing change  Flowsheets (Taken 9/8/2024 0641)  Decreased wound size/increased tissue granulation at next dressing change: Promote sleep for wound healing  Goal: Participates in plan/prevention/treatment measures  Flowsheets (Taken 9/8/2024 0641)  Participates in plan/prevention/treatment measures: Elevate heels  Goal: Prevent/manage excess moisture  Flowsheets (Taken 9/8/2024 0641)  Prevent/manage excess moisture: Cleanse incontinence/protect with barrier cream  Goal: Prevent/minimize sheer/friction injuries  Flowsheets (Taken 9/8/2024 0641)  Prevent/minimize sheer/friction injuries: Use pull sheet  Goal: Promote/optimize nutrition  Flowsheets (Taken 9/8/2024 0641)  Promote/optimize nutrition: Offer water/supplements/favorite foods  Goal: Promote skin healing  Flowsheets (Taken 9/8/2024 0641)  Promote skin healing: Assess skin/pad under line(s)/device(s)

## 2024-09-08 NOTE — CARE PLAN
The patient's goals for the shift include      The clinical goals for the shift include pt will remain injury free    Problem: Pain  Goal: Takes deep breaths with improved pain control throughout the shift  Outcome: Progressing  Goal: Turns in bed with improved pain control throughout the shift  Outcome: Progressing  Goal: Walks with improved pain control throughout the shift  Outcome: Progressing  Goal: Performs ADL's with improved pain control throughout shift  Outcome: Progressing  Goal: Participates in PT with improved pain control throughout the shift  Outcome: Progressing  Goal: Free from opioid side effects throughout the shift  Outcome: Progressing  Goal: Free from acute confusion related to pain meds throughout the shift  Outcome: Progressing     Problem: Skin  Goal: Decreased wound size/increased tissue granulation at next dressing change  Outcome: Progressing  Flowsheets (Taken 9/8/2024 1716)  Decreased wound size/increased tissue granulation at next dressing change: Promote sleep for wound healing  Goal: Participates in plan/prevention/treatment measures  Outcome: Progressing  Goal: Prevent/manage excess moisture  Outcome: Progressing  Goal: Prevent/minimize sheer/friction injuries  Outcome: Progressing  Goal: Promote/optimize nutrition  Outcome: Progressing  Goal: Promote skin healing  Outcome: Progressing     Problem: Pain - Adult  Goal: Verbalizes/displays adequate comfort level or baseline comfort level  Outcome: Progressing     Problem: Safety - Adult  Goal: Free from fall injury  Outcome: Progressing     Problem: Discharge Planning  Goal: Discharge to home or other facility with appropriate resources  Outcome: Progressing     Problem: Chronic Conditions and Co-morbidities  Goal: Patient's chronic conditions and co-morbidity symptoms are monitored and maintained or improved  Outcome: Progressing     Problem: Nutrition  Goal: Less than 5 days NPO/clear liquids  Outcome: Progressing  Goal: Oral intake  greater than 50%  Outcome: Progressing  Goal: Oral intake greater 75%  Outcome: Progressing  Goal: Consume prescribed supplement  Outcome: Progressing  Goal: Adequate PO fluid intake  Outcome: Progressing  Goal: Nutrition support goals are met within 48 hrs  Outcome: Progressing  Goal: Nutrition support is meeting 75% of nutrient needs  Outcome: Progressing  Goal: Tube feed tolerance  Outcome: Progressing  Goal: BG  mg/dL  Outcome: Progressing  Goal: Lab values WNL  Outcome: Progressing  Goal: Electrolytes WNL  Outcome: Progressing  Goal: Promote healing  Outcome: Progressing  Goal: Maintain stable weight  Outcome: Progressing  Goal: Reduce weight from edema/fluid  Outcome: Progressing  Goal: Gradual weight gain  Outcome: Progressing  Goal: Improve ostomy output  Outcome: Progressing     Problem: Fall/Injury  Goal: Not fall by end of shift  Outcome: Progressing  Goal: Be free from injury by end of the shift  Outcome: Progressing  Goal: Verbalize understanding of personal risk factors for fall in the hospital  Outcome: Progressing  Goal: Verbalize understanding of risk factor reduction measures to prevent injury from fall in the home  Outcome: Progressing  Goal: Use assistive devices by end of the shift  Outcome: Progressing  Goal: Pace activities to prevent fatigue by end of the shift  Outcome: Progressing     Problem: Swallowing  Goal: LTG - Patient will tolerate the least restrictive diet consistency to allow for safe consumption of daily meals  Outcome: Progressing  Goal: STG - Patient will tolerate recommended food and liquid consistencies without clinical signs and symptoms of aspirations on 100% of trials  Outcome: Progressing

## 2024-09-08 NOTE — SIGNIFICANT EVENT
Rapid Response RN Note    Rapid response RN for RADAR score 6 due to the recent VS listed below:     Vitals:    09/07/24 1014 09/07/24 1650 09/07/24 2252 09/08/24 0201   BP: 98/59 109/63 103/65 96/57   BP Location: Right arm Left arm Left arm Left arm   Patient Position: Lying Lying Lying Lying   Pulse: 82 82 79 82   Resp: 16 16 18 18   Temp: 37.7 °C (99.9 °F) 36.6 °C (97.9 °F) 36.6 °C (97.9 °F) 36.5 °C (97.7 °F)   TempSrc: Temporal Temporal Temporal Temporal   SpO2: 90% 91% 92% 92%   Weight:       Height:            Reviewed above VS with bedside RN via phone.   Per RN, no concerns at this time.  VS within patient's current trends.  No interventions by rapid response team indicated at this time.  Staff to page rapid response for any concerns or acute change in condition/VS.

## 2024-09-08 NOTE — SIGNIFICANT EVENT
DINA   09/08/24 0934   Onset Documentation   Rapid Response Initiated By Radar auto page   Pager Time [phone]   Arrival Time 0940   Event End Time 0955   Primary Reason for Call Radar auto page     RADAR page auto generated from VS -see documented VS. Radar score 7. Upon arrival pt comfortable in bed with family at his side. Spoke with his bedside nurse and no immediate concerns- VS consistent. Primary team having continued goals of care with patient.  Pt ok to remain on the floor for continued monitoring-bedside nurse will call with any concerns.

## 2024-09-09 ENCOUNTER — APPOINTMENT (OUTPATIENT)
Dept: PALLIATIVE MEDICINE | Facility: CLINIC | Age: 74
End: 2024-09-09
Payer: MEDICARE

## 2024-09-09 ENCOUNTER — HOSPITAL ENCOUNTER (OUTPATIENT)
Dept: RADIATION ONCOLOGY | Facility: HOSPITAL | Age: 74
Setting detail: RADIATION/ONCOLOGY SERIES
Discharge: HOME | End: 2024-09-09
Payer: MEDICARE

## 2024-09-09 ENCOUNTER — APPOINTMENT (OUTPATIENT)
Dept: RADIATION ONCOLOGY | Facility: HOSPITAL | Age: 74
End: 2024-09-09
Payer: MEDICARE

## 2024-09-09 ENCOUNTER — APPOINTMENT (OUTPATIENT)
Dept: RADIATION ONCOLOGY | Facility: CLINIC | Age: 74
End: 2024-09-09
Payer: MEDICARE

## 2024-09-09 DIAGNOSIS — Z51.0 ENCOUNTER FOR ANTINEOPLASTIC RADIATION THERAPY: ICD-10-CM

## 2024-09-09 DIAGNOSIS — C79.51 SECONDARY MALIGNANT NEOPLASM OF BONE (MULTI): ICD-10-CM

## 2024-09-09 DIAGNOSIS — C79.51 PAIN FROM BONE METASTASES (MULTI): Primary | ICD-10-CM

## 2024-09-09 DIAGNOSIS — C64: ICD-10-CM

## 2024-09-09 DIAGNOSIS — G89.3 PAIN FROM BONE METASTASES (MULTI): Primary | ICD-10-CM

## 2024-09-09 LAB
ALBUMIN SERPL BCP-MCNC: 2.5 G/DL (ref 3.4–5)
ANION GAP SERPL CALC-SCNC: 12 MMOL/L (ref 10–20)
BASOPHILS # BLD AUTO: 0.03 X10*3/UL (ref 0–0.1)
BASOPHILS NFR BLD AUTO: 0.2 %
BUN SERPL-MCNC: 7 MG/DL (ref 6–23)
CALCIUM SERPL-MCNC: 8.5 MG/DL (ref 8.6–10.6)
CHLORIDE SERPL-SCNC: 94 MMOL/L (ref 98–107)
CO2 SERPL-SCNC: 28 MMOL/L (ref 21–32)
CREAT SERPL-MCNC: 0.42 MG/DL (ref 0.5–1.3)
EGFRCR SERPLBLD CKD-EPI 2021: >90 ML/MIN/1.73M*2
EOSINOPHIL # BLD AUTO: 0.17 X10*3/UL (ref 0–0.4)
EOSINOPHIL NFR BLD AUTO: 1.1 %
ERYTHROCYTE [DISTWIDTH] IN BLOOD BY AUTOMATED COUNT: 15.9 % (ref 11.5–14.5)
GLUCOSE SERPL-MCNC: 180 MG/DL (ref 74–99)
HCT VFR BLD AUTO: 24.9 % (ref 41–52)
HGB BLD-MCNC: 7.5 G/DL (ref 13.5–17.5)
IMM GRANULOCYTES # BLD AUTO: 0.13 X10*3/UL (ref 0–0.5)
IMM GRANULOCYTES NFR BLD AUTO: 0.9 % (ref 0–0.9)
LYMPHOCYTES # BLD AUTO: 3.48 X10*3/UL (ref 0.8–3)
LYMPHOCYTES NFR BLD AUTO: 23.1 %
MAGNESIUM SERPL-MCNC: 1.62 MG/DL (ref 1.6–2.4)
MCH RBC QN AUTO: 25.3 PG (ref 26–34)
MCHC RBC AUTO-ENTMCNC: 30.1 G/DL (ref 32–36)
MCV RBC AUTO: 84 FL (ref 80–100)
MONOCYTES # BLD AUTO: 0.74 X10*3/UL (ref 0.05–0.8)
MONOCYTES NFR BLD AUTO: 4.9 %
NEUTROPHILS # BLD AUTO: 10.5 X10*3/UL (ref 1.6–5.5)
NEUTROPHILS NFR BLD AUTO: 69.8 %
NRBC BLD-RTO: 0 /100 WBCS (ref 0–0)
PHOSPHATE SERPL-MCNC: 2.3 MG/DL (ref 2.5–4.9)
PLATELET # BLD AUTO: 286 X10*3/UL (ref 150–450)
POTASSIUM SERPL-SCNC: 4.1 MMOL/L (ref 3.5–5.3)
RAD ONC MSQ ACTUAL FRACTIONS DELIVERED: 6
RAD ONC MSQ ACTUAL FRACTIONS DELIVERED: 6
RAD ONC MSQ ACTUAL SESSION DELIVERED DOSE: 300 CGRAY
RAD ONC MSQ ACTUAL SESSION DELIVERED DOSE: 300 CGRAY
RAD ONC MSQ ACTUAL TOTAL DOSE: 1800 CGRAY
RAD ONC MSQ ACTUAL TOTAL DOSE: 1800 CGRAY
RAD ONC MSQ ELAPSED DAYS: 13
RAD ONC MSQ ELAPSED DAYS: 13
RAD ONC MSQ LAST DATE: NORMAL
RAD ONC MSQ LAST DATE: NORMAL
RAD ONC MSQ PRESCRIBED FRACTIONAL DOSE: 300 CGRAY
RAD ONC MSQ PRESCRIBED FRACTIONAL DOSE: 300 CGRAY
RAD ONC MSQ PRESCRIBED NUMBER OF FRACTIONS: 10
RAD ONC MSQ PRESCRIBED NUMBER OF FRACTIONS: 10
RAD ONC MSQ PRESCRIBED TECHNIQUE: NORMAL
RAD ONC MSQ PRESCRIBED TECHNIQUE: NORMAL
RAD ONC MSQ PRESCRIBED TOTAL DOSE: 3000 CGRAY
RAD ONC MSQ PRESCRIBED TOTAL DOSE: 3000 CGRAY
RAD ONC MSQ START DATE: NORMAL
RAD ONC MSQ START DATE: NORMAL
RAD ONC MSQ TREATMENT COURSE NUMBER: 1
RAD ONC MSQ TREATMENT COURSE NUMBER: 1
RAD ONC MSQ TREATMENT SITE: NORMAL
RAD ONC MSQ TREATMENT SITE: NORMAL
RBC # BLD AUTO: 2.97 X10*6/UL (ref 4.5–5.9)
SODIUM SERPL-SCNC: 130 MMOL/L (ref 136–145)
WBC # BLD AUTO: 15.1 X10*3/UL (ref 4.4–11.3)

## 2024-09-09 PROCEDURE — 96376 TX/PRO/DX INJ SAME DRUG ADON: CPT

## 2024-09-09 PROCEDURE — 77412 RADIATION TX DELIVERY LVL 3: CPT | Performed by: STUDENT IN AN ORGANIZED HEALTH CARE EDUCATION/TRAINING PROGRAM

## 2024-09-09 PROCEDURE — 80069 RENAL FUNCTION PANEL: CPT

## 2024-09-09 PROCEDURE — 2500000001 HC RX 250 WO HCPCS SELF ADMINISTERED DRUGS (ALT 637 FOR MEDICARE OP): Performed by: HOME HEALTH AIDE

## 2024-09-09 PROCEDURE — G0378 HOSPITAL OBSERVATION PER HR: HCPCS

## 2024-09-09 PROCEDURE — 36415 COLL VENOUS BLD VENIPUNCTURE: CPT

## 2024-09-09 PROCEDURE — 83735 ASSAY OF MAGNESIUM: CPT

## 2024-09-09 PROCEDURE — 2500000001 HC RX 250 WO HCPCS SELF ADMINISTERED DRUGS (ALT 637 FOR MEDICARE OP)

## 2024-09-09 PROCEDURE — 2500000004 HC RX 250 GENERAL PHARMACY W/ HCPCS (ALT 636 FOR OP/ED)

## 2024-09-09 PROCEDURE — 77387 GUIDANCE FOR RADJ TX DLVR: CPT | Performed by: RADIOLOGY

## 2024-09-09 PROCEDURE — 96367 TX/PROPH/DG ADDL SEQ IV INF: CPT

## 2024-09-09 PROCEDURE — 97165 OT EVAL LOW COMPLEX 30 MIN: CPT | Mod: GO

## 2024-09-09 PROCEDURE — 85025 COMPLETE CBC W/AUTO DIFF WBC: CPT

## 2024-09-09 PROCEDURE — 96372 THER/PROPH/DIAG INJ SC/IM: CPT

## 2024-09-09 PROCEDURE — 97530 THERAPEUTIC ACTIVITIES: CPT | Mod: GP

## 2024-09-09 PROCEDURE — 99233 SBSQ HOSP IP/OBS HIGH 50: CPT

## 2024-09-09 PROCEDURE — 96366 THER/PROPH/DIAG IV INF ADDON: CPT

## 2024-09-09 PROCEDURE — 97161 PT EVAL LOW COMPLEX 20 MIN: CPT | Mod: GP

## 2024-09-09 PROCEDURE — 2500000005 HC RX 250 GENERAL PHARMACY W/O HCPCS

## 2024-09-09 RX ORDER — HYDROMORPHONE HYDROCHLORIDE 1 MG/ML
0.4 INJECTION, SOLUTION INTRAMUSCULAR; INTRAVENOUS; SUBCUTANEOUS ONCE
Status: COMPLETED | OUTPATIENT
Start: 2024-09-09 | End: 2024-09-09

## 2024-09-09 ASSESSMENT — ACTIVITIES OF DAILY LIVING (ADL)
BATHING_ASSISTANCE: MAXIMAL
ADL_ASSISTANCE: NEEDS ASSISTANCE
ADL_ASSISTANCE: NEEDS ASSISTANCE

## 2024-09-09 ASSESSMENT — PAIN - FUNCTIONAL ASSESSMENT
PAIN_FUNCTIONAL_ASSESSMENT: 0-10

## 2024-09-09 ASSESSMENT — PAIN DESCRIPTION - DESCRIPTORS: DESCRIPTORS: ACHING

## 2024-09-09 ASSESSMENT — COGNITIVE AND FUNCTIONAL STATUS - GENERAL
TURNING FROM BACK TO SIDE WHILE IN FLAT BAD: A LITTLE
DRESSING REGULAR UPPER BODY CLOTHING: TOTAL
MOVING TO AND FROM BED TO CHAIR: A LOT
TURNING FROM BACK TO SIDE WHILE IN FLAT BAD: A LOT
PERSONAL GROOMING: A LOT
CLIMB 3 TO 5 STEPS WITH RAILING: TOTAL
TOILETING: A LOT
WALKING IN HOSPITAL ROOM: A LOT
HELP NEEDED FOR BATHING: A LOT
MOBILITY SCORE: 13
MOVING FROM LYING ON BACK TO SITTING ON SIDE OF FLAT BED WITH BEDRAILS: A LITTLE
DRESSING REGULAR LOWER BODY CLOTHING: TOTAL
WALKING IN HOSPITAL ROOM: A LOT
DAILY ACTIVITIY SCORE: 13
PERSONAL GROOMING: A LOT
DRESSING REGULAR UPPER BODY CLOTHING: A LOT
MOBILITY SCORE: 8
CLIMB 3 TO 5 STEPS WITH RAILING: TOTAL
MOVING TO AND FROM BED TO CHAIR: TOTAL
EATING MEALS: A LITTLE
DRESSING REGULAR LOWER BODY CLOTHING: TOTAL
CLIMB 3 TO 5 STEPS WITH RAILING: TOTAL
STANDING UP FROM CHAIR USING ARMS: A LOT
MOVING TO AND FROM BED TO CHAIR: A LOT
MOVING FROM LYING ON BACK TO SITTING ON SIDE OF FLAT BED WITH BEDRAILS: A LOT
WALKING IN HOSPITAL ROOM: TOTAL
HELP NEEDED FOR BATHING: TOTAL
STANDING UP FROM CHAIR USING ARMS: A LOT
DAILY ACTIVITIY SCORE: 9
MOBILITY SCORE: 13
TURNING FROM BACK TO SIDE WHILE IN FLAT BAD: A LITTLE
STANDING UP FROM CHAIR USING ARMS: TOTAL
TOILETING: TOTAL
MOVING FROM LYING ON BACK TO SITTING ON SIDE OF FLAT BED WITH BEDRAILS: A LITTLE

## 2024-09-09 ASSESSMENT — PAIN DESCRIPTION - LOCATION: LOCATION: BACK

## 2024-09-09 ASSESSMENT — PAIN SCALES - GENERAL
PAINLEVEL_OUTOF10: 6
PAINLEVEL_OUTOF10: 7
PAINLEVEL_OUTOF10: 4
PAINLEVEL_OUTOF10: 3

## 2024-09-09 ASSESSMENT — PAIN DESCRIPTION - ORIENTATION: ORIENTATION: LOWER

## 2024-09-09 NOTE — PROGRESS NOTES
Physical Therapy    Physical Therapy Evaluation & Treatment    Patient Name: Christophe Ambriz  MRN: [id number]  Today's Date: 9/9/2024   Time Calculation  Start Time: 0915  Stop Time: 1017  Time Calculation (min): 62 min    Assessment/Plan   PT Assessment  PT Assessment Results: Decreased strength, Decreased endurance, Impaired balance, Decreased mobility, Decreased coordination, Decreased skin integrity, Orthopedic restrictions, Pain  Rehab Prognosis: Good  Barriers to Discharge: none  Evaluation/Treatment Tolerance: Patient limited by fatigue, Patient limited by pain  Medical Staff Made Aware: Yes  Strengths: Attitude of self  Barriers to Participation: Comorbidities, Coping skills  End of Session Communication: Bedside nurse  Assessment Comment: The pt is very deconditioned with c/o increased pain with movement, but able to perform supine to sit to supine transfers with max assistance and presented with minimal assist static and dynamic sitting balance at the EOB. The pt has the potential for improved strength and mobility.  End of Session Patient Position: Bed, 3 rail up, Alarm off, caregiver present   IP OR SWING BED PT PLAN  Inpatient or Swing Bed: Inpatient  PT Plan  Treatment/Interventions: Bed mobility, Transfer training, Gait training, Balance training, Strengthening, Endurance training, Therapeutic exercise, Therapeutic activity, Home exercise program  PT Plan: Ongoing PT  PT Frequency: 3 times per week  PT Discharge Recommendations: Moderate intensity level of continued care  Equipment Recommended upon Discharge:  (none)  PT Recommended Transfer Status: Assist x1  PT - OK to Discharge: Yes      Subjective     General Visit Information:  General  Reason for Referral: Aspiration pneumonia and worsening back pain  Past Medical History Relevant to Rehab: CAD, HTN, HLD and RCC with spinal mets s/p L1-5 fusion w/ L2-4 decompression and tumor debulking (7/24)  Family/Caregiver Present: Yes  Caregiver Feedback:  Wife present with good support.  Prior to Session Communication: Bedside nurse  Patient Position Received: Bed, 3 rail up, Alarm off, caregiver present  Preferred Learning Style: verbal, visual, written  General Comment: The pt was pleasant, cooperative and willing to participate in therapy.  Home Living:  Home Living  Type of Home: Apartment  Lives With: Spouse  Home Adaptive Equipment: Wheelchair-manual  Home Layout: One level  Home Access: Level entry  Bathroom Shower/Tub: Tub/shower unit  Bathroom Toilet: Handicapped height  Bathroom Equipment: Grab bars in shower, Shower chair without back, Grab bars around toilet  Prior Level of Function:  Prior Function Per Pt/Caregiver Report  Level of [place]: Needs assistance with ADLs, Needs assistance with homemaking, Needs assistance with functional transfers  Receives Help From: Family  ADL Assistance: Needs assistance  Homemaking Assistance: Needs assistance  Ambulatory Assistance: Needs assistance  Vocational: Retired  Leisure: Motorcycles  Hand Dominance: Right  Prior Function Comments: The pt has been bed bound since 5/24.  Precautions:  Precautions  Hearing/Visual Limitations: Hearing and vision WFL with reading glasses.  Medical Precautions: Fall precautions, Oxygen therapy device and L/min, Spinal precautions  Precautions Comment: Pt in compliance with precautions throughout PT session.    Vital Signs (Past 2hrs)        Date/Time Vitals Session Patient Position Pulse Resp SpO2 BP MAP (mmHg)    09/09/24 0915 During PT  Lying  84  --  94 %  --  --                   Objective   Pain:  Pain Assessment  Pain Assessment: 0-10  0-10 (Numeric) Pain Score: 3  Pain Type: Acute pain, Surgical pain  Pain Location: Back  Pain Orientation: Right, Left, Lower  Pain Descriptors: Aching  Pain Frequency: Constant/continuous  Pain Onset: Ongoing  Clinical Progression: Gradually worsening (with movement)  Pain Interventions: Therapeutic presence  Response to Interventions:  Pain with slight increase  Cognition:  Cognition  Overall Cognitive Status: Within Functional Limits    General Assessments:  General Observation  General Observation: The pt is very deconditioned with c/o increased low back pain with movement. The pt required extensive education on the benefits of movement and the risks of immobility. The pt received education on subsequent POC and how to perform bed mobility when pain is a barrier.     Activity Tolerance  Endurance: Decreased tolerance for upright activites    Sensation  Light Touch: No apparent deficits    Strength  Strength Comments: Generalized weakness L>R  Perception  Inattention/Neglect: Appears intact      Coordination  Movements are Fluid and Coordinated: No  Coordination Comment: Luis UE has good coordination, Luis LE has impaired coordination    Postural Control  Postural Control: Impaired  Posture Comment: Pt presented with impaired sitting posture.    Static Sitting Balance  Static Sitting-Balance Support: Bilateral upper extremity supported, Feet supported  Static Sitting-Level of Assistance: Minimum assistance  Static Sitting-Comment/Number of Minutes: Sitting EOB  Dynamic Sitting Balance  Dynamic Sitting-Balance Support: Bilateral upper extremity supported, Feet supported  Dynamic Sitting-Level of Assistance: Minimum assistance  Dynamic Sitting-Comments: Sitting EOB    Static Standing Balance  Static Standing-Balance Support:  (not assessed)  Dynamic Standing Balance  Dynamic Standing-Balance Support:  (not assessed)  Functional Assessments:  Bed Mobility  Bed Mobility: Yes  Bed Mobility 1  Bed Mobility 1: Supine to sitting  Level of Assistance 1: Maximum assistance  Bed Mobility Comments 1: lateral roll technique  Bed Mobility 2  Bed Mobility  2: Sitting to supine  Level of Assistance 2: Maximum assistance  Bed Mobility Comments 2: lateral roll technique    Transfers  Transfer: No    Ambulation/Gait Training  Ambulation/Gait Training Performed:  No    Stairs  Stairs: No  Extremity/Trunk Assessments:  Cervical Spine   Cervical Spine: Within Functional Limits  Lumbar Spine   Lumbar Spine : Exceptions to Funtional Limits  Lumbar Spine Comment: Decreased strength and ROM    RUE   RUE : Exceptions to WFL  RUE AROM (degrees)  RUE AROM Comment: [facility]  RUE Strength  R Shoulder Flexion: 3+/5  R Elbow Flexion: 4-/5  R Elbow Extension: 4-/5  R Wrist Flexion: 5/5  R Wrist Extension: 5/5  LUE   LUE: Exceptions to WFL  LUE AROM (degrees)  LUE AROM Comment: WFL  LUE Strength  L Shoulder Flexion: 3/5  L Elbow Flexion: 4-/5  L Elbow Extension: 4-/5  L Wrist Flexion: 5/5  L Wrist Extension: 5/5  RLE   RLE : Exceptions to WFL  AROM RLE (degrees)  RLE AROM Comment: [facility]  Strength RLE  R Hip Flexion: 3-/5  R Knee Flexion: 3/5  R Knee Extension: 3/5  R Ankle Dorsiflexion: 4+/5  R Ankle Plantar Flexion: 4+/5  LLE   LLE : Exceptions to WFL  AROM LLE (degrees)  LLE AROM Comment: [facility]  Strength LLE  L Hip Flexion: 3-/5  L Knee Flexion: 3-/5  L Knee Extension: 3-/5  L Ankle Dorsiflexion: 4+/5  L Ankle Plantar Flexion: 4+/5  Treatments:     Bed Mobility  Bed Mobility: Yes  Bed Mobility 1  Bed Mobility 1: Supine to sitting  Level of Assistance 1: Maximum assistance  Bed Mobility Comments 1: lateral roll technique  Bed Mobility 2  Bed Mobility  2: Sitting to supine  Level of Assistance 2: Maximum assistance  Bed Mobility Comments 2: lateral roll technique    Ambulation/Gait Training  Ambulation/Gait Training Performed: No  Transfers  Transfer: No    Stairs  Stairs: No  Outcome Measures:  [facility] Basic Mobility  Turning from your back to your side while in a flat bed without using bedrails: A lot  Moving from lying on your back to sitting on the side of a flat bed without using bedrails: A lot  Moving to and from bed to chair (including a wheelchair): Total  Standing up from a chair using your arms (e.g. wheelchair or bedside chair): Total  To walk in hospital room: Total  Climbing 3-5 steps  with railing: Total  Basic Mobility - Total Score: 8    Encounter Problems       Encounter Problems (Active)       Balance       STG - Maintains minimal assistance dynamic standing balance with upper extremity support using a wheeled walker. (Progressing)       Start:  09/09/24    Expected End:  09/23/24            STG - Maintains minimal assistance static standing balance with upper extremity support using a wheeled walker. (Progressing)       Start:  09/09/24    Expected End:  09/23/24               Mobility       STG - Patient will ambulate 10ft with minimal assistance using a wheeled walker. (Progressing)       Start:  09/09/24    Expected End:  09/23/24               PT Transfers       STG - Transfer from bed to chair with minimal assistance using a wheeled walker. (Progressing)       Start:  09/09/24    Expected End:  09/23/24            STG - Patient to transfer to and from sit to supine with minimal assistance. (Progressing)       Start:  09/09/24    Expected End:  09/23/24            STG - Patient will transfer sit to and from stand with minimal assistance using a wheeled walker. (Progressing)       Start:  09/09/24    Expected End:  09/23/24                   Education Documentation  Handouts, taught by Bakari Jacques PT at 9/9/2024 10:58 AM.  Learner: Significant Other, Patient  Readiness: Acceptance  Method: Explanation, Demonstration  Response: Verbalizes Understanding, Demonstrated Understanding    Body Mechanics, taught by Bakari Jacques PT at 9/9/2024 10:58 AM.  Learner: Significant Other, Patient  Readiness: Acceptance  Method: Explanation, Demonstration  Response: Verbalizes Understanding, Demonstrated Understanding    Home Exercise Program, taught by Bakari Jacques PT at 9/9/2024 10:58 AM.  Learner: Significant Other, Patient  Readiness: Acceptance  Method: Explanation, Demonstration  Response: Verbalizes Understanding, Demonstrated Understanding    Precautions, taught by Bakari BLANK  Sawyer, PT at 9/9/2024 10:58 AM.  Learner: Significant Other, Patient  Readiness: Acceptance  Method: Explanation, Demonstration  Response: Verbalizes Understanding, Demonstrated Understanding    Mobility Training, taught by Bakari Jacques, PT at 9/9/2024 10:58 AM.  Learner: Significant Other, Patient  Readiness: Acceptance  Method: Explanation, Demonstration  Response: Verbalizes Understanding, Demonstrated Understanding    Education Comments  No comments found.

## 2024-09-09 NOTE — PROGRESS NOTES
Christophe Ambriz is a 74 y.o. male on day 3 of admission presenting with Aspiration pneumonia, unspecified aspiration pneumonia type, unspecified laterality, unspecified part of lung (Multi).    Subjective   Seen at bedside this AM. Pt c/o moderate pain, but states that pain medication providing relief. Discussed with pt and pt wife that pt needs to participate in PT today, otherwise he will need to be discharged and complete his remaining radiation fractions outpt. Pt agreeable. Also discussed that by working with PT they will be able to evaluate him for possible placement needs. No current complaints of bowel or bladder issues. Denies fever/chills, N/V/D/C, bleeding, dizziness, SOB, H/A and vision changes.     Objective     Physical Exam  Constitutional:       General: He is not in acute distress.     Appearance: He is not ill-appearing.      Comments: cachexia   HENT:      Head: Normocephalic and atraumatic.      Nose: Nose normal.      Mouth/Throat:      Mouth: Mucous membranes are moist.   Eyes:      General: No scleral icterus.     Extraocular Movements: Extraocular movements intact.      Pupils: Pupils are equal, round, and reactive to light.   Cardiovascular:      Rate and Rhythm: Normal rate and regular rhythm.      Pulses: Normal pulses.      Heart sounds: Normal heart sounds.   Pulmonary:      Effort: Pulmonary effort is normal.      Breath sounds: Rhonchi present.      Comments: On 2.5 L NC  Abdominal:      General: Abdomen is flat. Bowel sounds are normal. There is no distension.      Palpations: Abdomen is soft.      Tenderness: There is no abdominal tenderness. There is no guarding.   Musculoskeletal:         General: No swelling, deformity or signs of injury. Normal range of motion.      Cervical back: Normal range of motion and neck supple.   Skin:     General: Skin is warm and dry.      Findings: Bruising present. No erythema or rash.   Neurological:      General: No focal deficit present.       "Mental Status: He is alert and oriented to person, place, and time.      Cranial Nerves: No cranial nerve deficit.      Sensory: No sensory deficit.      Motor: Weakness (generalized) present.   Psychiatric:         Mood and Affect: Mood normal.         Behavior: Behavior normal.      Comments: Fluent speech, intermittently cooperative       Last Recorded Vitals  Blood pressure 106/58, pulse 87, temperature 37 °C (98.6 °F), temperature source Temporal, resp. rate 18, height 1.85 m (6' 0.84\"), weight 61.8 kg (136 lb 3.9 oz), SpO2 91%.  Intake/Output last 3 Shifts:  I/O last 3 completed shifts:  In: 1012.5 (16.4 mL/kg) [IV Piggyback:1012.5]  Out: - (0 mL/kg)   Weight: 61.8 kg     Relevant Results  Results for orders placed or performed during the hospital encounter of 09/03/24 (from the past 24 hour(s))   CBC and Auto Differential   Result Value Ref Range    WBC 15.1 (H) 4.4 - 11.3 x10*3/uL    nRBC 0.0 0.0 - 0.0 /100 WBCs    RBC 2.97 (L) 4.50 - 5.90 x10*6/uL    Hemoglobin 7.5 (L) 13.5 - 17.5 g/dL    Hematocrit 24.9 (L) 41.0 - 52.0 %    MCV 84 80 - 100 fL    MCH 25.3 (L) 26.0 - 34.0 pg    MCHC 30.1 (L) 32.0 - 36.0 g/dL    RDW 15.9 (H) 11.5 - 14.5 %    Platelets 286 150 - 450 x10*3/uL    Neutrophils % 69.8 40.0 - 80.0 %    Immature Granulocytes %, Automated 0.9 0.0 - 0.9 %    Lymphocytes % 23.1 13.0 - 44.0 %    Monocytes % 4.9 2.0 - 10.0 %    Eosinophils % 1.1 0.0 - 6.0 %    Basophils % 0.2 0.0 - 2.0 %    Neutrophils Absolute 10.50 (H) 1.60 - 5.50 x10*3/uL    Immature Granulocytes Absolute, Automated 0.13 0.00 - 0.50 x10*3/uL    Lymphocytes Absolute 3.48 (H) 0.80 - 3.00 x10*3/uL    Monocytes Absolute 0.74 0.05 - 0.80 x10*3/uL    Eosinophils Absolute 0.17 0.00 - 0.40 x10*3/uL    Basophils Absolute 0.03 0.00 - 0.10 x10*3/uL   Magnesium   Result Value Ref Range    Magnesium 1.62 1.60 - 2.40 mg/dL   Renal Function Panel   Result Value Ref Range    Glucose 180 (H) 74 - 99 mg/dL    Sodium 130 (L) 136 - 145 mmol/L    Potassium " 4.1 3.5 - 5.3 mmol/L    Chloride 94 (L) 98 - 107 mmol/L    Bicarbonate 28 21 - 32 mmol/L    Anion Gap 12 10 - 20 mmol/L    Urea Nitrogen 7 6 - 23 mg/dL    Creatinine 0.42 (L) 0.50 - 1.30 mg/dL    eGFR >90 >60 mL/min/1.73m*2    Calcium 8.5 (L) 8.6 - 10.6 mg/dL    Phosphorus 2.3 (L) 2.5 - 4.9 mg/dL    Albumin 2.5 (L) 3.4 - 5.0 g/dL           Assessment/Plan   Assessment & Plan  Aspiration pneumonia, unspecified aspiration pneumonia type, unspecified laterality, unspecified part of lung (Multi)    Christophe Ambriz is a 74 y.o. male with a hx of CAD, HTN, HLD and RCC with spinal mets s/p L1-5 fusion w/ L2-4 decompression and tumor debulking (7/24) presented initially to OSH with worsening back pain. He was noted to be hypoxic (87% on RA) and placed on 2L O2. CT PE was negative for PE, showed interval improvement in bilateral pulmonary opacities, persistent but improved tree-in-bud opacities, and reveals probable aspiration. Per patient, he is here for radiation sessions that he cannot attend outpatient. Speech therapy consulted on admit, rec pureed solids with thin liquids (pt refusing thickened liquids at [facility]) (9/3). On 9/4, pt cleared for regular solids and thin liquids. As of 9/3, pt refusing to work with PT/OT; team reiterating importance of participation while inpatient to improve performance status for for future treatment. Wound care consulted on 9/5 for pressure wound on sacrum. On 9/9, pt agreeable to participate in PT therapy, rec SNF placement. DC pending facility placement and completion of palliative RT.    #Metastatic renal cell carcinoma  #Back and leg pain  - L1-5 fusion w/ L2-4 decompression and tumor debulking (7/24)  at [facility] with NSGY  - MRI 8/14: L3 compression fracture with retropulsion of the posterior cortex and severe stenosis of the central spinal canal with nerve root compression unchanged from the previous exam *No new foci of marrow replacement or compression fracture *There is no  measurable change compared to the previous exam  - Recently DC 8/27/24 with plans to complete radiation outpatient  - Has not attended radiation sessions due to immobility-Last EBRT 8/27/24  - Presented to OSH for continued back pain, no worse from prior admit; here for inpatient radiation therapy  - Follows with Dr. Chavarria; FUV requested (9/3)   - 9/6 Dr. Chavarria at bedside- discussed with pt and family importance/need for pt to be at a performance status to receive systemic treatment, otherwise recommending supportive measures and hospice- Pt not agreeable. Pt agreeable to work with PT today (9/6) - pt did decline PT 9/6  - Ongoing GOC conversation concerning patient's wishes are to pursue systemic therapy vs comfort care, pt agreeable to work with PT 9/9 and still interested in pursuing systemic therapy  - Rad onc consulted 9/3, plan daily radiation for remaining fractions       - Received 8/27, 9/3, 9/4, 9/5, 9/6, 9/9  - Pt DID work with PT today, 9/9, PT rec SNF placement    #Pain control  - cont Gabapentin 300mg TID  - cont home oxycodone 5mg Q4 prn moderate pain, 10mg q4h severe pain  - Supportive oncology consulted, recs appreciated     #Hx Pneumonia  #Aspiration  - Recently discharged 8/27/24  - Last inpatient stay pertinent for MICU transfer for AHRF 2/2 multifocal pneumonia. Blood cultures positive for GPCC, and MRSA nares positive  - Completed inpatient course of Vanco/Zosyn  - CT PE at OSH-possible aspiration with improving tree-in-bud opacities and improvement in bilateral pulmonary opacities  - S/p Unasyn 3gm IV in ED/admit; will discontinue (low suspicion active infection given no systemic s/s, improving CT scan)   - Procal 0.05  - SLP consulted 9/3; c/w pureed textures and thin liquids; okay for regular diet (9/4)       #CAD s/p PCI (unknown year)  #HTN  #HLD  - continue home Atorvastatin, ASA, Plavix  - no current BP medications     #Leukocytosis, improving   - Admit WBC 25.5, down trending  from previous discharge (30.2, 30.6)--> 16.2 (9/5) --> 15.1 (9/9)  - CT Angio reveals probable aspiration pneumonia  - currently on Decadron taper   - No evidence of infection   - Trend CBC    #Prophy  - cont w/ Lovenox for prophylaxis  - cont w/ Protonix 40mg daily     DISPO:  - DNR/DNI - confirmed on admission  - PT/OT ordered, will likely need SNF dispo   - DC pending facility placement and radiation completion  - FUV: 10/1 with Onc (requested for sooner date 9/6)    I spent >60 minutes in the professional and overall care of this patient.     Assessment and plan discussed with attending physician Dr. San.    Kenisha Durant, VINNY-CNP

## 2024-09-09 NOTE — PROGRESS NOTES
Occupational Therapy        Evaluation    Patient Name: Christophe Ambriz  MRN: [id number]  Today's Date: 9/9/2024  Room: [address] [place] [address]  Time Calculation  Start Time: 1217  Stop Time: 1230  Time Calculation (min): 13 min    Assessment  IP OT Assessment  Prognosis: Fair  Barriers to Discharge: None  Evaluation/Treatment Tolerance: Patient limited by pain  End of Session Communication: Bedside nurse  End of Session Patient Position: Bed, 4 rail up, Alarm off, not on at start of session  Plan:  Inpatient Plan  Treatment Interventions: ADL retraining, Functional transfer training, UE strengthening/ROM, Endurance training, Patient/family training, Compensatory technique education  OT Frequency: 2 times per week  OT Discharge Recommendations: Low intensity level of continued care  OT Recommended Transfer Status: Maximum assist  OT - OK to Discharge: Yes  OT Assessment  OT Assessment Results: Decreased ADL status, Decreased upper extremity strength, Decreased safe judgment during ADL, Decreased endurance, Decreased functional mobility, Decreased IADLs  Prognosis: Fair  Barriers to Discharge: None  Evaluation/Treatment Tolerance: Patient limited by pain    Subjective   Current Problem:  1. Aspiration pneumonia, unspecified aspiration pneumonia type, unspecified laterality, unspecified part of lung (Multi)        2. 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 Pt with limited mobility d/t c/o unrated pain.   Precautions:  Medical Precautions: Fall precautions, Oxygen therapy device  "and L/min, Spinal precautions  Vital Signs:    Pain:  Pain Assessment  Pain Assessment: 0-10  0-10 (Numeric) Pain Score:  (unrated, repeatedly stating \"I dont know, this is all becoming normal\")  Lines/Tubes/Drains:         Objective   Cognition:  Insight: Moderate  Processing Speed: Delayed           Home Living:  Type of Home: Apartment  Lives With: Spouse  Home Adaptive Equipment: Wheelchair-manual  Home Layout: One level  Home Access: Level entry  Bathroom Shower/Tub: Tub/shower unit  Bathroom Toilet: Handicapped height  Bathroom Equipment: Grab bars in shower, Shower chair without back, Grab bars around toilet   Prior Function:  Level of [place]: Needs assistance with ADLs, Needs assistance with homemaking, Needs assistance with functional transfers  Receives Help From: Family (wife helps)  ADL Assistance: Needs assistance  Homemaking Assistance: Needs assistance  Ambulatory Assistance: Needs assistance  Vocational: Retired  Hand Dominance: Right  Prior Function Comments: wife reports she has been helping with ADLs, cooking and cleaning. Pt denies falls  IADL History:     ADL:  Eating Assistance: Stand by  Grooming Assistance: Stand by  Grooming Deficit: Setup  Bathing Assistance: Maximal  UE Dressing Assistance: Minimal  LE Dressing Assistance: Total  LE Dressing Deficit: Don/doff R sock, Don/doff L sock, Thread RLE into pants, Thread LLE into pants, Pull up over hips  Toileting Assistance with Device: Total  Activity Tolerance:  Endurance: Decreased tolerance for upright activites  Balance:     Bed Mobility/Transfers: Bed Mobility  Bed Mobility: Yes  Bed Mobility 1  Bed Mobility 1: Rolling left  Level of Assistance 1: Maximum assistance  Bed Mobility Comments 1: attempted supine to sit, pt tolerated rolling to L side with Max A, pt declined further transfer d/t unrated pain   and         Vision: Vision - Basic Assessment  Current Vision: Wears glasses all the time   and    Sensation:  Light Touch: No " apparent deficits  Strength:  Strength Comments: deconditioned throughout    Extremities: RUE   RUE : Exceptions to WFL  RUE Strength  RUE Overall Strength: Greater than or equal to 3/5 as evidenced by functional mobility, LUE   LUE: Exceptions to WFL  LUE Strength  LUE Overall Strength: Greater than or equal to 3/5 as evidenced by functional mobility, RLE   RLE : Exceptions to WFL  Strength RLE  RLE Overall Strength: Greater than or equal to 3/5 as evidenced by functional mobility, and LLE   LLE : Exceptions to WFL  Strength LLE  LLE Overall Strength: Greater than or equal to 3/5 as evidenced by functional mobility    Outcome Measures: [facility] Daily Activity  Putting on and taking off regular lower body clothing: Total  Bathing (including washing, rinsing, drying): A lot  Putting on and taking off regular upper body clothing: A lot  Toileting, which includes using toilet, bedpan or urinal: A lot  Taking care of personal grooming such as brushing teeth: A lot  Eating Meals: None  Daily Activity - Total Score: 13         ,     OT Adult Other Outcome Measures  4AT: negative    Education Documentation  Body Mechanics, taught by Deb Fairchild OT at 9/9/2024  2:28 PM.  Learner: Family, Patient  Readiness: Acceptance  Method: Explanation, Demonstration  Response: Needs Reinforcement    Precautions, taught by Deb Fairchild OT at 9/9/2024  2:28 PM.  Learner: Family, Patient  Readiness: Acceptance  Method: Explanation, Demonstration  Response: Needs Reinforcement    ADL Training, taught by Deb Fairchild OT at 9/9/2024  2:28 PM.  Learner: Family, Patient  Readiness: Acceptance  Method: Explanation, Demonstration  Response: Needs Reinforcement    Education Comments  No comments found.        Goals:     Encounter Problems       Encounter Problems (Active)       ADLs       Patient with complete upper body dressing with contact guard assist level of assistance donning and doffing all UE clothes with no adaptive  equipment while edge of bed  (Progressing)       Start:  09/09/24    Expected End:  09/23/24            Patient with complete lower body dressing with minimal assist  and moderate assist level of assistance donning and doffing all LE clothes  with PRN adaptive equipment while edge of bed  (Progressing)       Start:  09/09/24    Expected End:  09/23/24            Patient will complete daily grooming tasks brushing teeth and washing face/hair with stand by assist level of assistance and PRN adaptive equipment while edge of bed . (Progressing)       Start:  09/09/24    Expected End:  09/23/24            Patient will complete toileting including hygiene clothing management/hygiene with minimal assist  level of assistance and bedside commode. (Progressing)       Start:  09/09/24    Expected End:  09/23/24               EXERCISE/STRENGTHENING       Patient will participate in BUE exercises in order to improve strength and activity for ADL performance.  (Progressing)       Start:  09/09/24    Expected End:  09/23/24               TRANSFERS       Patient will perform bed mobility minimal assist  level of assistance and bed rails in order to improve safety and independence with mobility (Progressing)       Start:  09/09/24    Expected End:  09/23/24            Patient will complete functional transfers with least restrictive device with moderate assist level of assistance. (Progressing)       Start:  09/09/24    Expected End:  09/23/24 09/09/24 at 2:30 PM   Deb Fairchild OT   Rehab Office: [phone]

## 2024-09-09 NOTE — PROGRESS NOTES
09/09/24 1200   Discharge Planning   Who is requesting discharge planning? Provider   Home or Post Acute Services Post acute facilities (Rehab/SNF/etc)   Type of Post Acute Facility Services Skilled nursing   Expected Discharge Disposition SNF   Does the patient need discharge transport arranged? Yes   RoundTrip coordination needed? Yes   Has discharge transport been arranged? No     9/9/24 @ 1220  Met with patient and his SO at bedside. They worked with PT this morning. PT rec SNF. Their FOC is [facility]. Referral sent via [facility].  No bed available at this time. Will obtain additional choices.  Evelyn Grant, BAL TCC

## 2024-09-10 ENCOUNTER — APPOINTMENT (OUTPATIENT)
Dept: RADIATION ONCOLOGY | Facility: HOSPITAL | Age: 74
End: 2024-09-10
Payer: MEDICARE

## 2024-09-10 ENCOUNTER — APPOINTMENT (OUTPATIENT)
Dept: RADIATION ONCOLOGY | Facility: CLINIC | Age: 74
End: 2024-09-10
Payer: MEDICARE

## 2024-09-10 ENCOUNTER — HOSPITAL ENCOUNTER (OUTPATIENT)
Dept: RADIATION ONCOLOGY | Facility: HOSPITAL | Age: 74
Setting detail: RADIATION/ONCOLOGY SERIES
Discharge: HOME | End: 2024-09-10
Payer: MEDICARE

## 2024-09-10 DIAGNOSIS — C64: ICD-10-CM

## 2024-09-10 DIAGNOSIS — Z51.0 ENCOUNTER FOR ANTINEOPLASTIC RADIATION THERAPY: ICD-10-CM

## 2024-09-10 DIAGNOSIS — C79.51 SECONDARY MALIGNANT NEOPLASM OF BONE (MULTI): ICD-10-CM

## 2024-09-10 LAB
ALBUMIN SERPL BCP-MCNC: 2.7 G/DL (ref 3.4–5)
ANION GAP SERPL CALC-SCNC: 13 MMOL/L (ref 10–20)
BASOPHILS # BLD AUTO: 0.02 X10*3/UL (ref 0–0.1)
BASOPHILS NFR BLD AUTO: 0.2 %
BUN SERPL-MCNC: 8 MG/DL (ref 6–23)
CALCIUM SERPL-MCNC: 8.7 MG/DL (ref 8.6–10.6)
CHLORIDE SERPL-SCNC: 94 MMOL/L (ref 98–107)
CO2 SERPL-SCNC: 29 MMOL/L (ref 21–32)
CREAT SERPL-MCNC: 0.48 MG/DL (ref 0.5–1.3)
EGFRCR SERPLBLD CKD-EPI 2021: >90 ML/MIN/1.73M*2
EOSINOPHIL # BLD AUTO: 0.21 X10*3/UL (ref 0–0.4)
EOSINOPHIL NFR BLD AUTO: 1.7 %
ERYTHROCYTE [DISTWIDTH] IN BLOOD BY AUTOMATED COUNT: 16.2 % (ref 11.5–14.5)
GLUCOSE SERPL-MCNC: 132 MG/DL (ref 74–99)
HCT VFR BLD AUTO: 27.2 % (ref 41–52)
HGB BLD-MCNC: 8.1 G/DL (ref 13.5–17.5)
IMM GRANULOCYTES # BLD AUTO: 0.12 X10*3/UL (ref 0–0.5)
IMM GRANULOCYTES NFR BLD AUTO: 1 % (ref 0–0.9)
LYMPHOCYTES # BLD AUTO: 3.15 X10*3/UL (ref 0.8–3)
LYMPHOCYTES NFR BLD AUTO: 25.6 %
MAGNESIUM SERPL-MCNC: 1.93 MG/DL (ref 1.6–2.4)
MCH RBC QN AUTO: 25.3 PG (ref 26–34)
MCHC RBC AUTO-ENTMCNC: 29.8 G/DL (ref 32–36)
MCV RBC AUTO: 85 FL (ref 80–100)
MONOCYTES # BLD AUTO: 0.62 X10*3/UL (ref 0.05–0.8)
MONOCYTES NFR BLD AUTO: 5 %
NEUTROPHILS # BLD AUTO: 8.2 X10*3/UL (ref 1.6–5.5)
NEUTROPHILS NFR BLD AUTO: 66.5 %
NRBC BLD-RTO: 0 /100 WBCS (ref 0–0)
PHOSPHATE SERPL-MCNC: 3.4 MG/DL (ref 2.5–4.9)
PLATELET # BLD AUTO: 342 X10*3/UL (ref 150–450)
POTASSIUM SERPL-SCNC: 4 MMOL/L (ref 3.5–5.3)
RAD ONC MSQ ACTUAL FRACTIONS DELIVERED: 7
RAD ONC MSQ ACTUAL FRACTIONS DELIVERED: 7
RAD ONC MSQ ACTUAL SESSION DELIVERED DOSE: 300 CGRAY
RAD ONC MSQ ACTUAL SESSION DELIVERED DOSE: 300 CGRAY
RAD ONC MSQ ACTUAL TOTAL DOSE: 2100 CGRAY
RAD ONC MSQ ACTUAL TOTAL DOSE: 2100 CGRAY
RAD ONC MSQ ELAPSED DAYS: 14
RAD ONC MSQ ELAPSED DAYS: 14
RAD ONC MSQ LAST DATE: NORMAL
RAD ONC MSQ LAST DATE: NORMAL
RAD ONC MSQ PRESCRIBED FRACTIONAL DOSE: 300 CGRAY
RAD ONC MSQ PRESCRIBED FRACTIONAL DOSE: 300 CGRAY
RAD ONC MSQ PRESCRIBED NUMBER OF FRACTIONS: 10
RAD ONC MSQ PRESCRIBED NUMBER OF FRACTIONS: 10
RAD ONC MSQ PRESCRIBED TECHNIQUE: NORMAL
RAD ONC MSQ PRESCRIBED TECHNIQUE: NORMAL
RAD ONC MSQ PRESCRIBED TOTAL DOSE: 3000 CGRAY
RAD ONC MSQ PRESCRIBED TOTAL DOSE: 3000 CGRAY
RAD ONC MSQ START DATE: NORMAL
RAD ONC MSQ START DATE: NORMAL
RAD ONC MSQ TREATMENT COURSE NUMBER: 1
RAD ONC MSQ TREATMENT COURSE NUMBER: 1
RAD ONC MSQ TREATMENT SITE: NORMAL
RAD ONC MSQ TREATMENT SITE: NORMAL
RBC # BLD AUTO: 3.2 X10*6/UL (ref 4.5–5.9)
SODIUM SERPL-SCNC: 132 MMOL/L (ref 136–145)
WBC # BLD AUTO: 12.3 X10*3/UL (ref 4.4–11.3)

## 2024-09-10 PROCEDURE — 2500000001 HC RX 250 WO HCPCS SELF ADMINISTERED DRUGS (ALT 637 FOR MEDICARE OP): Performed by: HOME HEALTH AIDE

## 2024-09-10 PROCEDURE — 77412 RADIATION TX DELIVERY LVL 3: CPT | Performed by: STUDENT IN AN ORGANIZED HEALTH CARE EDUCATION/TRAINING PROGRAM

## 2024-09-10 PROCEDURE — 96372 THER/PROPH/DIAG INJ SC/IM: CPT

## 2024-09-10 PROCEDURE — 36415 COLL VENOUS BLD VENIPUNCTURE: CPT

## 2024-09-10 PROCEDURE — 2500000004 HC RX 250 GENERAL PHARMACY W/ HCPCS (ALT 636 FOR OP/ED)

## 2024-09-10 PROCEDURE — 85025 COMPLETE CBC W/AUTO DIFF WBC: CPT

## 2024-09-10 PROCEDURE — 84100 ASSAY OF PHOSPHORUS: CPT

## 2024-09-10 PROCEDURE — 2500000001 HC RX 250 WO HCPCS SELF ADMINISTERED DRUGS (ALT 637 FOR MEDICARE OP)

## 2024-09-10 PROCEDURE — G0378 HOSPITAL OBSERVATION PER HR: HCPCS

## 2024-09-10 PROCEDURE — 77387 GUIDANCE FOR RADJ TX DLVR: CPT | Performed by: RADIOLOGY

## 2024-09-10 PROCEDURE — 83735 ASSAY OF MAGNESIUM: CPT

## 2024-09-10 PROCEDURE — 99233 SBSQ HOSP IP/OBS HIGH 50: CPT

## 2024-09-10 ASSESSMENT — COGNITIVE AND FUNCTIONAL STATUS - GENERAL
WALKING IN HOSPITAL ROOM: A LOT
STANDING UP FROM CHAIR USING ARMS: A LOT
TOILETING: TOTAL
HELP NEEDED FOR BATHING: TOTAL
CLIMB 3 TO 5 STEPS WITH RAILING: TOTAL
MOVING TO AND FROM BED TO CHAIR: A LOT
DRESSING REGULAR LOWER BODY CLOTHING: TOTAL
PERSONAL GROOMING: A LOT
TURNING FROM BACK TO SIDE WHILE IN FLAT BAD: A LITTLE
DAILY ACTIVITIY SCORE: 9
EATING MEALS: A LITTLE
MOBILITY SCORE: 13
MOVING FROM LYING ON BACK TO SITTING ON SIDE OF FLAT BED WITH BEDRAILS: A LITTLE
DRESSING REGULAR UPPER BODY CLOTHING: TOTAL

## 2024-09-10 ASSESSMENT — PAIN - FUNCTIONAL ASSESSMENT
PAIN_FUNCTIONAL_ASSESSMENT: 0-10

## 2024-09-10 ASSESSMENT — PAIN SCALES - GENERAL
PAINLEVEL_OUTOF10: 5 - MODERATE PAIN
PAINLEVEL_OUTOF10: 5 - MODERATE PAIN
PAINLEVEL_OUTOF10: 8
PAINLEVEL_OUTOF10: 5 - MODERATE PAIN

## 2024-09-10 ASSESSMENT — PAIN DESCRIPTION - LOCATION: LOCATION: BACK

## 2024-09-10 NOTE — CARE PLAN
The clinical goals for the shift include patient will remain safe and free from injury, will work with PT/OT throughout shift at 1900      Problem: Pain  Goal: Takes deep breaths with improved pain control throughout the shift  Outcome: Progressing     Problem: Pain  Goal: Turns in bed with improved pain control throughout the shift  Outcome: Progressing     Problem: Pain  Goal: Performs ADL's with improved pain control throughout shift  Outcome: Progressing     Problem: Skin  Goal: Prevent/minimize sheer/friction injuries  Outcome: Progressing     Problem: Skin  Goal: Promote/optimize nutrition  Outcome: Progressing

## 2024-09-10 NOTE — CARE PLAN
The patient's goals for the shift include      The clinical goals for the shift include Pt will remain safe and free from injury throughout shift ending 9/10 0700

## 2024-09-10 NOTE — PROGRESS NOTES
"SUPPORTIVE AND PALLIATIVE ONCOLOGY INPATIENT FOLLOW-UP      SERVICE DATE: 09/10/24     SUBJECTIVE:  Interval Events:  Aura Taylor spoke with pt and significant other on ; note states:   \"I discussed in detail options for him which include combination immunotherapy including ipilimumab and nivolumab versus oral TKI plus immunotherapy. Oral agent by itself probably would not be very effective and my concerns are his performance status is very poor and his prognosis unless he start systemic treatment is also poor and I do not see a pathway forward where he would be able to successfully come into the office unless he improves his performance status. If it remains as is the recommendation would strongly be on supportive measures and hospice which they were not agreeable to moving forward. I discussed with the primary team to keep me updated regarding his discharge and future plan and if his performance status does improve more than happy to see the patient ongoing to discuss systemic therapy. \"    Patient expressed interest in pursing systemic therapy  Pt had previously been refusing to work with PT and stated he would not work with PT until after RT completed.  Pt did however work with PT yesterday  Has not completed 7/10 fx of palliative RT (counting today); will complete course on .   PT recommending SNF placement    Pain Assessment:  Location:  lower back (lumbar area); also sacral/coccyx area (this pain began shortly after starting RT   Duration: Intermittent  Characteristics:   Ratin   Descriptors:  back pain: sharp, stabbing, ache that radiates down bilateral legs (usually stops at knees but sometimes goes down to toes; sacral/coccyx pain: constant burning    Aggravating: movement and lying in one position for too long     Relieving: Analgesics Oxycodone and Modifying activity   Interference with Function: Very Much    Pt reporting pain as well controlled    Opioid Use  Past 24 h prn opioid use: " Hydromorphone 0.4 mg IV x 1 doses = 1 mg = 5 OME  Oxycodone IR 5 mg PO x 21 doses = 10 mg = 6.2 OME  Oxycodone IR 10 mg PO x 1 dose=10 mg=12 OME  Total 24h OME use:  23.2 OME    Note: OME calculations based on equianalgesic table below. Please note this table is based on best available evidence but conversions are still approximate. These are NOT opioid DOSES for individual patient use; this is equivalency information.  Drug Parenteral Enteral   Morphine 10 25   Oxycodone N/A 20   Hydromorphone 2 5   Fentanyl 0.15 N/A   Tramadol N/A 120   Citation: Rafaela MARX. Demystifying opioid conversion calculations: A guide for effective dosing, Second edition. [place] Reyna: American Society of Health-System Pharmacists, 2018.    Symptom Assessment:  Nausea none  Constipation none  Lack of appetite a little    Information obtained from: chart review, interview of patient, and discussion with primary team  ______________________________________________________________________        OBJECTIVE:    Lab Results   Component Value Date    WBC 15.1 (H) 09/09/2024    HGB 7.5 (L) 09/09/2024    HCT 24.9 (L) 09/09/2024    MCV 84 09/09/2024     09/09/2024      Lab Results   Component Value Date    GLUCOSE 180 (H) 09/09/2024    CALCIUM 8.5 (L) 09/09/2024     (L) 09/09/2024    K 4.1 09/09/2024    CO2 28 09/09/2024    CL 94 (L) 09/09/2024    BUN 7 09/09/2024    CREATININE 0.42 (L) 09/09/2024     Lab Results   Component Value Date    ALT 20 09/07/2024    AST 19 09/07/2024    ALKPHOS 134 09/07/2024    BILITOT 0.5 09/07/2024     Estimated Creatinine Clearance: 125 mL/min (A) (by C-G formula based on SCr of 0.42 mg/dL (L)).     Scheduled medications  aspirin, 81 mg, oral, Daily  atorvastatin, 20 mg, oral, Nightly  clopidogrel, 75 mg, oral, Daily  dexAMETHasone, 2 mg, oral, Daily  enoxaparin, 40 mg, subcutaneous, q24h  gabapentin, 300 mg, oral, TID  pantoprazole, 40 mg, oral, Daily before breakfast  polyethylene glycol, 17 g, oral,  Daily  sennosides, 2 tablet, oral, BID      Continuous medications     PRN medications  alteplase, 2 mg, PRN  bisacodyl, 10 mg, Daily PRN  guaiFENesin, 600 mg, BID PRN  oxyCODONE, 10 mg, q4h PRN  oxyCODONE, 5 mg, q4h PRN  traZODone, 50 mg, Daily PRN  zinc oxide, 1 Application, q1h PRN      }     PHYSICAL EXAMINATION:    Vital Signs:   Vital signs reviewed  Visit Vitals  BP 96/60 (BP Location: Right arm, Patient Position: Lying)   Pulse 77   Temp 37.2 °C (99 °F) (Temporal)   Resp 16        0-10 (Numeric) Pain Score: 5 - Moderate pain       Physical Exam  Vitals reviewed.   Constitutional:       General: He is not in acute distress.     Appearance: He is ill-appearing.      Comments: A&O x 3; cachectic; appears in NAD   HENT:      Head: Normocephalic and atraumatic.      Mouth/Throat:      Mouth: Mucous membranes are moist.   Eyes:      Pupils: Pupils are equal, round, and reactive to light.   Cardiovascular:      Rate and Rhythm: Normal rate.      Pulses: Normal pulses.   Pulmonary:      Effort: Pulmonary effort is normal. No respiratory distress.      Comments: On room air; respirations even and unlabored.   Abdominal:      General: Bowel sounds are normal. There is no distension.      Palpations: Abdomen is soft.   Musculoskeletal:      Right lower leg: No edema.      Left lower leg: No edema.   Skin:     General: Skin is warm and dry.      Capillary Refill: Capillary refill takes less than 2 seconds.      Coloration: Skin is pale.   Neurological:      General: No focal deficit present.      Mental Status: He is alert and oriented to person, place, and time.   Psychiatric:         Mood and Affect: Mood normal.         Behavior: Behavior normal.         Thought Content: Thought content normal.         Judgment: Judgment normal.          ASSESSMENT/PLAN:  Christophe Ambriz is a 74 y.o. male diagnosed with renal cell carcinoma w/spinal mets s/p L1-L5 spinal fusion w/L2-L4 decompression and tumor debulking (7/24),  worsening back pain, and aspiration PNA. PMH significant for CAD, HTN, HLD . Admitted 9/3/2024 for further evaluation and management of PNA and back pain. Supportive and Palliative Oncology is consulted for pain management.       Pain:  Lumbar back pain related to metastatic disease; Sacral/coccyx pain related to radiation treatment.  Pain is: cancer related pain  Type: somatic and neuropathic  Pain control: well-controlled  Home regimen:  Oxycodone IR 5 mg po q 4 hrs prn and Gabapentin 300 mg po q hs  Intolerances/previously tried: NKDA  Personalized pain goal: 2  Total OME usage for the past 24 hours:  23.2 OME  Continue Oxycodone 5 mg po q 4 hrs prn for moderate pain  Continue Oxycodone 10 mg po q 4 hrs prn for SEVERE pain  Continue Gabapentin 300 mg po tid (was increased by primary team 9/3/24 from 300 mg q hs; ok to increase to tid dosing which is not standard titration due to uncontrolled neuropathic pain s/p radiation); CrCl 9/9/24=125  Can consider adding Hydromorphone 0.2 mg IV q 3 hrs prn for breakthrough pain if needed  Continue to monitor pain scores and administer PRN medications as appropriate  Continue/initiate nonpharmacologic pain management strategies including ice/heat therapy, distraction techniques, deep breathing/relaxation techniques, calming music, and repositioning  Continue to monitor for signs of opioid efficacy (pain scores, improved functionality) and toxicity (pinpoint pupils, excess sedation/drowsiness/confusion, respiratory depression, etc.)     Nausea:  At risk for nausea without vomiting related to opioids and pain    Home regimen:  none  Well-controlled; denies  EKG: Qtc on 9/2/24=463  Consider adding Ondansetron 4 mg PO/IV q 6 hrs prn      Constipation  At risk for constipation related to opioids, currently not constipated  Usual bowel pattern: every other day  Home regimen: Senna 2 tablets po bid and Miralax 17 gm daily   LBM 9/10/24  Monitor BM frequency, adjust regimen as  needed  Goal to have BM without straining q48-72h  Continue Senna  2 tabs po bid  Continue Miralax 17 g po daily  Continue Bisacodyl rectal suppository daily prn     Sleeping Difficulty:  Impaired sleep related to pain, hospital environment, and chronic sleep issues  Home regimen:  trazodone  Improved and well controlled  Continue Trazodone 50 mg po q hs     Decreased appetite:  Appetite loss related to disease process and possible immobility, pureed diet (pt reports he does not like)  Nutrition consult  Weight loss 41 lbs since 7/26/24  Home regimen:  none  Repeat swallow evaluation failed; pt to remain on pureed diet  Nutrition consult  May improve with pain management     Medical Decision Making/Goals of Care/Advance Care Planning:  Patient's current clinical condition, including diagnosis, prognosis, and management plan, and goals of care were discussed.   Life limiting disease: metastatic malignancy  Family:  Supportive significant other Iram Armstrong   Performance status: Major  limitations due to pain, disease process, and immobility  Joys/meaning/strength: [place]  Understanding of health: Demonstrates good understanding of disease process, understands need to remain inpatient to complete remaining radiation treatments; aware that options are limited as far as treatment  Information:Wants full disclosure  Goals: symptom control and cancer directed therapy  Worries and fears now and future: ongoing symptoms and inability to receive cancer treatment   Minimum acceptable outcome/QOL:  to complete RT and ideally gain strength and some weight to be able to care for himself and gain some independence back; to have minimal pain  Code status discussion:  DNR/DNI     Advance Directives  Existence of Advance Directives:No - not interested  Decision maker: Surrogate decision maker is significant other Iram Armstrong ([phone])  Code Status: DNR DNI     Supportive Interventions: Interventions: Music  Therapy: declined, Art Therapy: declined, SPO Spiritual Care: declined     Disposition:  Please  start the process of having prior authorization with meds to beds deliver medications to patient prior to discharge via [facility] pharmacy. Prescriptions will need to be sent 48-72 hours prior to discharge so that a prior authorization can be completed.      Discharge date: unknown pending acute issues, pain control, and symptom control  SNF placement recommended by PT  Will request an appointment with Outpatient Supportive Oncology RENETTA Ordonez (was scheduled for new patient visit on 9/9/24; will need to be rescheduled)     Supportive and Palliative Oncology encounter:  Spoke with patient at bedside  Emotional support provided  Coordination of care     Signature and billing:  Thank you for allowing us to participate in the care of this patient. Recommendations will be communicated back to the consulting service by way of shared electronic medical record or face-to-face.    Medical complexity was high level due to due to complexity of problems, extensive data review, and high risk of management/treatment.    I spent 50 minutes in the care of this patient which included chart review, interviewing patient/family, discussion with primary team, coordination of care, and documentation.    Data:   Diagnostic tests and information reviewed for today's visit:  Conversation with primary team, Most recent labs and imaging results, Most recent EKG, Medications       Some elements copied from my note on 9/4/24, the elements have been updated and all reflect current decision making from today, 09/10/24       Plan of Care discussed with: Provider, RN, Patient    Thank you for asking Supportive and Palliative Oncology to assist with care of this patient.  Recommendations will be communicated back to the consulting service by way of shared electronic medical record/secure chat/email or face-to-face.   We will continue to  follow  Please contact us for additional questions or concerns.      SIGNATURE: Galina Durant, APRN-CNP   PAGER/CONTACT:  Contact information:  Supportive and Palliative Oncology  Monday-Friday 8 AM-5 PM  [employer] or pager [phone].  After hours and weekends:  pager [phone]

## 2024-09-10 NOTE — PROGRESS NOTES
09/09/24 1200   Discharge Planning   Who is requesting discharge planning? Provider   Home or Post Acute Services Post acute facilities (Rehab/SNF/etc)   Type of Post Acute Facility Services Skilled nursing   Expected Discharge Disposition SNF   Does the patient need discharge transport arranged? Yes   RoundTrip coordination needed? Yes   Has discharge transport been arranged? No     9/9/24 @ 1225  Met with patient and his SO at bedside. They worked with PT this morning. PT rec SNF. Their FOC is [facility]. Referral sent via [facility].  No bed available at this time. Will obtain additional choices.  BAL Ortiz    9/10/24 @ 1220  [facility] does not have any bed availability. SO and patient made aware. [facility] 2nd choice is [facility]. Referral sent via [facility].    Evelyn Grant RN TCC

## 2024-09-10 NOTE — PROGRESS NOTES
Christophe Ambriz is a 74 y.o. male on day 3 of admission presenting with Aspiration pneumonia, unspecified aspiration pneumonia type, unspecified laterality, unspecified part of lung (Multi).    Subjective   Seen at bedside this AM. Pt stated that he had a bad night overnight, stated that he woke up wet multiple times. Pt c/o moderate pain, and pain medications are providing relief. Discussed with pt PT eval, and plans to discharge to [facility] after radiation finish pending acceptance. Pt stated he had trouble having a BM; offered to add additional laxative/suppository, pt declined. Last BM 9/10. Denies fever/chills, N/V/D/C, bleeding, dizziness, SOB, H/A and vision changes.     Objective     Physical Exam  Constitutional:       General: He is not in acute distress.     Appearance: He is not ill-appearing.      Comments: cachexia   HENT:      Head: Normocephalic and atraumatic.      Nose: Nose normal.      Mouth/Throat:      Mouth: Mucous membranes are moist.   Eyes:      General: No scleral icterus.     Extraocular Movements: Extraocular movements intact.      Pupils: Pupils are equal, round, and reactive to light.   Cardiovascular:      Rate and Rhythm: Normal rate and regular rhythm.      Pulses: Normal pulses.      Heart sounds: Normal heart sounds.   Pulmonary:      Effort: Pulmonary effort is normal.      Breath sounds: Rhonchi present.      Comments: On 2.5 L NC  Abdominal:      General: Abdomen is flat. Bowel sounds are normal. There is no distension.      Palpations: Abdomen is soft.      Tenderness: There is no abdominal tenderness. There is no guarding.   Musculoskeletal:         General: No swelling, deformity or signs of injury. Normal range of motion.      Cervical back: Normal range of motion and neck supple.   Skin:     General: Skin is warm and dry.      Findings: Bruising present. No erythema or rash.   Neurological:      General: No focal deficit present.      Mental Status: He is alert and oriented to  "person, place, and time.      Cranial Nerves: No cranial nerve deficit.      Sensory: No sensory deficit.      Motor: Weakness (generalized) present.   Psychiatric:         Mood and Affect: Mood normal.         Behavior: Behavior normal.      Comments: Fluent speech, intermittently cooperative       Last Recorded Vitals  Blood pressure 96/60, pulse 77, temperature 37.2 °C (99 °F), temperature source Temporal, resp. rate 16, height 1.85 m (6' 0.84\"), weight 61.8 kg (136 lb 3.9 oz), SpO2 94%.  Intake/Output last 3 Shifts:  I/O last 3 completed shifts:  In: 737.5 (11.9 mL/kg) [IV Piggyback:737.5]  Out: 480 (7.8 mL/kg) [Urine:480 (0.2 mL/kg/hr)]  Weight: 61.8 kg     Relevant Results  Results for orders placed or performed during the hospital encounter of 09/03/24 (from the past 24 hour(s))   CBC and Auto Differential   Result Value Ref Range    WBC 12.3 (H) 4.4 - 11.3 x10*3/uL    nRBC 0.0 0.0 - 0.0 /100 WBCs    RBC 3.20 (L) 4.50 - 5.90 x10*6/uL    Hemoglobin 8.1 (L) 13.5 - 17.5 g/dL    Hematocrit 27.2 (L) 41.0 - 52.0 %    MCV 85 80 - 100 fL    MCH 25.3 (L) 26.0 - 34.0 pg    MCHC 29.8 (L) 32.0 - 36.0 g/dL    RDW 16.2 (H) 11.5 - 14.5 %    Platelets 342 150 - 450 x10*3/uL    Neutrophils % 66.5 40.0 - 80.0 %    Immature Granulocytes %, Automated 1.0 (H) 0.0 - 0.9 %    Lymphocytes % 25.6 13.0 - 44.0 %    Monocytes % 5.0 2.0 - 10.0 %    Eosinophils % 1.7 0.0 - 6.0 %    Basophils % 0.2 0.0 - 2.0 %    Neutrophils Absolute 8.20 (H) 1.60 - 5.50 x10*3/uL    Immature Granulocytes Absolute, Automated 0.12 0.00 - 0.50 x10*3/uL    Lymphocytes Absolute 3.15 (H) 0.80 - 3.00 x10*3/uL    Monocytes Absolute 0.62 0.05 - 0.80 x10*3/uL    Eosinophils Absolute 0.21 0.00 - 0.40 x10*3/uL    Basophils Absolute 0.02 0.00 - 0.10 x10*3/uL   Magnesium   Result Value Ref Range    Magnesium 1.93 1.60 - 2.40 mg/dL   Renal Function Panel   Result Value Ref Range    Glucose 132 (H) 74 - 99 mg/dL    Sodium 132 (L) 136 - 145 mmol/L    Potassium 4.0 3.5 - " 5.3 mmol/L    Chloride 94 (L) 98 - 107 mmol/L    Bicarbonate 29 21 - 32 mmol/L    Anion Gap 13 10 - 20 mmol/L    Urea Nitrogen 8 6 - 23 mg/dL    Creatinine 0.48 (L) 0.50 - 1.30 mg/dL    eGFR >90 >60 mL/min/1.73m*2    Calcium 8.7 8.6 - 10.6 mg/dL    Phosphorus 3.4 2.5 - 4.9 mg/dL    Albumin 2.7 (L) 3.4 - 5.0 g/dL           Assessment/Plan   Assessment & Plan  Aspiration pneumonia, unspecified aspiration pneumonia type, unspecified laterality, unspecified part of lung (Multi)    Christophe Ambriz is a 74 y.o. male with a hx of CAD, HTN, HLD and RCC with spinal mets s/p L1-5 fusion w/ L2-4 decompression and tumor debulking (7/24) presented initially to OSH with worsening back pain. He was noted to be hypoxic (87% on RA) and placed on 2L O2. CT PE was negative for PE, showed interval improvement in bilateral pulmonary opacities, persistent but improved tree-in-bud opacities, and reveals probable aspiration. Per patient, he is here for radiation sessions that he cannot attend outpatient. Speech therapy consulted on admit, rec pureed solids with thin liquids (pt refusing thickened liquids at [facility]) (9/3). On 9/4, pt cleared for regular solids and thin liquids. As of 9/3, pt refusing to work with PT/OT; team reiterating importance of participation while inpatient to improve performance status for for future treatment. Wound care consulted on 9/5 for pressure wound on sacrum. On 9/9, pt agreeable to participate in PT therapy, rec SNF placement. DC pending facility placement and completion of palliative RT.    #Metastatic renal cell carcinoma  #Back and leg pain  - L1-5 fusion w/ L2-4 decompression and tumor debulking (7/24)  at [facility] with NSGY  - MRI 8/14: L3 compression fracture with retropulsion of the posterior cortex and severe stenosis of the central spinal canal with nerve root compression unchanged from the previous exam *No new foci of marrow replacement or compression fracture *There is no measurable change  compared to the previous exam  - Recently DC 8/27/24 with plans to complete radiation outpatient  - Has not attended radiation sessions due to immobility-Last EBRT 8/27/24  - Presented to OSH for continued back pain, no worse from prior admit; here for inpatient radiation therapy  - Follows with Dr. Chavarria; FUV requested (9/3)   - 9/6 Dr. Chavarria at bedside- discussed with pt and family importance/need for pt to be at a performance status to receive systemic treatment, otherwise recommending supportive measures and hospice- Pt not agreeable. Pt agreeable to work with PT today (9/6) - pt did decline PT 9/6  - Ongoing GOC conversation concerning patient's wishes are to pursue systemic therapy vs comfort care, pt agreeable to work with PT 9/9 and still interested in pursuing systemic therapy  - Rad onc consulted 9/3, plan daily radiation for remaining fractions       - Received 8/27, 9/3, 9/4, 9/5, 9/6, 9/9, 9/10  - Pt DID work with PT today, 9/9, PT rec SNF placement    #Pain control  - cont Gabapentin 300mg TID  - cont home oxycodone 5mg Q4 prn moderate pain, 10mg q4h severe pain  - Supportive oncology consulted, recs appreciated     #Hx Pneumonia  #Aspiration  - Recently discharged 8/27/24  - Last inpatient stay pertinent for MICU transfer for AHRF 2/2 multifocal pneumonia. Blood cultures positive for GPCC, and MRSA nares positive  - Completed inpatient course of Vanco/Zosyn  - CT PE at OSH-possible aspiration with improving tree-in-bud opacities and improvement in bilateral pulmonary opacities  - S/p Unasyn 3gm IV in ED/admit; will discontinue (low suspicion active infection given no systemic s/s, improving CT scan)   - Procal 0.05  - SLP consulted 9/3; c/w pureed textures and thin liquids; okay for regular diet (9/4)       #CAD s/p PCI (unknown year)  #HTN  #HLD  - continue home Atorvastatin, ASA, Plavix  - no current BP medications     #Leukocytosis, improving   - Admit WBC 25.5, down trending from  previous discharge (30.2, 30.6)--> 16.2 (9/5) --> 15.1 (9/9)  - CT Angio reveals probable aspiration pneumonia  - currently on Decadron taper   - No evidence of infection   - Trend CBC    #Prophy  - cont w/ Lovenox for prophylaxis  - cont w/ Protonix 40mg daily     DISPO:  - DNR/DNI - confirmed on admission  - PT/OT ordered, will likely need SNF dispo   - DC pending facility placement and radiation completion  - FUV: 10/1 with Onc     I spent >60 minutes in the professional and overall care of this patient.     Assessment and plan discussed with attending physician Dr. San.    Kenisha Durant, APRN-CNP

## 2024-09-10 NOTE — SIGNIFICANT EVENT
RADAR Note     09/10/24 1546   Onset Documentation   Rapid Response Initiated By Radar auto page   Pager Time 1546   Arrival Time 1546   Event End Time 1600   Level II Called No   Primary Reason for Call Radar auto page     RADAR auto-generated page of 6 received by Rapid Response Team for VS of 36.5, 80, 16, 97/64, 93%.  Reviewed VS with Bedside BAL Shukla.  No change in patient condition.  RN encouraged to page a Rapid Response if further concerns arise in patient status.

## 2024-09-11 ENCOUNTER — RADIATION ONCOLOGY OTV (OUTPATIENT)
Dept: RADIATION ONCOLOGY | Facility: HOSPITAL | Age: 74
End: 2024-09-11

## 2024-09-11 ENCOUNTER — APPOINTMENT (OUTPATIENT)
Dept: RADIATION ONCOLOGY | Facility: CLINIC | Age: 74
End: 2024-09-11
Payer: MEDICARE

## 2024-09-11 ENCOUNTER — HOSPITAL ENCOUNTER (OUTPATIENT)
Dept: RADIATION ONCOLOGY | Facility: HOSPITAL | Age: 74
Setting detail: RADIATION/ONCOLOGY SERIES
Discharge: HOME | End: 2024-09-11
Payer: MEDICARE

## 2024-09-11 ENCOUNTER — APPOINTMENT (OUTPATIENT)
Dept: RADIATION ONCOLOGY | Facility: HOSPITAL | Age: 74
End: 2024-09-11
Payer: MEDICARE

## 2024-09-11 DIAGNOSIS — C64.9: Primary | ICD-10-CM

## 2024-09-11 DIAGNOSIS — Z51.0 ENCOUNTER FOR ANTINEOPLASTIC RADIATION THERAPY: ICD-10-CM

## 2024-09-11 DIAGNOSIS — C64: ICD-10-CM

## 2024-09-11 DIAGNOSIS — C79.51 SECONDARY MALIGNANT NEOPLASM OF BONE (MULTI): ICD-10-CM

## 2024-09-11 LAB
ALBUMIN SERPL BCP-MCNC: 2.8 G/DL (ref 3.4–5)
ANION GAP SERPL CALC-SCNC: 12 MMOL/L (ref 10–20)
BASOPHILS # BLD AUTO: 0.02 X10*3/UL (ref 0–0.1)
BASOPHILS NFR BLD AUTO: 0.2 %
BUN SERPL-MCNC: 9 MG/DL (ref 6–23)
CALCIUM SERPL-MCNC: 9.1 MG/DL (ref 8.6–10.6)
CHLORIDE SERPL-SCNC: 93 MMOL/L (ref 98–107)
CO2 SERPL-SCNC: 32 MMOL/L (ref 21–32)
CREAT SERPL-MCNC: 0.48 MG/DL (ref 0.5–1.3)
EGFRCR SERPLBLD CKD-EPI 2021: >90 ML/MIN/1.73M*2
EOSINOPHIL # BLD AUTO: 0.25 X10*3/UL (ref 0–0.4)
EOSINOPHIL NFR BLD AUTO: 1.9 %
ERYTHROCYTE [DISTWIDTH] IN BLOOD BY AUTOMATED COUNT: 16.1 % (ref 11.5–14.5)
GLUCOSE SERPL-MCNC: 150 MG/DL (ref 74–99)
HCT VFR BLD AUTO: 28.8 % (ref 41–52)
HGB BLD-MCNC: 8.3 G/DL (ref 13.5–17.5)
IMM GRANULOCYTES # BLD AUTO: 0.15 X10*3/UL (ref 0–0.5)
IMM GRANULOCYTES NFR BLD AUTO: 1.1 % (ref 0–0.9)
LYMPHOCYTES # BLD AUTO: 3.04 X10*3/UL (ref 0.8–3)
LYMPHOCYTES NFR BLD AUTO: 23.1 %
MAGNESIUM SERPL-MCNC: 1.88 MG/DL (ref 1.6–2.4)
MCH RBC QN AUTO: 24.9 PG (ref 26–34)
MCHC RBC AUTO-ENTMCNC: 28.8 G/DL (ref 32–36)
MCV RBC AUTO: 87 FL (ref 80–100)
MONOCYTES # BLD AUTO: 0.66 X10*3/UL (ref 0.05–0.8)
MONOCYTES NFR BLD AUTO: 5 %
NEUTROPHILS # BLD AUTO: 9.04 X10*3/UL (ref 1.6–5.5)
NEUTROPHILS NFR BLD AUTO: 68.7 %
NRBC BLD-RTO: 0 /100 WBCS (ref 0–0)
PHOSPHATE SERPL-MCNC: 3.2 MG/DL (ref 2.5–4.9)
PLATELET # BLD AUTO: 387 X10*3/UL (ref 150–450)
POTASSIUM SERPL-SCNC: 4.5 MMOL/L (ref 3.5–5.3)
RAD ONC MSQ ACTUAL FRACTIONS DELIVERED: 8
RAD ONC MSQ ACTUAL FRACTIONS DELIVERED: 8
RAD ONC MSQ ACTUAL SESSION DELIVERED DOSE: 300 CGRAY
RAD ONC MSQ ACTUAL SESSION DELIVERED DOSE: 300 CGRAY
RAD ONC MSQ ACTUAL TOTAL DOSE: 2400 CGRAY
RAD ONC MSQ ACTUAL TOTAL DOSE: 2400 CGRAY
RAD ONC MSQ ELAPSED DAYS: 15
RAD ONC MSQ ELAPSED DAYS: 15
RAD ONC MSQ LAST DATE: NORMAL
RAD ONC MSQ LAST DATE: NORMAL
RAD ONC MSQ PRESCRIBED FRACTIONAL DOSE: 300 CGRAY
RAD ONC MSQ PRESCRIBED FRACTIONAL DOSE: 300 CGRAY
RAD ONC MSQ PRESCRIBED NUMBER OF FRACTIONS: 10
RAD ONC MSQ PRESCRIBED NUMBER OF FRACTIONS: 10
RAD ONC MSQ PRESCRIBED TECHNIQUE: NORMAL
RAD ONC MSQ PRESCRIBED TECHNIQUE: NORMAL
RAD ONC MSQ PRESCRIBED TOTAL DOSE: 3000 CGRAY
RAD ONC MSQ PRESCRIBED TOTAL DOSE: 3000 CGRAY
RAD ONC MSQ START DATE: NORMAL
RAD ONC MSQ START DATE: NORMAL
RAD ONC MSQ TREATMENT COURSE NUMBER: 1
RAD ONC MSQ TREATMENT COURSE NUMBER: 1
RAD ONC MSQ TREATMENT SITE: NORMAL
RAD ONC MSQ TREATMENT SITE: NORMAL
RBC # BLD AUTO: 3.33 X10*6/UL (ref 4.5–5.9)
SODIUM SERPL-SCNC: 132 MMOL/L (ref 136–145)
WBC # BLD AUTO: 13.2 X10*3/UL (ref 4.4–11.3)

## 2024-09-11 PROCEDURE — 77412 RADIATION TX DELIVERY LVL 3: CPT | Performed by: STUDENT IN AN ORGANIZED HEALTH CARE EDUCATION/TRAINING PROGRAM

## 2024-09-11 PROCEDURE — 96372 THER/PROPH/DIAG INJ SC/IM: CPT

## 2024-09-11 PROCEDURE — 36415 COLL VENOUS BLD VENIPUNCTURE: CPT

## 2024-09-11 PROCEDURE — 85025 COMPLETE CBC W/AUTO DIFF WBC: CPT

## 2024-09-11 PROCEDURE — 80069 RENAL FUNCTION PANEL: CPT

## 2024-09-11 PROCEDURE — 2500000004 HC RX 250 GENERAL PHARMACY W/ HCPCS (ALT 636 FOR OP/ED)

## 2024-09-11 PROCEDURE — 99233 SBSQ HOSP IP/OBS HIGH 50: CPT

## 2024-09-11 PROCEDURE — 77387 GUIDANCE FOR RADJ TX DLVR: CPT | Performed by: RADIOLOGY

## 2024-09-11 PROCEDURE — 77336 RADIATION PHYSICS CONSULT: CPT | Performed by: STUDENT IN AN ORGANIZED HEALTH CARE EDUCATION/TRAINING PROGRAM

## 2024-09-11 PROCEDURE — 2500000001 HC RX 250 WO HCPCS SELF ADMINISTERED DRUGS (ALT 637 FOR MEDICARE OP)

## 2024-09-11 PROCEDURE — G0378 HOSPITAL OBSERVATION PER HR: HCPCS

## 2024-09-11 PROCEDURE — 97530 THERAPEUTIC ACTIVITIES: CPT | Mod: GO

## 2024-09-11 PROCEDURE — 97530 THERAPEUTIC ACTIVITIES: CPT | Mod: GP

## 2024-09-11 PROCEDURE — 83735 ASSAY OF MAGNESIUM: CPT

## 2024-09-11 PROCEDURE — 2500000001 HC RX 250 WO HCPCS SELF ADMINISTERED DRUGS (ALT 637 FOR MEDICARE OP): Performed by: HOME HEALTH AIDE

## 2024-09-11 RX ORDER — DEXAMETHASONE 2 MG/1
2 TABLET ORAL DAILY
Status: ON HOLD
Start: 2024-09-11 | End: 2024-10-04

## 2024-09-11 ASSESSMENT — COGNITIVE AND FUNCTIONAL STATUS - GENERAL
PERSONAL GROOMING: A LITTLE
WALKING IN HOSPITAL ROOM: TOTAL
STANDING UP FROM CHAIR USING ARMS: TOTAL
MOBILITY SCORE: 8
CLIMB 3 TO 5 STEPS WITH RAILING: TOTAL
MOVING TO AND FROM BED TO CHAIR: TOTAL
TOILETING: A LOT
MOVING FROM LYING ON BACK TO SITTING ON SIDE OF FLAT BED WITH BEDRAILS: A LITTLE
TURNING FROM BACK TO SIDE WHILE IN FLAT BAD: A LOT
EATING MEALS: A LITTLE
HELP NEEDED FOR BATHING: A LOT
WALKING IN HOSPITAL ROOM: TOTAL
PERSONAL GROOMING: A LOT
CLIMB 3 TO 5 STEPS WITH RAILING: TOTAL
DAILY ACTIVITIY SCORE: 12
MOVING FROM LYING ON BACK TO SITTING ON SIDE OF FLAT BED WITH BEDRAILS: A LOT
DAILY ACTIVITIY SCORE: 13
EATING MEALS: A LITTLE
HELP NEEDED FOR BATHING: TOTAL
TURNING FROM BACK TO SIDE WHILE IN FLAT BAD: A LOT
STANDING UP FROM CHAIR USING ARMS: TOTAL
TOILETING: A LOT
DRESSING REGULAR LOWER BODY CLOTHING: A LOT
MOVING TO AND FROM BED TO CHAIR: TOTAL
DRESSING REGULAR LOWER BODY CLOTHING: TOTAL
DRESSING REGULAR UPPER BODY CLOTHING: A LOT
MOBILITY SCORE: 9
DRESSING REGULAR UPPER BODY CLOTHING: A LOT

## 2024-09-11 ASSESSMENT — PAIN DESCRIPTION - LOCATION: LOCATION: BACK

## 2024-09-11 ASSESSMENT — PAIN DESCRIPTION - DESCRIPTORS: DESCRIPTORS: ACHING

## 2024-09-11 ASSESSMENT — PAIN - FUNCTIONAL ASSESSMENT
PAIN_FUNCTIONAL_ASSESSMENT: 0-10

## 2024-09-11 ASSESSMENT — PAIN SCALES - GENERAL
PAINLEVEL_OUTOF10: 6
PAINLEVEL_OUTOF10: 5 - MODERATE PAIN
PAINLEVEL_OUTOF10: 5 - MODERATE PAIN
PAINLEVEL_OUTOF10: 6
PAINLEVEL_OUTOF10: 6

## 2024-09-11 NOTE — PROGRESS NOTES
Occupational Therapy        Occupational Therapy Treatment    Name: Christophe Ambriz  MRN: [id number]  : 1950  Date: 24  Room: [address] [place] [address]      Time Calculation  Start Time: 1212  Stop Time: 1236  Time Calculation (min): 24 min    Assessment:  End of Session Communication: Bedside nurse  End of Session Patient Position: Bed, 3 rail up, Alarm off, not on at start of session  Plan:  Treatment Interventions: ADL retraining, Functional transfer training, UE strengthening/ROM, Endurance training, Patient/family training, Compensatory technique education  OT Frequency: 2 times per week  OT Discharge Recommendations: Low intensity level of continued care  Equipment Recommended upon Discharge:  (none)  OT Recommended Transfer Status: Maximum assist  OT - OK to Discharge: Yes    Subjective   General:  OT Last Visit  OT Received On: 24  Co-Treatment: PT  Co-Treatment Reason: PT/OT co-tx for increased pt safety and participation.  Prior to Session Communication: Bedside nurse  Patient Position Received: Bed, 3 rail up, Alarm off, not on at start of session  General Comment: Pt willing to particpate with encouragement, tolerated mobilizing to EOb with increased time and effort, limited by pain.   Precautions:  Medical Precautions: Fall precautions, Oxygen therapy device and L/min, Spinal precautions  Vitals:  Vital Signs (Past 2hrs)        Date/Time Vitals Session Patient Position Pulse Resp SpO2 BP MAP (mmHg)    24 1211 During PT  Lying  85  --  94 %  --  --     24 1212 --  Lying  85  --  94 %  --  --                   Lines/Tubes/Drains:       Cognition:  Overall Cognitive Status: Within Functional Limits    Pain Assessment:  Pain Assessment  Pain Assessment: 0-10  0-10 (Numeric) Pain Score: 6  Pain Location: Hip  Pain Orientation: Left  Clinical Progression: Gradually worsening  Pain Interventions: Medication (See MAR), Warm pack     Objective   Activities of Daily Living:        LE Dressing  LE  Dressing: Yes  Sock Level of Assistance: Maximum assistance  LE Dressing Where Assessed: Bed level          Bed Mobility/Transfers:   Bed Mobility  Bed Mobility: Yes  Bed Mobility 1  Bed Mobility 1: Supine to sitting  Level of Assistance 1: Maximum assistance, +2  Bed Mobility Comments 1: HOB elevated, cues to complete log roll technique  Bed Mobility 2  Bed Mobility  2: Sitting to supine  Level of Assistance 2: Maximum assistance, +2    Transfers  Transfer: No (pt unable to tolerate d/t pain)    Balance:  Static Sitting Balance  Static Sitting-Balance Support: Bilateral upper extremity supported  Static Sitting-Level of Assistance: Minimum assistance  Static Sitting-Comment/Number of Minutes: tolerated ~ 5 min EOB sitting    Outcome Measures:  [facility] Daily Activity  Putting on and taking off regular lower body clothing: Total  Bathing (including washing, rinsing, drying): A lot  Putting on and taking off regular upper body clothing: A lot  Toileting, which includes using toilet, bedpan or urinal: A lot  Taking care of personal grooming such as brushing teeth: A lot  Eating Meals: A little  Daily Activity - Total Score: 12     Education Documentation  No documentation found.  Education Comments  No comments found.        Goals:  Encounter Problems       Encounter Problems (Active)       ADLs       Patient with complete upper body dressing with contact guard assist level of assistance donning and doffing all UE clothes with no adaptive equipment while edge of bed  (Progressing)       Start:  09/09/24    Expected End:  09/23/24            Patient with complete lower body dressing with minimal assist  and moderate assist level of assistance donning and doffing all LE clothes  with PRN adaptive equipment while edge of bed  (Progressing)       Start:  09/09/24    Expected End:  09/23/24            Patient will complete daily grooming tasks brushing teeth and washing face/hair with stand by assist level of assistance and  PRN adaptive equipment while edge of bed . (Progressing)       Start:  09/09/24    Expected End:  09/23/24            Patient will complete toileting including hygiene clothing management/hygiene with minimal assist  level of assistance and bedside commode. (Progressing)       Start:  09/09/24    Expected End:  09/23/24               EXERCISE/STRENGTHENING       Patient will participate in BUE exercises in order to improve strength and activity for ADL performance.  (Progressing)       Start:  09/09/24    Expected End:  09/23/24               TRANSFERS       Patient will perform bed mobility minimal assist  level of assistance and bed rails in order to improve safety and independence with mobility (Progressing)       Start:  09/09/24    Expected End:  09/23/24            Patient will complete functional transfers with least restrictive device with moderate assist level of assistance. (Progressing)       Start:  09/09/24    Expected End:  09/23/24 09/11/24 at 1:30 PM   Deb Fairchild, OT   [phone]

## 2024-09-11 NOTE — PROGRESS NOTES
Radiation Oncology On Treatment Visit    Patient Name:  Christophe Ambriz  MRN:  [id number]  :  1950    Referring Provider: No ref. 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      OBJECTIVE:   Vital Signs:  There were no vitals taken for this visit.    Other Pertinent Findings:     Toxicity Assessment          2024    14:21 2024    10:29   Toxicity Assessment   Adverse Events Reviewed (WDL) Yes (Within Defined Limits) Yes (Within Defined Limits)   Treatment Site Bone Bone;Thoracic   Anorexia Grade 0 Grade 2   Anxiety Grade 0 Grade 0   Dehydration Grade 0 Grade 0   Depression Grade 0 Grade 1   Dermatitis Radiation Grade 0 Grade 0   Diarrhea Grade 0 Grade 0   Fatigue Grade 0 Grade 1   Fibrosis Deep Connective Tissue Grade 0 Grade 0   Fracture Grade 0 Grade 0   Nausea Grade 0 Grade 0   Pain Grade 1 Grade 1   Treatment Related Secondary Malignancy Grade 0 Grade 0   Tumor Pain Grade 1 Grade 1   Vomiting Grade 0 Grade 0   Constipation  Grade 0   Dyspepsia  Grade 0   Dysphagia  Grade 0   Esophagitis  Grade 0   Mucositis Oral  Grade 0   Upper Gastrointestinal Hemorrhage  Grade 0   Peripheral Sensory Neuropathy  Grade 0   Joint Range of Motion Decreased Grade 0     Brachial Plexopathy  Grade 0   Pneumonitis  Grade 0   Bone Pain Grade 0    Edema Limbs Grade 0    Aspiration  Grade 0   Hoarseness  Grade 0   Laryngeal Edema  Grade 0   Myocardial Infarction  Grade 0   Pericardial Effusion  Grade 0   Pericarditis  Grade 0   Esophageal Fistula  Grade 0   Esophageal Obstruction  Grade 0   Esophageal Pain  Grade 0   Esophageal Stenosis  Grade 0   Esophageal Ulcer  Grade 0   Bronchial Obstruction  Grade 0   Cough  Grade 1   Dyspnea  Grade 0   Epistaxis  Grade 0   Hiccups  Grade 0   Hypoxia  Grade 0   Pulmonary Fibrosis  Grade 0   Lymphedema  Grade 0   Thromboembolic Event  Grade 0   Hot Flashes  Grade 0   Alopecia Grade 0 Grade 0   Erythroderma Grade 0 Grade 0   Pain of Skin Grade 0 Grade 0   Pruritus Grade 0 Grade 0   Rash Acneiform Grade 0 Grade 0   Skin Hyperpigmentation Grade 0 Grade 0   Skin Hypopigmentation Grade 0 Grade 0   Skin Induration Grade 0 Grade 0   Skin Ulceration Grade 0 Grade 0   Telangiectasia Grade 0 Grade 0        Assessment / Plan:  The patient is tolerating radiation therapy as anticipated.  Continue per current treatment plan.  Return to clinic in 3 months

## 2024-09-11 NOTE — CARE PLAN
The patient's goals for the shift include      The clinical goals for the shift include Pt will remain injury free during the shift      Problem: Skin  Goal: Decreased wound size/increased tissue granulation at next dressing change  Flowsheets (Taken 9/11/2024 0126)  Decreased wound size/increased tissue granulation at next dressing change:   Promote sleep for wound healing   Protective dressings over bony prominences  Goal: Participates in plan/prevention/treatment measures  Flowsheets (Taken 9/11/2024 0126)  Participates in plan/prevention/treatment measures: Elevate heels  Goal: Prevent/manage excess moisture  Flowsheets (Taken 9/11/2024 0126)  Prevent/manage excess moisture: Follow provider orders for dressing changes  Goal: Prevent/minimize sheer/friction injuries  Flowsheets (Taken 9/11/2024 0126)  Prevent/minimize sheer/friction injuries:   HOB 30 degrees or less   Turn/reposition every 2 hours/use positioning/transfer devices   Use pull sheet  Goal: Promote skin healing  Flowsheets (Taken 9/11/2024 0126)  Promote skin healing:   Turn/reposition every 2 hours/use positioning/transfer devices   Protective dressings over bony prominences

## 2024-09-11 NOTE — PROGRESS NOTES
"Christophe Ambriz is a 74 y.o. male on day 3 of admission presenting with Aspiration pneumonia, unspecified aspiration pneumonia type, unspecified laterality, unspecified part of lung (Multi).    Subjective   Patient seen resting in bed this afternoon after radiation and working with PT. Reports pain in his right hip that radiates down his leg. Patient with brief episode of desaturation during transport from XRT to the floor and briefly required 5L (increased from his baseline 2L). Patient now back to 2L and he denies any shortness of breath or chest pain, endorses a dry, non-productive cough.        Objective     Physical Exam  Constitutional:       Appearance: He is ill-appearing.   HENT:      Mouth/Throat:      Mouth: Mucous membranes are moist.   Eyes:      Pupils: Pupils are equal, round, and reactive to light.   Cardiovascular:      Rate and Rhythm: Normal rate.   Pulmonary:      Effort: Pulmonary effort is normal.      Breath sounds: Normal breath sounds.      Comments: 2L NC in place  Abdominal:      General: Abdomen is flat. Bowel sounds are normal.      Palpations: Abdomen is soft.   Musculoskeletal:         General: Normal range of motion.      Cervical back: Normal range of motion.   Skin:     General: Skin is warm.   Neurological:      General: No focal deficit present.      Mental Status: He is alert.   Psychiatric:         Mood and Affect: Mood normal.         Last Recorded Vitals  Blood pressure 101/64, pulse 85, temperature 36.9 °C (98.4 °F), temperature source Temporal, resp. rate 16, height 1.85 m (6' 0.84\"), weight 61.8 kg (136 lb 3.9 oz), SpO2 90%.  Intake/Output last 3 Shifts:  I/O last 3 completed shifts:  In: - (0 mL/kg)   Out: 400 (6.5 mL/kg) [Urine:400 (0.2 mL/kg/hr)]  Weight: 61.8 kg     Relevant Results                         Malnutrition Diagnosis Status: New  Malnutrition Diagnosis: Severe malnutrition related to chronic disease or condition  As Evidenced by: > 5% wt loss in 1 mo, > 7.5% " wt loss in 3 mo, severe muscle and fat losses noted in NFPE  I agree with the dietitian's malnutrition diagnosis.      Assessment/Plan   Assessment & Plan  Aspiration pneumonia, unspecified aspiration pneumonia type, unspecified laterality, unspecified part of lung (Multi)    Christophe Ambriz is a 74 y.o. male with a hx of CAD, HTN, HLD and RCC with spinal mets s/p L1-5 fusion w/ L2-4 decompression and tumor debulking (7/24) presented initially to OSH with worsening back pain. He was noted to be hypoxic (87% on RA) and placed on 2L O2. CT PE was negative for PE, showed interval improvement in bilateral pulmonary opacities, persistent but improved tree-in-bud opacities, and reveals probable aspiration. Per patient, he is here for radiation sessions that he cannot attend outpatient. Speech therapy consulted on admit, rec pureed solids with thin liquids (pt refusing thickened liquids at [facility]) (9/3). On 9/4, pt cleared for regular solids and thin liquids. As of 9/3, pt refusing to work with PT/OT; team reiterating importance of participation while inpatient to improve performance status for for future treatment. Wound care consulted on 9/5 for pressure wound on sacrum. On 9/9, pt agreeable to participate in PT therapy, rec SNF placement. DC pending facility placement and completion of palliative RT.     #Metastatic renal cell carcinoma  #Back and leg pain  - L1-5 fusion w/ L2-4 decompression and tumor debulking (7/24)  at [facility] with NSGY  - MRI 8/14: L3 compression fracture with retropulsion of the posterior cortex and severe stenosis of the central spinal canal with nerve root compression unchanged from the previous exam *No new foci of marrow replacement or compression fracture *There is no measurable change compared to the previous exam  - Recently DC 8/27/24 with plans to complete radiation outpatient  - Has not attended radiation sessions due to immobility-Last EBRT 8/27/24  - Presented to OSH for continued  back pain, no worse from prior admit; here for inpatient radiation therapy  - Follows with Dr. Chavarria; FUV requested (9/3)   - 9/6 Dr. Chavarria at bedside- discussed with pt and family importance/need for pt to be at a performance status to receive systemic treatment, otherwise recommending supportive measures and hospice- Pt not agreeable. Pt agreeable to work with PT today (9/6) - pt did decline PT 9/6  - Ongoing GOC conversation concerning patient's wishes are to pursue systemic therapy vs comfort care, pt agreeable to work with PT 9/9 and still interested in pursuing systemic therapy  - Rad onc consulted 9/3, plan daily radiation for remaining fractions       - Received 8/27, 9/3, 9/4, 9/5, 9/6, 9/9, 9/10  - Pt DID work with PT today, 9/9, PT rec SNF placement     #Pain control  - cont Gabapentin 300mg TID  - cont home oxycodone 5mg Q4 prn moderate pain, 10mg q4h severe pain  - Supportive oncology consulted, recs appreciated     #Hx Pneumonia  #Aspiration  - Recently discharged 8/27/24  - Last inpatient stay pertinent for MICU transfer for AHRF 2/2 multifocal pneumonia. Blood cultures positive for GPCC, and MRSA nares positive  - Completed inpatient course of Vanco/Zosyn  - CT PE at OSH-possible aspiration with improving tree-in-bud opacities and improvement in bilateral pulmonary opacities  - S/p Unasyn 3gm IV in ED/admit; will discontinue (low suspicion active infection given no systemic s/s, improving CT scan)   - Procal 0.05  - SLP consulted 9/3; c/w pureed textures and thin liquids; okay for regular diet (9/4)        #CAD s/p PCI (unknown year)  #HTN  #HLD  - continue home Atorvastatin, ASA, Plavix  - no current BP medications     #Leukocytosis, improving   - Admit WBC 25.5, down trending from previous discharge (30.2, 30.6)--> 16.2 (9/5) --> 15.1 (9/9) --> 13.2 (9/11)  - CT Angio reveals probable aspiration pneumonia  - likely in setting of Decadron taper   - No evidence of infection   - Trend CBC      #Prophy  - cont w/ Lovenox for prophylaxis  - cont w/ Protonix 40mg daily     DISPO:  - DNR/DNI - confirmed on admission  - PT/OT ordered, will likely need SNF dispo   - DC pending facility placement and radiation completion  - FUV: 10/1 with On       I spent 60 minutes in the professional and overall care of this patient.    Assessment and plan as above discussed with attending physician, BECK AmesC

## 2024-09-11 NOTE — PROGRESS NOTES
Physical Therapy    Physical Therapy Treatment    Patient Name: Christophe Ambriz  MRN: [id number]  Department: [facility]  Room: [facility]4026-A  Today's Date: 9/11/2024  Time Calculation  Start Time: 1211  Stop Time: 1236  Time Calculation (min): 25 min         Assessment/Plan   PT Assessment  PT Assessment Results: Decreased strength, Decreased endurance, Impaired balance, Decreased mobility, Orthopedic restrictions, Pain  Rehab Prognosis: Good  Barriers to Discharge: none  Evaluation/Treatment Tolerance: Patient limited by fatigue, Patient limited by pain  Medical Staff Made Aware: Yes  Strengths: Attitude of self  Barriers to Participation: Comorbidities, Coping skills  End of Session Communication: Bedside nurse  Assessment Comment: The pt presented with unsafe bed mobility and static and dynamic sitting balance at EOB due to decreased strength, impaired balance, increased pain, and deconditioning.  End of Session Patient Position: Bed, 3 rail up, Alarm off, not on at start of session (Bed alarm malfunction)  PT Plan  Inpatient/Swing Bed or Outpatient: Inpatient  PT Plan  Treatment/Interventions: Bed mobility, Transfer training, Gait training, Balance training, Strengthening, Endurance training, Therapeutic exercise, Therapeutic activity, Home exercise program  PT Plan: Ongoing PT  PT Frequency: 3 times per week  PT Discharge Recommendations: Moderate intensity level of continued care  Equipment Recommended upon Discharge:  (none)  PT Recommended Transfer Status: Assist x1  PT - OK to Discharge: Yes      General Visit Information:   PT  Visit  PT Received On: 09/11/24  Response to Previous Treatment: Patient reporting fatigue but able to participate.  General  Co-Treatment: OT  Co-Treatment Reason: PT/OT co-tx for increased pt safety and participation.  Prior to Session Communication: Bedside nurse  Patient Position Received: Bed, 3 rail up, Alarm off, not on at start of session (Bed alarm malfunction)  Preferred Learning  Style: verbal, visual, written  General Comment: Pt required minimal encouragement to participate in therapy.    Subjective   Precautions:  Precautions  Hearing/Visual Limitations: Hearing and vision WFL with reading glasses.  Medical Precautions: Fall precautions, Oxygen therapy device and L/min, Spinal precautions  Precautions Comment: Pt in compliance with precautions throughout PT session.    Vital Signs (Past 2hrs)        Date/Time Vitals Session Patient Position Pulse Resp SpO2 BP MAP (mmHg)    09/11/24 1211 During PT  Lying  85  --  94 %  --  --                   Objective   Pain:  Pain Assessment  Pain Assessment: 0-10  0-10 (Numeric) Pain Score: 6  Pain Type: Acute pain, Chronic pain  Pain Location: Hip  Pain Orientation: Left  Pain Descriptors: Aching  Pain Frequency: Intermittent (Left hip pain went from 0-6/10 after sitting EOB  ~5 min.)  Pain Onset: Gradual  Clinical Progression: Gradually worsening  Patient's Stated Pain Goal: No pain  Pain Interventions: Therapeutic presence  Response to Interventions: Pain increased  Cognition:  Cognition  Overall Cognitive Status: Within Functional Limits  Coordination:  Movements are Fluid and Coordinated: Yes  Coordination Comment: WFL  Postural Control:  Postural Control  Postural Control: Impaired  Posture Comment: Pt presented with impaired sitting posture.  Static Sitting Balance  Static Sitting-Balance Support: Bilateral upper extremity supported, Feet supported  Static Sitting-Level of Assistance: Minimum assistance  Static Sitting-Comment/Number of Minutes: Sitting EOB  Dynamic Sitting Balance  Dynamic Sitting-Balance Support: Bilateral upper extremity supported, Feet supported  Dynamic Sitting-Level of Assistance: Minimum assistance  Dynamic Sitting-Comments: Sitting EOB  Static Standing Balance  Static Standing-Balance Support:  (not assessed)  Dynamic Standing Balance  Dynamic Standing-Balance Support:  (not assessed)  Extremity/Trunk  Assessments:  Cervical Spine   Cervical Spine: Within Functional Limits  Lumbar Spine   Lumbar Spine : Exceptions to Funtional Limits  Lumbar Spine Comment: Decreased strength and ROM    RUE   RUE : Exceptions to WFL  RUE AROM (degrees)  RUE AROM Comment: WFL  RUE Strength  R Shoulder Flexion: 3+/5  R Elbow Flexion: 4-/5  R Elbow Extension: 4-/5  R Wrist Flexion: 5/5  R Wrist Extension: 5/5  LUE   LUE: Exceptions to WFL  LUE AROM (degrees)  LUE AROM Comment: WFL  LUE Strength  L Shoulder Flexion: 3/5  L Elbow Flexion: 4-/5  L Elbow Extension: 4-/5  L Wrist Flexion: 5/5  L Wrist Extension: 5/5  RLE   RLE : Exceptions to WFL  AROM RLE (degrees)  RLE AROM Comment: [facility]  Strength RLE  R Hip Flexion: 3-/5  R Knee Flexion: 3/5  R Knee Extension: 3/5  R Ankle Dorsiflexion: 4+/5  R Ankle Plantar Flexion: 4+/5  LLE   LLE : Exceptions to WFL  AROM LLE (degrees)  LLE AROM Comment: [facility]  Strength LLE  L Hip Flexion: 3-/5  L Knee Flexion: 3-/5  L Knee Extension: 3-/5  L Ankle Dorsiflexion: 4+/5  L Ankle Plantar Flexion: 4+/5  Activity Tolerance:  Activity Tolerance  Endurance: Decreased tolerance for upright activites  Treatments:  Therapeutic Exercise  Therapeutic Exercise Performed: No    Therapeutic Activity  Therapeutic Activity Performed: Yes  Therapeutic Activity 1: Pt performed EOB activity.    Balance/Neuromuscular Re-Education  Balance/Neuromuscular Re-Education Activity Performed: Yes  Balance/Neuromuscular Re-Education Activity 1: Minimal assistance static sitting balance using Luis UE and LE support.  Balance/Neuromuscular Re-Education Activity 2: Minimal assistance dynamic sitting balance using Luis UE and LE support.    Bed Mobility  Bed Mobility: Yes  Bed Mobility 1  Bed Mobility 1: Supine to sitting  Level of Assistance 1: Maximum assistance, +2  Bed Mobility Comments 1: HOB elevated  Bed Mobility 2  Bed Mobility  2: Sitting to supine  Level of Assistance 2: Maximum assistance, +2  Bed Mobility Comments 2: HOB  elevated    Ambulation/Gait Training  Ambulation/Gait Training Performed: No  Transfers  Transfer: No    Stairs  Stairs: No    Outcome Measures:  [facility] Basic Mobility  Turning from your back to your side while in a flat bed without using bedrails: A lot  Moving from lying on your back to sitting on the side of a flat bed without using bedrails: A lot  Moving to and from bed to chair (including a wheelchair): Total  Standing up from a chair using your arms (e.g. wheelchair or bedside chair): Total  To walk in hospital room: Total  Climbing 3-5 steps with railing: Total  Basic Mobility - Total Score: 8    OP EDUCATION:  Outpatient Education  Individual(s) Educated: Patient  Education Provided: Body Mechanics, Fall Risk, Posture  Patient Response to Education: Patient/Caregiver Verbalized Understanding of Information    Encounter Problems       Encounter Problems (Active)       Balance       STG - Maintains minimal assistance dynamic standing balance with upper extremity support using a wheeled walker. (Progressing)       Start:  09/09/24    Expected End:  09/23/24            STG - Maintains minimal assistance static standing balance with upper extremity support using a wheeled walker. (Progressing)       Start:  09/09/24    Expected End:  09/23/24               Mobility       STG - Patient will ambulate 10ft with minimal assistance using a wheeled walker. (Progressing)       Start:  09/09/24    Expected End:  09/23/24               PT Transfers       STG - Transfer from bed to chair with minimal assistance using a wheeled walker. (Progressing)       Start:  09/09/24    Expected End:  09/23/24            STG - Patient to transfer to and from sit to supine with minimal assistance. (Progressing)       Start:  09/09/24    Expected End:  09/23/24            STG - Patient will transfer sit to and from stand with minimal assistance using a wheeled walker. (Progressing)       Start:  09/09/24    Expected End:  09/23/24

## 2024-09-11 NOTE — PROGRESS NOTES
Speech-Language Pathology                 Therapy Communication Note    Patient Name: Christophe Ambriz  MRN: [id number]  Department: [facility]  Room: 4026/4026-A  Today's Date: 9/11/2024     Discipline: Speech Language Pathology    SLP follow-up w/ pt for review of pt's swallow strategies and diet tolerance thus far. Chart review and discussion w/ patient supports toleration of his regular diet and thin liquids. He has verbalized accurate understanding of swallow strategies and s/sx of dysphagia. Skilled SLP services not indicated and will sign-off. Please contact if additional questions or concerns.

## 2024-09-11 NOTE — SIGNIFICANT EVENT
Rapid Response RN Note    RADAR score 6 due to the following VS: T 36.4 °C; HR 64; RR 16; BP 95/58; SPO2 93%.     Reviewed above VS with bedside RN via phone.  Vital signs within patient's current trends.  No acute change in condition.  No interventions by rapid response team indicated at this time.    Staff to page rapid response for any concerns or acute change in condition/VS.

## 2024-09-12 ENCOUNTER — APPOINTMENT (OUTPATIENT)
Dept: RADIATION ONCOLOGY | Facility: CLINIC | Age: 74
End: 2024-09-12
Payer: MEDICARE

## 2024-09-12 ENCOUNTER — HOSPITAL ENCOUNTER (OUTPATIENT)
Dept: RADIATION ONCOLOGY | Facility: HOSPITAL | Age: 74
Setting detail: RADIATION/ONCOLOGY SERIES
Discharge: HOME | End: 2024-09-12
Payer: MEDICARE

## 2024-09-12 ENCOUNTER — HOSPITAL ENCOUNTER (OUTPATIENT)
Dept: RADIATION ONCOLOGY | Facility: HOSPITAL | Age: 74
Setting detail: RADIATION/ONCOLOGY SERIES
End: 2024-09-12
Payer: MEDICARE

## 2024-09-12 DIAGNOSIS — Z51.0 ENCOUNTER FOR ANTINEOPLASTIC RADIATION THERAPY: ICD-10-CM

## 2024-09-12 DIAGNOSIS — C64: ICD-10-CM

## 2024-09-12 DIAGNOSIS — C79.51 SECONDARY MALIGNANT NEOPLASM OF BONE (MULTI): ICD-10-CM

## 2024-09-12 PROBLEM — J69.0 ASPIRATION PNEUMONIA, UNSPECIFIED ASPIRATION PNEUMONIA TYPE, UNSPECIFIED LATERALITY, UNSPECIFIED PART OF LUNG (MULTI): Status: RESOLVED | Noted: 2024-09-03 | Resolved: 2024-09-12

## 2024-09-12 LAB
ALBUMIN SERPL BCP-MCNC: 2.8 G/DL (ref 3.4–5)
ANION GAP SERPL CALC-SCNC: 13 MMOL/L (ref 10–20)
BASOPHILS # BLD AUTO: 0.04 X10*3/UL (ref 0–0.1)
BASOPHILS NFR BLD AUTO: 0.3 %
BUN SERPL-MCNC: 10 MG/DL (ref 6–23)
CALCIUM SERPL-MCNC: 9 MG/DL (ref 8.6–10.6)
CHLORIDE SERPL-SCNC: 94 MMOL/L (ref 98–107)
CO2 SERPL-SCNC: 31 MMOL/L (ref 21–32)
CREAT SERPL-MCNC: 0.51 MG/DL (ref 0.5–1.3)
EGFRCR SERPLBLD CKD-EPI 2021: >90 ML/MIN/1.73M*2
EOSINOPHIL # BLD AUTO: 0.25 X10*3/UL (ref 0–0.4)
EOSINOPHIL NFR BLD AUTO: 1.6 %
ERYTHROCYTE [DISTWIDTH] IN BLOOD BY AUTOMATED COUNT: 16.2 % (ref 11.5–14.5)
GLUCOSE SERPL-MCNC: 164 MG/DL (ref 74–99)
HCT VFR BLD AUTO: 28.9 % (ref 41–52)
HGB BLD-MCNC: 8.5 G/DL (ref 13.5–17.5)
IMM GRANULOCYTES # BLD AUTO: 0.2 X10*3/UL (ref 0–0.5)
IMM GRANULOCYTES NFR BLD AUTO: 1.3 % (ref 0–0.9)
LYMPHOCYTES # BLD AUTO: 2.96 X10*3/UL (ref 0.8–3)
LYMPHOCYTES NFR BLD AUTO: 18.7 %
MAGNESIUM SERPL-MCNC: 1.91 MG/DL (ref 1.6–2.4)
MCH RBC QN AUTO: 25 PG (ref 26–34)
MCHC RBC AUTO-ENTMCNC: 29.4 G/DL (ref 32–36)
MCV RBC AUTO: 85 FL (ref 80–100)
MONOCYTES # BLD AUTO: 0.77 X10*3/UL (ref 0.05–0.8)
MONOCYTES NFR BLD AUTO: 4.9 %
NEUTROPHILS # BLD AUTO: 11.58 X10*3/UL (ref 1.6–5.5)
NEUTROPHILS NFR BLD AUTO: 73.2 %
NRBC BLD-RTO: 0 /100 WBCS (ref 0–0)
PHOSPHATE SERPL-MCNC: 3.2 MG/DL (ref 2.5–4.9)
PLATELET # BLD AUTO: 412 X10*3/UL (ref 150–450)
POTASSIUM SERPL-SCNC: 4.3 MMOL/L (ref 3.5–5.3)
RAD ONC MSQ ACTUAL FRACTIONS DELIVERED: 9
RAD ONC MSQ ACTUAL FRACTIONS DELIVERED: 9
RAD ONC MSQ ACTUAL SESSION DELIVERED DOSE: 300 CGRAY
RAD ONC MSQ ACTUAL SESSION DELIVERED DOSE: 300 CGRAY
RAD ONC MSQ ACTUAL TOTAL DOSE: 2700 CGRAY
RAD ONC MSQ ACTUAL TOTAL DOSE: 2700 CGRAY
RAD ONC MSQ ELAPSED DAYS: 16
RAD ONC MSQ ELAPSED DAYS: 16
RAD ONC MSQ LAST DATE: NORMAL
RAD ONC MSQ LAST DATE: NORMAL
RAD ONC MSQ PRESCRIBED FRACTIONAL DOSE: 300 CGRAY
RAD ONC MSQ PRESCRIBED FRACTIONAL DOSE: 300 CGRAY
RAD ONC MSQ PRESCRIBED NUMBER OF FRACTIONS: 10
RAD ONC MSQ PRESCRIBED NUMBER OF FRACTIONS: 10
RAD ONC MSQ PRESCRIBED TECHNIQUE: NORMAL
RAD ONC MSQ PRESCRIBED TECHNIQUE: NORMAL
RAD ONC MSQ PRESCRIBED TOTAL DOSE: 3000 CGRAY
RAD ONC MSQ PRESCRIBED TOTAL DOSE: 3000 CGRAY
RAD ONC MSQ START DATE: NORMAL
RAD ONC MSQ START DATE: NORMAL
RAD ONC MSQ TREATMENT COURSE NUMBER: 1
RAD ONC MSQ TREATMENT COURSE NUMBER: 1
RAD ONC MSQ TREATMENT SITE: NORMAL
RAD ONC MSQ TREATMENT SITE: NORMAL
RBC # BLD AUTO: 3.4 X10*6/UL (ref 4.5–5.9)
SODIUM SERPL-SCNC: 134 MMOL/L (ref 136–145)
WBC # BLD AUTO: 15.8 X10*3/UL (ref 4.4–11.3)

## 2024-09-12 PROCEDURE — 2500000004 HC RX 250 GENERAL PHARMACY W/ HCPCS (ALT 636 FOR OP/ED)

## 2024-09-12 PROCEDURE — 36415 COLL VENOUS BLD VENIPUNCTURE: CPT

## 2024-09-12 PROCEDURE — 77387 GUIDANCE FOR RADJ TX DLVR: CPT | Performed by: RADIOLOGY

## 2024-09-12 PROCEDURE — 2500000001 HC RX 250 WO HCPCS SELF ADMINISTERED DRUGS (ALT 637 FOR MEDICARE OP): Performed by: HOME HEALTH AIDE

## 2024-09-12 PROCEDURE — 80069 RENAL FUNCTION PANEL: CPT

## 2024-09-12 PROCEDURE — 2500000001 HC RX 250 WO HCPCS SELF ADMINISTERED DRUGS (ALT 637 FOR MEDICARE OP)

## 2024-09-12 PROCEDURE — 83735 ASSAY OF MAGNESIUM: CPT

## 2024-09-12 PROCEDURE — 77412 RADIATION TX DELIVERY LVL 3: CPT | Performed by: STUDENT IN AN ORGANIZED HEALTH CARE EDUCATION/TRAINING PROGRAM

## 2024-09-12 PROCEDURE — 85025 COMPLETE CBC W/AUTO DIFF WBC: CPT

## 2024-09-12 PROCEDURE — 96372 THER/PROPH/DIAG INJ SC/IM: CPT

## 2024-09-12 PROCEDURE — G0378 HOSPITAL OBSERVATION PER HR: HCPCS

## 2024-09-12 PROCEDURE — 99233 SBSQ HOSP IP/OBS HIGH 50: CPT | Performed by: PHYSICIAN ASSISTANT

## 2024-09-12 ASSESSMENT — COGNITIVE AND FUNCTIONAL STATUS - GENERAL
MOVING FROM LYING ON BACK TO SITTING ON SIDE OF FLAT BED WITH BEDRAILS: A LITTLE
DAILY ACTIVITIY SCORE: 12
PERSONAL GROOMING: A LITTLE
DRESSING REGULAR LOWER BODY CLOTHING: A LOT
CLIMB 3 TO 5 STEPS WITH RAILING: TOTAL
WALKING IN HOSPITAL ROOM: TOTAL
TURNING FROM BACK TO SIDE WHILE IN FLAT BAD: A LOT
DRESSING REGULAR UPPER BODY CLOTHING: A LOT
MOVING TO AND FROM BED TO CHAIR: TOTAL
HELP NEEDED FOR BATHING: TOTAL
TOILETING: TOTAL
STANDING UP FROM CHAIR USING ARMS: TOTAL
MOBILITY SCORE: 9
EATING MEALS: A LITTLE

## 2024-09-12 ASSESSMENT — PAIN SCALES - GENERAL
PAINLEVEL_OUTOF10: 6
PAINLEVEL_OUTOF10: 7
PAINLEVEL_OUTOF10: 9
PAINLEVEL_OUTOF10: 0 - NO PAIN

## 2024-09-12 ASSESSMENT — PAIN - FUNCTIONAL ASSESSMENT
PAIN_FUNCTIONAL_ASSESSMENT: 0-10

## 2024-09-12 NOTE — PROGRESS NOTES
09/09/24 1200   Discharge Planning   Who is requesting discharge planning? Provider   Home or Post Acute Services Post acute facilities (Rehab/SNF/etc)   Type of Post Acute Facility Services Skilled nursing   Expected Discharge Disposition SNF   Does the patient need discharge transport arranged? Yes   RoundTrip coordination needed? Yes   Has discharge transport been arranged? No     9/9/24 @ 1225  Met with patient and his SO at bedside. They worked with PT this morning. PT rec SNF. Their FOC is [facility]. Referral sent via [facility].  No bed available at this time. Will obtain additional choices.  Evelyn Grant RN [facility]    9/10/24 @ 1220  [facility] does not have any bed availability. SO and patient made aware. Their 2nd choice is [facility]. Referral sent via [facility].    Evelyn Grant RN [facility]    9/12/24 @ 1050  [facility] will not have any beds available tomorrow.  Spoke to SO via phone. [facility] is their next FOC.  They will have a bed for the patient tomorrow.  team to start precert. Patient will finish his last radiation today. ADOD 9/13.    UPDATE 1105:  SO called back and would like [facility] as FOC.  precert team updated. [facility] made aware.    UPDATE 1145:  Approved. Authorization Number: [id number]. Dates: 09/12/2024 - 09/18/2024. [facility] made aware. Per AN Woodall, patient has one more radiation session. Will plan to discharge patient tomorrow afternoon. Will request transport via [facility]Trip.    UPDATE 1235:  7000 completed in [facility].    UPDATE 1320:  The S Vehicle you requested for Christophe OSORIO in unit/room [facility] 4026 on 09/13/2024 is scheduled to arrive at 12:00pm EDT! [facility] is handling this ride and you can contact them at [phone].  SO, I[place], updated on transport time and auth approval via phone.  Evelyn Grant RN [facility]

## 2024-09-12 NOTE — SIGNIFICANT EVENT
[facility]    09/12/24 0816   Onset Documentation   Rapid Response Initiated By Radar auto page   Pager Time 0816   Arrival Time 0845   Event End Time 0900   Primary Reason for Call Radar auto page     RADAR page auto generated from VS -see documented VS. Radar score 6. Upon arrival pt resting in bed and transport at the bedside to transport pt to radiation therapy. Pt with c/o pain and bedside nurse able to medicate him. RR even with dyspnea on exertion. No addtl concerns at this time.  Pt ok to remain on the floor for continued monitoring-bedside nurse  will call with any concerns.

## 2024-09-12 NOTE — PROGRESS NOTES
"Christophe Ambriz is a 74 y.o. male on day 3 of admission presenting with Aspiration pneumonia, unspecified aspiration pneumonia type, unspecified laterality, unspecified part of lung (Multi).    Subjective   Patient seen resting in bed this morning after radiation.  Reports pain in his right hip and pain meds is helping. We discussed discharge to SNF tomorrow. Denies chest pain, SOB, N/V/D, fever/chills.  Objective     Physical Exam  Constitutional:       Appearance: He is ill-appearing.   HENT:      Mouth/Throat:      Mouth: Mucous membranes are moist.   Eyes:      Pupils: Pupils are equal, round, and reactive to light.   Cardiovascular:      Rate and Rhythm: Normal rate.   Pulmonary:      Effort: Pulmonary effort is normal.      Breath sounds: Normal breath sounds.      Comments: 2L NC in place  Abdominal:      General: Abdomen is flat. Bowel sounds are normal.      Palpations: Abdomen is soft.   Musculoskeletal:         General: Normal range of motion.      Cervical back: Normal range of motion.   Skin:     General: Skin is warm.   Neurological:      General: No focal deficit present.      Mental Status: He is alert.   Psychiatric:         Mood and Affect: Mood normal.         Last Recorded Vitals  Blood pressure (!) 93/43, pulse 84, temperature 36.7 °C (98.1 °F), temperature source Temporal, resp. rate 18, height 1.85 m (6' 0.84\"), weight 61.8 kg (136 lb 3.9 oz), SpO2 92%.  Intake/Output last 3 Shifts:  I/O last 3 completed shifts:  In: - (0 mL/kg)   Out: 675 (10.9 mL/kg) [Urine:675 (0.3 mL/kg/hr)]  Weight: 61.8 kg     Relevant Results       Malnutrition Diagnosis Status: New  Malnutrition Diagnosis: Severe malnutrition related to chronic disease or condition  As Evidenced by: > 5% wt loss in 1 mo, > 7.5% wt loss in 3 mo, severe muscle and fat losses noted in NFPE  I agree with the dietitian's malnutrition diagnosis.      Assessment/Plan   Assessment & Plan  Aspiration pneumonia, unspecified aspiration pneumonia " type, unspecified laterality, unspecified part of lung (Multi)    Christophe Ambriz is a 74 y.o. male with a hx of CAD, HTN, HLD and RCC with spinal mets s/p L1-5 fusion w/ L2-4 decompression and tumor debulking (7/24) presented initially to OSH with worsening back pain. He was noted to be hypoxic (87% on RA) and placed on 2L O2. CT PE was negative for PE, showed interval improvement in bilateral pulmonary opacities, persistent but improved tree-in-bud opacities, and reveals probable aspiration. Per patient, he is here for radiation sessions that he cannot attend outpatient. Speech therapy consulted on admit, rec pureed solids with thin liquids [facility] ([facility] On 9/4, pt cleared for regular solids and thin liquids. As of 9/3, pt refusing to work with PT/OT; team reiterating importance of participation while inpatient to improve performance status for for future treatment. Wound care consulted on 9/5 for pressure wound on sacrum. On 9/9, pt agreeable to participate in PT therapy, rec SNF placement. DC pending facility placement and completion of palliative RT.     #Metastatic renal cell carcinoma  #Back and leg pain  - L1-5 fusion w/ L2-4 decompression and tumor debulking (7/24)  at [facility] with NSGY  - MRI 8/14: L3 compression fracture with retropulsion of the posterior cortex and severe stenosis of the central spinal canal with nerve root compression unchanged from the previous exam *No new foci of marrow replacement or compression fracture *There is no measurable change compared to the previous exam  - Recently DC 8/27/24 with plans to complete radiation outpatient  - Has not attended radiation sessions due to immobility-Last EBRT 8/27/24  - Presented to OSH for continued back pain, no worse from prior admit; here for inpatient radiation therapy  - Follows with Dr. Chavarria; FUV requested (9/3)   - 9/6 Dr. Chavarria at bedside- discussed with pt and family importance/need for pt  to be at a performance status to receive systemic treatment, otherwise recommending supportive measures and hospice- Pt not agreeable. Pt agreeable to work with PT today (9/6) - pt did decline PT 9/6  - Ongoing C conversation concerning patient's wishes are to pursue systemic therapy vs comfort care, pt agreeable to work with PT 9/9 and still interested in pursuing systemic therapy  - Rad onc consulted 9/3, plan daily radiation for remaining fractions       - Received 8/27, 9/3, 9/4, 9/5, 9/6, 9/9, 9/10, 9/11, 9/12 and 9/13   - Pt DID work with PT today, 9/9, PT rec SNF placement     # Pain control  - cont Gabapentin 300mg TID  - cont home oxycodone 5mg Q4 prn moderate pain, 10mg q4h severe pain  - Supportive oncology consulted, recs appreciated     # Hx Pneumonia  # Aspiration  - Recently discharged 8/27/24  - Last inpatient stay pertinent for MICU transfer for AHRF 2/2 multifocal pneumonia. Blood cultures positive for GPCC, and MRSA nares positive  - Completed inpatient course of Vanco/Zosyn  - CT PE at OSH-possible aspiration with improving tree-in-bud opacities and improvement in bilateral pulmonary opacities  - S/p Unasyn 3gm IV in ED/admit; will discontinue (low suspicion active infection given no systemic s/s, improving CT scan)   - Procal 0.05  - SLP consulted 9/3; c/w pureed textures and thin liquids; okay for regular diet (9/4)        #CAD s/p PCI (unknown year)  #HTN  #HLD  - continue home Atorvastatin, ASA, Plavix  - no current BP medications     # Leukocytosis, improving   - Admit WBC 25.5, down trending from previous discharge (30.2, 30.6)--> 16.2 (9/5) --> 15.1 (9/9) --> 13.2 (9/11)  - CT Angio reveals probable aspiration pneumonia  - likely in setting of Decadron taper   - No evidence of infection   - Trend CBC     # Prophy  - cont w/ Lovenox for prophylaxis  - cont w/ Protonix 40mg daily     # DISPO:  - DNR/DNI - confirmed on admission  - DC tomorrow 9/13 after radiation   - FUV: 10/1 with On        I spent 60 minutes in the professional and overall care of this patient.    Assessment and plan as above discussed with attending physician, Dr. Valencia Sánchez PA-C

## 2024-09-12 NOTE — CARE PLAN
The clinical goals for the shift include patient will rate pain less than an 8/10 throughout shift 9/12 aat 1900      Problem: Pain  Goal: Takes deep breaths with improved pain control throughout the shift  Outcome: Progressing     Problem: Pain  Goal: Turns in bed with improved pain control throughout the shift  Outcome: Progressing     Problem: Pain  Goal: Walks with improved pain control throughout the shift  Outcome: Progressing     Problem: Pain  Goal: Performs ADL's with improved pain control throughout shift  Outcome: Progressing     Problem: Safety - Adult  Goal: Free from fall injury  Outcome: Progressing     Problem: Nutrition  Goal: Nutrition support is meeting 75% of nutrient needs  Outcome: Progressing     Problem: Nutrition  Goal: Adequate PO fluid intake  Outcome: Progressing     Problem: Fall/Injury  Goal: Not fall by end of shift  Outcome: Progressing

## 2024-09-12 NOTE — SIGNIFICANT EVENT
Rapid Response RN Note    Rapid response RN paged for RADAR score 6 due to the following VS: T 37 °C; HR 83; RR 16; BP 96/60; SPO2 92%.     Reviewed above VS with bedside RN.  VS within patient's current trends.  No interventions by rapid response team indicated at this time.      Staff to page rapid response for any concerns or acute change in condition/VS.

## 2024-09-13 ENCOUNTER — HOSPITAL ENCOUNTER (OUTPATIENT)
Dept: RADIATION ONCOLOGY | Facility: HOSPITAL | Age: 74
Setting detail: RADIATION/ONCOLOGY SERIES
Discharge: HOME | End: 2024-09-13
Payer: MEDICARE

## 2024-09-13 ENCOUNTER — DOCUMENTATION (OUTPATIENT)
Dept: RADIATION ONCOLOGY | Facility: HOSPITAL | Age: 74
End: 2024-09-13

## 2024-09-13 ENCOUNTER — APPOINTMENT (OUTPATIENT)
Dept: RADIATION ONCOLOGY | Facility: CLINIC | Age: 74
End: 2024-09-13
Payer: MEDICARE

## 2024-09-13 ENCOUNTER — TELEPHONE (OUTPATIENT)
Dept: PALLIATIVE MEDICINE | Facility: HOSPITAL | Age: 74
End: 2024-09-13
Payer: MEDICARE

## 2024-09-13 VITALS
OXYGEN SATURATION: 94 % | DIASTOLIC BLOOD PRESSURE: 62 MMHG | HEART RATE: 87 BPM | TEMPERATURE: 98.2 F | BODY MASS INDEX: 18.06 KG/M2 | HEIGHT: 73 IN | WEIGHT: 136.24 LBS | SYSTOLIC BLOOD PRESSURE: 99 MMHG | RESPIRATION RATE: 18 BRPM

## 2024-09-13 DIAGNOSIS — C64: ICD-10-CM

## 2024-09-13 DIAGNOSIS — C79.51 SECONDARY MALIGNANT NEOPLASM OF BONE (MULTI): ICD-10-CM

## 2024-09-13 DIAGNOSIS — Z51.0 ENCOUNTER FOR ANTINEOPLASTIC RADIATION THERAPY: ICD-10-CM

## 2024-09-13 LAB
RAD ONC MSQ ACTUAL FRACTIONS DELIVERED: 10
RAD ONC MSQ ACTUAL FRACTIONS DELIVERED: 10
RAD ONC MSQ ACTUAL SESSION DELIVERED DOSE: 300 CGRAY
RAD ONC MSQ ACTUAL SESSION DELIVERED DOSE: 300 CGRAY
RAD ONC MSQ ACTUAL TOTAL DOSE: 3000 CGRAY
RAD ONC MSQ ACTUAL TOTAL DOSE: 3000 CGRAY
RAD ONC MSQ ELAPSED DAYS: 17
RAD ONC MSQ ELAPSED DAYS: 17
RAD ONC MSQ LAST DATE: NORMAL
RAD ONC MSQ LAST DATE: NORMAL
RAD ONC MSQ PRESCRIBED FRACTIONAL DOSE: 300 CGRAY
RAD ONC MSQ PRESCRIBED FRACTIONAL DOSE: 300 CGRAY
RAD ONC MSQ PRESCRIBED NUMBER OF FRACTIONS: 10
RAD ONC MSQ PRESCRIBED NUMBER OF FRACTIONS: 10
RAD ONC MSQ PRESCRIBED TECHNIQUE: NORMAL
RAD ONC MSQ PRESCRIBED TECHNIQUE: NORMAL
RAD ONC MSQ PRESCRIBED TOTAL DOSE: 3000 CGRAY
RAD ONC MSQ PRESCRIBED TOTAL DOSE: 3000 CGRAY
RAD ONC MSQ START DATE: NORMAL
RAD ONC MSQ START DATE: NORMAL
RAD ONC MSQ TREATMENT COURSE NUMBER: 1
RAD ONC MSQ TREATMENT COURSE NUMBER: 1
RAD ONC MSQ TREATMENT SITE: NORMAL
RAD ONC MSQ TREATMENT SITE: NORMAL

## 2024-09-13 PROCEDURE — 2500000001 HC RX 250 WO HCPCS SELF ADMINISTERED DRUGS (ALT 637 FOR MEDICARE OP): Performed by: HOME HEALTH AIDE

## 2024-09-13 PROCEDURE — 77412 RADIATION TX DELIVERY LVL 3: CPT | Performed by: RADIOLOGY

## 2024-09-13 PROCEDURE — 99239 HOSP IP/OBS DSCHRG MGMT >30: CPT | Performed by: PHYSICIAN ASSISTANT

## 2024-09-13 PROCEDURE — 2500000001 HC RX 250 WO HCPCS SELF ADMINISTERED DRUGS (ALT 637 FOR MEDICARE OP)

## 2024-09-13 PROCEDURE — 97535 SELF CARE MNGMENT TRAINING: CPT | Mod: GO,59 | Performed by: OCCUPATIONAL THERAPIST

## 2024-09-13 PROCEDURE — 2500000004 HC RX 250 GENERAL PHARMACY W/ HCPCS (ALT 636 FOR OP/ED)

## 2024-09-13 PROCEDURE — 96372 THER/PROPH/DIAG INJ SC/IM: CPT

## 2024-09-13 PROCEDURE — G0378 HOSPITAL OBSERVATION PER HR: HCPCS

## 2024-09-13 PROCEDURE — 97530 THERAPEUTIC ACTIVITIES: CPT | Mod: GO | Performed by: OCCUPATIONAL THERAPIST

## 2024-09-13 PROCEDURE — 77387 GUIDANCE FOR RADJ TX DLVR: CPT | Performed by: RADIOLOGY

## 2024-09-13 RX ORDER — OXYCODONE HYDROCHLORIDE 5 MG/1
5 TABLET ORAL EVERY 4 HOURS PRN
Qty: 15 TABLET | Refills: 0 | Status: ON HOLD | OUTPATIENT
Start: 2024-09-13 | End: 2024-09-16

## 2024-09-13 ASSESSMENT — COGNITIVE AND FUNCTIONAL STATUS - GENERAL
TOILETING: A LOT
TURNING FROM BACK TO SIDE WHILE IN FLAT BAD: A LOT
DRESSING REGULAR UPPER BODY CLOTHING: A LOT
HELP NEEDED FOR BATHING: TOTAL
DRESSING REGULAR LOWER BODY CLOTHING: A LOT
DRESSING REGULAR LOWER BODY CLOTHING: A LOT
EATING MEALS: A LOT
PERSONAL GROOMING: A LOT
MOBILITY SCORE: 9
STANDING UP FROM CHAIR USING ARMS: TOTAL
TOILETING: TOTAL
EATING MEALS: A LITTLE
DAILY ACTIVITIY SCORE: 12
PERSONAL GROOMING: A LITTLE
DRESSING REGULAR UPPER BODY CLOTHING: A LOT
WALKING IN HOSPITAL ROOM: TOTAL
CLIMB 3 TO 5 STEPS WITH RAILING: TOTAL
MOVING FROM LYING ON BACK TO SITTING ON SIDE OF FLAT BED WITH BEDRAILS: A LITTLE
DAILY ACTIVITIY SCORE: 12
MOVING TO AND FROM BED TO CHAIR: TOTAL
HELP NEEDED FOR BATHING: A LOT

## 2024-09-13 ASSESSMENT — PAIN DESCRIPTION - LOCATION: LOCATION: HIP

## 2024-09-13 ASSESSMENT — PAIN - FUNCTIONAL ASSESSMENT
PAIN_FUNCTIONAL_ASSESSMENT: 0-10

## 2024-09-13 ASSESSMENT — PAIN DESCRIPTION - ORIENTATION: ORIENTATION: RIGHT;LEFT

## 2024-09-13 ASSESSMENT — PAIN SCALES - GENERAL
PAINLEVEL_OUTOF10: 9
PAINLEVEL_OUTOF10: 8
PAINLEVEL_OUTOF10: 8
PAINLEVEL_OUTOF10: 6
PAINLEVEL_OUTOF10: 8
PAINLEVEL_OUTOF10: 0 - NO PAIN
PAINLEVEL_OUTOF10: 9

## 2024-09-13 ASSESSMENT — ACTIVITIES OF DAILY LIVING (ADL): HOME_MANAGEMENT_TIME_ENTRY: 23

## 2024-09-13 NOTE — PROGRESS NOTES
"Occupational Therapy    Occupational Therapy    Occupational Therapy Treatment    Name: Christophe Ambriz  MRN: [id number]  : 1950  Date: 24  Room: [facility]      Time Calculation  Start Time: 1441  Stop Time: 1514  Time Calculation (min): 33 min    Assessment:  OT Assessment: ADL retraining and bed mobility limited by pain and fatigue today.  Patient cooperative and agreeable to tx.  He made good attempts to participate and perform self care despite pain.  Patient remains appropriate for moderate intensity OT following discharge.  End of Session Communication: Bedside nurse  End of Session Patient Position: Bed, 3 rail up, Alarm off, caregiver present (Nurse in room)  Plan:  Treatment Interventions: ADL retraining, Functional transfer training, UE strengthening/ROM, Endurance training, Patient/family training, Compensatory technique education  OT Frequency: 2 times per week  OT Discharge Recommendations: Low intensity level of continued care  Equipment Recommended upon Discharge:  (none)  OT Recommended Transfer Status: Maximum assist  OT - OK to Discharge: Yes    Subjective   General:  OT Last Visit  OT Received On: 24  Prior to Session Communication: Bedside nurse  Patient Position Received: Bed, 3 rail up, Alarm off, not on at start of session  Family/Caregiver Present: No  General Comment: Patient reports feeling stiff today.   Precautions:  Medical Precautions: Fall precautions, Oxygen therapy device and L/min, Spinal precautions  Vitals:  Vital Signs (Past 2hrs)               Cognition:  Overall Cognitive Status:  (alert, Patient answered, \"I don't know\" to 90% of questions presented today.)  Orientation Level:  (confused to place, month, situation)  Following Commands: Follows one step commands with increased time (and with repetition)  Problem Solving: Exceptions to WFL  Simple Functional Tasks: Impaired    Pain Assessment:  Pain Assessment  Pain Assessment: 0-10  0-10 (Numeric) Pain " Score: 6  Pain Type: Acute pain  Pain Location: Back     Objective   Activities of Daily Living:        Grooming  Grooming Level of Assistance:  (independent post setup to apply chapstick)              UE Dressing  UE Dressing Level of Assistance: Moderate assistance, Maximum verbal cues (to don gown, max assist to don gown)    LE Dressing  LE Dressing: Yes  Pants Level of Assistance: Maximum assistance (of 2 in supine position)  LE Dressing Where Assessed: Bed level  LE Dressing Comments: Patient required extended time for all ADL.          Bed Mobility/Transfers:   Bed Mobility 1  Bed Mobility 1: Rolling right, Rolling left  Level of Assistance 1: Maximum assistance (or 2)  Bed Mobility 2  Bed Mobility  2:  (Patient declined sitting EOB due to fatigue and pain.)     Outcome Measures:  [facility] Daily Activity  Putting on and taking off regular lower body clothing: A lot  Bathing (including washing, rinsing, drying): A lot  Putting on and taking off regular upper body clothing: A lot  Toileting, which includes using toilet, bedpan or urinal: A lot  Taking care of personal grooming such as brushing teeth: A lot  Eating Meals: A lot  Daily Activity - Total Score: 12     Education Documentation  No documentation found.  Education Comments  No comments found.        Goals:  Encounter Problems       Encounter Problems (Active)       ADLs       Patient with complete upper body dressing with contact guard assist level of assistance donning and doffing all UE clothes with no adaptive equipment while edge of bed  (Progressing)       Start:  09/09/24    Expected End:  09/23/24            Patient with complete lower body dressing with minimal assist  and moderate assist level of assistance donning and doffing all LE clothes  with PRN adaptive equipment while edge of bed  (Not Progressing)       Start:  09/09/24    Expected End:  09/23/24            Patient will complete daily grooming tasks brushing teeth and washing face/hair  with stand by assist level of assistance and PRN adaptive equipment while edge of bed . (Not Progressing)       Start:  09/09/24    Expected End:  09/23/24            Patient will complete toileting including hygiene clothing management/hygiene with minimal assist  level of assistance and bedside commode. (Not Progressing)       Start:  09/09/24    Expected End:  09/23/24               EXERCISE/STRENGTHENING       Patient will participate in BUE exercises in order to improve strength and activity for ADL performance.  (Not Progressing)       Start:  09/09/24    Expected End:  09/23/24               TRANSFERS       Patient will perform bed mobility minimal assist  level of assistance and bed rails in order to improve safety and independence with mobility (Progressing)       Start:  09/09/24    Expected End:  09/23/24            Patient will complete functional transfers with least restrictive device with moderate assist level of assistance. (Not Progressing)       Start:  09/09/24    Expected End:  09/23/24 09/13/24 at 3:26 PM   Faith Currie, OT   [phone]

## 2024-09-13 NOTE — DISCHARGE SUMMARY
Discharge Diagnosis  Aspiration pneumonia, unspecified aspiration pneumonia type, unspecified laterality, unspecified part of lung (Multi)    Issues Requiring Follow-Up  None     Test Results Pending At Discharge  Pending Labs       No current pending labs.        Hospital Course  Christophe Ambriz is a 74 y.o. male with a hx of CAD, HTN, HLD and RCC with spinal mets s/p L1-5 fusion w/ L2-4 decompression and tumor debulking (7/24) presented initially to OSH with worsening back pain. He was noted to be hypoxic (87% on RA) and placed on 2L O2. CT PE was negative for PE, showed interval improvement in bilateral pulmonary opacities, persistent but improved tree-in-bud opacities, and reveals probable aspiration.  He was started on Unasyn but was ultimately discontinued for low suspicion at the time for an infection. Per patient, he is here for radiation sessions that he cannot attend outpatient. Speech therapy consulted on admit, rec pureed solids with thin liquids (pt refusing thickened liquids at [facility]) (9/3). On 9/4, pt cleared for regular solids and thin liquids. As of 9/3, pt refusing to work with PT/OT; team reiterating importance of participation while inpatient to improve performance status for for future treatment. Wound care consulted on 9/5 for pressure wound on sacrum. On 9/9, pt finally agreeable to participate in PT therapy, rec SNF placement. Today the patient has completed his radiation to the spine and is being discharged to a SNF in stable condition. For pain management he will cont  home oxycodone 5mg Q4 prn moderate pain and 10 mg q4h severe pain. He has a FUV with Dr. Chavarria on 10/1.    On the day of discharge, the patient reported feeling well and pain was controlled. Vitals and labs were stable.   Attending has reviewed all labs and vitals, and discussed and agreed with the discharge plan prior to patient discharge.  Patient discharged in stable condition. > 30 minutes spent on discharge  planning.     Pertinent Physical Exam At Time of Discharge  Physical Exam  Constitutional:       Appearance: Normal appearance.   HENT:      Head: Normocephalic and atraumatic.   Eyes:      Extraocular Movements: Extraocular movements intact.   Cardiovascular:      Rate and Rhythm: Normal rate and regular rhythm.   Pulmonary:      Effort: Pulmonary effort is normal.      Breath sounds: Normal breath sounds.      Comments: 2L NC  Abdominal:      General: Abdomen is flat. Bowel sounds are normal.      Palpations: Abdomen is soft.   Musculoskeletal:         General: No swelling. Normal range of motion.   Skin:     General: Skin is warm and dry.      Comments: Healed surgical site on back. +sacral ulcer   Neurological:      General: No focal deficit present.      Mental Status: He is alert and oriented to person, place, and time.   Psychiatric:         Mood and Affect: Mood normal.         Behavior: Behavior normal.         Home Medications     Medication List      CHANGE how you take these medications     dexAMETHasone 2 mg tablet; Commonly known as: Decadron; Take 1 tablet (2   mg) by mouth once daily for 23 doses.; What changed: medication strength,   See the new instructions.   traZODone 50 mg tablet; Commonly known as: Desyrel; Take 1 tablet (50   mg) by mouth once daily at bedtime.; What changed: when to take this,   reasons to take this     CONTINUE taking these medications     amLODIPine 10 mg tablet; Commonly known as: Norvasc   aspirin 81 mg EC tablet; Take 1 tablet (81 mg) by mouth once daily.   atorvastatin 20 mg tablet; Commonly known as: Lipitor; Take 1 tablet (20   mg) by mouth once daily at bedtime.   clopidogrel 75 mg tablet; Commonly known as: Plavix   gabapentin 300 mg capsule; Commonly known as: Neurontin; Take 1 capsule   (300 mg) by mouth once daily at bedtime.   oxyCODONE 5 mg immediate release tablet; Commonly known as: Roxicodone;   Take 1 tablet (5 mg) by mouth every 4 hours if needed for  moderate pain (4   - 6).   oxygen gas therapy; Commonly known as: O2; Inhale 1 each continuously.     STOP taking these medications     bisacodyl 10 mg suppository; Commonly known as: Dulcolax   senna 8.6 mg tablet; Generic drug: sennosides       Outpatient Follow-Up  Future Appointments   Date Time Provider Department Center   10/1/2024 11:00 AM Sahil Chavarria MD [id number] [facility]       Aguilar Sánchez PA-C

## 2024-09-13 NOTE — NURSING NOTE
Kar discharged to [facility] via community care at 1520. PIV removed prior to discharge. AVS reviewed with patient, patient acknowledged understanding.

## 2024-09-13 NOTE — TELEPHONE ENCOUNTER
Patient called to schedule NPV with supportive oncology.  Patient was seen by inpatient palliative team and Galina Durant NP.  No answer.  Voicemail message left for patient to call to schedule appointment.

## 2024-09-13 NOTE — CARE PLAN
The patient's goals for the shift include      The clinical goals for the shift include Pt will be safe, comfortable, and HD stable ovdernight      Problem: Pain  Goal: Takes deep breaths with improved pain control throughout the shift  Outcome: Progressing  Goal: Turns in bed with improved pain control throughout the shift  Outcome: Progressing  Goal: Walks with improved pain control throughout the shift  Outcome: Progressing  Goal: Performs ADL's with improved pain control throughout shift  Outcome: Progressing  Goal: Participates in PT with improved pain control throughout the shift  Outcome: Progressing  Goal: Free from opioid side effects throughout the shift  Outcome: Progressing  Goal: Free from acute confusion related to pain meds throughout the shift  Outcome: Progressing     Problem: Safety - Adult  Goal: Free from fall injury  Outcome: Progressing     Problem: Fall/Injury  Goal: Not fall by end of shift  Outcome: Progressing  Goal: Be free from injury by end of the shift  Outcome: Progressing  Goal: Verbalize understanding of personal risk factors for fall in the hospital  Outcome: Progressing  Goal: Verbalize understanding of risk factor reduction measures to prevent injury from fall in the home  Outcome: Progressing  Goal: Use assistive devices by end of the shift  Outcome: Progressing  Goal: Pace activities to prevent fatigue by end of the shift  Outcome: Progressing

## 2024-09-13 NOTE — PROGRESS NOTES
24 0900   Discharge Planning   Who is requesting discharge planning? Provider   Home or Post Acute Services Post acute facilities (Rehab/SNF/etc)   Type of Post Acute Facility Services Skilled nursing   Expected Discharge Disposition SNF   Does the patient need discharge transport arranged? Yes   RoundTrip coordination needed? Yes   Has discharge transport been arranged? Yes   What day is the transport expected? 24   What time is the transport expected? 1200     Patient to discharge today to [facility] at noon. Ride scheduled in RoundTrip with [facility]. Team aware. Payton Bush RN given report number [phone], [address].   HOA Patel     Precert Status: Approved Authorization Number: [id number] Dates: 2024 - 2024 [facility] [address] Christophe Ambriz MRN [id number]  1950 [place] # [id number] [facility]. Auth letter updated, patient cleared to go to facility.

## 2024-09-14 ENCOUNTER — NURSING HOME VISIT (OUTPATIENT)
Dept: POST ACUTE CARE | Facility: EXTERNAL LOCATION | Age: 74
End: 2024-09-14
Payer: MEDICARE

## 2024-09-14 DIAGNOSIS — C64.9 METASTATIC RENAL CELL CARCINOMA, UNSPECIFIED LATERALITY (MULTI): ICD-10-CM

## 2024-09-14 DIAGNOSIS — Z91.81 AT RISK FOR FALLING: ICD-10-CM

## 2024-09-14 DIAGNOSIS — E78.5 HYPERLIPIDEMIA, UNSPECIFIED HYPERLIPIDEMIA TYPE: ICD-10-CM

## 2024-09-14 DIAGNOSIS — M54.9 BACK PAIN, UNSPECIFIED BACK LOCATION, UNSPECIFIED BACK PAIN LATERALITY, UNSPECIFIED CHRONICITY: ICD-10-CM

## 2024-09-14 DIAGNOSIS — I20.9 ANGINA PECTORIS: ICD-10-CM

## 2024-09-14 DIAGNOSIS — I10 HYPERTENSION, UNSPECIFIED TYPE: ICD-10-CM

## 2024-09-14 DIAGNOSIS — I25.10 CORONARY ARTERY DISEASE, UNSPECIFIED VESSEL OR LESION TYPE, UNSPECIFIED WHETHER ANGINA PRESENT, UNSPECIFIED WHETHER NATIVE OR TRANSPLANTED HEART: ICD-10-CM

## 2024-09-14 DIAGNOSIS — R53.1 WEAKNESS: ICD-10-CM

## 2024-09-14 DIAGNOSIS — J69.0 ASPIRATION PNEUMONIA, UNSPECIFIED ASPIRATION PNEUMONIA TYPE, UNSPECIFIED LATERALITY, UNSPECIFIED PART OF LUNG (MULTI): Primary | ICD-10-CM

## 2024-09-14 DIAGNOSIS — I73.9 PERIPHERAL VASCULAR DISEASE (CMS-HCC): ICD-10-CM

## 2024-09-14 PROCEDURE — 99305 1ST NF CARE MODERATE MDM 35: CPT | Performed by: INTERNAL MEDICINE

## 2024-09-14 NOTE — LETTER
Patient: Christophe Ambriz  : 1950    Encounter Date: 2024    PLACE OF SERVICE:  [facility]    This is new/initial history and physical.    Subjective  Patient ID: Christophe Ambriz is a 74 y.o. male who presents for Initial visit.    Mr. Christophe Ambriz is a 74-year-old male with history of aspiration pneumonia.  He suffers from renal cell carcinoma with multiple metastasis.  He is unable to care for himself and requires supportive care.    Review of Systems   Constitutional:  Negative for chills and fever.   Cardiovascular:  Negative for chest pain.   All other systems reviewed and are negative.    Objective  /80   Pulse 84   Temp 36.9 °C (98.5 °F)   Resp 18     Physical Exam  Vitals reviewed.   Constitutional:       Comments: This is a well-developed, well-nourished male, lying in bed, appearing weak and lethargic.   HENT:      Right Ear: Tympanic membrane, ear canal and external ear normal.      Left Ear: Tympanic membrane, ear canal and external ear normal.   Eyes:      General: No scleral icterus.     Pupils: Pupils are equal, round, and reactive to light.   Neck:      Vascular: No carotid bruit.   Cardiovascular:      Heart sounds: Normal heart sounds, S1 normal and S2 normal. No murmur heard.     No friction rub.   Pulmonary:      Effort: Pulmonary effort is normal.      Breath sounds: Normal breath sounds and air entry.   Abdominal:      Palpations: There is no hepatomegaly, splenomegaly or mass.   Musculoskeletal:         General: No swelling or deformity. Normal range of motion.      Cervical back: Neck supple.      Right lower leg: No edema.      Left lower leg: No edema.   Lymphadenopathy:      Cervical: No cervical adenopathy.      Upper Body:      Right upper body: No axillary adenopathy.      Left upper body: No axillary adenopathy.      Lower Body: No right inguinal adenopathy. No left inguinal adenopathy.   Neurological:      Mental Status: He is oriented to person,  place, and time.      Cranial Nerves: Cranial nerves 2-12 are intact. No cranial nerve deficit.      Sensory: No sensory deficit.      Motor: Motor function is intact. No weakness.      Gait: Gait is intact.      Deep Tendon Reflexes: Reflexes normal.   Psychiatric:         Mood and Affect: Mood normal. Mood is not anxious or depressed. Affect is not angry.         Behavior: Behavior is not agitated.         Thought Content: Thought content normal.         Judgment: Judgment normal.     LAB WORK:  Laboratory studies were reviewed.    Assessment/Plan  Problem List Items Addressed This Visit             ICD-10-CM       Cardiac and Vasculature    CAD (coronary artery disease) I25.10    HTN (hypertension) I10    Hyperlipidemia E78.5     Other Visit Diagnoses         Codes    Aspiration pneumonia, unspecified aspiration pneumonia type, unspecified laterality, unspecified part of lung (Multi)    -  Primary J69.0    Metastatic renal cell carcinoma, unspecified laterality (Multi)     C64.9    Back pain, unspecified back location, unspecified back pain laterality, unspecified chronicity     M54.9    Weakness     R53.1    At risk for falling     Z91.81        1. Aspiration pneumonia, on antibiotic.  2. Metastatic renal cell carcinoma.  Follow with Oncology.  3. Back pain on Tylenol.  4. Coronary artery disease, on aspirin.  5. Hypertension, medically controlled.  6. Hyperlipidemia, on statin.  7. Weakness, on PT/OT.  8. Fall risk, on fall precautions.    Scribe Attestation  By signing my name below, IHazel Scribe attest that this documentation has been prepared under the direction and in the presence of Rudi Raymundo MD.     All medical record entries made by the scribe were personally dictated by me I have reviewed the chart and agree the record accurately reflects my personal performance of his history physical examination and management      Electronically Signed By: Rudi Raymundo MD   9/18/24 11:47 PM

## 2024-09-16 ENCOUNTER — APPOINTMENT (OUTPATIENT)
Dept: RADIATION ONCOLOGY | Facility: CLINIC | Age: 74
End: 2024-09-16
Payer: MEDICARE

## 2024-09-17 ENCOUNTER — APPOINTMENT (OUTPATIENT)
Dept: RADIATION ONCOLOGY | Facility: CLINIC | Age: 74
End: 2024-09-17
Payer: MEDICARE

## 2024-09-17 VITALS
SYSTOLIC BLOOD PRESSURE: 126 MMHG | DIASTOLIC BLOOD PRESSURE: 80 MMHG | TEMPERATURE: 98.5 F | HEART RATE: 84 BPM | RESPIRATION RATE: 18 BRPM

## 2024-09-17 ASSESSMENT — ENCOUNTER SYMPTOMS
CHILLS: 0
FEVER: 0

## 2024-09-17 NOTE — PROGRESS NOTES
PLACE OF SERVICE:  [facility]    This is new/initial history and physical.    Subjective   Patient ID: Christophe Ambriz is a 74 y.o. male who presents for Initial visit.    Mr. Christophe Ambriz is a 74-year-old male with history of aspiration pneumonia.  He suffers from renal cell carcinoma with multiple metastasis.  He is unable to care for himself and requires supportive care.    Review of Systems   Constitutional:  Negative for chills and fever.   Cardiovascular:  Negative for chest pain.   All other systems reviewed and are negative.    Objective   /80   Pulse 84   Temp 36.9 °C (98.5 °F)   Resp 18     Physical Exam  Vitals reviewed.   Constitutional:       Comments: This is a well-developed, well-nourished male, lying in bed, appearing weak and lethargic.   HENT:      Right Ear: Tympanic membrane, ear canal and external ear normal.      Left Ear: Tympanic membrane, ear canal and external ear normal.   Eyes:      General: No scleral icterus.     Pupils: Pupils are equal, round, and reactive to light.   Neck:      Vascular: No carotid bruit.   Cardiovascular:      Heart sounds: Normal heart sounds, S1 normal and S2 normal. No murmur heard.     No friction rub.   Pulmonary:      Effort: Pulmonary effort is normal.      Breath sounds: Normal breath sounds and air entry.   Abdominal:      Palpations: There is no hepatomegaly, splenomegaly or mass.   Musculoskeletal:         General: No swelling or deformity. Normal range of motion.      Cervical back: Neck supple.      Right lower leg: No edema.      Left lower leg: No edema.   Lymphadenopathy:      Cervical: No cervical adenopathy.      Upper Body:      Right upper body: No axillary adenopathy.      Left upper body: No axillary adenopathy.      Lower Body: No right inguinal adenopathy. No left inguinal adenopathy.   Neurological:      Mental Status: He is oriented to person, place, and time.      Cranial Nerves: Cranial nerves 2-12 are intact.  No cranial nerve deficit.      Sensory: No sensory deficit.      Motor: Motor function is intact. No weakness.      Gait: Gait is intact.      Deep Tendon Reflexes: Reflexes normal.   Psychiatric:         Mood and Affect: Mood normal. Mood is not anxious or depressed. Affect is not angry.         Behavior: Behavior is not agitated.         Thought Content: Thought content normal.         Judgment: Judgment normal.     LAB WORK:  Laboratory studies were reviewed.    Assessment/Plan   Problem List Items Addressed This Visit             ICD-10-CM       Cardiac and Vasculature    CAD (coronary artery disease) I25.10    HTN (hypertension) I10    Hyperlipidemia E78.5     Other Visit Diagnoses         Codes    Aspiration pneumonia, unspecified aspiration pneumonia type, unspecified laterality, unspecified part of lung (Multi)    -  Primary J69.0    Metastatic renal cell carcinoma, unspecified laterality (Multi)     C64.9    Back pain, unspecified back location, unspecified back pain laterality, unspecified chronicity     M54.9    Weakness     R53.1    At risk for falling     Z91.81        1. Aspiration pneumonia, on antibiotic.  2. Metastatic renal cell carcinoma.  Follow with Oncology.  3. Back pain on Tylenol.  4. Coronary artery disease, on aspirin.  5. Hypertension, medically controlled.  6. Hyperlipidemia, on statin.  7. Weakness, on PT/OT.  8. Fall risk, on fall precautions.    Scribe Attestation  By signing my name below, I, Carson Green attest that this documentation has been prepared under the direction and in the presence of Rudi Raymundo MD.     All medical record entries made by the scribe were personally dictated by me I have reviewed the chart and agree the record accurately reflects my personal performance of his history physical examination and management

## 2024-09-18 ENCOUNTER — APPOINTMENT (OUTPATIENT)
Dept: RADIATION ONCOLOGY | Facility: CLINIC | Age: 74
End: 2024-09-18
Payer: MEDICARE

## 2024-09-18 PROBLEM — I73.9 PERIPHERAL VASCULAR DISEASE (CMS-HCC): Status: ACTIVE | Noted: 2024-09-18

## 2024-09-18 NOTE — PROGRESS NOTES
Radiation Oncology Treatment Summary    Patient Name:  Christophe Ambriz  MRN:  [id number]  :  1950    Referring Provider: No ref. provider found  Primary Care Provider: Dejuan Dhaliwal MD    Brief History: Christophe Ambriz is a 74 y.o. male with No matching staging information was found for the patient..  The patient completed radiotherapy as outlined below.    Radiation Treatment Summary:    3D CRT: Not Applicable Lumbar spine, Right Chest wall    Treatment Period Technique Fraction Dose Fractions Total Dose   Course 1 2024-2024  (days elapsed: 17)         Chestwall_R 2024-2024 3D 300 / 300 cGy 10 / 10 3000 / 3,000 cGy         L3 Spine 2024-2024 AP/ / 300 cGy 10 / 10 3000 / 3,000 cGy       Please see the patient's [facility] chart for further details regarding the radiation plan, including beam energy.    Concurrent Chemotherapy:  Treatment Plans       No treatment plans exist          CTCAE Toxicity Overview:   Toxicity Assessment          2024    14:21 2024    10:29   Toxicity Assessment   Adverse Events Reviewed (WDL) Yes (Within Defined Limits) Yes (Within Defined Limits)   Treatment Site Bone Bone;Thoracic   Anorexia Grade 0 Grade 2   Anxiety Grade 0 Grade 0   Dehydration Grade 0 Grade 0   Depression Grade 0 Grade 1   Dermatitis Radiation Grade 0 Grade 0   Diarrhea Grade 0 Grade 0   Fatigue Grade 0 Grade 1   Fibrosis Deep Connective Tissue Grade 0 Grade 0   Fracture Grade 0 Grade 0   Nausea Grade 0 Grade 0   Pain Grade 1 Grade 1   Treatment Related Secondary Malignancy Grade 0 Grade 0   Tumor Pain Grade 1 Grade 1   Vomiting Grade 0 Grade 0   Constipation  Grade 0   Dyspepsia  Grade 0   Dysphagia  Grade 0   Esophagitis  Grade 0   Mucositis Oral  Grade 0   Upper Gastrointestinal Hemorrhage  Grade 0   Peripheral Sensory Neuropathy  Grade 0   Joint Range of Motion Decreased Grade 0    Brachial Plexopathy  Grade 0   Pneumonitis  Grade 0   Bone Pain Grade 0    Edema Limbs  Grade 0    Aspiration  Grade 0   Hoarseness  Grade 0   Laryngeal Edema  Grade 0   Myocardial Infarction  Grade 0   Pericardial Effusion  Grade 0   Pericarditis  Grade 0   Esophageal Fistula  Grade 0   Esophageal Obstruction  Grade 0   Esophageal Pain  Grade 0   Esophageal Stenosis  Grade 0   Esophageal Ulcer  Grade 0   Bronchial Obstruction  Grade 0   Cough  Grade 1   Dyspnea  Grade 0   Epistaxis  Grade 0   Hiccups  Grade 0   Hypoxia  Grade 0   Pulmonary Fibrosis  Grade 0   Lymphedema  Grade 0   Thromboembolic Event  Grade 0   Hot Flashes  Grade 0   Alopecia Grade 0 Grade 0   Erythroderma Grade 0 Grade 0   Pain of Skin Grade 0 Grade 0   Pruritus Grade 0 Grade 0   Rash Acneiform Grade 0 Grade 0   Skin Hyperpigmentation Grade 0 Grade 0   Skin Hypopigmentation Grade 0 Grade 0   Skin Induration Grade 0 Grade 0   Skin Ulceration Grade 0 Grade 0   Telangiectasia Grade 0 Grade 0     Patient Disposition: Patient will follow-up in clinic in 3 mo

## 2024-09-19 ENCOUNTER — APPOINTMENT (OUTPATIENT)
Dept: RADIATION ONCOLOGY | Facility: CLINIC | Age: 74
End: 2024-09-19
Payer: MEDICARE

## 2024-09-20 ENCOUNTER — APPOINTMENT (OUTPATIENT)
Dept: RADIOLOGY | Facility: HOSPITAL | Age: 74
DRG: 871 | End: 2024-09-20
Payer: MEDICARE

## 2024-09-20 ENCOUNTER — APPOINTMENT (OUTPATIENT)
Dept: RADIATION ONCOLOGY | Facility: CLINIC | Age: 74
End: 2024-09-20
Payer: MEDICARE

## 2024-09-20 ENCOUNTER — APPOINTMENT (OUTPATIENT)
Dept: CARDIOLOGY | Facility: HOSPITAL | Age: 74
DRG: 871 | End: 2024-09-20
Payer: MEDICARE

## 2024-09-20 ENCOUNTER — HOSPITAL ENCOUNTER (INPATIENT)
Facility: HOSPITAL | Age: 74
DRG: 871 | End: 2024-09-20
Attending: STUDENT IN AN ORGANIZED HEALTH CARE EDUCATION/TRAINING PROGRAM | Admitting: INTERNAL MEDICINE
Payer: MEDICARE

## 2024-09-20 DIAGNOSIS — A41.9 SEPSIS WITHOUT ACUTE ORGAN DYSFUNCTION, DUE TO UNSPECIFIED ORGANISM (MULTI): ICD-10-CM

## 2024-09-20 DIAGNOSIS — J18.9 PNEUMONIA OF LEFT LOWER LOBE DUE TO INFECTIOUS ORGANISM: Primary | ICD-10-CM

## 2024-09-20 DIAGNOSIS — J96.01 ACUTE RESPIRATORY FAILURE WITH HYPOXIA (MULTI): ICD-10-CM

## 2024-09-20 LAB
ALBUMIN SERPL BCP-MCNC: 3 G/DL (ref 3.4–5)
ALP SERPL-CCNC: 103 U/L (ref 33–136)
ALT SERPL W P-5'-P-CCNC: 12 U/L (ref 10–52)
AMORPH CRY #/AREA UR COMP ASSIST: ABNORMAL /HPF
ANION GAP SERPL CALCULATED.3IONS-SCNC: 18 MMOL/L (ref 10–20)
APPEARANCE UR: ABNORMAL
AST SERPL W P-5'-P-CCNC: 26 U/L (ref 9–39)
BASOPHILS # BLD AUTO: 0.04 X10*3/UL (ref 0–0.1)
BASOPHILS NFR BLD AUTO: 0.1 %
BILIRUB SERPL-MCNC: 0.8 MG/DL (ref 0–1.2)
BILIRUB UR STRIP.AUTO-MCNC: NEGATIVE MG/DL
BNP SERPL-MCNC: 129 PG/ML (ref 0–99)
BUN SERPL-MCNC: 20 MG/DL (ref 6–23)
CALCIUM SERPL-MCNC: 9.7 MG/DL (ref 8.6–10.3)
CARDIAC TROPONIN I PNL SERPL HS: 18 NG/L (ref 0–20)
CHLORIDE SERPL-SCNC: 97 MMOL/L (ref 98–107)
CO2 SERPL-SCNC: 25 MMOL/L (ref 21–32)
COLOR UR: ABNORMAL
CREAT SERPL-MCNC: 0.57 MG/DL (ref 0.5–1.3)
EGFRCR SERPLBLD CKD-EPI 2021: >90 ML/MIN/1.73M*2
EOSINOPHIL # BLD AUTO: 0.01 X10*3/UL (ref 0–0.4)
EOSINOPHIL NFR BLD AUTO: 0 %
ERYTHROCYTE [DISTWIDTH] IN BLOOD BY AUTOMATED COUNT: 16.6 % (ref 11.5–14.5)
GLUCOSE SERPL-MCNC: 195 MG/DL (ref 74–99)
GLUCOSE UR STRIP.AUTO-MCNC: ABNORMAL MG/DL
HCT VFR BLD AUTO: 31.1 % (ref 41–52)
HGB BLD-MCNC: 9 G/DL (ref 13.5–17.5)
IMM GRANULOCYTES # BLD AUTO: 0.4 X10*3/UL (ref 0–0.5)
IMM GRANULOCYTES NFR BLD AUTO: 1.5 % (ref 0–0.9)
KETONES UR STRIP.AUTO-MCNC: ABNORMAL MG/DL
LACTATE SERPL-SCNC: 1.5 MMOL/L (ref 0.4–2)
LACTATE SERPL-SCNC: 2.1 MMOL/L (ref 0.4–2)
LACTATE SERPL-SCNC: 2.5 MMOL/L (ref 0.4–2)
LEUKOCYTE ESTERASE UR QL STRIP.AUTO: NEGATIVE
LYMPHOCYTES # BLD AUTO: 3.57 X10*3/UL (ref 0.8–3)
LYMPHOCYTES NFR BLD AUTO: 13.4 %
MCH RBC QN AUTO: 24.5 PG (ref 26–34)
MCHC RBC AUTO-ENTMCNC: 28.9 G/DL (ref 32–36)
MCV RBC AUTO: 85 FL (ref 80–100)
MONOCYTES # BLD AUTO: 0.6 X10*3/UL (ref 0.05–0.8)
MONOCYTES NFR BLD AUTO: 2.2 %
MUCOUS THREADS #/AREA URNS AUTO: ABNORMAL /LPF
NEUTROPHILS # BLD AUTO: 22.08 X10*3/UL (ref 1.6–5.5)
NEUTROPHILS NFR BLD AUTO: 82.8 %
NITRITE UR QL STRIP.AUTO: NEGATIVE
NRBC BLD-RTO: 0 /100 WBCS (ref 0–0)
PH UR STRIP.AUTO: 6 [PH]
PLATELET # BLD AUTO: 537 X10*3/UL (ref 150–450)
POTASSIUM SERPL-SCNC: 3.9 MMOL/L (ref 3.5–5.3)
PROT SERPL-MCNC: 8.1 G/DL (ref 6.4–8.2)
PROT UR STRIP.AUTO-MCNC: ABNORMAL MG/DL
RBC # BLD AUTO: 3.67 X10*6/UL (ref 4.5–5.9)
RBC # UR STRIP.AUTO: ABNORMAL /UL
RBC #/AREA URNS AUTO: >20 /HPF
SARS-COV-2 RNA RESP QL NAA+PROBE: NOT DETECTED
SODIUM SERPL-SCNC: 136 MMOL/L (ref 136–145)
SP GR UR STRIP.AUTO: >1.05
SQUAMOUS #/AREA URNS AUTO: ABNORMAL /HPF
UROBILINOGEN UR STRIP.AUTO-MCNC: ABNORMAL MG/DL
WBC # BLD AUTO: 26.7 X10*3/UL (ref 4.4–11.3)
WBC #/AREA URNS AUTO: ABNORMAL /HPF

## 2024-09-20 PROCEDURE — 93010 ELECTROCARDIOGRAM REPORT: CPT | Performed by: INTERNAL MEDICINE

## 2024-09-20 PROCEDURE — 84484 ASSAY OF TROPONIN QUANT: CPT | Performed by: STUDENT IN AN ORGANIZED HEALTH CARE EDUCATION/TRAINING PROGRAM

## 2024-09-20 PROCEDURE — 2550000001 HC RX 255 CONTRASTS: Performed by: STUDENT IN AN ORGANIZED HEALTH CARE EDUCATION/TRAINING PROGRAM

## 2024-09-20 PROCEDURE — 87040 BLOOD CULTURE FOR BACTERIA: CPT | Mod: TRILAB | Performed by: STUDENT IN AN ORGANIZED HEALTH CARE EDUCATION/TRAINING PROGRAM

## 2024-09-20 PROCEDURE — 74177 CT ABD & PELVIS W/CONTRAST: CPT

## 2024-09-20 PROCEDURE — 2500000004 HC RX 250 GENERAL PHARMACY W/ HCPCS (ALT 636 FOR OP/ED): Mod: JZ

## 2024-09-20 PROCEDURE — 85025 COMPLETE CBC W/AUTO DIFF WBC: CPT | Performed by: STUDENT IN AN ORGANIZED HEALTH CARE EDUCATION/TRAINING PROGRAM

## 2024-09-20 PROCEDURE — 83880 ASSAY OF NATRIURETIC PEPTIDE: CPT | Performed by: STUDENT IN AN ORGANIZED HEALTH CARE EDUCATION/TRAINING PROGRAM

## 2024-09-20 PROCEDURE — 96365 THER/PROPH/DIAG IV INF INIT: CPT

## 2024-09-20 PROCEDURE — 93005 ELECTROCARDIOGRAM TRACING: CPT

## 2024-09-20 PROCEDURE — 99291 CRITICAL CARE FIRST HOUR: CPT | Mod: 25

## 2024-09-20 PROCEDURE — 83605 ASSAY OF LACTIC ACID: CPT | Performed by: STUDENT IN AN ORGANIZED HEALTH CARE EDUCATION/TRAINING PROGRAM

## 2024-09-20 PROCEDURE — 71275 CT ANGIOGRAPHY CHEST: CPT | Performed by: RADIOLOGY

## 2024-09-20 PROCEDURE — 36415 COLL VENOUS BLD VENIPUNCTURE: CPT | Performed by: STUDENT IN AN ORGANIZED HEALTH CARE EDUCATION/TRAINING PROGRAM

## 2024-09-20 PROCEDURE — 87635 SARS-COV-2 COVID-19 AMP PRB: CPT | Performed by: STUDENT IN AN ORGANIZED HEALTH CARE EDUCATION/TRAINING PROGRAM

## 2024-09-20 PROCEDURE — 2500000001 HC RX 250 WO HCPCS SELF ADMINISTERED DRUGS (ALT 637 FOR MEDICARE OP): Performed by: INTERNAL MEDICINE

## 2024-09-20 PROCEDURE — 80053 COMPREHEN METABOLIC PANEL: CPT | Performed by: STUDENT IN AN ORGANIZED HEALTH CARE EDUCATION/TRAINING PROGRAM

## 2024-09-20 PROCEDURE — 96361 HYDRATE IV INFUSION ADD-ON: CPT

## 2024-09-20 PROCEDURE — 2500000005 HC RX 250 GENERAL PHARMACY W/O HCPCS: Performed by: INTERNAL MEDICINE

## 2024-09-20 PROCEDURE — 71275 CT ANGIOGRAPHY CHEST: CPT

## 2024-09-20 PROCEDURE — 2500000004 HC RX 250 GENERAL PHARMACY W/ HCPCS (ALT 636 FOR OP/ED): Mod: JZ | Performed by: STUDENT IN AN ORGANIZED HEALTH CARE EDUCATION/TRAINING PROGRAM

## 2024-09-20 PROCEDURE — 1100000001 HC PRIVATE ROOM DAILY

## 2024-09-20 PROCEDURE — 81001 URINALYSIS AUTO W/SCOPE: CPT | Performed by: STUDENT IN AN ORGANIZED HEALTH CARE EDUCATION/TRAINING PROGRAM

## 2024-09-20 PROCEDURE — 74177 CT ABD & PELVIS W/CONTRAST: CPT | Performed by: RADIOLOGY

## 2024-09-20 PROCEDURE — 96367 TX/PROPH/DG ADDL SEQ IV INF: CPT

## 2024-09-20 PROCEDURE — 87086 URINE CULTURE/COLONY COUNT: CPT | Mod: TRILAB,WESLAB | Performed by: STUDENT IN AN ORGANIZED HEALTH CARE EDUCATION/TRAINING PROGRAM

## 2024-09-20 PROCEDURE — 2500000004 HC RX 250 GENERAL PHARMACY W/ HCPCS (ALT 636 FOR OP/ED): Performed by: INTERNAL MEDICINE

## 2024-09-20 RX ORDER — VANCOMYCIN HYDROCHLORIDE 1 G/20ML
INJECTION, POWDER, LYOPHILIZED, FOR SOLUTION INTRAVENOUS DAILY PRN
Status: DISCONTINUED | OUTPATIENT
Start: 2024-09-20 | End: 2024-09-20 | Stop reason: ALTCHOICE

## 2024-09-20 RX ORDER — GUAIFENESIN/DEXTROMETHORPHAN 100-10MG/5
5 SYRUP ORAL EVERY 4 HOURS PRN
Status: DISCONTINUED | OUTPATIENT
Start: 2024-09-20 | End: 2024-09-23 | Stop reason: HOSPADM

## 2024-09-20 RX ORDER — ATORVASTATIN CALCIUM 20 MG/1
20 TABLET, FILM COATED ORAL NIGHTLY
Status: DISCONTINUED | OUTPATIENT
Start: 2024-09-20 | End: 2024-09-23 | Stop reason: HOSPADM

## 2024-09-20 RX ORDER — ACETAMINOPHEN 160 MG/5ML
650 SOLUTION ORAL EVERY 4 HOURS PRN
Status: DISCONTINUED | OUTPATIENT
Start: 2024-09-20 | End: 2024-09-23 | Stop reason: HOSPADM

## 2024-09-20 RX ORDER — GABAPENTIN 300 MG/1
300 CAPSULE ORAL NIGHTLY
Status: DISCONTINUED | OUTPATIENT
Start: 2024-09-20 | End: 2024-09-23 | Stop reason: HOSPADM

## 2024-09-20 RX ORDER — AMLODIPINE BESYLATE 10 MG/1
10 TABLET ORAL DAILY
Status: DISCONTINUED | OUTPATIENT
Start: 2024-09-21 | End: 2024-09-23 | Stop reason: HOSPADM

## 2024-09-20 RX ORDER — METRONIDAZOLE 500 MG/100ML
500 INJECTION, SOLUTION INTRAVENOUS ONCE
Status: COMPLETED | OUTPATIENT
Start: 2024-09-20 | End: 2024-09-20

## 2024-09-20 RX ORDER — TALC
3 POWDER (GRAM) TOPICAL NIGHTLY PRN
Status: DISCONTINUED | OUTPATIENT
Start: 2024-09-20 | End: 2024-09-23 | Stop reason: HOSPADM

## 2024-09-20 RX ORDER — ONDANSETRON 4 MG/1
4 TABLET, ORALLY DISINTEGRATING ORAL EVERY 8 HOURS PRN
Status: DISCONTINUED | OUTPATIENT
Start: 2024-09-20 | End: 2024-09-23 | Stop reason: HOSPADM

## 2024-09-20 RX ORDER — GUAIFENESIN 600 MG/1
600 TABLET, EXTENDED RELEASE ORAL EVERY 12 HOURS PRN
Status: DISCONTINUED | OUTPATIENT
Start: 2024-09-20 | End: 2024-09-23 | Stop reason: HOSPADM

## 2024-09-20 RX ORDER — VANCOMYCIN 1 G/200ML
1 INJECTION, SOLUTION INTRAVENOUS ONCE
Status: COMPLETED | OUTPATIENT
Start: 2024-09-20 | End: 2024-09-20

## 2024-09-20 RX ORDER — ONDANSETRON HYDROCHLORIDE 2 MG/ML
4 INJECTION, SOLUTION INTRAVENOUS EVERY 8 HOURS PRN
Status: DISCONTINUED | OUTPATIENT
Start: 2024-09-20 | End: 2024-09-23 | Stop reason: HOSPADM

## 2024-09-20 RX ORDER — DOCUSATE SODIUM 100 MG/1
100 CAPSULE, LIQUID FILLED ORAL 2 TIMES DAILY
Status: DISCONTINUED | OUTPATIENT
Start: 2024-09-20 | End: 2024-09-23 | Stop reason: HOSPADM

## 2024-09-20 RX ORDER — ACETAMINOPHEN 650 MG/1
650 SUPPOSITORY RECTAL EVERY 4 HOURS PRN
Status: DISCONTINUED | OUTPATIENT
Start: 2024-09-20 | End: 2024-09-23 | Stop reason: HOSPADM

## 2024-09-20 RX ORDER — CEFEPIME HYDROCHLORIDE 2 G/50ML
2 INJECTION, SOLUTION INTRAVENOUS ONCE
Status: COMPLETED | OUTPATIENT
Start: 2024-09-20 | End: 2024-09-20

## 2024-09-20 RX ORDER — ACETAMINOPHEN 325 MG/1
650 TABLET ORAL EVERY 4 HOURS PRN
Status: DISCONTINUED | OUTPATIENT
Start: 2024-09-20 | End: 2024-09-23 | Stop reason: HOSPADM

## 2024-09-20 RX ORDER — OXYCODONE HYDROCHLORIDE 5 MG/1
5 TABLET ORAL EVERY 4 HOURS PRN
Status: DISCONTINUED | OUTPATIENT
Start: 2024-09-20 | End: 2024-09-23 | Stop reason: HOSPADM

## 2024-09-20 RX ORDER — CLOPIDOGREL BISULFATE 75 MG/1
75 TABLET ORAL DAILY
Status: DISCONTINUED | OUTPATIENT
Start: 2024-09-21 | End: 2024-09-23 | Stop reason: HOSPADM

## 2024-09-20 RX ORDER — TRAZODONE HYDROCHLORIDE 50 MG/1
50 TABLET ORAL NIGHTLY
Status: DISCONTINUED | OUTPATIENT
Start: 2024-09-20 | End: 2024-09-23 | Stop reason: HOSPADM

## 2024-09-20 RX ORDER — PANTOPRAZOLE SODIUM 40 MG/10ML
40 INJECTION, POWDER, LYOPHILIZED, FOR SOLUTION INTRAVENOUS
Status: DISCONTINUED | OUTPATIENT
Start: 2024-09-21 | End: 2024-09-23 | Stop reason: HOSPADM

## 2024-09-20 RX ORDER — POLYETHYLENE GLYCOL 3350 17 G/17G
17 POWDER, FOR SOLUTION ORAL DAILY
Status: DISCONTINUED | OUTPATIENT
Start: 2024-09-21 | End: 2024-09-23 | Stop reason: HOSPADM

## 2024-09-20 RX ORDER — ENOXAPARIN SODIUM 100 MG/ML
40 INJECTION SUBCUTANEOUS DAILY
Status: DISCONTINUED | OUTPATIENT
Start: 2024-09-21 | End: 2024-09-23 | Stop reason: HOSPADM

## 2024-09-20 RX ORDER — ASPIRIN 81 MG/1
81 TABLET ORAL DAILY
Status: DISCONTINUED | OUTPATIENT
Start: 2024-09-21 | End: 2024-09-23 | Stop reason: HOSPADM

## 2024-09-20 RX ORDER — PANTOPRAZOLE SODIUM 40 MG/1
40 TABLET, DELAYED RELEASE ORAL
Status: DISCONTINUED | OUTPATIENT
Start: 2024-09-21 | End: 2024-09-23 | Stop reason: HOSPADM

## 2024-09-20 RX ORDER — DEXTROSE MONOHYDRATE, SODIUM CHLORIDE, AND POTASSIUM CHLORIDE 50; 1.49; 4.5 G/1000ML; G/1000ML; G/1000ML
100 INJECTION, SOLUTION INTRAVENOUS CONTINUOUS
Status: DISCONTINUED | OUTPATIENT
Start: 2024-09-20 | End: 2024-09-23 | Stop reason: HOSPADM

## 2024-09-20 RX ORDER — DEXAMETHASONE 4 MG/1
2 TABLET ORAL
Status: DISCONTINUED | OUTPATIENT
Start: 2024-09-21 | End: 2024-09-23 | Stop reason: HOSPADM

## 2024-09-20 RX ADMIN — METRONIDAZOLE 500 MG: 500 INJECTION, SOLUTION INTRAVENOUS at 16:15

## 2024-09-20 RX ADMIN — CEFEPIME HYDROCHLORIDE 2 G: 2 INJECTION, SOLUTION INTRAVENOUS at 15:58

## 2024-09-20 RX ADMIN — Medication 4 L/MIN: at 23:16

## 2024-09-20 RX ADMIN — VANCOMYCIN 1 G: 1 INJECTION, SOLUTION INTRAVENOUS at 17:33

## 2024-09-20 RX ADMIN — DEXTROSE MONOHYDRATE, SODIUM CHLORIDE, AND POTASSIUM CHLORIDE 100 ML/HR: 50; 4.5; 1.49 INJECTION, SOLUTION INTRAVENOUS at 23:21

## 2024-09-20 RX ADMIN — SODIUM CHLORIDE 1000 ML: 900 INJECTION, SOLUTION INTRAVENOUS at 15:46

## 2024-09-20 RX ADMIN — IOHEXOL 75 ML: 350 INJECTION, SOLUTION INTRAVENOUS at 17:25

## 2024-09-20 RX ADMIN — SODIUM CHLORIDE 1000 ML: 900 INJECTION, SOLUTION INTRAVENOUS at 19:45

## 2024-09-20 ASSESSMENT — PAIN DESCRIPTION - PROGRESSION: CLINICAL_PROGRESSION: NOT CHANGED

## 2024-09-20 ASSESSMENT — PAIN - FUNCTIONAL ASSESSMENT: PAIN_FUNCTIONAL_ASSESSMENT: 0-10

## 2024-09-20 ASSESSMENT — PAIN SCALES - GENERAL: PAINLEVEL_OUTOF10: 9

## 2024-09-20 NOTE — CONSULTS
Vancomycin Dosing by Pharmacy- EMERGENCY DEPARTMENT    Christophe Ambriz is a 74 y.o. year old male who Pharmacy has been consulted to give a ONE TIME ONLY vancomycin dose in the Emergency Department for sepsis of unknown origin.     Visit Vitals  /76 (BP Location: Left arm, Patient Position: Sitting)   Pulse (!) 113   Temp 36.8 °C (98.2 °F) (Temporal)   Resp (!) 28        Lab Results   Component Value Date    CREATININE 0.51 09/12/2024    CREATININE 0.48 (L) 09/11/2024    CREATININE 0.48 (L) 09/10/2024    CREATININE 0.42 (L) 09/09/2024        Patient weight is   Wt Readings from Last 1 Encounters:   09/20/24 65.7 kg (144 lb 13.5 oz)        Assessment/Plan     Patient will be given a one time dose of 1000 mg based on a 15 mg/kg dosing regimen.  Pharmacy will sign off at this time. If vancomycin is to be continued, please re-consult Pharmacy.       Dejuan Park, KinD

## 2024-09-20 NOTE — ED PROVIDER NOTES
HPI   No chief complaint on file.      Patient is a 74-year-old male that presents to the emergency department for evaluation of failure to thrive.  Patient recently admitted to nursing facility after aspiration pneumonia.  He has been steadily getting weaker and having a difficult time eating and caring for himself.  They were concerned because he has had increased nausea and fatigue and seemed to have increased work of breathing.  Patient reports he is not on oxygen at baseline.  Patient denies chest pain, abdominal pain, change in bowel habits, dysuria, hematuria.      History provided by:  Patient          Patient History   Past Medical History:   Diagnosis Date   • Aspiration pneumonia (Multi)    • At risk for falling    • Coronary artery disease    • HLD (hyperlipidemia)    • HTN (hypertension)    • Metastasis (Multi)    • Renal cell adenocarcinoma (Multi)    • Weakness      Past Surgical History:   Procedure Laterality Date   • LUMBAR FUSION       Family History   Problem Relation Name Age of Onset   • Hypertension Other       Social History     Tobacco Use   • Smoking status: Never     Passive exposure: Never   • Smokeless tobacco: Never   Vaping Use   • Vaping status: Never Used   Substance Use Topics   • Alcohol use: Not on file   • Drug use: Never       Physical Exam   ED Triage Vitals [09/20/24 1513]   Temperature Heart Rate Respirations BP   36.8 °C (98.2 °F) (!) 113 (!) 28 107/76      Pulse Ox Temp Source Heart Rate Source Patient Position   (!) 91 % Temporal Monitor Sitting      BP Location FiO2 (%)     Left arm --       Physical Exam  Vitals and nursing note reviewed.   Constitutional:       General: He is not in acute distress.     Appearance: He is ill-appearing.      Comments: Frail-appearing, uncomfortable   HENT:      Head: Normocephalic and atraumatic.      Mouth/Throat:      Mouth: Mucous membranes are moist.   Eyes:      Extraocular Movements: Extraocular movements intact.      Pupils: Pupils  are equal, round, and reactive to light.   Cardiovascular:      Rate and Rhythm: Tachycardia present. Rhythm irregular.      Pulses: Normal pulses.   Pulmonary:      Effort: Tachypnea present. No respiratory distress.      Breath sounds: Examination of the right-upper field reveals rhonchi. Examination of the left-upper field reveals rhonchi. Examination of the right-middle field reveals rhonchi. Examination of the left-middle field reveals rhonchi. Examination of the right-lower field reveals rhonchi. Examination of the left-lower field reveals rhonchi. Rhonchi present. No wheezing.   Abdominal:      General: Abdomen is flat.      Tenderness: There is no abdominal tenderness. There is no guarding or rebound.   Skin:     General: Skin is warm and dry.   Neurological:      General: No focal deficit present.      Mental Status: He is alert and oriented to person, place, and time.     Recent Results (from the past 24 hour(s))   CBC and Auto Differential    Collection Time: 09/20/24  3:31 PM   Result Value Ref Range    WBC 26.7 (H) 4.4 - 11.3 x10*3/uL    nRBC 0.0 0.0 - 0.0 /100 WBCs    RBC 3.67 (L) 4.50 - 5.90 x10*6/uL    Hemoglobin 9.0 (L) 13.5 - 17.5 g/dL    Hematocrit 31.1 (L) 41.0 - 52.0 %    MCV 85 80 - 100 fL    MCH 24.5 (L) 26.0 - 34.0 pg    MCHC 28.9 (L) 32.0 - 36.0 g/dL    RDW 16.6 (H) 11.5 - 14.5 %    Platelets 537 (H) 150 - 450 x10*3/uL    Neutrophils % 82.8 40.0 - 80.0 %    Immature Granulocytes %, Automated 1.5 (H) 0.0 - 0.9 %    Lymphocytes % 13.4 13.0 - 44.0 %    Monocytes % 2.2 2.0 - 10.0 %    Eosinophils % 0.0 0.0 - 6.0 %    Basophils % 0.1 0.0 - 2.0 %    Neutrophils Absolute 22.08 (H) 1.60 - 5.50 x10*3/uL    Immature Granulocytes Absolute, Automated 0.40 0.00 - 0.50 x10*3/uL    Lymphocytes Absolute 3.57 (H) 0.80 - 3.00 x10*3/uL    Monocytes Absolute 0.60 0.05 - 0.80 x10*3/uL    Eosinophils Absolute 0.01 0.00 - 0.40 x10*3/uL    Basophils Absolute 0.04 0.00 - 0.10 x10*3/uL   Comprehensive Metabolic Panel     Collection Time: 09/20/24  3:31 PM   Result Value Ref Range    Glucose 195 (H) 74 - 99 mg/dL    Sodium 136 136 - 145 mmol/L    Potassium 3.9 3.5 - 5.3 mmol/L    Chloride 97 (L) 98 - 107 mmol/L    Bicarbonate 25 21 - 32 mmol/L    Anion Gap 18 10 - 20 mmol/L    Urea Nitrogen 20 6 - 23 mg/dL    Creatinine 0.57 0.50 - 1.30 mg/dL    eGFR >90 >60 mL/min/1.73m*2    Calcium 9.7 8.6 - 10.3 mg/dL    Albumin 3.0 (L) 3.4 - 5.0 g/dL    Alkaline Phosphatase 103 33 - 136 U/L    Total Protein 8.1 6.4 - 8.2 g/dL    AST 26 9 - 39 U/L    Bilirubin, Total 0.8 0.0 - 1.2 mg/dL    ALT 12 10 - 52 U/L   Lactate    Collection Time: 09/20/24  3:31 PM   Result Value Ref Range    Lactate 2.5 (H) 0.4 - 2.0 mmol/L   Troponin I, High Sensitivity    Collection Time: 09/20/24  3:31 PM   Result Value Ref Range    Troponin I, High Sensitivity 18 0 - 20 ng/L   B-type natriuretic peptide    Collection Time: 09/20/24  3:31 PM   Result Value Ref Range     (H) 0 - 99 pg/mL   Lactate    Collection Time: 09/20/24  5:02 PM   Result Value Ref Range    Lactate 2.1 (H) 0.4 - 2.0 mmol/L   Lactate    Collection Time: 09/20/24  6:07 PM   Result Value Ref Range    Lactate 1.5 0.4 - 2.0 mmol/L   Urinalysis with Reflex Culture and Microscopic    Collection Time: 09/20/24  6:17 PM   Result Value Ref Range    Color, Urine Light-Orange (N) Light-Yellow, Yellow, Dark-Yellow    Appearance, Urine Ex.Turbid (N) Clear    Specific Gravity, Urine >1.050 (N) 1.005 - 1.035    pH, Urine 6.0 5.0, 5.5, 6.0, 6.5, 7.0, 7.5, 8.0    Protein, Urine 100 (2+) (A) NEGATIVE, 10 (TRACE), 20 (TRACE) mg/dL    Glucose, Urine 30 (TRACE) (A) Normal mg/dL    Blood, Urine OVER (3+) (A) NEGATIVE    Ketones, Urine 40 (2+) (A) NEGATIVE mg/dL    Bilirubin, Urine NEGATIVE NEGATIVE    Urobilinogen, Urine 2 (1+) (A) Normal mg/dL    Nitrite, Urine NEGATIVE NEGATIVE    Leukocyte Esterase, Urine NEGATIVE NEGATIVE   Urinalysis Microscopic    Collection Time: 09/20/24  6:17 PM   Result Value Ref Range     WBC, Urine 21-50 (A) 1-5, NONE /HPF    RBC, Urine >20 (A) NONE, 1-2, 3-5 /HPF    Squamous Epithelial Cells, Urine 1-9 (SPARSE) Reference range not established. /HPF    Mucus, Urine 4+ Reference range not established. /LPF    Amorphous Crystals, Urine 1+ NONE, 1+, 2+ /HPF         ED Course & MDM   ED Course as of 09/21/24 0038   Fri Sep 20, 2024   1910 EKG Time:1906  EKG Interpretation time:1910  EKG Interpretation: EKG shows normal sinus rhythm with a rate of 96 bpm, left axis deviation, QTc 469, no evidence of STEMI.    EKG was interpreted by myself independently [JL]      ED Course User Index  [JL] Nate Agustin,          Diagnoses as of 09/21/24 0038   Pneumonia of left lower lobe due to infectious organism   Sepsis without acute organ dysfunction, due to unspecified organism (Multi)   Acute respiratory failure with hypoxia (Multi)                 No data recorded                                 Medical Decision Making  Patient is a 74-year-old male who presents emergency department for evaluation of failure to thrive.  Patient uncomfortable appearing, frail but in no obvious distress.  He was found to be hypoxic and was placed on 6 L nasal cannula.  Blood work ordered for septic workup including CBC, CMP, lactic acid, blood cultures, troponin, BNP along with a CT angio of the chest given patient's recent admission to evaluate for pulmonary embolism and a CT scan of the abdomen was given his abdominal pain.  Blood work was remarkable for significant leukocytosis with a white count of 26, elevated lactic acid of 2.1, mildly elevated BNP of 129 and normal troponin of 18 and I have low suspicion for cardiac source of patient's hypoxia.  Patient was treated with IV vancomycin, IV ceftriaxone, IV Flagyl.  CT scan of the chest does show a left lower lobe pneumonia with significant consolidation.  CT scan of the abdomen pelvis shows again multiple metastatic lesions consistent with patient's known history.  I  discussed at length with patient's wife who is power of  and patient would like to be DNR comfort care at this time.  Patient would not want central line or intubation as they would be too aggressive.  I did recommend being admitted for antibiotic treatment, hydration as well as evaluation by palliative care/hospice as patient has voiced wishes to go home on hospice from the nursing facility.  They are agreeable at this time.  I discussed case with patient's primary care physician Dr. Raymundo who accepted patient for admission.    CRITICAL CARE NOTE:   Upon my evaluation, this patient had a high probability of imminent or life-threatening deterioration due to acute respiratory failure with hypoxia, sepsis, pneumonia, which required my direct attention, intervention, and personal management    35 total minutes of critical care were personally provided which excludes all other billable procedures. This was for time at the bedside, re-evaluations, interpretation of lab and imaging results, discussions with consultants, and monitoring for potential decompensation. Intervention were performed as documented above.        Procedure  Procedures     Nate Agustin, DO  09/20/24 1904       Nate Agustin, DO  09/21/24 0039

## 2024-09-21 PROBLEM — R09.02 HYPOXIA: Status: ACTIVE | Noted: 2024-09-21

## 2024-09-21 PROBLEM — R62.51 FAILURE TO THRIVE (CHILD): Status: ACTIVE | Noted: 2024-09-21

## 2024-09-21 LAB
ALBUMIN SERPL BCP-MCNC: 2.4 G/DL (ref 3.4–5)
ALP SERPL-CCNC: 76 U/L (ref 33–136)
ALT SERPL W P-5'-P-CCNC: 9 U/L (ref 10–52)
ANION GAP SERPL CALCULATED.3IONS-SCNC: 15 MMOL/L (ref 10–20)
AST SERPL W P-5'-P-CCNC: 21 U/L (ref 9–39)
BILIRUB SERPL-MCNC: 0.4 MG/DL (ref 0–1.2)
BUN SERPL-MCNC: 14 MG/DL (ref 6–23)
CALCIUM SERPL-MCNC: 8.5 MG/DL (ref 8.6–10.3)
CHLORIDE SERPL-SCNC: 105 MMOL/L (ref 98–107)
CO2 SERPL-SCNC: 22 MMOL/L (ref 21–32)
CREAT SERPL-MCNC: 0.37 MG/DL (ref 0.5–1.3)
EGFRCR SERPLBLD CKD-EPI 2021: >90 ML/MIN/1.73M*2
ERYTHROCYTE [DISTWIDTH] IN BLOOD BY AUTOMATED COUNT: 16.8 % (ref 11.5–14.5)
GLUCOSE SERPL-MCNC: 193 MG/DL (ref 74–99)
HCT VFR BLD AUTO: 24.9 % (ref 41–52)
HGB BLD-MCNC: 7.3 G/DL (ref 13.5–17.5)
MCH RBC QN AUTO: 24.7 PG (ref 26–34)
MCHC RBC AUTO-ENTMCNC: 29.3 G/DL (ref 32–36)
MCV RBC AUTO: 84 FL (ref 80–100)
NRBC BLD-RTO: 0 /100 WBCS (ref 0–0)
PLATELET # BLD AUTO: 413 X10*3/UL (ref 150–450)
POTASSIUM SERPL-SCNC: 3.2 MMOL/L (ref 3.5–5.3)
PROT SERPL-MCNC: 6.5 G/DL (ref 6.4–8.2)
RBC # BLD AUTO: 2.96 X10*6/UL (ref 4.5–5.9)
SODIUM SERPL-SCNC: 139 MMOL/L (ref 136–145)
WBC # BLD AUTO: 18.7 X10*3/UL (ref 4.4–11.3)

## 2024-09-21 PROCEDURE — 1100000001 HC PRIVATE ROOM DAILY

## 2024-09-21 PROCEDURE — 36415 COLL VENOUS BLD VENIPUNCTURE: CPT | Performed by: INTERNAL MEDICINE

## 2024-09-21 PROCEDURE — 99222 1ST HOSP IP/OBS MODERATE 55: CPT | Performed by: INTERNAL MEDICINE

## 2024-09-21 PROCEDURE — 2500000005 HC RX 250 GENERAL PHARMACY W/O HCPCS: Performed by: INTERNAL MEDICINE

## 2024-09-21 PROCEDURE — 2500000001 HC RX 250 WO HCPCS SELF ADMINISTERED DRUGS (ALT 637 FOR MEDICARE OP): Performed by: INTERNAL MEDICINE

## 2024-09-21 PROCEDURE — 2500000004 HC RX 250 GENERAL PHARMACY W/ HCPCS (ALT 636 FOR OP/ED): Performed by: INTERNAL MEDICINE

## 2024-09-21 PROCEDURE — 85027 COMPLETE CBC AUTOMATED: CPT | Performed by: INTERNAL MEDICINE

## 2024-09-21 PROCEDURE — 80053 COMPREHEN METABOLIC PANEL: CPT | Performed by: INTERNAL MEDICINE

## 2024-09-21 RX ORDER — IPRATROPIUM BROMIDE AND ALBUTEROL SULFATE 2.5; .5 MG/3ML; MG/3ML
3 SOLUTION RESPIRATORY (INHALATION) EVERY 2 HOUR PRN
Status: DISCONTINUED | OUTPATIENT
Start: 2024-09-21 | End: 2024-09-23 | Stop reason: HOSPADM

## 2024-09-21 RX ORDER — IPRATROPIUM BROMIDE AND ALBUTEROL SULFATE 2.5; .5 MG/3ML; MG/3ML
3 SOLUTION RESPIRATORY (INHALATION)
Status: DISCONTINUED | OUTPATIENT
Start: 2024-09-21 | End: 2024-09-21

## 2024-09-21 RX ORDER — IPRATROPIUM BROMIDE AND ALBUTEROL SULFATE 2.5; .5 MG/3ML; MG/3ML
3 SOLUTION RESPIRATORY (INHALATION)
Status: DISCONTINUED | OUTPATIENT
Start: 2024-09-22 | End: 2024-09-23 | Stop reason: HOSPADM

## 2024-09-21 RX ADMIN — PIPERACILLIN SODIUM AND TAZOBACTAM SODIUM 4.5 G: 4; .5 INJECTION, SOLUTION INTRAVENOUS at 17:20

## 2024-09-21 RX ADMIN — CLOPIDOGREL BISULFATE 75 MG: 75 TABLET ORAL at 12:37

## 2024-09-21 RX ADMIN — DEXTROSE MONOHYDRATE, SODIUM CHLORIDE, AND POTASSIUM CHLORIDE 100 ML/HR: 50; 4.5; 1.49 INJECTION, SOLUTION INTRAVENOUS at 10:13

## 2024-09-21 RX ADMIN — ONDANSETRON 4 MG: 4 TABLET, ORALLY DISINTEGRATING ORAL at 12:22

## 2024-09-21 RX ADMIN — PIPERACILLIN SODIUM AND TAZOBACTAM SODIUM 4.5 G: 4; .5 INJECTION, SOLUTION INTRAVENOUS at 10:13

## 2024-09-21 RX ADMIN — SODIUM CHLORIDE 250 ML: 900 INJECTION, SOLUTION INTRAVENOUS at 08:44

## 2024-09-21 RX ADMIN — ENOXAPARIN SODIUM 40 MG: 40 INJECTION SUBCUTANEOUS at 12:39

## 2024-09-21 RX ADMIN — DEXAMETHASONE 2 MG: 4 TABLET ORAL at 12:37

## 2024-09-21 SDOH — SOCIAL STABILITY: SOCIAL INSECURITY: DOES ANYONE TRY TO KEEP YOU FROM HAVING/CONTACTING OTHER FRIENDS OR DOING THINGS OUTSIDE YOUR HOME?: NO

## 2024-09-21 SDOH — SOCIAL STABILITY: SOCIAL INSECURITY: HAVE YOU HAD ANY THOUGHTS OF HARMING ANYONE ELSE?: NO

## 2024-09-21 SDOH — SOCIAL STABILITY: SOCIAL INSECURITY: WERE YOU ABLE TO COMPLETE ALL THE BEHAVIORAL HEALTH SCREENINGS?: YES

## 2024-09-21 SDOH — SOCIAL STABILITY: SOCIAL INSECURITY: ABUSE: ADULT

## 2024-09-21 SDOH — SOCIAL STABILITY: SOCIAL INSECURITY: DO YOU FEEL ANYONE HAS EXPLOITED OR TAKEN ADVANTAGE OF YOU FINANCIALLY OR OF YOUR PERSONAL PROPERTY?: NO

## 2024-09-21 SDOH — SOCIAL STABILITY: SOCIAL INSECURITY: DO YOU FEEL UNSAFE GOING BACK TO THE PLACE WHERE YOU ARE LIVING?: NO

## 2024-09-21 SDOH — SOCIAL STABILITY: SOCIAL INSECURITY: HAVE YOU HAD THOUGHTS OF HARMING ANYONE ELSE?: NO

## 2024-09-21 SDOH — SOCIAL STABILITY: SOCIAL INSECURITY: ARE YOU OR HAVE YOU BEEN THREATENED OR ABUSED PHYSICALLY, EMOTIONALLY, OR SEXUALLY BY ANYONE?: NO

## 2024-09-21 SDOH — SOCIAL STABILITY: SOCIAL INSECURITY: HAS ANYONE EVER THREATENED TO HURT YOUR FAMILY OR YOUR PETS?: NO

## 2024-09-21 SDOH — SOCIAL STABILITY: SOCIAL INSECURITY: ARE THERE ANY APPARENT SIGNS OF INJURIES/BEHAVIORS THAT COULD BE RELATED TO ABUSE/NEGLECT?: NO

## 2024-09-21 ASSESSMENT — COGNITIVE AND FUNCTIONAL STATUS - GENERAL
DRESSING REGULAR LOWER BODY CLOTHING: A LOT
STANDING UP FROM CHAIR USING ARMS: A LOT
DAILY ACTIVITIY SCORE: 13
MOBILITY SCORE: 11
MOVING FROM LYING ON BACK TO SITTING ON SIDE OF FLAT BED WITH BEDRAILS: A LOT
STANDING UP FROM CHAIR USING ARMS: A LOT
TOILETING: TOTAL
CLIMB 3 TO 5 STEPS WITH RAILING: TOTAL
MOBILITY SCORE: 11
HELP NEEDED FOR BATHING: A LOT
PERSONAL GROOMING: A LOT
TOILETING: TOTAL
EATING MEALS: A LITTLE
HELP NEEDED FOR BATHING: A LOT
DRESSING REGULAR UPPER BODY CLOTHING: A LITTLE
PATIENT BASELINE BEDBOUND: NO
MOVING TO AND FROM BED TO CHAIR: A LOT
DRESSING REGULAR LOWER BODY CLOTHING: A LOT
DRESSING REGULAR UPPER BODY CLOTHING: A LITTLE
CLIMB 3 TO 5 STEPS WITH RAILING: TOTAL
MOVING FROM LYING ON BACK TO SITTING ON SIDE OF FLAT BED WITH BEDRAILS: A LOT
MOVING TO AND FROM BED TO CHAIR: A LOT
TURNING FROM BACK TO SIDE WHILE IN FLAT BAD: A LOT
WALKING IN HOSPITAL ROOM: A LOT
PERSONAL GROOMING: A LOT
EATING MEALS: A LITTLE
WALKING IN HOSPITAL ROOM: A LOT
TURNING FROM BACK TO SIDE WHILE IN FLAT BAD: A LOT
DAILY ACTIVITIY SCORE: 13

## 2024-09-21 ASSESSMENT — LIFESTYLE VARIABLES
SUBSTANCE_ABUSE_PAST_12_MONTHS: NO
AUDIT-C TOTAL SCORE: 0
HOW OFTEN DO YOU HAVE 6 OR MORE DRINKS ON ONE OCCASION: NEVER
AUDIT-C TOTAL SCORE: 0
SKIP TO QUESTIONS 9-10: 1
HOW OFTEN DO YOU HAVE A DRINK CONTAINING ALCOHOL: NEVER
HOW MANY STANDARD DRINKS CONTAINING ALCOHOL DO YOU HAVE ON A TYPICAL DAY: PATIENT DOES NOT DRINK
PRESCIPTION_ABUSE_PAST_12_MONTHS: NO

## 2024-09-21 ASSESSMENT — PAIN SCALES - GENERAL: PAINLEVEL_OUTOF10: 0 - NO PAIN

## 2024-09-21 ASSESSMENT — ACTIVITIES OF DAILY LIVING (ADL)
BATHING: NEEDS ASSISTANCE
GROOMING: NEEDS ASSISTANCE
PATIENT'S MEMORY ADEQUATE TO SAFELY COMPLETE DAILY ACTIVITIES?: YES
HEARING - RIGHT EAR: FUNCTIONAL
FEEDING YOURSELF: NEEDS ASSISTANCE
WALKS IN HOME: NEEDS ASSISTANCE
JUDGMENT_ADEQUATE_SAFELY_COMPLETE_DAILY_ACTIVITIES: YES
ADEQUATE_TO_COMPLETE_ADL: YES
TOILETING: NEEDS ASSISTANCE
HEARING - LEFT EAR: FUNCTIONAL
DRESSING YOURSELF: NEEDS ASSISTANCE

## 2024-09-21 NOTE — NURSING NOTE
250mL fluid bolus infusing. Pt refusing meds, states that he can't swallow. Observed patient spitting out water on bed.

## 2024-09-21 NOTE — PROGRESS NOTES
Occupational Therapy                 Therapy Communication Note    Patient Name: Christophe Ambriz  MRN: [id number]  Department: [facility] 3 S  Room: [address] [place] [address]  Today's Date: 9/21/2024     Discipline: Occupational Therapy    Missed Visit Reason: Missed Visit Reason: Patient refused    Missed Time: Attempt    Comment:

## 2024-09-21 NOTE — PROGRESS NOTES
"Physical Therapy                 Therapy Communication Note    Patient Name: Christophe Ambriz  MRN: [id number]  Department: [facility] 3 S  Room: [address] [place], [place] [address]  Today's Date: 9/21/2024     Discipline: Physical Therapy    Missed Visit Reason: Missed Visit Reason: Patient refused    Missed Time: Cancel    Comment: Pt supine in bed upon arrival; Declining PT services and mobility despite encouragement. Pt shaking head no; minimal interaction and verbalizations with therapist. \"I don't need you're help.\" PT deferred at this time.   "

## 2024-09-21 NOTE — H&P
History Of Present Illness  Christophe Ambriz is a 74 y.o. male presenting with poor intake.  hx of CAD, HTN, HLD and RCC with spinal mets s/p L1-5 fusion w/ L2-4 decompression and tumor debulking (7/24) presented initially to OSH with worsening back pain. He was noted to be hypoxic (87% on RA) and placed on 2L O2. CT PE was negative for PE, showed interval improvement in bilateral pulmonary opacities, persistent but improved tree-in-bud opacities, and reveals probable aspiration.  Patient at  was cleared for regular solids and thin liquids.  He was refusing to work with PT OT he had pressure sores for which wound care was consulted.  Finally he was sent to the rehab after finishing radiation to the spine.  At rehab patient is getting weaker and having difficult time eating and caring for himself.  His blood pressure is fluctuating a lot his heart rate is fluctuating a lot.  He has increased nausea and fatigue and requiring oxygen      Past Medical History  Past Medical History:   Diagnosis Date    Aspiration pneumonia (Multi)     At risk for falling     Coronary artery disease     HLD (hyperlipidemia)     HTN (hypertension)     Metastasis (Multi)     Renal cell adenocarcinoma (Multi)     Weakness        Surgical History  Past Surgical History:   Procedure Laterality Date    LUMBAR FUSION          Social History  He reports that he has never smoked. He has never been exposed to tobacco smoke. He has never used smokeless tobacco. He reports that he does not use drugs. No history on file for alcohol use.    Family History  Family History   Problem Relation Name Age of Onset    Hypertension Other          Allergies  Oxycodone-acetaminophen and Penicillamine    Review of Systems  Poor historian.  Talking minimal  Physical Exam  Vitals reviewed.   Constitutional:       Appearance: Normal appearance. He is ill-appearing.   HENT:      Head: Normocephalic and atraumatic.      Right Ear: Tympanic membrane, ear canal and  "external ear normal.      Left Ear: Tympanic membrane, ear canal and external ear normal.      Nose: Nose normal.      Mouth/Throat:      Pharynx: Oropharynx is clear.   Eyes:      Extraocular Movements: Extraocular movements intact.      Conjunctiva/sclera: Conjunctivae normal.      Pupils: Pupils are equal, round, and reactive to light.   Cardiovascular:      Rate and Rhythm: Normal rate and regular rhythm.      Pulses: Normal pulses.      Heart sounds: Normal heart sounds.   Pulmonary:      Effort: Pulmonary effort is normal.      Breath sounds: Rhonchi present.   Abdominal:      General: Abdomen is flat. Bowel sounds are normal.      Palpations: Abdomen is soft.   Musculoskeletal:      Cervical back: Normal range of motion and neck supple.   Skin:     General: Skin is warm and dry.   Neurological:      General: No focal deficit present.      Mental Status: He is alert and oriented to person, place, and time.      Motor: Weakness present.      Coordination: Coordination abnormal.      Gait: Gait abnormal.   Psychiatric:         Mood and Affect: Mood normal.          Last Recorded Vitals  Blood pressure 92/52, pulse 88, temperature 36.5 °C (97.7 °F), temperature source Axillary, resp. rate 18, height 1.854 m (6' 1\"), weight 65.7 kg (144 lb 13.5 oz), SpO2 100%.    Relevant Results        Scheduled medications  amLODIPine, 10 mg, oral, Daily  aspirin, 81 mg, oral, Daily  atorvastatin, 20 mg, oral, Nightly  clopidogrel, 75 mg, oral, Daily  dexAMETHasone, 2 mg, oral, Daily with breakfast  docusate sodium, 100 mg, oral, BID  enoxaparin, 40 mg, subcutaneous, Daily  gabapentin, 300 mg, oral, Nightly  pantoprazole, 40 mg, oral, Daily before breakfast   Or  pantoprazole, 40 mg, intravenous, Daily before breakfast  piperacillin-tazobactam, 4.5 g, intravenous, q8h  polyethylene glycol, 17 g, oral, Daily  traZODone, 50 mg, oral, Nightly      Continuous medications  potassium ichsdcv-X4-9.45%NaCl, 100 mL/hr, Last Rate: 100 " mL/hr (09/21/24 1013)  oxygen, , Last Rate: 4 L/min (09/20/24 2316)      PRN medications  PRN medications: acetaminophen **OR** acetaminophen **OR** acetaminophen, benzocaine-menthol, dextromethorphan-guaifenesin, guaiFENesin, melatonin, ondansetron ODT **OR** ondansetron, oxyCODONE  Results for orders placed or performed during the hospital encounter of 09/20/24 (from the past 24 hour(s))   Sars-CoV-2 PCR   Result Value Ref Range    Coronavirus 2019, PCR Not Detected Not Detected   CBC   Result Value Ref Range    WBC 18.7 (H) 4.4 - 11.3 x10*3/uL    nRBC 0.0 0.0 - 0.0 /100 WBCs    RBC 2.96 (L) 4.50 - 5.90 x10*6/uL    Hemoglobin 7.3 (L) 13.5 - 17.5 g/dL    Hematocrit 24.9 (L) 41.0 - 52.0 %    MCV 84 80 - 100 fL    MCH 24.7 (L) 26.0 - 34.0 pg    MCHC 29.3 (L) 32.0 - 36.0 g/dL    RDW 16.8 (H) 11.5 - 14.5 %    Platelets 413 150 - 450 x10*3/uL   Comprehensive metabolic panel   Result Value Ref Range    Glucose 193 (H) 74 - 99 mg/dL    Sodium 139 136 - 145 mmol/L    Potassium 3.2 (L) 3.5 - 5.3 mmol/L    Chloride 105 98 - 107 mmol/L    Bicarbonate 22 21 - 32 mmol/L    Anion Gap 15 10 - 20 mmol/L    Urea Nitrogen 14 6 - 23 mg/dL    Creatinine 0.37 (L) 0.50 - 1.30 mg/dL    eGFR >90 >60 mL/min/1.73m*2    Calcium 8.5 (L) 8.6 - 10.3 mg/dL    Albumin 2.4 (L) 3.4 - 5.0 g/dL    Alkaline Phosphatase 76 33 - 136 U/L    Total Protein 6.5 6.4 - 8.2 g/dL    AST 21 9 - 39 U/L    Bilirubin, Total 0.4 0.0 - 1.2 mg/dL    ALT 9 (L) 10 - 52 U/L     CT abdomen pelvis w IV contrast    Result Date: 9/20/2024  Interpreted By:  Karley Lainez, STUDY:   CT ABDOMEN PELVIS W IV CONTRAST;  9/20/2024 5:16 pm   INDICATION: Signs/Symptoms:Abdominal pain, sepsis.   COMPARISON: 09/02/2024 07/05/2024   ACCESSION NUMBER(S): [id number]   ORDERING CLINICIAN: MINE ROQUE   TECHNIQUE: CT of the abdomen and pelvis was performed.  Standard contiguous axial images were obtained at 3 mm slice thickness through the abdomen and pelvis. Coronal and sagittal  reconstructions at 3 mm slice thickness were performed.   75 ML  Omnipaque 350 contrast administered intravenously without immediate complication.   FINDINGS: LOWER CHEST:   Please refer to CTA chest which was reported separately.   Increased consolidation in the left lower lung with air bronchograms and heterogeneously enhancing parenchyma, most likely pneumonia. Mucous plugging present in the left lower lung. There are increased patchy ground-glass and partially consolidated nodular opacities throughout the lingula and in the aerated portions of the left lung which are increased. There is a pulmonary nodule in the right lower lung measuring 6 mm, which is new since recent prior. Heart size normal. Coronary artery calcifications are present.   ABDOMEN:   LIVER: Liver size normal. Liver density is fatty. There is a subtle lesion in the inferior liver which is difficult to detect due to presence of fatty infiltration, can not accurately assess the size, however it is approximately 2.5 cm on image number 52 series 5. No other definite lesion seen.   GALLBLADDER: Normal size gallbladder. No radiopaque gallstones.   BILE DUCTS: Normal caliber.   PANCREAS: Atrophic pancreas. No ductal dilation or visualized mass. No inflammatory changes.   SPLEEN: Normal size. Homogeneous enhancement.   ADRENAL GLANDS: Within normal limits.   KIDNEYS AND URETERS: Normal size kidneys. No hydroureteronephrosis or urinary tract stone. Large enhancing mass left upper kidney, exophytic and measuring 7.6 by 5.6 by 8.1 cm consistent with a renal cell carcinoma. Allowing for any differences in measurement technique, no short interval change since the most recent prior. It extends to the margin of the posterior renal fascia. No renal vein invasion or thrombosis. There is a small dense, possibly solid nodule of the right posterior mid kidney measuring 1 cm which appears also unchanged. Left lower pole simple fluid attenuation cyst 4.1 cm.    PELVIS:   BLADDER: Within normal limits.   REPRODUCTIVE ORGANS: Mildly enlarged prostate.   BOWEL: The stomach is unremarkable. The small bowel is normal in caliber without evidence of focal wall thickening or obstruction. There is no evidence of focal wall thickening or dilatation of the large bowel. Appendix is normal.   VESSELS: Diffuse atherosclerotic disease. No aneurysm or dissection. No DVT.   PERITONEUM/RETROPERITONEUM/LYMPH NODES: There is no free or loculated intraperitoneal or retroperitoneal fluid collection, no free intraperitoneal air. Mildly enlarged left para-aortic node 1 cm, no change.   BONES AND ABDOMINAL WALL: There is overall slightly less enhancement of destructive lesion with soft tissue density involving the entire L3 body, status post decompressive laminectomy at this level. Intact posterior fusion hardware from the L1 through L5 level. Expansile metastatic lesion right 9th rib with soft tissue density, is measuring 6.2 by 4.3 cm, most recently 5.9 x 4.2 cm, not much change. Soft tissue from this lesion which extends to the paraspinal expansile soft tissue associated with this lesion causes severe canal encroachment just above the level of the laminectomy image 76 series 5, also effaces the left neural foramen at the level of L3-4 and nearly effaces the medial aspect neural foramen on the right at L3-4 there are several other subtle lesions which are slightly more conspicuous such as right sacrum 1 cm lesion image 118 series 5. On the most recent prior this was about 6 mm. A left posterior iliac mildly lytic lesion measuring 1.4 cm image number 107, was most recently 1.1 cm. No definite new lesions.  The abdominal wall soft tissues appear normal.       1.  See separate report chest CT regarding increased left lung base consolidation, likely pneumonia. Unable to exclude underlying metastatic disease in this region. There is a new pulmonary nodule measuring 6 mm in the right lower lung, which  may be metastatic or inflammatory. 2. Evidence of acute infectious process in the abdomen or pelvis. 3. Heterogeneously enhancing left renal 8.1 cm mass consistent with renal cell carcinoma, fairly similar in size to the most recent prior exam. No evidence of renal vein invasion thrombus. Unchanged left para-aortic mildly enlarged lymph node. 4. Multiple metastases, osseous lesions most notably involving the lateral right 9th rib and destroying most of the L3 body with unchanged pathologic fracture. Expansile soft tissue of the L3 lesion causes severe encroachment of the canal just above the level of the laminectomy and also of left-greater-than-right neural foramen at the level of L3-4. This could be further assessed with MRI. 5. Probable hepatic 2.5 cm metastasis noted inferior right liver which is difficult to assess/characterize due to fatty infiltration of the liver, I feel this is most likely new since 07/05/2024 exam. MRI could be performed to further characterize and assess for any other hepatic lesions.       Signed by: Karley Lainez 9/20/2024 6:46 PM Dictation workstation:   [id number]    CT angio chest for pulmonary embolism    Result Date: 9/20/2024  Interpreted By:  Gurmeet Garcia, STUDY: CT ANGIO CHEST FOR PULMONARY EMBOLISM;  9/20/2024 5:16 pm   INDICATION: Signs/Symptoms:Prolonged immobilization, significant hypoxia and tachycardia, rule out pulmonary embolism.     COMPARISON: None   ACCESSION NUMBER(S): [id number]   ORDERING CLINICIAN: MINE ROQUE   TECHNIQUE: Helical data acquisition of the chest was obtained after intravenous administration of 75 ML Omnipaque 350, as per PE protocol. Images were reformatted in coronal and sagittal planes. Axial and coronal maximum intensity projection (MIP) images were created and reviewed.   FINDINGS: POTENTIAL LIMITATIONS OF THE STUDY: None   HEART AND VESSELS: There are no discrete filling defects within main pulmonary artery and its branches to suggest acute  pulmonary embolism. Main pulmonary artery and its branches are normal in caliber.   The thoracic aorta normal in course and caliber. There are coronary artery calcifications are seen. Please note, the study is not optimized for evaluation of coronary arteries.   The cardiac chambers are not enlarged.   There is no pericardial effusion seen.   MEDIASTINUM AND CELE, LOWER NECK AND AXILLA: There is a stable 3  cm right thyroid nodule noted. No evidence of thoracic lymphadenopathy by CT criteria. Esophagus appears within normal limits as seen.   LUNGS AND AIRWAYS: The trachea and central airways are patent. No endobronchial lesion is seen.   There is a large region of confluent consolidation noted in the left concern for pneumonia which is significantly worsened when compared to the prior study. There are extensive scattered regions of ground-glass airspace opacities noted which are new from the prior study concerning for multifocal pneumonia.     UPPER ABDOMEN: The visualized subdiaphragmatic structures demonstrate no remarkable findings.       CHEST WALL AND OSSEOUS STRUCTURES: There is a partially imaged metastatic lesion noted involving the right 9th rib currently measuring approximately 4 cm, previously 3.5 cm. There is a partially imaged enhancing lesion noted in the inferior right lobe of the liver. Please see the dedicated abdominal CT performed the same day for further evaluation. No acute osseous pathology.There are no suspicious osseous lesions.       1. No evidence of acute pulmonary embolism. 2. There is a large region of confluent consolidation noted in the left concern for pneumonia which is significantly worsened when compared to the prior study. There are extensive scattered regions of ground-glass airspace opacities noted which are new from the prior study concerning for multifocal pneumonia. 3. There is a partially imaged metastatic lesion noted involving the right 9th rib currently measuring  approximately 4 cm, previously 3.5 cm. There is a partially imaged enhancing lesion noted in the inferior right lobe of the liver. Please see the dedicated abdominal CT performed the same day for further evaluation.   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      Colette Raymundo MD

## 2024-09-21 NOTE — CARE PLAN
The patient's goals for the shift include  rest    The clinical goals for the shift include  IV ATB

## 2024-09-21 NOTE — NURSING NOTE
Pt's manual BP this morning is 84/52, HR was showing 137 on monitor, radial pulse taken and it was 86. Pt is 90% on 6L of O2. Provider notified

## 2024-09-21 NOTE — NURSING NOTE
Patient to room 308 from ED, This Rn and additional staff transferred patient from cart to bed. Per patient he is unable to walk. This Rn oriented patient to room and call light system, no c/o voiced,call light in reach.

## 2024-09-22 VITALS
HEART RATE: 78 BPM | HEIGHT: 73 IN | DIASTOLIC BLOOD PRESSURE: 53 MMHG | SYSTOLIC BLOOD PRESSURE: 104 MMHG | TEMPERATURE: 98.1 F | WEIGHT: 144.84 LBS | OXYGEN SATURATION: 94 % | RESPIRATION RATE: 16 BRPM | BODY MASS INDEX: 19.2 KG/M2

## 2024-09-22 LAB
ANION GAP SERPL CALCULATED.3IONS-SCNC: 11 MMOL/L (ref 10–20)
BACTERIA BLD CULT: NORMAL
BACTERIA BLD CULT: NORMAL
BACTERIA UR CULT: NO GROWTH
BUN SERPL-MCNC: 13 MG/DL (ref 6–23)
CALCIUM SERPL-MCNC: 7.9 MG/DL (ref 8.6–10.3)
CHLORIDE SERPL-SCNC: 106 MMOL/L (ref 98–107)
CO2 SERPL-SCNC: 24 MMOL/L (ref 21–32)
CREAT SERPL-MCNC: 0.41 MG/DL (ref 0.5–1.3)
EGFRCR SERPLBLD CKD-EPI 2021: >90 ML/MIN/1.73M*2
ERYTHROCYTE [DISTWIDTH] IN BLOOD BY AUTOMATED COUNT: 16.6 % (ref 11.5–14.5)
GLUCOSE SERPL-MCNC: 166 MG/DL (ref 74–99)
HCT VFR BLD AUTO: 22.3 % (ref 41–52)
HGB BLD-MCNC: 6.5 G/DL (ref 13.5–17.5)
MCH RBC QN AUTO: 24.6 PG (ref 26–34)
MCHC RBC AUTO-ENTMCNC: 29.1 G/DL (ref 32–36)
MCV RBC AUTO: 85 FL (ref 80–100)
NRBC BLD-RTO: 0 /100 WBCS (ref 0–0)
PLATELET # BLD AUTO: 381 X10*3/UL (ref 150–450)
POTASSIUM SERPL-SCNC: 3.2 MMOL/L (ref 3.5–5.3)
RBC # BLD AUTO: 2.64 X10*6/UL (ref 4.5–5.9)
SODIUM SERPL-SCNC: 138 MMOL/L (ref 136–145)
WBC # BLD AUTO: 15.6 X10*3/UL (ref 4.4–11.3)

## 2024-09-22 PROCEDURE — 36415 COLL VENOUS BLD VENIPUNCTURE: CPT | Performed by: INTERNAL MEDICINE

## 2024-09-22 PROCEDURE — 2500000004 HC RX 250 GENERAL PHARMACY W/ HCPCS (ALT 636 FOR OP/ED): Performed by: INTERNAL MEDICINE

## 2024-09-22 PROCEDURE — 99223 1ST HOSP IP/OBS HIGH 75: CPT | Performed by: NURSE PRACTITIONER

## 2024-09-22 PROCEDURE — 9420000001 HC RT PATIENT EDUCATION 5 MIN

## 2024-09-22 PROCEDURE — 94640 AIRWAY INHALATION TREATMENT: CPT

## 2024-09-22 PROCEDURE — 99233 SBSQ HOSP IP/OBS HIGH 50: CPT | Performed by: INTERNAL MEDICINE

## 2024-09-22 PROCEDURE — 99497 ADVNCD CARE PLAN 30 MIN: CPT | Performed by: NURSE PRACTITIONER

## 2024-09-22 PROCEDURE — 2500000002 HC RX 250 W HCPCS SELF ADMINISTERED DRUGS (ALT 637 FOR MEDICARE OP, ALT 636 FOR OP/ED): Performed by: INTERNAL MEDICINE

## 2024-09-22 PROCEDURE — 2500000004 HC RX 250 GENERAL PHARMACY W/ HCPCS (ALT 636 FOR OP/ED): Performed by: NURSE PRACTITIONER

## 2024-09-22 PROCEDURE — 85027 COMPLETE CBC AUTOMATED: CPT | Performed by: INTERNAL MEDICINE

## 2024-09-22 PROCEDURE — 82374 ASSAY BLOOD CARBON DIOXIDE: CPT | Performed by: INTERNAL MEDICINE

## 2024-09-22 PROCEDURE — 1100000001 HC PRIVATE ROOM DAILY

## 2024-09-22 PROCEDURE — 2500000001 HC RX 250 WO HCPCS SELF ADMINISTERED DRUGS (ALT 637 FOR MEDICARE OP): Performed by: INTERNAL MEDICINE

## 2024-09-22 RX ORDER — OLANZAPINE 5 MG/1
5 TABLET, ORALLY DISINTEGRATING ORAL EVERY 6 HOURS PRN
Status: DISCONTINUED | OUTPATIENT
Start: 2024-09-22 | End: 2024-09-23 | Stop reason: HOSPADM

## 2024-09-22 RX ORDER — HYDROMORPHONE HYDROCHLORIDE 1 MG/ML
0.6 INJECTION, SOLUTION INTRAMUSCULAR; INTRAVENOUS; SUBCUTANEOUS EVERY 4 HOURS PRN
Status: DISCONTINUED | OUTPATIENT
Start: 2024-09-22 | End: 2024-09-23 | Stop reason: HOSPADM

## 2024-09-22 RX ADMIN — IPRATROPIUM BROMIDE AND ALBUTEROL SULFATE 3 ML: 2.5; .5 SOLUTION RESPIRATORY (INHALATION) at 07:32

## 2024-09-22 RX ADMIN — DEXTROSE MONOHYDRATE, SODIUM CHLORIDE, AND POTASSIUM CHLORIDE 100 ML/HR: 50; 4.5; 1.49 INJECTION, SOLUTION INTRAVENOUS at 09:56

## 2024-09-22 RX ADMIN — ONDANSETRON 4 MG: 2 INJECTION INTRAMUSCULAR; INTRAVENOUS at 13:15

## 2024-09-22 RX ADMIN — PIPERACILLIN SODIUM AND TAZOBACTAM SODIUM 4.5 G: 4; .5 INJECTION, SOLUTION INTRAVENOUS at 16:55

## 2024-09-22 RX ADMIN — DEXTROSE MONOHYDRATE, SODIUM CHLORIDE, AND POTASSIUM CHLORIDE 100 ML/HR: 50; 4.5; 1.49 INJECTION, SOLUTION INTRAVENOUS at 21:56

## 2024-09-22 RX ADMIN — PIPERACILLIN SODIUM AND TAZOBACTAM SODIUM 4.5 G: 4; .5 INJECTION, SOLUTION INTRAVENOUS at 08:10

## 2024-09-22 RX ADMIN — OXYCODONE HYDROCHLORIDE 5 MG: 5 TABLET ORAL at 08:17

## 2024-09-22 RX ADMIN — HYDROMORPHONE HYDROCHLORIDE 0.6 MG: 1 INJECTION, SOLUTION INTRAMUSCULAR; INTRAVENOUS; SUBCUTANEOUS at 18:18

## 2024-09-22 RX ADMIN — PIPERACILLIN SODIUM AND TAZOBACTAM SODIUM 4.5 G: 4; .5 INJECTION, SOLUTION INTRAVENOUS at 01:20

## 2024-09-22 RX ADMIN — HYDROMORPHONE HYDROCHLORIDE 0.6 MG: 1 INJECTION, SOLUTION INTRAMUSCULAR; INTRAVENOUS; SUBCUTANEOUS at 23:53

## 2024-09-22 SDOH — ECONOMIC STABILITY: HOUSING INSECURITY: AT ANY TIME IN THE PAST 12 MONTHS, WERE YOU HOMELESS OR LIVING IN A SHELTER (INCLUDING NOW)?: PATIENT UNABLE TO ANSWER

## 2024-09-22 SDOH — ECONOMIC STABILITY: INCOME INSECURITY
IN THE LAST 12 MONTHS, WAS THERE A TIME WHEN YOU WERE NOT ABLE TO PAY THE MORTGAGE OR RENT ON TIME?: PATIENT UNABLE TO ANSWER

## 2024-09-22 SDOH — ECONOMIC STABILITY: TRANSPORTATION INSECURITY
IN THE PAST 12 MONTHS, HAS LACK OF TRANSPORTATION KEPT YOU FROM MEETINGS, WORK, OR FROM GETTING THINGS NEEDED FOR DAILY LIVING?: PATIENT UNABLE TO ANSWER

## 2024-09-22 SDOH — ECONOMIC STABILITY: INCOME INSECURITY
HOW HARD IS IT FOR YOU TO PAY FOR THE VERY BASICS LIKE FOOD, HOUSING, MEDICAL CARE, AND HEATING?: PATIENT UNABLE TO ANSWER

## 2024-09-22 SDOH — ECONOMIC STABILITY: TRANSPORTATION INSECURITY
IN THE PAST 12 MONTHS, HAS THE LACK OF TRANSPORTATION KEPT YOU FROM MEDICAL APPOINTMENTS OR FROM GETTING MEDICATIONS?: PATIENT UNABLE TO ANSWER

## 2024-09-22 SDOH — ECONOMIC STABILITY: HOUSING INSECURITY: IN THE PAST 12 MONTHS, HOW MANY TIMES HAVE YOU MOVED WHERE YOU WERE LIVING?: 1

## 2024-09-22 ASSESSMENT — COGNITIVE AND FUNCTIONAL STATUS - GENERAL
DRESSING REGULAR LOWER BODY CLOTHING: A LOT
WALKING IN HOSPITAL ROOM: A LOT
PERSONAL GROOMING: A LOT
HELP NEEDED FOR BATHING: A LOT
MOVING FROM LYING ON BACK TO SITTING ON SIDE OF FLAT BED WITH BEDRAILS: A LOT
MOBILITY SCORE: 11
EATING MEALS: A LITTLE
TURNING FROM BACK TO SIDE WHILE IN FLAT BAD: A LOT
DRESSING REGULAR LOWER BODY CLOTHING: A LOT
PERSONAL GROOMING: TOTAL
MOVING TO AND FROM BED TO CHAIR: A LOT
STANDING UP FROM CHAIR USING ARMS: A LOT
WALKING IN HOSPITAL ROOM: A LOT
DRESSING REGULAR UPPER BODY CLOTHING: A LOT
TURNING FROM BACK TO SIDE WHILE IN FLAT BAD: A LOT
STANDING UP FROM CHAIR USING ARMS: A LOT
DAILY ACTIVITIY SCORE: 10
TOILETING: TOTAL
CLIMB 3 TO 5 STEPS WITH RAILING: TOTAL
DAILY ACTIVITIY SCORE: 12
DRESSING REGULAR UPPER BODY CLOTHING: A LOT
MOBILITY SCORE: 11
EATING MEALS: A LOT
MOVING TO AND FROM BED TO CHAIR: A LOT
CLIMB 3 TO 5 STEPS WITH RAILING: TOTAL
HELP NEEDED FOR BATHING: A LOT
TOILETING: TOTAL
MOVING FROM LYING ON BACK TO SITTING ON SIDE OF FLAT BED WITH BEDRAILS: A LOT

## 2024-09-22 ASSESSMENT — PAIN SCALES - GENERAL
PAINLEVEL_OUTOF10: 0 - NO PAIN
PAINLEVEL_OUTOF10: 7
PAINLEVEL_OUTOF10: 10 - WORST POSSIBLE PAIN
PAINLEVEL_OUTOF10: 5 - MODERATE PAIN
PAINLEVEL_OUTOF10: 0 - NO PAIN
PAINLEVEL_OUTOF10: 0 - NO PAIN
PAINLEVEL_OUTOF10: 7

## 2024-09-22 ASSESSMENT — ENCOUNTER SYMPTOMS
WEAKNESS: 1
DIFFICULTY URINATING: 0
CONFUSION: 0
NAUSEA: 0
BACK PAIN: 1
SINUS PAIN: 0
DIARRHEA: 0
ACTIVITY CHANGE: 1
POLYDIPSIA: 0
WOUND: 0
COUGH: 1
SORE THROAT: 0
ABDOMINAL PAIN: 0
MYALGIAS: 0
DIZZINESS: 0
TREMORS: 0
CONSTIPATION: 0
APPETITE CHANGE: 1
FATIGUE: 1
HEMATURIA: 0
POLYPHAGIA: 0
CHILLS: 0
EYE PAIN: 0
FEVER: 0
SLEEP DISTURBANCE: 0
SEIZURES: 0
NERVOUS/ANXIOUS: 0
ABDOMINAL DISTENTION: 0
HEADACHES: 0
FACIAL SWELLING: 0
COLOR CHANGE: 0
PALPITATIONS: 0
SHORTNESS OF BREATH: 1
ARTHRALGIAS: 0

## 2024-09-22 ASSESSMENT — PAIN - FUNCTIONAL ASSESSMENT
PAIN_FUNCTIONAL_ASSESSMENT: FLACC (FACE, LEGS, ACTIVITY, CRY, CONSOLABILITY)
PAIN_FUNCTIONAL_ASSESSMENT: FLACC (FACE, LEGS, ACTIVITY, CRY, CONSOLABILITY)

## 2024-09-22 ASSESSMENT — PAIN SCALES - PAIN ASSESSMENT IN ADVANCED DEMENTIA (PAINAD): TOTALSCORE: MEDICATION (SEE MAR);REPOSITIONED

## 2024-09-22 ASSESSMENT — PAIN DESCRIPTION - LOCATION
LOCATION: GENERALIZED
LOCATION: GENERALIZED
LOCATION: BACK

## 2024-09-22 ASSESSMENT — PAIN DESCRIPTION - ORIENTATION: ORIENTATION: LOWER

## 2024-09-22 NOTE — NURSING NOTE
"Patient refusing night time meds, states, \"leave me alone and just let me die\". This Rn asked patient if he has any thoughts of hurting himself and patient states\"no, just let me sleep\" This RN notified Dr. TEDDY Raymundo of the above. Girlfriend at bedside with patient.  "

## 2024-09-22 NOTE — CARE PLAN
The patient's goals for the shift include      The clinical goals for the shift include comfort care      Problem: Pain - Adult  Goal: Verbalizes/displays adequate comfort level or baseline comfort level  Outcome: Progressing     Problem: Safety - Adult  Goal: Free from fall injury  Outcome: Progressing     Problem: Discharge Planning  Goal: Discharge to home or other facility with appropriate resources  Outcome: Progressing     Problem: Chronic Conditions and Co-morbidities  Goal: Patient's chronic conditions and co-morbidity symptoms are monitored and maintained or improved  Outcome: Progressing     Problem: Respiratory  Goal: No signs of respiratory distress (eg. Use of accessory muscles. Peds grunting)  Outcome: Progressing     Problem: Skin  Goal: Decreased wound size/increased tissue granulation at next dressing change  Outcome: Progressing  Flowsheets (Taken 9/22/2024 1942)  Decreased wound size/increased tissue granulation at next dressing change: Protective dressings over bony prominences  Goal: Participates in plan/prevention/treatment measures  Outcome: Progressing  Flowsheets (Taken 9/22/2024 1942)  Participates in plan/prevention/treatment measures: Elevate heels  Goal: Prevent/manage excess moisture  Outcome: Progressing  Flowsheets (Taken 9/22/2024 1942)  Prevent/manage excess moisture: Cleanse incontinence/protect with barrier cream  Goal: Prevent/minimize sheer/friction injuries  Outcome: Progressing  Flowsheets (Taken 9/22/2024 1942)  Prevent/minimize sheer/friction injuries:   Use pull sheet   Turn/reposition every 2 hours/use positioning/transfer devices  Goal: Promote/optimize nutrition  Outcome: Progressing  Flowsheets (Taken 9/22/2024 1942)  Promote/optimize nutrition: Monitor/record intake including meals  Goal: Promote skin healing  Outcome: Progressing  Flowsheets (Taken 9/22/2024 1942)  Promote skin healing:   Turn/reposition every 2 hours/use positioning/transfer devices   Protective  dressings over bony prominences

## 2024-09-22 NOTE — PROGRESS NOTES
"Christophe Ambriz is a 74 y.o. male on day 2 of admission presenting with Pneumonia of left lower lobe due to infectious organism.    Subjective   Is just speaking with nodding the head.  Refusing to eat.  Refusing to have blood transfusion.  He does not want any treatment.  He has refused medication.  He wants to go home and die       Objective     Physical Exam  Vitals reviewed.   Constitutional:       Appearance: Normal appearance. He is ill-appearing.   HENT:      Head: Normocephalic and atraumatic.      Right Ear: Tympanic membrane, ear canal and external ear normal.      Left Ear: Tympanic membrane, ear canal and external ear normal.      Nose: Nose normal.      Mouth/Throat:      Pharynx: Oropharynx is clear.   Eyes:      Extraocular Movements: Extraocular movements intact.      Conjunctiva/sclera: Conjunctivae normal.      Pupils: Pupils are equal, round, and reactive to light.   Cardiovascular:      Rate and Rhythm: Normal rate and regular rhythm.      Pulses: Normal pulses.      Heart sounds: Normal heart sounds.   Pulmonary:      Effort: Pulmonary effort is normal.      Breath sounds: Normal breath sounds.   Abdominal:      General: Abdomen is flat. Bowel sounds are normal.      Palpations: Abdomen is soft.   Musculoskeletal:      Cervical back: Normal range of motion and neck supple.   Skin:     General: Skin is warm and dry.   Neurological:      General: No focal deficit present.      Mental Status: He is alert and oriented to person, place, and time.   Psychiatric:         Mood and Affect: Mood normal.         Last Recorded Vitals  Blood pressure 98/51, pulse 84, temperature 36.5 °C (97.7 °F), temperature source Axillary, resp. rate 18, height 1.854 m (6' 1\"), weight 65.7 kg (144 lb 13.5 oz), SpO2 94%.  Intake/Output last 3 Shifts:  No intake/output data recorded.    Relevant Results               Scheduled medications  amLODIPine, 10 mg, oral, Daily  aspirin, 81 mg, oral, Daily  atorvastatin, 20 mg, " oral, Nightly  clopidogrel, 75 mg, oral, Daily  dexAMETHasone, 2 mg, oral, Daily with breakfast  docusate sodium, 100 mg, oral, BID  enoxaparin, 40 mg, subcutaneous, Daily  gabapentin, 300 mg, oral, Nightly  ipratropium-albuteroL, 3 mL, nebulization, TID  pantoprazole, 40 mg, oral, Daily before breakfast   Or  pantoprazole, 40 mg, intravenous, Daily before breakfast  piperacillin-tazobactam, 4.5 g, intravenous, q8h  polyethylene glycol, 17 g, oral, Daily  traZODone, 50 mg, oral, Nightly      Continuous medications  potassium epdyizh-X1-2.45%NaCl, 100 mL/hr, Last Rate: 100 mL/hr (09/22/24 0956)  oxygen, , Last Rate: 4 L/min (09/20/24 3646)      PRN medications  PRN medications: acetaminophen **OR** acetaminophen **OR** acetaminophen, benzocaine-menthol, dextromethorphan-guaifenesin, guaiFENesin, HYDROmorphone, ipratropium-albuteroL, melatonin, OLANZapine zydis, ondansetron ODT **OR** ondansetron, oxyCODONE  Results for orders placed or performed during the hospital encounter of 09/20/24 (from the past 24 hour(s))   CBC   Result Value Ref Range    WBC 15.6 (H) 4.4 - 11.3 x10*3/uL    nRBC 0.0 0.0 - 0.0 /100 WBCs    RBC 2.64 (L) 4.50 - 5.90 x10*6/uL    Hemoglobin 6.5 (LL) 13.5 - 17.5 g/dL    Hematocrit 22.3 (L) 41.0 - 52.0 %    MCV 85 80 - 100 fL    MCH 24.6 (L) 26.0 - 34.0 pg    MCHC 29.1 (L) 32.0 - 36.0 g/dL    RDW 16.6 (H) 11.5 - 14.5 %    Platelets 381 150 - 450 x10*3/uL   Basic Metabolic Panel   Result Value Ref Range    Glucose 166 (H) 74 - 99 mg/dL    Sodium 138 136 - 145 mmol/L    Potassium 3.2 (L) 3.5 - 5.3 mmol/L    Chloride 106 98 - 107 mmol/L    Bicarbonate 24 21 - 32 mmol/L    Anion Gap 11 10 - 20 mmol/L    Urea Nitrogen 13 6 - 23 mg/dL    Creatinine 0.41 (L) 0.50 - 1.30 mg/dL    eGFR >90 >60 mL/min/1.73m*2    Calcium 7.9 (L) 8.6 - 10.3 mg/dL     CT abdomen pelvis w IV contrast    Result Date: 9/20/2024  Interpreted By:  Karley Lainez, STUDY:   CT ABDOMEN PELVIS W IV CONTRAST;  9/20/2024 5:16 pm   INDICATION:  Signs/Symptoms:Abdominal pain, sepsis.   COMPARISON: 09/02/2024 07/05/2024   ACCESSION NUMBER(S): [id number]   ORDERING CLINICIAN: MINE ROQUE   TECHNIQUE: CT of the abdomen and pelvis was performed.  Standard contiguous axial images were obtained at 3 mm slice thickness through the abdomen and pelvis. Coronal and sagittal reconstructions at 3 mm slice thickness were performed.   75 ML  Omnipaque 350 contrast administered intravenously without immediate complication.   FINDINGS: LOWER CHEST:   Please refer to CTA chest which was reported separately.   Increased consolidation in the left lower lung with air bronchograms and heterogeneously enhancing parenchyma, most likely pneumonia. Mucous plugging present in the left lower lung. There are increased patchy ground-glass and partially consolidated nodular opacities throughout the lingula and in the aerated portions of the left lung which are increased. There is a pulmonary nodule in the right lower lung measuring 6 mm, which is new since recent prior. Heart size normal. Coronary artery calcifications are present.   ABDOMEN:   LIVER: Liver size normal. Liver density is fatty. There is a subtle lesion in the inferior liver which is difficult to detect due to presence of fatty infiltration, can not accurately assess the size, however it is approximately 2.5 cm on image number 52 series 5. No other definite lesion seen.   GALLBLADDER: Normal size gallbladder. No radiopaque gallstones.   BILE DUCTS: Normal caliber.   PANCREAS: Atrophic pancreas. No ductal dilation or visualized mass. No inflammatory changes.   SPLEEN: Normal size. Homogeneous enhancement.   ADRENAL GLANDS: Within normal limits.   KIDNEYS AND URETERS: Normal size kidneys. No hydroureteronephrosis or urinary tract stone. Large enhancing mass left upper kidney, exophytic and measuring 7.6 by 5.6 by 8.1 cm consistent with a renal cell carcinoma. Allowing for any differences in measurement technique, no  short interval change since the most recent prior. It extends to the margin of the posterior renal fascia. No renal vein invasion or thrombosis. There is a small dense, possibly solid nodule of the right posterior mid kidney measuring 1 cm which appears also unchanged. Left lower pole simple fluid attenuation cyst 4.1 cm.   PELVIS:   BLADDER: Within normal limits.   REPRODUCTIVE ORGANS: Mildly enlarged prostate.   BOWEL: The stomach is unremarkable. The small bowel is normal in caliber without evidence of focal wall thickening or obstruction. There is no evidence of focal wall thickening or dilatation of the large bowel. Appendix is normal.   VESSELS: Diffuse atherosclerotic disease. No aneurysm or dissection. No DVT.   PERITONEUM/RETROPERITONEUM/LYMPH NODES: There is no free or loculated intraperitoneal or retroperitoneal fluid collection, no free intraperitoneal air. Mildly enlarged left para-aortic node 1 cm, no change.   BONES AND ABDOMINAL WALL: There is overall slightly less enhancement of destructive lesion with soft tissue density involving the entire L3 body, status post decompressive laminectomy at this level. Intact posterior fusion hardware from the L1 through L5 level. Expansile metastatic lesion right 9th rib with soft tissue density, is measuring 6.2 by 4.3 cm, most recently 5.9 x 4.2 cm, not much change. Soft tissue from this lesion which extends to the paraspinal expansile soft tissue associated with this lesion causes severe canal encroachment just above the level of the laminectomy image 76 series 5, also effaces the left neural foramen at the level of L3-4 and nearly effaces the medial aspect neural foramen on the right at L3-4 there are several other subtle lesions which are slightly more conspicuous such as right sacrum 1 cm lesion image 118 series 5. On the most recent prior this was about 6 mm. A left posterior iliac mildly lytic lesion measuring 1.4 cm image number 107, was most recently 1.1  cm. No definite new lesions.  The abdominal wall soft tissues appear normal.       1.  See separate report chest CT regarding increased left lung base consolidation, likely pneumonia. Unable to exclude underlying metastatic disease in this region. There is a new pulmonary nodule measuring 6 mm in the right lower lung, which may be metastatic or inflammatory. 2. Evidence of acute infectious process in the abdomen or pelvis. 3. Heterogeneously enhancing left renal 8.1 cm mass consistent with renal cell carcinoma, fairly similar in size to the most recent prior exam. No evidence of renal vein invasion thrombus. Unchanged left para-aortic mildly enlarged lymph node. 4. Multiple metastases, osseous lesions most notably involving the lateral right 9th rib and destroying most of the L3 body with unchanged pathologic fracture. Expansile soft tissue of the L3 lesion causes severe encroachment of the canal just above the level of the laminectomy and also of left-greater-than-right neural foramen at the level of L3-4. This could be further assessed with MRI. 5. Probable hepatic 2.5 cm metastasis noted inferior right liver which is difficult to assess/characterize due to fatty infiltration of the liver, I feel this is most likely new since 07/05/2024 exam. MRI could be performed to further characterize and assess for any other hepatic lesions.       Signed by: Karley Lainez 9/20/2024 6:46 PM Dictation workstation:   [id number]    CT angio chest for pulmonary embolism    Result Date: 9/20/2024  Interpreted By:  Gurmeet Garcia, STUDY: CT ANGIO CHEST FOR PULMONARY EMBOLISM;  9/20/2024 5:16 pm   INDICATION: Signs/Symptoms:Prolonged immobilization, significant hypoxia and tachycardia, rule out pulmonary embolism.     COMPARISON: None   ACCESSION NUMBER(S): [id number]   ORDERING CLINICIAN: MINE ROQUE   TECHNIQUE: Helical data acquisition of the chest was obtained after intravenous administration of 75 ML Omnipaque 350, as per PE  protocol. Images were reformatted in coronal and sagittal planes. Axial and coronal maximum intensity projection (MIP) images were created and reviewed.   FINDINGS: POTENTIAL LIMITATIONS OF THE STUDY: None   HEART AND VESSELS: There are no discrete filling defects within main pulmonary artery and its branches to suggest acute pulmonary embolism. Main pulmonary artery and its branches are normal in caliber.   The thoracic aorta normal in course and caliber. There are coronary artery calcifications are seen. Please note, the study is not optimized for evaluation of coronary arteries.   The cardiac chambers are not enlarged.   There is no pericardial effusion seen.   MEDIASTINUM AND CELE, LOWER NECK AND AXILLA: There is a stable 3  cm right thyroid nodule noted. No evidence of thoracic lymphadenopathy by CT criteria. Esophagus appears within normal limits as seen.   LUNGS AND AIRWAYS: The trachea and central airways are patent. No endobronchial lesion is seen.   There is a large region of confluent consolidation noted in the left concern for pneumonia which is significantly worsened when compared to the prior study. There are extensive scattered regions of ground-glass airspace opacities noted which are new from the prior study concerning for multifocal pneumonia.     UPPER ABDOMEN: The visualized subdiaphragmatic structures demonstrate no remarkable findings.       CHEST WALL AND OSSEOUS STRUCTURES: There is a partially imaged metastatic lesion noted involving the right 9th rib currently measuring approximately 4 cm, previously 3.5 cm. There is a partially imaged enhancing lesion noted in the inferior right lobe of the liver. Please see the dedicated abdominal CT performed the same day for further evaluation. No acute osseous pathology.There are no suspicious osseous lesions.       1. No evidence of acute pulmonary embolism. 2. There is a large region of confluent consolidation noted in the left concern for pneumonia  which is significantly worsened when compared to the prior study. There are extensive scattered regions of ground-glass airspace opacities noted which are new from the prior study concerning for multifocal pneumonia. 3. There is a partially imaged metastatic lesion noted involving the right 9th rib currently measuring approximately 4 cm, previously 3.5 cm. There is a partially imaged enhancing lesion noted in the inferior right lobe of the liver. Please see the dedicated abdominal CT performed the same day for further evaluation.   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      Colette Raymundo MD

## 2024-09-22 NOTE — CARE PLAN
- No serum testing today  -  Urine bag in place  -  MIGUELITO in about 1 year ( and later today)     The patient's goals for the shift include  comfort    The clinical goals for the shift include pain control, comfort      Problem: Skin  Goal: Promote/optimize nutrition  Outcome: Not Progressing  Flowsheets (Taken 9/22/2024 1020)  Promote/optimize nutrition:   Monitor/record intake including meals   Assist with feeding   Consume > 50% meals/supplements   Offer water/supplements/favorite foods   Discuss with provider if NPO > 2 days   Reassess MST if dietician not consulted

## 2024-09-22 NOTE — PROGRESS NOTES
"             Therapy Communication Note    Patient Name: Christophe Ambriz  MRN: [id number]  Today's Date: 9/22/2024    Discipline: Physical Therapy    Missed Visit Reason:      Missed Time: Cancel    Comment: H/H continues to decline. Per MD note today, \"Refusing to eat. Refusing to have blood transfusion. He does not want any treatment. He has refused medication. He wants to go home and die \". Pending hospice consult at this time. Will cancel PT eval for today.  "

## 2024-09-22 NOTE — CARE PLAN
The patient's goals for the shift include  rest    The clinical goals for the shift include Safety/Rest

## 2024-09-22 NOTE — CONSULTS
Inpatient consult to Palliative Care  Consult performed by: Gris Allen, APRN-CNP  Consult ordered by: Colette Raymundo MD  Reason for consult: RCC mets to the spine          Reason For Consult  Reason for Consult: communication / medical decision making     History Of Present Illness  Christophe Ambriz is a 74 y.o. male with past medical history of RCC Cancer with spine mets, CAD, HTN, HLD is presenting from his SNF with fatigue, weakness, failure to thrive. No significant oral intake noted in >7days per significant other. Discharged from  9/13 to [facility] after being treated for aspiration pneumonia. Since then, SO has reported progressive weakness, poor appetite, increased nausea. In ER, He was found to be hypoxic and was placed on 6 L nasal cannula. Blood work ordered for septic workup including CBC, CMP, lactic acid, blood cultures, troponin, BNP along with a CT angio of the chest given patient's recent admission to evaluate for pulmonary embolism and a CT scan of the abdomen was given his abdominal pain. Blood work was remarkable for significant leukocytosis with a white count of 26, elevated lactic acid of 2.1, mildly elevated BNP of 129 and normal troponin of 18 and I have low suspicion for cardiac source of patient's hypoxia. Patient was treated with IV vancomycin, IV ceftriaxone, IV Flagyl. CT scan of the chest does show a left lower lobe pneumonia with significant consolidation. CT scan of the abdomen pelvis shows again multiple metastatic lesions consistent with patient's known history. Patient admitted for evaluation. Discussion between patient, SO and providers, patient does not want any aggressive treatment, at this point he is interested in going home with hospice. Today he reports his pain to his back as stable, but he has uncontrolled nausea. He wants a popsicle for his dry mouth, but has no appetite. He denies dyspnea on oxygen. He has no co chest pain. His SO reports severe fatigue and  weakness. He denies pain to his sacral wound.      Symptoms (0 - 10, Best to Worst)  [place] Symptom Assessment System  0-10 (Numeric) Pain Score: 5 - Moderate pain    BM in last 48 hours? unknown    Emotional/Psychological/Spiritual Needs  na  Serious Illness Conversation  What is your understanding now of where you are with your illness:  patient and SO very aware of current diagnoses and treatment options as well as prognosis  How much information about what is likely to be ahead with your illness  would you like from me: fully disclose information to SO and patient, however, patient has no wish to know his prognosis  What are your most important goals if your health situation worsens:  he does not want to suffer, he wants to be at home  What are your biggest fears and worries about the future with your health:  suffering  What gives you strength as you think about the future with your illness:  support of SO and her children  What abilities are so critical to your life that you can’t imagine living without them:  cognition  If you become sicker, how much are you willing to go through for the possibility of gaining more time:  nothing more at this point, he wants to go home with hospice  How much does your family know about your priorities and wishes:  family is fully aware of patient wishes    Personal/Social History    He reports that he has never smoked. He has never been exposed to tobacco smoke. He has never used smokeless tobacco. He reports that he does not use drugs. No history on file for alcohol use.    Functional Status    Patient is total care, he is dependent on his SO of 33+ years for all of his needs    Caregiving/Caregiver Support  Does the patient require assistance in some or all components of his care, including coordination of medical care? Yes  If Yes, which person serves that role?  partner   Caregiver emotional or practical needs:      Past Medical History  He has a past medical history of  Aspiration pneumonia (Multi), At risk for falling, Coronary artery disease, HLD (hyperlipidemia), HTN (hypertension), Metastasis (Multi), Renal cell adenocarcinoma (Multi), and Weakness.    Surgical History  He has a past surgical history that includes Lumbar fusion.     Family History  Family History   Problem Relation Name Age of Onset    Hypertension Other       Allergies  Oxycodone-acetaminophen and Penicillamine    Review of Systems   Constitutional:  Positive for activity change, appetite change and fatigue. Negative for chills and fever.   HENT:  Negative for dental problem, facial swelling, mouth sores, sinus pain and sore throat.    Eyes:  Negative for pain.   Respiratory:  Positive for cough and shortness of breath.    Cardiovascular:  Negative for chest pain, palpitations and leg swelling.   Gastrointestinal:  Negative for abdominal distention, abdominal pain, constipation, diarrhea and nausea.   Endocrine: Negative for polydipsia, polyphagia and polyuria.   Genitourinary:  Negative for difficulty urinating and hematuria.   Musculoskeletal:  Positive for back pain. Negative for arthralgias, gait problem and myalgias.   Skin:  Negative for color change, rash and wound.   Neurological:  Positive for weakness. Negative for dizziness, tremors, seizures and headaches.   Psychiatric/Behavioral:  Negative for confusion and sleep disturbance. The patient is not nervous/anxious.         Physical Exam  Vitals and nursing note reviewed.   Constitutional:       General: He is not in acute distress.     Appearance: He is ill-appearing.   HENT:      Head: Normocephalic and atraumatic.      Nose: Nose normal.      Mouth/Throat:      Mouth: Mucous membranes are dry.      Pharynx: Oropharynx is clear.   Eyes:      General: No scleral icterus.        Right eye: No discharge.         Left eye: No discharge.      Extraocular Movements: Extraocular movements intact.      Pupils: Pupils are equal, round, and reactive to light.  "  Cardiovascular:      Rate and Rhythm: Normal rate and regular rhythm.      Pulses: Normal pulses.      Heart sounds: No murmur heard.  Pulmonary:      Effort: Pulmonary effort is normal. No respiratory distress.      Breath sounds: Rhonchi present.      Comments: [id number]  Abdominal:      General: Abdomen is flat. Bowel sounds are normal. There is no distension.      Palpations: Abdomen is soft.      Tenderness: There is no abdominal tenderness.   Musculoskeletal:         General: No swelling, tenderness, deformity or signs of injury. Normal range of motion.      Cervical back: Normal range of motion and neck supple.      Comments: Back pain with light palpation and attempts to flex/rotate l spine.    Skin:     General: Skin is warm and dry.      Capillary Refill: Capillary refill takes 2 to 3 seconds.   Neurological:      General: No focal deficit present.      Mental Status: He is alert and oriented to person, place, and time.      Motor: Weakness present.   Psychiatric:         Mood and Affect: Mood normal.         Last Recorded Vitals  Blood pressure 98/51, pulse 84, temperature 36.5 °C (97.7 °F), temperature source Axillary, resp. rate 18, height 1.854 m (6' 1\"), weight 65.7 kg (144 lb 13.5 oz), SpO2 94%.    Relevant Results  CT abdomen pelvis w IV contrast    Result Date: 9/20/2024  Interpreted By:  Karley Lainez, STUDY:   CT ABDOMEN PELVIS W IV CONTRAST;  9/20/2024 5:16 pm   INDICATION: Signs/Symptoms:Abdominal pain, sepsis.   COMPARISON: 09/02/2024 07/05/2024   ACCESSION NUMBER(S): [id number]   ORDERING CLINICIAN: MINE ROQUE   TECHNIQUE: CT of the abdomen and pelvis was performed.  Standard contiguous axial images were obtained at 3 mm slice thickness through the abdomen and pelvis. Coronal and sagittal reconstructions at 3 mm slice thickness were performed.   75 ML  Omnipaque 350 contrast administered intravenously without immediate complication.   FINDINGS: LOWER CHEST:   Please refer to CTA chest which " was reported separately.   Increased consolidation in the left lower lung with air bronchograms and heterogeneously enhancing parenchyma, most likely pneumonia. Mucous plugging present in the left lower lung. There are increased patchy ground-glass and partially consolidated nodular opacities throughout the lingula and in the aerated portions of the left lung which are increased. There is a pulmonary nodule in the right lower lung measuring 6 mm, which is new since recent prior. Heart size normal. Coronary artery calcifications are present.   ABDOMEN:   LIVER: Liver size normal. Liver density is fatty. There is a subtle lesion in the inferior liver which is difficult to detect due to presence of fatty infiltration, can not accurately assess the size, however it is approximately 2.5 cm on image number 52 series 5. No other definite lesion seen.   GALLBLADDER: Normal size gallbladder. No radiopaque gallstones.   BILE DUCTS: Normal caliber.   PANCREAS: Atrophic pancreas. No ductal dilation or visualized mass. No inflammatory changes.   SPLEEN: Normal size. Homogeneous enhancement.   ADRENAL GLANDS: Within normal limits.   KIDNEYS AND URETERS: Normal size kidneys. No hydroureteronephrosis or urinary tract stone. Large enhancing mass left upper kidney, exophytic and measuring 7.6 by 5.6 by 8.1 cm consistent with a renal cell carcinoma. Allowing for any differences in measurement technique, no short interval change since the most recent prior. It extends to the margin of the posterior renal fascia. No renal vein invasion or thrombosis. There is a small dense, possibly solid nodule of the right posterior mid kidney measuring 1 cm which appears also unchanged. Left lower pole simple fluid attenuation cyst 4.1 cm.   PELVIS:   BLADDER: Within normal limits.   REPRODUCTIVE ORGANS: Mildly enlarged prostate.   BOWEL: The stomach is unremarkable. The small bowel is normal in caliber without evidence of focal wall thickening or  obstruction. There is no evidence of focal wall thickening or dilatation of the large bowel. Appendix is normal.   VESSELS: Diffuse atherosclerotic disease. No aneurysm or dissection. No DVT.   PERITONEUM/RETROPERITONEUM/LYMPH NODES: There is no free or loculated intraperitoneal or retroperitoneal fluid collection, no free intraperitoneal air. Mildly enlarged left para-aortic node 1 cm, no change.   BONES AND ABDOMINAL WALL: There is overall slightly less enhancement of destructive lesion with soft tissue density involving the entire L3 body, status post decompressive laminectomy at this level. Intact posterior fusion hardware from the L1 through L5 level. Expansile metastatic lesion right 9th rib with soft tissue density, is measuring 6.2 by 4.3 cm, most recently 5.9 x 4.2 cm, not much change. Soft tissue from this lesion which extends to the paraspinal expansile soft tissue associated with this lesion causes severe canal encroachment just above the level of the laminectomy image 76 series 5, also effaces the left neural foramen at the level of L3-4 and nearly effaces the medial aspect neural foramen on the right at L3-4 there are several other subtle lesions which are slightly more conspicuous such as right sacrum 1 cm lesion image 118 series 5. On the most recent prior this was about 6 mm. A left posterior iliac mildly lytic lesion measuring 1.4 cm image number 107, was most recently 1.1 cm. No definite new lesions.  The abdominal wall soft tissues appear normal.       1.  See separate report chest CT regarding increased left lung base consolidation, likely pneumonia. Unable to exclude underlying metastatic disease in this region. There is a new pulmonary nodule measuring 6 mm in the right lower lung, which may be metastatic or inflammatory. 2. Evidence of acute infectious process in the abdomen or pelvis. 3. Heterogeneously enhancing left renal 8.1 cm mass consistent with renal cell carcinoma, fairly similar in  size to the most recent prior exam. No evidence of renal vein invasion thrombus. Unchanged left para-aortic mildly enlarged lymph node. 4. Multiple metastases, osseous lesions most notably involving the lateral right 9th rib and destroying most of the L3 body with unchanged pathologic fracture. Expansile soft tissue of the L3 lesion causes severe encroachment of the canal just above the level of the laminectomy and also of left-greater-than-right neural foramen at the level of L3-4. This could be further assessed with MRI. 5. Probable hepatic 2.5 cm metastasis noted inferior right liver which is difficult to assess/characterize due to fatty infiltration of the liver, I feel this is most likely new since 07/05/2024 exam. MRI could be performed to further characterize and assess for any other hepatic lesions.       Signed by: Karley Lainez 9/20/2024 6:46 PM Dictation workstation:   [id number]    CT angio chest for pulmonary embolism    Result Date: 9/20/2024  Interpreted By:  Gurmeet Garcia, STUDY: CT ANGIO CHEST FOR PULMONARY EMBOLISM;  9/20/2024 5:16 pm   INDICATION: Signs/Symptoms:Prolonged immobilization, significant hypoxia and tachycardia, rule out pulmonary embolism.     COMPARISON: None   ACCESSION NUMBER(S): [id number]   ORDERING CLINICIAN: MINE ROQUE   TECHNIQUE: Helical data acquisition of the chest was obtained after intravenous administration of 75 ML Omnipaque 350, as per PE protocol. Images were reformatted in coronal and sagittal planes. Axial and coronal maximum intensity projection (MIP) images were created and reviewed.   FINDINGS: POTENTIAL LIMITATIONS OF THE STUDY: None   HEART AND VESSELS: There are no discrete filling defects within main pulmonary artery and its branches to suggest acute pulmonary embolism. Main pulmonary artery and its branches are normal in caliber.   The thoracic aorta normal in course and caliber. There are coronary artery calcifications are seen. Please note, the study is  not optimized for evaluation of coronary arteries.   The cardiac chambers are not enlarged.   There is no pericardial effusion seen.   MEDIASTINUM AND CELE, LOWER NECK AND AXILLA: There is a stable 3  cm right thyroid nodule noted. No evidence of thoracic lymphadenopathy by CT criteria. Esophagus appears within normal limits as seen.   LUNGS AND AIRWAYS: The trachea and central airways are patent. No endobronchial lesion is seen.   There is a large region of confluent consolidation noted in the left concern for pneumonia which is significantly worsened when compared to the prior study. There are extensive scattered regions of ground-glass airspace opacities noted which are new from the prior study concerning for multifocal pneumonia.     UPPER ABDOMEN: The visualized subdiaphragmatic structures demonstrate no remarkable findings.       CHEST WALL AND OSSEOUS STRUCTURES: There is a partially imaged metastatic lesion noted involving the right 9th rib currently measuring approximately 4 cm, previously 3.5 cm. There is a partially imaged enhancing lesion noted in the inferior right lobe of the liver. Please see the dedicated abdominal CT performed the same day for further evaluation. No acute osseous pathology.There are no suspicious osseous lesions.       1. No evidence of acute pulmonary embolism. 2. There is a large region of confluent consolidation noted in the left concern for pneumonia which is significantly worsened when compared to the prior study. There are extensive scattered regions of ground-glass airspace opacities noted which are new from the prior study concerning for multifocal pneumonia. 3. There is a partially imaged metastatic lesion noted involving the right 9th rib currently measuring approximately 4 cm, previously 3.5 cm. There is a partially imaged enhancing lesion noted in the inferior right lobe of the liver. Please see the dedicated abdominal CT performed the same day for further evaluation.    MACRO: None   Signed by: Gurmeet Garcia 9/20/2024 6:10 PM Dictation workstation:   [id number]    ECG 12 lead    Result Date: 9/6/2024  Normal sinus rhythm Normal ECG When compared with ECG of 26-JUL-2024 14:10, No significant change was found Confirmed by Law Sanderson ([id number]) on 9/6/2024 9:06:10 AM    CT angio chest for pulmonary embolism    Result Date: 9/2/2024  Interpreted By:  Nathaniel Rich, STUDY: CT ANGIO CHEST FOR PULMONARY EMBOLISM;  9/2/2024 3:27 pm   INDICATION: Signs/Symptoms:hypoxic, cancer.   COMPARISON: CTA chest for PE 08/19/2024   ACCESSION NUMBER(S): [id number]   ORDERING CLINICIAN: SHERMAN CRYSTAL   TECHNIQUE: Contiguous axial images of the chest were obtained after the intravenous administration of 75 mL Omnipaque 350 contrast using angiographic PE protocol. Coronal and sagittal reformatted images were reconstructed from the axial data. MIP images were created on an independent workstation and reviewed.   FINDINGS: PULMONARY ARTERIES: Adequate opacification to the level of the segmental arteries. Assessment of the subsegmental arteries limited by respiratory motion and mixing artifact. No filling defect to suggest pulmonary embolus in the visualized pulmonary arteries. The main pulmonary artery is normal in diameter. No CT evidence of right heart strain.   HEART: Normal in size. Triple-vessel coronary vascular calcifications. No significant pericardial effusion.   VESSELS: Normal caliber aorta without dissection. No significant aortic atherosclerosis.   MEDIASTINUM AND LYMPH NODES: Asymmetric enlargement of the right lobe of the thyroid. This may be further assessed with ultrasound on a nonemergent basis. No enlarged intrathoracic or axillary lymph nodes by imaging criteria. No pneumomediastinum. The esophagus appears within normal limits.   LUNG, AIRWAYS, AND PLEURA: Secretions and debris are noted within the distal trachea and proximal right mainstem bronchus. Filling defects within multiple left  lower lobe bronchi as well as a consolidation within the medial left lower lobe. Overall interval improvement in numerous patchy bilateral pulmonary opacities likely representing a resolving infectious or inflammatory process. There are persistent tree-in-bud opacities noted throughout the bilateral lungs, most pronounced in the left lower lobe. No pleural effusion or pneumothorax.   OSSEOUS STRUCTURES: The expansile soft tissue lesion is again noted within the right 9th rib measuring a proximally 6.0 x 4.4 cm, slightly increased from prior chest CT.   CHEST WALL SOFT TISSUES: No discernible abnormality.   UPPER ABDOMEN/OTHER: Partially visualized left renal mass. Please see dedicated CT of the abdomen and pelvis.       1. No evidence of pulmonary embolism to the level of the segmental arteries. Small subsegmental filling defects can not be entirely excluded. 2. Interval improvement in bilateral pulmonary opacities likely reflecting resolving infectious or inflammatory process. 3. Persistent but improved tree-in-bud opacities bilaterally, most pronounced in the left lower lobe. 4. Small consolidation in the medial left lung base with filling defects of the associated bronchi and debris within the distal trachea. This likely represents volume loss and probable aspiration. Superimposed infectious process or underlying lesion is not excluded. Follow-up to resolution is recommended. 5. Slight interval progression in size of destructive soft tissue lesion centered within the lateral right 9th rib. 6. Partially visualized left renal mass, concerning for renal cell carcinoma. 7. Asymmetric enlargement of the right lobe of the thyroid, this can be further assessed with dedicated ultrasound on a nonemergent basis.   MACRO: None   Signed by: Nathaniel Rich 9/2/2024 4:36 PM Dictation workstation:   [id number]    CT abdomen pelvis w IV contrast    Result Date: 9/2/2024  Interpreted By:  Nathaniel Rich, STUDY: CT ABDOMEN PELVIS  W IV CONTRAST;  9/2/2024 3:27 pm   INDICATION: Signs/Symptoms:worsening pain, cancer.   COMPARISON: CT chest abdomen and pelvis 07/05/2024.   ACCESSION NUMBER(S): [id number]   ORDERING CLINICIAN: SHERMAN CRYSTAL   TECHNIQUE: Contiguous axial images of the abdomen and pelvis were obtained after the intravenous administration of 75 mL Omnipaque 350 contrast. Coronal and sagittal reformatted images were reconstructed from the axial data.   FINDINGS: LOWER CHEST: Consolidation in the medial aspect of the left lower lobe. Please see dedicated CTA of the chest.   LIVER: Hepatic steatosis. Expansile destructive lesion arising from the lateral right 9th rib abuts and deforms the lateral margin of the right hepatic lobe.   BILE DUCTS: No significant intrahepatic or extrahepatic dilatation.   GALLBLADDER: No significant abnormality.   PANCREAS: No significant abnormality.   SPLEEN: No significant abnormality.   ADRENALS: No significant abnormality.   KIDNEYS, URETERS, BLADDER: Heterogeneous mass is again noted arising from the left upper pole. The mass measures 8.0 x 5.3 by 8.1 cm similar to previous examination. The appearance is most concerning for a renal cell carcinoma. 1 cm exophytic lesion arising from the posterior aspect of the right kidney which is not definitively cystic and could represent an additional malignant focus. MRI is recommended for further assessment. There is no hydronephrosis or hydroureter. No appreciable renal or ureteral calculus. The bladder is unremarkable.   REPRODUCTIVE ORGANS: The prostate is prominent in size measuring up to 6 cm, correlate with PSA.   GI: No obstruction. No appreciable bowel inflammation. Normal appendix.   VESSELS: No significant abnormality. The portal veins and IVC are patent.   PERITONEUM/RETROPERITONEUM: No ascites, free air, or fluid collection.   LYMPH NODES: No enlarged lymph nodes.   ABDOMINAL WALL: Locules of air noted within the subcutaneous soft tissues of the midline  lower back.   OSSEOUS STRUCTURES: Interval progression of an expansile destructive soft tissue lesion centered within the lateral right 9th rib. The lesion now measures 5.9 x 4.2 cm. Posterior fusion of the L1-L5 with bilateral transpedicular screws at L1, L2, L4, and L5 with bilateral interconnecting rods. Expansile destructive lesion is again noted centered within the L3 vertebral body. The soft tissue component of the mass appears to abut if not invade the psoas musculature, particularly on the left. There is posterior extension of the soft tissue component of the mass into the epidural space if not the spinal canal. There is severe canal stenosis at L3. This appears similar to recent MRI from 08/14/2024 given within the limitations of different techniques. There is likely a component of pathologic fracture of the L3 vertebral body with vertebral body height loss.       1. Complex lesion arising from the superior aspect of the left kidney is again noted most concerning for a renal cell carcinoma. 2. 1 cm partially exophytic lesion arising from the posterior aspect of the right kidney which is not definitively cystic. This could represent an additional focus of malignancy. Further assessment with MRI is recommended. 3. Interval progression of destructive lesion centered within the lateral right 9th rib with the soft tissue component. The lesion abuts and indents the lateral margin of the right hepatic lobe. This is most concerning for metastasis. 4. Posterior fusion of L1-L5 and partial laminectomy of L3. Infiltrative destructive lesion again noted within the L3 vertebral body which appears to abut if not extend into the psoas musculature, left-greater-than-right. There is also posterior extension of the soft tissue component of the mass with severe spinal canal stenosis at L3. The appearance is similar to recent MRI from 08/14/2024. The degree of involvement of the structures surrounding the spine would be better  characterized with MRI. 5. Subcutaneous air locules within the midline soft tissues of the lower back at laminectomy/fusion site. These are presumably postsurgical in nature, correlation for evidence of infection is recommended. 6. Consolidation in the medial left lung base, please see separately dictated CTA of the chest. 7. Please note PET-CT may be considered in further staging for presumed renal cell carcinoma, particularly in the assessment of additional osseous lesions.   MACRO: None   Signed by: Nathaniel Rich 9/2/2024 4:23 PM Dictation workstation:   [id number]    XR chest 1 view    Result Date: 8/29/2024  Interpreted By:  Laureano Moore, STUDY: XR CHEST 1 VIEW; 8/27/2024 8:58 am   INDICATION: Signs/Symptoms:followup effusions.   COMPARISON: 08/25/2024.   ACCESSION NUMBER(S): [id number]   ORDERING CLINICIAN: SHIRLENE CHRISTOPHER   FINDINGS:     CARDIOMEDIASTINAL SILHOUETTE: Cardiomediastinal silhouette is normal in size and configuration.   LUNGS: Slight interval worsening in left retrocardiac infiltrate/atelectasis. No pneumothorax.   ABDOMEN: No remarkable upper abdominal findings.   BONES: No acute osseous changes.       1.  There is slight interval worsening in left lower lobe volume loss/consolidation and correlate with underlying pneumonia/aspiration.     Signed by: Laureano Moore 8/29/2024 6:39 AM Dictation workstation:   [id number]    XR chest 1 view    Result Date: 8/25/2024  Interpreted By:  Laureano Moore, STUDY: XR CHEST 1 VIEW; 8/25/2024 9:02 am   INDICATION: Signs/Symptoms:Pneumonia.   COMPARISON: 08/22/2020   ACCESSION NUMBER(S): [id number]   ORDERING CLINICIAN: JESUS LEVIN   FINDINGS:     CARDIOMEDIASTINAL SILHOUETTE: Cardiomediastinal silhouette is normal in size and configuration.   LUNGS: There is minimal residual left basilar atelectasis/infiltrates. Correlate with residual infiltrate/pneumonia. The remainder lungs are unremarkable.   ABDOMEN: No remarkable upper abdominal  findings.   BONES: No acute osseous changes.       1.  There is residual left retrocardiac and basilar infiltrate/atelectasis. Recommend continued follow-up with PA and lateral x-ray to resolution in 4-6 weeks.     Signed by: Laureano Moore 8/25/2024 3:51 PM Dictation workstation:   [id number]       Results for orders placed or performed during the hospital encounter of 09/20/24 (from the past 24 hour(s))   CBC   Result Value Ref Range    WBC 15.6 (H) 4.4 - 11.3 x10*3/uL    nRBC 0.0 0.0 - 0.0 /100 WBCs    RBC 2.64 (L) 4.50 - 5.90 x10*6/uL    Hemoglobin 6.5 (LL) 13.5 - 17.5 g/dL    Hematocrit 22.3 (L) 41.0 - 52.0 %    MCV 85 80 - 100 fL    MCH 24.6 (L) 26.0 - 34.0 pg    MCHC 29.1 (L) 32.0 - 36.0 g/dL    RDW 16.6 (H) 11.5 - 14.5 %    Platelets 381 150 - 450 x10*3/uL   Basic Metabolic Panel   Result Value Ref Range    Glucose 166 (H) 74 - 99 mg/dL    Sodium 138 136 - 145 mmol/L    Potassium 3.2 (L) 3.5 - 5.3 mmol/L    Chloride 106 98 - 107 mmol/L    Bicarbonate 24 21 - 32 mmol/L    Anion Gap 11 10 - 20 mmol/L    Urea Nitrogen 13 6 - 23 mg/dL    Creatinine 0.41 (L) 0.50 - 1.30 mg/dL    eGFR >90 >60 mL/min/1.73m*2    Calcium 7.9 (L) 8.6 - 10.3 mg/dL      Assessment/Plan   IMP:    Acute Respiratory Failure with hypoxia  Pneumonia LLL  Renal Cell Cancer with mets  Adult Failure to Thrive/Cachexia  Anemia  Malnutrition  Palliative Care    DNRCCA/DNI/No ICU  Capable  Significant Other Iram is documented HPOA, copy in epic. Living Will in [facility].     Patient and significant other had extensive conversations with ER provider as well as admitting service. Patient has no wish for further treatment and would like to go home with hospice for supportive care. Prognosis likely days given his rapid decline and severe anemia. SO is supportive of patient wishes and is working with her daughter and grandaughter to arrange a schedule to care for patient at home. She has been providing patient total care since June and is willing to  continue to provide him 24hr care at home. She spoke to her daughter, and is requesting a consult for [facility]. She has no interest in hospice house for patient as he wishes to be at home. Goal is to set patient up with hospice and get him home as soon as arranagements can be made. Will need equipment and comfort meds in place prior to transporting patient home. I called referral in to hospice and a meeting was set up with Lara HOLDENR reshma [phone]. I reached out to update attending service. Await hospice meeting for further planning.     I did add zyprexa zydis as 2nd line for nausea. I also added dilaudid as 2nd line for pain. Patient has done well with both during prior hospitalizations.     I spent 90 minutes in the professional and overall care of this patient. 20min spent in acp discussion.       Gris Allen, APRN-CNP

## 2024-09-23 VITALS
DIASTOLIC BLOOD PRESSURE: 44 MMHG | TEMPERATURE: 97.5 F | OXYGEN SATURATION: 96 % | HEART RATE: 75 BPM | WEIGHT: 144.84 LBS | HEIGHT: 73 IN | BODY MASS INDEX: 19.2 KG/M2 | RESPIRATION RATE: 16 BRPM | SYSTOLIC BLOOD PRESSURE: 94 MMHG

## 2024-09-23 LAB
ATRIAL RATE: 96 BPM
P AXIS: 16 DEGREES
P OFFSET: 199 MS
P ONSET: 146 MS
PR INTERVAL: 148 MS
Q ONSET: 220 MS
QRS COUNT: 16 BEATS
QRS DURATION: 98 MS
QT INTERVAL: 372 MS
QTC CALCULATION(BAZETT): 469 MS
QTC FREDERICIA: 435 MS
R AXIS: -29 DEGREES
T AXIS: 70 DEGREES
T OFFSET: 406 MS
VENTRICULAR RATE: 96 BPM

## 2024-09-23 PROCEDURE — 99239 HOSP IP/OBS DSCHRG MGMT >30: CPT | Performed by: INTERNAL MEDICINE

## 2024-09-23 PROCEDURE — 99232 SBSQ HOSP IP/OBS MODERATE 35: CPT | Performed by: NURSE PRACTITIONER

## 2024-09-23 PROCEDURE — 2500000001 HC RX 250 WO HCPCS SELF ADMINISTERED DRUGS (ALT 637 FOR MEDICARE OP): Performed by: INTERNAL MEDICINE

## 2024-09-23 PROCEDURE — 2500000004 HC RX 250 GENERAL PHARMACY W/ HCPCS (ALT 636 FOR OP/ED): Performed by: NURSE PRACTITIONER

## 2024-09-23 PROCEDURE — 2500000004 HC RX 250 GENERAL PHARMACY W/ HCPCS (ALT 636 FOR OP/ED): Performed by: INTERNAL MEDICINE

## 2024-09-23 RX ADMIN — PIPERACILLIN SODIUM AND TAZOBACTAM SODIUM 4.5 G: 4; .5 INJECTION, SOLUTION INTRAVENOUS at 00:20

## 2024-09-23 RX ADMIN — PIPERACILLIN SODIUM AND TAZOBACTAM SODIUM 4.5 G: 4; .5 INJECTION, SOLUTION INTRAVENOUS at 08:11

## 2024-09-23 RX ADMIN — HYDROMORPHONE HYDROCHLORIDE 0.6 MG: 1 INJECTION, SOLUTION INTRAMUSCULAR; INTRAVENOUS; SUBCUTANEOUS at 06:18

## 2024-09-23 RX ADMIN — OXYCODONE HYDROCHLORIDE 5 MG: 5 TABLET ORAL at 14:45

## 2024-09-23 RX ADMIN — PANTOPRAZOLE SODIUM 40 MG: 40 INJECTION, POWDER, FOR SOLUTION INTRAVENOUS at 06:18

## 2024-09-23 ASSESSMENT — PAIN DESCRIPTION - LOCATION: LOCATION: BACK

## 2024-09-23 ASSESSMENT — COGNITIVE AND FUNCTIONAL STATUS - GENERAL
PERSONAL GROOMING: TOTAL
MOVING TO AND FROM BED TO CHAIR: A LOT
HELP NEEDED FOR BATHING: A LOT
DAILY ACTIVITIY SCORE: 10
CLIMB 3 TO 5 STEPS WITH RAILING: TOTAL
EATING MEALS: A LOT
DRESSING REGULAR LOWER BODY CLOTHING: A LOT
STANDING UP FROM CHAIR USING ARMS: A LOT
MOBILITY SCORE: 11
WALKING IN HOSPITAL ROOM: A LOT
TOILETING: TOTAL
MOVING FROM LYING ON BACK TO SITTING ON SIDE OF FLAT BED WITH BEDRAILS: A LOT
DRESSING REGULAR UPPER BODY CLOTHING: A LOT
TURNING FROM BACK TO SIDE WHILE IN FLAT BAD: A LOT

## 2024-09-23 ASSESSMENT — PAIN SCALES - GENERAL
PAINLEVEL_OUTOF10: 5 - MODERATE PAIN
PAINLEVEL_OUTOF10: 0 - NO PAIN
PAINLEVEL_OUTOF10: 7
PAINLEVEL_OUTOF10: 5 - MODERATE PAIN

## 2024-09-23 ASSESSMENT — PAIN SCALES - WONG BAKER: WONGBAKER_NUMERICALRESPONSE: HURTS WORST

## 2024-09-23 ASSESSMENT — PAIN DESCRIPTION - ORIENTATION: ORIENTATION: LOWER

## 2024-09-23 NOTE — PROGRESS NOTES
"Christophe Ambriz is a 74 y.o. male on day 3 of admission presenting with Pneumonia of left lower lobe due to infectious organism.    Subjective   Patient is very happy this morning because he gets to go home.  He is refusing all treatment and understands.  He is aware of consequences of his decision       Objective     Physical Exam  Vitals reviewed.   Constitutional:       Appearance: Normal appearance. He is ill-appearing.   HENT:      Head: Normocephalic and atraumatic.      Right Ear: Tympanic membrane, ear canal and external ear normal.      Left Ear: Tympanic membrane, ear canal and external ear normal.      Nose: Nose normal.      Mouth/Throat:      Pharynx: Oropharynx is clear.   Eyes:      Extraocular Movements: Extraocular movements intact.      Conjunctiva/sclera: Conjunctivae normal.      Pupils: Pupils are equal, round, and reactive to light.   Cardiovascular:      Rate and Rhythm: Normal rate and regular rhythm.      Pulses: Normal pulses.      Heart sounds: Normal heart sounds.   Pulmonary:      Effort: Pulmonary effort is normal.      Breath sounds: Normal breath sounds.   Abdominal:      General: Abdomen is flat. Bowel sounds are normal.      Palpations: Abdomen is soft.   Musculoskeletal:      Cervical back: Normal range of motion and neck supple.   Skin:     General: Skin is warm and dry.   Neurological:      General: No focal deficit present.      Mental Status: He is alert and oriented to person, place, and time.   Psychiatric:         Mood and Affect: Mood normal.         Last Recorded Vitals  Blood pressure (!) 94/44, pulse 75, temperature 36.4 °C (97.5 °F), temperature source Oral, resp. rate 16, height 1.854 m (6' 1\"), weight 65.7 kg (144 lb 13.5 oz), SpO2 96%.  Intake/Output last 3 Shifts:  I/O last 3 completed shifts:  In: 5416.6 (82.4 mL/kg) [P.O.:150; IV Piggyback:5266.6]  Out: - (0 mL/kg)   Weight: 65.7 kg     Relevant Results               Scheduled medications      Continuous " medications      PRN medications    No results found for this or any previous visit (from the past 24 hour(s)).    No results found.                 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      Colette Raymundo MD

## 2024-09-23 NOTE — PROGRESS NOTES
Occupational Therapy                 Therapy Communication Note    Patient Name: Christophe Ambriz  MRN: [id number]  Department: [facility] 3 S  Room: [address] [place], [place] [address]A  Today's Date: 9/23/2024     Discipline: Occupational Therapy    Missed Visit Reason: Missed Visit Reason: Other (Comment) (Plan for DC home with ADINARKimmy Kowalski planned for 1330. No OT needs at this time.)    Missed Time: Cancel    Comment: DC OT services. Home with hospice.

## 2024-09-23 NOTE — CONSULTS
"Nutrition Progress Note    Consult for MST score 2 \"unsure\" of any wt loss. Will defer assessment at this time - comfort care. Plan for discharge with hospice.   "

## 2024-09-23 NOTE — PROGRESS NOTES
Speech-Language Pathology                 Therapy Communication Note    Patient Name: Christophe Ambriz  MRN: [id number]  Department: [facility] 3 S  Room: 308/[employer]  Today's Date: 9/23/2024     Discipline: Speech Language Pathology    Missed Visit Reason: Pt referred to SLP Services for Clinical Swallow Evaluation.  Per chart review and discussion with bedside RN Diandra, pt's goals of care have changed since order placed for evaluation.  Pt has chosen to pursue hospice level of care and will be discharged home today with HWR services in place.    Missed Time: Cancel    Comment: Cancel order for swallowing evaluation per discussion with RN.

## 2024-09-23 NOTE — DISCHARGE SUMMARY
Discharge Diagnosis  Pneumonia of left lower lobe due to infectious organism    Issues Requiring Follow-Up  RCC with spinal mets  Failure to thrive    Test Results Pending At Discharge  Pending Labs       Order Current Status    Extra Urine Early Tube Collected (09/20/24 1817)    Urinalysis with Reflex Culture and Microscopic In process    Blood Culture Preliminary result    Blood Culture Preliminary result            Hospital Course   Christophe Ambriz is a 74 y.o. male presenting with poor intake.  hx of CAD, HTN, HLD and RCC with spinal mets s/p L1-5 fusion w/ L2-4 decompression and tumor debulking (7/24) presented initially to OSH with worsening back pain. He was noted to be hypoxic (87% on RA) and placed on 2L O2. CT PE was negative for PE, showed interval improvement in bilateral pulmonary opacities, persistent but improved tree-in-bud opacities, and reveals probable aspiration.  Patient at  was cleared for regular solids and thin liquids.  He was refusing to work with PT OT he had pressure sores for which wound care was consulted.  Finally he was sent to the rehab after finishing radiation to the spine.  At rehab patient is getting weaker and having difficult time eating and caring for himself.  His blood pressure is fluctuating a lot his heart rate is fluctuating a lot.  He has increased nausea and fatigue and requiring oxygen   Patient Refusing medications.  His H&H came very low.  He refused blood transfusion.  He wanted hospice consulted and discharged home with hospice.  Hospice was consulted and patient made DNR CC and discharged home    Pertinent Physical Exam At Time of Discharge  Physical Exam  Vitals reviewed.   Constitutional:       Appearance: Normal appearance. He is ill-appearing.   HENT:      Head: Normocephalic and atraumatic.      Right Ear: Tympanic membrane, ear canal and external ear normal.      Left Ear: Tympanic membrane, ear canal and external ear normal.      Nose: Nose normal.       Mouth/Throat:      Pharynx: Oropharynx is clear.   Eyes:      Extraocular Movements: Extraocular movements intact.      Conjunctiva/sclera: Conjunctivae normal.      Pupils: Pupils are equal, round, and reactive to light.   Cardiovascular:      Rate and Rhythm: Normal rate and regular rhythm.      Pulses: Normal pulses.      Heart sounds: Normal heart sounds.   Pulmonary:      Effort: Pulmonary effort is normal.      Breath sounds: Wheezing and rhonchi present.   Abdominal:      General: Abdomen is flat. Bowel sounds are normal.      Palpations: Abdomen is soft.   Musculoskeletal:      Cervical back: Normal range of motion and neck supple.   Skin:     General: Skin is warm and dry.   Neurological:      General: No focal deficit present.      Mental Status: He is alert and oriented to person, place, and time.   Psychiatric:         Mood and Affect: Mood normal.         Home Medications     Medication List      STOP taking these medications     amLODIPine 10 mg tablet; Commonly known as: Norvasc   aspirin 81 mg EC tablet   atorvastatin 20 mg tablet; Commonly known as: Lipitor   clopidogrel 75 mg tablet; Commonly known as: Plavix   dexAMETHasone 2 mg tablet; Commonly known as: Decadron   gabapentin 300 mg capsule; Commonly known as: Neurontin   oxyCODONE 5 mg immediate release tablet; Commonly known as: Roxicodone   oxygen gas therapy; Commonly known as: O2   traZODone 50 mg tablet; Commonly known as: Desyrel       Outpatient Follow-Up  Future Appointments   Date Time Provider Department Center   10/1/2024 11:00 AM Sahil Chavarria MD [id number] [facility]   10/8/2024  1:30 PM Cathy Mcneill, APRN-CNP [id number] [facility]       Colette Raymundo MD

## 2024-09-23 NOTE — NURSING NOTE
RN Hospice Note    Christophe Ambriz is a Hospice Patient.   Hospice terminal diagnosis: renal cell carcinoma with mets   Physician: yasmeen diaz    Visit type: discharage    Comments/recommendations: dme to arrive by 1200. Comfort meds to arrive by 2pm - ordered by hospice NP.   Tric ounty set for 130 pm. Please remove IV prior to discharge and medicate for comfort as needed.  Discharge reviewed with pt and significant other Iram.  Thank you for consult     Discharge Planning:  Patient to be discharged to home    The following is to be completed:  Discharge order: done  State DNR signed by MD: done  Nursing facility referral/transfer form: na  Medication reconciliation: done  PAS/RR or convalescent stay form: na  Prescriptions for al narcotics/new medications: done  Transportation: done [facility] 130 pm   Other: na    Plan of care reviewed with patient/family members iram    Plan of care reviewed with hospital staff members: jarod kristen Prascell NP and Diandra Gardner rn     Please notify [facility] of any changes in condition. Thank you.  Office: [phone] (8 am-6:30 pm M-F and 8 am-4:30 pm weekends and holidays)   [phone] (6:30 pm-8 am M-F and 4:30 pm-8 am weekends and holidays)    Stephanie Mendez RN

## 2024-09-23 NOTE — PROGRESS NOTES
Sepsis perfusion reevaluation was performed as patient blood pressure did drop to 89 systolic in patient with sepsis secondary to pneumonia.  In discussion with patient's power of  patient does not want aggressive treatment and would not want central line placed.  Perfusion reevaluation performed and patient does appear to be adequately perfused his blood pressure did improve with IV fluids.

## 2024-09-23 NOTE — CARE PLAN
The patient's goals for the shift include  discharge planning, comfort care    The clinical goals for the shift include comfort care      Problem: Pain - Adult  Goal: Verbalizes/displays adequate comfort level or baseline comfort level  Outcome: Progressing     Problem: Safety - Adult  Goal: Free from fall injury  Outcome: Progressing     Problem: Discharge Planning  Goal: Discharge to home or other facility with appropriate resources  Outcome: Progressing     Problem: Chronic Conditions and Co-morbidities  Goal: Patient's chronic conditions and co-morbidity symptoms are monitored and maintained or improved  Outcome: Progressing     Problem: Respiratory  Goal: No signs of respiratory distress (eg. Use of accessory muscles. Peds grunting)  Outcome: Progressing     Problem: Skin  Goal: Decreased wound size/increased tissue granulation at next dressing change  Outcome: Progressing  Goal: Participates in plan/prevention/treatment measures  Outcome: Progressing  Goal: Prevent/manage excess moisture  Outcome: Progressing  Goal: Prevent/minimize sheer/friction injuries  Outcome: Progressing  Goal: Promote/optimize nutrition  Outcome: Progressing  Goal: Promote skin healing  Outcome: Progressing

## 2024-09-23 NOTE — PROGRESS NOTES
Physical Therapy                 Therapy Communication Note - screen/dc PT    Patient Name: Christophe Ambriz  MRN: [id number]  Department: [facility] 3 S  Room: 308/[facility]  Today's Date: 9/23/2024     Discipline: Physical Therapy    Missed Visit Reason: Missed Visit Reason: Other (Comment) (Pt d/c with hospice services and is comfort care only. No PT needs.)    Missed Time: Cancel    Comment: Per bedside RN, pt to dc this date, home with hospice services. No further PT needs in-house.

## 2024-09-23 NOTE — PROGRESS NOTES
Christophe Ambriz is a 74 y.o. male on day 3 of admission presenting with Pneumonia of left lower lobe due to infectious organism.    Subjective   Symptoms (0 - 10, Best to Worst)  [place] Symptom Assessment System  0-10 (Numeric) Pain Score: 0 - No pain  Co uncontrolled nausea this am. No recent antiemetics. Spo2 96% on oxygen via NC. Noted oralpharyngeal secretions, no dyspnea, no cough. Pain stable.        Objective     Vitals and nursing note reviewed.   Constitutional:       General: He is not in acute distress.     Appearance: He is ill-appearing.   HENT:      Head: Normocephalic and atraumatic.      Nose: Nose normal.      Mouth/Throat:      Mouth: Mucous membranes are dry.      Pharynx: Oropharynx is clear.   Eyes:      General: No scleral icterus.        Right eye: No discharge.         Left eye: No discharge.      Extraocular Movements: Extraocular movements intact.      Pupils: Pupils are equal, round, and reactive to light.   Cardiovascular:      Rate and Rhythm: Normal rate and regular rhythm.      Pulses: Normal pulses.      Heart sounds: No murmur heard.  Pulmonary:      Effort: Pulmonary effort is normal. Mild tachypnea     Breath sounds: Rhonchi present. Audible oralpharyngeal secretions.      Comments: nc5l  Abdominal:      General: Abdomen is flat. Bowel sounds are normal. There is no distension.      Palpations: Abdomen is soft.      Tenderness: There is no abdominal tenderness.   Musculoskeletal:         General: No swelling, tenderness, deformity or signs of injury. Normal range of motion.      Cervical back: Normal range of motion and neck supple.      Comments: Back pain with light palpation and attempts to flex/rotate l spine.    Skin:     General: Skin is warm and dry.      Capillary Refill: Capillary refill takes 2 to 3 seconds.   Neurological:      General: No focal deficit present.      Mental Status: He is alert and oriented to person, place, and time.      Motor: Weakness present.  "  Psychiatric:         Mood and Affect: Mood normal.        Last Recorded Vitals  Blood pressure (!) 94/44, pulse 75, temperature 36.4 °C (97.5 °F), temperature source Oral, resp. rate 16, height 1.854 m (6' 1\"), weight 65.7 kg (144 lb 13.5 oz), SpO2 96%.  Intake/Output last 3 Shifts:  I/O last 3 completed shifts:  In: 5416.6 (82.4 mL/kg) [P.O.:150; IV Piggyback:5266.6]  Out: - (0 mL/kg)   Weight: 65.7 kg     Relevant Results  No results found for this or any previous visit (from the past 24 hour(s)).   CT abdomen pelvis w IV contrast    Result Date: 9/20/2024  Interpreted By:  Karley Lainez, STUDY:   CT ABDOMEN PELVIS W IV CONTRAST;  9/20/2024 5:16 pm   INDICATION: Signs/Symptoms:Abdominal pain, sepsis.   COMPARISON: 09/02/2024 07/05/2024   ACCESSION NUMBER(S): [id number]   ORDERING CLINICIAN: MINE ROQUE   TECHNIQUE: CT of the abdomen and pelvis was performed.  Standard contiguous axial images were obtained at 3 mm slice thickness through the abdomen and pelvis. Coronal and sagittal reconstructions at 3 mm slice thickness were performed.   75 ML  Omnipaque 350 contrast administered intravenously without immediate complication.   FINDINGS: LOWER CHEST:   Please refer to CTA chest which was reported separately.   Increased consolidation in the left lower lung with air bronchograms and heterogeneously enhancing parenchyma, most likely pneumonia. Mucous plugging present in the left lower lung. There are increased patchy ground-glass and partially consolidated nodular opacities throughout the lingula and in the aerated portions of the left lung which are increased. There is a pulmonary nodule in the right lower lung measuring 6 mm, which is new since recent prior. Heart size normal. Coronary artery calcifications are present.   ABDOMEN:   LIVER: Liver size normal. Liver density is fatty. There is a subtle lesion in the inferior liver which is difficult to detect due to presence of fatty infiltration, can not " accurately assess the size, however it is approximately 2.5 cm on image number 52 series 5. No other definite lesion seen.   GALLBLADDER: Normal size gallbladder. No radiopaque gallstones.   BILE DUCTS: Normal caliber.   PANCREAS: Atrophic pancreas. No ductal dilation or visualized mass. No inflammatory changes.   SPLEEN: Normal size. Homogeneous enhancement.   ADRENAL GLANDS: Within normal limits.   KIDNEYS AND URETERS: Normal size kidneys. No hydroureteronephrosis or urinary tract stone. Large enhancing mass left upper kidney, exophytic and measuring 7.6 by 5.6 by 8.1 cm consistent with a renal cell carcinoma. Allowing for any differences in measurement technique, no short interval change since the most recent prior. It extends to the margin of the posterior renal fascia. No renal vein invasion or thrombosis. There is a small dense, possibly solid nodule of the right posterior mid kidney measuring 1 cm which appears also unchanged. Left lower pole simple fluid attenuation cyst 4.1 cm.   PELVIS:   BLADDER: Within normal limits.   REPRODUCTIVE ORGANS: Mildly enlarged prostate.   BOWEL: The stomach is unremarkable. The small bowel is normal in caliber without evidence of focal wall thickening or obstruction. There is no evidence of focal wall thickening or dilatation of the large bowel. Appendix is normal.   VESSELS: Diffuse atherosclerotic disease. No aneurysm or dissection. No DVT.   PERITONEUM/RETROPERITONEUM/LYMPH NODES: There is no free or loculated intraperitoneal or retroperitoneal fluid collection, no free intraperitoneal air. Mildly enlarged left para-aortic node 1 cm, no change.   BONES AND ABDOMINAL WALL: There is overall slightly less enhancement of destructive lesion with soft tissue density involving the entire L3 body, status post decompressive laminectomy at this level. Intact posterior fusion hardware from the L1 through L5 level. Expansile metastatic lesion right 9th rib with soft tissue density, is  measuring 6.2 by 4.3 cm, most recently 5.9 x 4.2 cm, not much change. Soft tissue from this lesion which extends to the paraspinal expansile soft tissue associated with this lesion causes severe canal encroachment just above the level of the laminectomy image 76 series 5, also effaces the left neural foramen at the level of L3-4 and nearly effaces the medial aspect neural foramen on the right at L3-4 there are several other subtle lesions which are slightly more conspicuous such as right sacrum 1 cm lesion image 118 series 5. On the most recent prior this was about 6 mm. A left posterior iliac mildly lytic lesion measuring 1.4 cm image number 107, was most recently 1.1 cm. No definite new lesions.  The abdominal wall soft tissues appear normal.       1.  See separate report chest CT regarding increased left lung base consolidation, likely pneumonia. Unable to exclude underlying metastatic disease in this region. There is a new pulmonary nodule measuring 6 mm in the right lower lung, which may be metastatic or inflammatory. 2. Evidence of acute infectious process in the abdomen or pelvis. 3. Heterogeneously enhancing left renal 8.1 cm mass consistent with renal cell carcinoma, fairly similar in size to the most recent prior exam. No evidence of renal vein invasion thrombus. Unchanged left para-aortic mildly enlarged lymph node. 4. Multiple metastases, osseous lesions most notably involving the lateral right 9th rib and destroying most of the L3 body with unchanged pathologic fracture. Expansile soft tissue of the L3 lesion causes severe encroachment of the canal just above the level of the laminectomy and also of left-greater-than-right neural foramen at the level of L3-4. This could be further assessed with MRI. 5. Probable hepatic 2.5 cm metastasis noted inferior right liver which is difficult to assess/characterize due to fatty infiltration of the liver, I feel this is most likely new since 07/05/2024 exam. MRI  could be performed to further characterize and assess for any other hepatic lesions.       Signed by: Karley Lainez 9/20/2024 6:46 PM Dictation workstation:   [id number]    CT angio chest for pulmonary embolism    Result Date: 9/20/2024  Interpreted By:  Gurmeet Garcia, STUDY: CT ANGIO CHEST FOR PULMONARY EMBOLISM;  9/20/2024 5:16 pm   INDICATION: Signs/Symptoms:Prolonged immobilization, significant hypoxia and tachycardia, rule out pulmonary embolism.     COMPARISON: None   ACCESSION NUMBER(S): [id number]   ORDERING CLINICIAN: MINE ROQUE   TECHNIQUE: Helical data acquisition of the chest was obtained after intravenous administration of 75 ML Omnipaque 350, as per PE protocol. Images were reformatted in coronal and sagittal planes. Axial and coronal maximum intensity projection (MIP) images were created and reviewed.   FINDINGS: POTENTIAL LIMITATIONS OF THE STUDY: None   HEART AND VESSELS: There are no discrete filling defects within main pulmonary artery and its branches to suggest acute pulmonary embolism. Main pulmonary artery and its branches are normal in caliber.   The thoracic aorta normal in course and caliber. There are coronary artery calcifications are seen. Please note, the study is not optimized for evaluation of coronary arteries.   The cardiac chambers are not enlarged.   There is no pericardial effusion seen.   MEDIASTINUM AND CELE, LOWER NECK AND AXILLA: There is a stable 3  cm right thyroid nodule noted. No evidence of thoracic lymphadenopathy by CT criteria. Esophagus appears within normal limits as seen.   LUNGS AND AIRWAYS: The trachea and central airways are patent. No endobronchial lesion is seen.   There is a large region of confluent consolidation noted in the left concern for pneumonia which is significantly worsened when compared to the prior study. There are extensive scattered regions of ground-glass airspace opacities noted which are new from the prior study concerning for multifocal  pneumonia.     UPPER ABDOMEN: The visualized subdiaphragmatic structures demonstrate no remarkable findings.       CHEST WALL AND OSSEOUS STRUCTURES: There is a partially imaged metastatic lesion noted involving the right 9th rib currently measuring approximately 4 cm, previously 3.5 cm. There is a partially imaged enhancing lesion noted in the inferior right lobe of the liver. Please see the dedicated abdominal CT performed the same day for further evaluation. No acute osseous pathology.There are no suspicious osseous lesions.       1. No evidence of acute pulmonary embolism. 2. There is a large region of confluent consolidation noted in the left concern for pneumonia which is significantly worsened when compared to the prior study. There are extensive scattered regions of ground-glass airspace opacities noted which are new from the prior study concerning for multifocal pneumonia. 3. There is a partially imaged metastatic lesion noted involving the right 9th rib currently measuring approximately 4 cm, previously 3.5 cm. There is a partially imaged enhancing lesion noted in the inferior right lobe of the liver. Please see the dedicated abdominal CT performed the same day for further evaluation.   MACRO: None   Signed by: Gurmeet Garcia 9/20/2024 6:10 PM Dictation workstation:   [id number]    ECG 12 lead    Result Date: 9/6/2024  Normal sinus rhythm Normal ECG When compared with ECG of 26-JUL-2024 14:10, No significant change was found Confirmed by Law Sanderson ([id number]) on 9/6/2024 9:06:10 AM    CT angio chest for pulmonary embolism    Result Date: 9/2/2024  Interpreted By:  Nathaniel Rich, STUDY: CT ANGIO CHEST FOR PULMONARY EMBOLISM;  9/2/2024 3:27 pm   INDICATION: Signs/Symptoms:hypoxic, cancer.   COMPARISON: CTA chest for PE 08/19/2024   ACCESSION NUMBER(S): [id number]   ORDERING CLINICIAN: SHERMAN CRYSTAL   TECHNIQUE: Contiguous axial images of the chest were obtained after the intravenous administration of 75 mL  Omnipaque 350 contrast using angiographic PE protocol. Coronal and sagittal reformatted images were reconstructed from the axial data. MIP images were created on an independent workstation and reviewed.   FINDINGS: PULMONARY ARTERIES: Adequate opacification to the level of the segmental arteries. Assessment of the subsegmental arteries limited by respiratory motion and mixing artifact. No filling defect to suggest pulmonary embolus in the visualized pulmonary arteries. The main pulmonary artery is normal in diameter. No CT evidence of right heart strain.   HEART: Normal in size. Triple-vessel coronary vascular calcifications. No significant pericardial effusion.   VESSELS: Normal caliber aorta without dissection. No significant aortic atherosclerosis.   MEDIASTINUM AND LYMPH NODES: Asymmetric enlargement of the right lobe of the thyroid. This may be further assessed with ultrasound on a nonemergent basis. No enlarged intrathoracic or axillary lymph nodes by imaging criteria. No pneumomediastinum. The esophagus appears within normal limits.   LUNG, AIRWAYS, AND PLEURA: Secretions and debris are noted within the distal trachea and proximal right mainstem bronchus. Filling defects within multiple left lower lobe bronchi as well as a consolidation within the medial left lower lobe. Overall interval improvement in numerous patchy bilateral pulmonary opacities likely representing a resolving infectious or inflammatory process. There are persistent tree-in-bud opacities noted throughout the bilateral lungs, most pronounced in the left lower lobe. No pleural effusion or pneumothorax.   OSSEOUS STRUCTURES: The expansile soft tissue lesion is again noted within the right 9th rib measuring a proximally 6.0 x 4.4 cm, slightly increased from prior chest CT.   CHEST WALL SOFT TISSUES: No discernible abnormality.   UPPER ABDOMEN/OTHER: Partially visualized left renal mass. Please see dedicated CT of the abdomen and pelvis.        1. No evidence of pulmonary embolism to the level of the segmental arteries. Small subsegmental filling defects can not be entirely excluded. 2. Interval improvement in bilateral pulmonary opacities likely reflecting resolving infectious or inflammatory process. 3. Persistent but improved tree-in-bud opacities bilaterally, most pronounced in the left lower lobe. 4. Small consolidation in the medial left lung base with filling defects of the associated bronchi and debris within the distal trachea. This likely represents volume loss and probable aspiration. Superimposed infectious process or underlying lesion is not excluded. Follow-up to resolution is recommended. 5. Slight interval progression in size of destructive soft tissue lesion centered within the lateral right 9th rib. 6. Partially visualized left renal mass, concerning for renal cell carcinoma. 7. Asymmetric enlargement of the right lobe of the thyroid, this can be further assessed with dedicated ultrasound on a nonemergent basis.   MACRO: None   Signed by: Nathaniel Rich 9/2/2024 4:36 PM Dictation workstation:   [id number]    CT abdomen pelvis w IV contrast    Result Date: 9/2/2024  Interpreted By:  Nathaniel Rich, STUDY: CT ABDOMEN PELVIS W IV CONTRAST;  9/2/2024 3:27 pm   INDICATION: Signs/Symptoms:worsening pain, cancer.   COMPARISON: CT chest abdomen and pelvis 07/05/2024.   ACCESSION NUMBER(S): [id number]   ORDERING CLINICIAN: SHERMAN CRYSTAL   TECHNIQUE: Contiguous axial images of the abdomen and pelvis were obtained after the intravenous administration of 75 mL Omnipaque 350 contrast. Coronal and sagittal reformatted images were reconstructed from the axial data.   FINDINGS: LOWER CHEST: Consolidation in the medial aspect of the left lower lobe. Please see dedicated CTA of the chest.   LIVER: Hepatic steatosis. Expansile destructive lesion arising from the lateral right 9th rib abuts and deforms the lateral margin of the right hepatic lobe.   BILE  DUCTS: No significant intrahepatic or extrahepatic dilatation.   GALLBLADDER: No significant abnormality.   PANCREAS: No significant abnormality.   SPLEEN: No significant abnormality.   ADRENALS: No significant abnormality.   KIDNEYS, URETERS, BLADDER: Heterogeneous mass is again noted arising from the left upper pole. The mass measures 8.0 x 5.3 by 8.1 cm similar to previous examination. The appearance is most concerning for a renal cell carcinoma. 1 cm exophytic lesion arising from the posterior aspect of the right kidney which is not definitively cystic and could represent an additional malignant focus. MRI is recommended for further assessment. There is no hydronephrosis or hydroureter. No appreciable renal or ureteral calculus. The bladder is unremarkable.   REPRODUCTIVE ORGANS: The prostate is prominent in size measuring up to 6 cm, correlate with PSA.   GI: No obstruction. No appreciable bowel inflammation. Normal appendix.   VESSELS: No significant abnormality. The portal veins and IVC are patent.   PERITONEUM/RETROPERITONEUM: No ascites, free air, or fluid collection.   LYMPH NODES: No enlarged lymph nodes.   ABDOMINAL WALL: Locules of air noted within the subcutaneous soft tissues of the midline lower back.   OSSEOUS STRUCTURES: Interval progression of an expansile destructive soft tissue lesion centered within the lateral right 9th rib. The lesion now measures 5.9 x 4.2 cm. Posterior fusion of the L1-L5 with bilateral transpedicular screws at L1, L2, L4, and L5 with bilateral interconnecting rods. Expansile destructive lesion is again noted centered within the L3 vertebral body. The soft tissue component of the mass appears to abut if not invade the psoas musculature, particularly on the left. There is posterior extension of the soft tissue component of the mass into the epidural space if not the spinal canal. There is severe canal stenosis at L3. This appears similar to recent MRI from 08/14/2024 given  within the limitations of different techniques. There is likely a component of pathologic fracture of the L3 vertebral body with vertebral body height loss.       1. Complex lesion arising from the superior aspect of the left kidney is again noted most concerning for a renal cell carcinoma. 2. 1 cm partially exophytic lesion arising from the posterior aspect of the right kidney which is not definitively cystic. This could represent an additional focus of malignancy. Further assessment with MRI is recommended. 3. Interval progression of destructive lesion centered within the lateral right 9th rib with the soft tissue component. The lesion abuts and indents the lateral margin of the right hepatic lobe. This is most concerning for metastasis. 4. Posterior fusion of L1-L5 and partial laminectomy of L3. Infiltrative destructive lesion again noted within the L3 vertebral body which appears to abut if not extend into the psoas musculature, left-greater-than-right. There is also posterior extension of the soft tissue component of the mass with severe spinal canal stenosis at L3. The appearance is similar to recent MRI from 08/14/2024. The degree of involvement of the structures surrounding the spine would be better characterized with MRI. 5. Subcutaneous air locules within the midline soft tissues of the lower back at laminectomy/fusion site. These are presumably postsurgical in nature, correlation for evidence of infection is recommended. 6. Consolidation in the medial left lung base, please see separately dictated CTA of the chest. 7. Please note PET-CT may be considered in further staging for presumed renal cell carcinoma, particularly in the assessment of additional osseous lesions.   MACRO: None   Signed by: Nathaniel Rich 9/2/2024 4:23 PM Dictation workstation:   [id number]    XR chest 1 view    Result Date: 8/29/2024  Interpreted By:  Laureano Moore, STUDY: XR CHEST 1 VIEW; 8/27/2024 8:58 am   INDICATION:  Signs/Symptoms:followup effusions.   COMPARISON: 08/25/2024.   ACCESSION NUMBER(S): [id number]   ORDERING CLINICIAN: SHIRLENE CHRISTOPHER   FINDINGS:     CARDIOMEDIASTINAL SILHOUETTE: Cardiomediastinal silhouette is normal in size and configuration.   LUNGS: Slight interval worsening in left retrocardiac infiltrate/atelectasis. No pneumothorax.   ABDOMEN: No remarkable upper abdominal findings.   BONES: No acute osseous changes.       1.  There is slight interval worsening in left lower lobe volume loss/consolidation and correlate with underlying pneumonia/aspiration.     Signed by: Laureano Moore 8/29/2024 6:39 AM Dictation workstation:   [id number]    XR chest 1 view    Result Date: 8/25/2024  Interpreted By:  Laureano Moore, STUDY: XR CHEST 1 VIEW; 8/25/2024 9:02 am   INDICATION: Signs/Symptoms:Pneumonia.   COMPARISON: 08/22/2020   ACCESSION NUMBER(S): [id number]   ORDERING CLINICIAN: JESUS LEVIN   FINDINGS:     CARDIOMEDIASTINAL SILHOUETTE: Cardiomediastinal silhouette is normal in size and configuration.   LUNGS: There is minimal residual left basilar atelectasis/infiltrates. Correlate with residual infiltrate/pneumonia. The remainder lungs are unremarkable.   ABDOMEN: No remarkable upper abdominal findings.   BONES: No acute osseous changes.       1.  There is residual left retrocardiac and basilar infiltrate/atelectasis. Recommend continued follow-up with PA and lateral x-ray to resolution in 4-6 weeks.     Signed by: Laureano Moore 8/25/2024 3:51 PM Dictation workstation:   [id number]      Assessment/Plan   IMP:    Acute Respiratory Failure with hypoxia  Pneumonia LLL  Renal Cell Cancer with mets  Adult Failure to Thrive/Cachexia  Anemia  Malnutrition  Palliative Care     DNRCCA/DNI/No ICU  Capable  Significant Other Iram is documented HPOA, copy in epic. Living Will in [facility].      9/23  [facility] at bedside, arrangments made for pickup to transport patient home at 1330.  Equipment to arrive at home at noon. Comfort meds to arrive at 1400.     9/22  Patient and significant other had extensive conversations with ER provider as well as admitting service. Patient has no wish for further treatment and would like to go home with hospice for supportive care. Prognosis likely days given his rapid decline and severe anemia. SO is supportive of patient wishes and is working with her daughter and grandfabio to arrange a schedule to care for patient at home. She has been providing patient total care since June and is willing to continue to provide him 24hr care at home. She spoke to her daughter, and is requesting a consult for [facility]. She has no interest in hospice house for patient as he wishes to be at home. Goal is to set patient up with hospice and get him home as soon as arranagements can be made. Will need equipment and comfort meds in place prior to transporting patient home. I called referral in to hospice and a meeting was set up with Lara justice [phone]. I reached out to update attending service. Await hospice meeting for further planning.      Addendum: Met with [facility] [id number], consents signed, first available hospice RN to see patient in am to make final arrangements for discharge home with hospice.     I spent 45 minutes in the professional and overall care of this patient.      Gris Allen, APRN-CNP

## 2024-09-23 NOTE — PROGRESS NOTES
09/23/24 1037   Discharge Planning   Living Arrangements Other (Comment)   Support Systems Friends/neighbors   Type of Residence Private residence   Number of Stairs to Enter Residence 0   Home or Post Acute Services In home services   Type of Home Care Services Hospice   Expected Discharge Disposition [facility]ospice[facility]   Does the patient need discharge transport arranged? Yes   What day is the transport expected? 09/23/24   What time is the transport expected? 1330     Patient is being discharged to home with hospice ([facility]). Transport arranged by [facility] and  time is 1330

## 2024-09-23 NOTE — NURSING NOTE
Patient picked up via [facility] to return home with hospice, patient's wife on phone with provider and aware of discharge.

## 2024-09-24 LAB
BACTERIA BLD CULT: NORMAL
BACTERIA BLD CULT: NORMAL

## 2024-09-26 ENCOUNTER — HOSPITAL ENCOUNTER (EMERGENCY)
Facility: HOSPITAL | Age: 74
Discharge: HOME | End: 2024-09-26
Attending: EMERGENCY MEDICINE
Payer: MEDICARE

## 2024-09-26 VITALS
RESPIRATION RATE: 16 BRPM | SYSTOLIC BLOOD PRESSURE: 112 MMHG | WEIGHT: 149 LBS | TEMPERATURE: 97.4 F | HEIGHT: 73 IN | BODY MASS INDEX: 19.75 KG/M2 | OXYGEN SATURATION: 96 % | DIASTOLIC BLOOD PRESSURE: 81 MMHG | HEART RATE: 91 BPM

## 2024-09-26 DIAGNOSIS — R04.0 EPISTAXIS: Primary | ICD-10-CM

## 2024-09-26 PROCEDURE — 2500000001 HC RX 250 WO HCPCS SELF ADMINISTERED DRUGS (ALT 637 FOR MEDICARE OP): Mod: SE

## 2024-09-26 PROCEDURE — 99283 EMERGENCY DEPT VISIT LOW MDM: CPT

## 2024-09-26 PROCEDURE — 2500000001 HC RX 250 WO HCPCS SELF ADMINISTERED DRUGS (ALT 637 FOR MEDICARE OP): Mod: SE | Performed by: EMERGENCY MEDICINE

## 2024-09-26 PROCEDURE — 30901 CONTROL OF NOSEBLEED: CPT | Mod: RT | Performed by: EMERGENCY MEDICINE

## 2024-09-26 RX ORDER — SILVER NITRATE 38.21; 12.74 MG/1; MG/1
STICK TOPICAL ONCE
Status: COMPLETED | OUTPATIENT
Start: 2024-09-26 | End: 2024-09-26

## 2024-09-26 RX ORDER — OXYMETAZOLINE HCL 0.05 %
2 SPRAY, NON-AEROSOL (ML) NASAL EVERY 12 HOURS PRN
Status: DISCONTINUED | OUTPATIENT
Start: 2024-09-26 | End: 2024-09-26 | Stop reason: HOSPADM

## 2024-09-26 RX ORDER — HYDROMORPHONE HYDROCHLORIDE 2 MG/1
2 TABLET ORAL ONCE
Status: COMPLETED | OUTPATIENT
Start: 2024-09-26 | End: 2024-09-26

## 2024-09-26 RX ORDER — HYDROMORPHONE HYDROCHLORIDE 2 MG/1
TABLET ORAL
Status: COMPLETED
Start: 2024-09-26 | End: 2024-09-26

## 2024-09-26 RX ADMIN — HYDROMORPHONE HYDROCHLORIDE 2 MG: 2 TABLET ORAL at 04:01

## 2024-09-26 RX ADMIN — SILVER NITRATE APPLICATORS 1 APPLICATION: 25; 75 STICK TOPICAL at 03:15

## 2024-09-26 ASSESSMENT — PAIN SCALES - GENERAL
PAINLEVEL_OUTOF10: 0 - NO PAIN
PAINLEVEL_OUTOF10: 8

## 2024-09-26 ASSESSMENT — PAIN - FUNCTIONAL ASSESSMENT: PAIN_FUNCTIONAL_ASSESSMENT: 0-10

## 2024-09-26 NOTE — ED PROVIDER NOTES
HPI   Chief Complaint   Patient presents with    Epistaxis (Nose Bleed)     Pt brought in from home via squad for nose bleed, pt not actively bleeding on arrival to ED, dried blood noted on face and neck, pt had nose bleed earlier in the evening and hospice nurse states they were able to stop it with pressure, pt went to bed around 2200 and woke up with nose bleed again, pt is on hospice with [facility], chonically on 5L O2 via NC. ABCs intact on arrival       74-year-old male with past medical history significant for renal cell adenocarcinoma with metastases terminally ill and hospice presents for epistaxis.  Oxygen dependent.  Denies any pain or any other complaints.  Sent for evaluation for continuing epistaxis.              Patient History   Past Medical History:   Diagnosis Date    Aspiration pneumonia (Multi)     At risk for falling     Coronary artery disease     HLD (hyperlipidemia)     HTN (hypertension)     Metastasis (Multi)     Renal cell adenocarcinoma (Multi)     Weakness      Past Surgical History:   Procedure Laterality Date    LUMBAR FUSION       Family History   Problem Relation Name Age of Onset    Hypertension Other       Social History     Tobacco Use    Smoking status: Never     Passive exposure: Never    Smokeless tobacco: Never   Vaping Use    Vaping status: Never Used   Substance Use Topics    Alcohol use: Not on file    Drug use: Never       Physical Exam   ED Triage Vitals [09/26/24 0120]   Temperature Heart Rate Respirations BP   36.3 °C (97.4 °F) 98 18 126/73      Pulse Ox Temp Source Heart Rate Source Patient Position   95 % Temporal Monitor Lying      BP Location FiO2 (%)     Left arm --       Physical Exam  HENT:      Head: Normocephalic and atraumatic.      Nose:      Comments: Right nasal anterior epistaxis from anterior inferior nasal septum     Mouth/Throat:      Mouth: Mucous membranes are moist.   Cardiovascular:      Rate and Rhythm: Normal rate and regular rhythm.   Pulmonary:       Effort: Pulmonary effort is normal.   Neurological:      Mental Status: He is alert.           ED Course & MDM   Diagnoses as of 09/26/24 0411   Epistaxis                 No data recorded     [place] Coma Scale Score: 15 (09/26/24 0156 : Nickie Hancock RN)                           Medical Decision Making  Patient presents with epistaxis.  On evaluation noted to be from the right anterior nasal septum.  Clots removed and area cleaned with oxymetazoline and cauterized with silver nitrate.  Patient is terminally ill on hospice with very short life expectancy.  No blood work done since no further treatment other than the hemostasis of the epistaxes was to be done.  Patient observed for an hour without rebleeding.  Discharge improved condition.        Procedure  Epistaxis Management    Performed by: Franki Saldivar MD  Authorized by: Franki Saldivar MD    Consent:     Consent obtained:  Verbal    Consent given by:  Patient    Risks discussed:  Bleeding, infection and nasal injury    Alternatives discussed:  No treatment and delayed treatment  Universal protocol:     Procedure explained and questions answered to patient or proxy's satisfaction: yes      Patient identity confirmed:  Verbally with patient  Anesthesia:     Anesthesia method:  None  Procedure details:     Treatment site:  R anterior    Treatment method:  Silver nitrate    Treatment complexity:  Limited    Treatment episode: recurring    Post-procedure details:     Assessment:  Bleeding stopped    Procedure completion:  Tolerated well, no immediate complications       Franki Saldivar MD  09/26/24 0411       Franki Saldivar MD  09/26/24 0412

## 2024-09-26 NOTE — ED TRIAGE NOTES
Pt brought in from home via squad for nose bleed, pt not actively bleeding on arrival to ED, dried blood noted on face and neck, pt had nose bleed earlier in the evening and hospice nurse states they were able to stop it with pressure, pt went to bed around 2200 and woke up with nose bleed again, pt is on hospice with [facility], chonically on 5L O2 via NC. ABCs intact on arrival

## 2024-10-01 ENCOUNTER — APPOINTMENT (OUTPATIENT)
Dept: HEMATOLOGY/ONCOLOGY | Facility: CLINIC | Age: 74
End: 2024-10-01
Payer: MEDICARE

## 2024-10-08 ENCOUNTER — APPOINTMENT (OUTPATIENT)
Dept: PALLIATIVE MEDICINE | Facility: CLINIC | Age: 74
End: 2024-10-08
Payer: MEDICARE

## (undated) DEVICE — ELECTRODE, ELECTROSURGICAL, BLADE EXT 4 INCH, INSULATED

## (undated) DEVICE — APPLICATOR, CHLORAPREP, W/ORANGE TINT, 26ML

## (undated) DEVICE — KIT, PATIENT CARE, JACKSON TABLE W/PRONE-SAFE HEADREST

## (undated) DEVICE — SEALANT, HEMOSTATIC, FLOSEAL, 10 ML

## (undated) DEVICE — SEALER, BIPOLAR, AQUA MANTYS 6.0

## (undated) DEVICE — SPONGE, HEMOSTATIC, GELATIN, SURGIFOAM, 8 X 12.5 CM X 10 MM

## (undated) DEVICE — Device

## (undated) DEVICE — PAD, GROUNDING, ELECTROSURGICAL, W/9 FT CABLE, POLYHESIVE II, ADULT, LF

## (undated) DEVICE — BONE, MILL, MIDAS REX, DUAL BLADE, ELECTRIC

## (undated) DEVICE — SUTURE, SILK, 2-0, 30 IN, SH, BLACK

## (undated) DEVICE — SKIN CLOSURE SYS, PREMIERPRO EXOFIN, 1-4CM X 22CM, 1.75G TUBE

## (undated) DEVICE — SUTURE, VICRYL, 0, 18 IN, UNDYED

## (undated) DEVICE — WOUND SYSTEM, DEBRIDEMENT & CLEANING, O.R DUOPAK

## (undated) DEVICE — DRESSING, MEPILEX, BORDER FLEX, 6 X 8

## (undated) DEVICE — DRESSING, MEPILEX, POST OP, 8 X 4

## (undated) DEVICE — DRAPE COVER, C ARM, FLOUROSCAN IMAGING SYS

## (undated) DEVICE — EVACUATOR, WOUND, CLOSED, 3 SPRING, 400 CC, Y CONNECTING TUBE

## (undated) DEVICE — TUBING, SMOKE EVAC, 3/8 X 10 FT

## (undated) DEVICE — SUTURE, VICRYL, 4-0, 18 IN, UNDYED BR PS-2

## (undated) DEVICE — SPHERE, STEALTHSTATION, 5-PK

## (undated) DEVICE — FLOSEAL, MATRIX, HEMOSTATIC, FULL STERILE PREP, 5ML

## (undated) DEVICE — DRAIN, WOUND, ROUND, W/TROCAR, HOLE PATTERN, 10 IN, MEDIUM, 1/8 X 49 IN

## (undated) DEVICE — SUTURE, VICRYL, 0, 18 IN, CT-1, UNDYED

## (undated) DEVICE — ELECTRODE, ELECTROSURGICAL, BLADE, INSULATED, ENT/IMA, STERILE

## (undated) DEVICE — COVER, CART, 45 X 27 X 48 IN, CLEAR

## (undated) DEVICE — DRESSING, MEPILEX, BORDER FLEX, 3 X 3

## (undated) DEVICE — BUR, 3MM X 3.8MM, PRECISION, NEURO DRILL

## (undated) DEVICE — CAUTERY, PENCIL, PUSH BUTTON, SMOKE EVAC, 70MM

## (undated) DEVICE — TIP,  ELECTRODE COATED INSULATED, EXTENDED, LF

## (undated) DEVICE — MANIFOLD, 4 PORT NEPTUNE STANDARD

## (undated) DEVICE — DRAPE, SHEET, FAN FOLDED, HALF, 44 X 58 IN, DISPOSABLE, LF, STERILE

## (undated) DEVICE — SUTURE, STRATAFIX, SPIRAL MONOCRYL PLUS, 3-0, PS-2 45CM, UNDYED

## (undated) DEVICE — MARKER, SKIN, RULER AND LABEL PACK, CUSTOM

## (undated) DEVICE — DRAPE, C-ARM IMAGE

## (undated) DEVICE — TAPE, SILK, DURAPORE, 3 IN X 10 YD, LF